# Patient Record
Sex: MALE | Race: WHITE | NOT HISPANIC OR LATINO | Employment: FULL TIME | URBAN - METROPOLITAN AREA
[De-identification: names, ages, dates, MRNs, and addresses within clinical notes are randomized per-mention and may not be internally consistent; named-entity substitution may affect disease eponyms.]

---

## 2017-01-12 ENCOUNTER — OFFICE VISIT (OUTPATIENT)
Dept: PSYCHIATRY | Facility: CLINIC | Age: 47
End: 2017-01-12
Attending: PSYCHIATRY & NEUROLOGY
Payer: MEDICARE

## 2017-01-12 VITALS
SYSTOLIC BLOOD PRESSURE: 143 MMHG | WEIGHT: 184.6 LBS | HEART RATE: 87 BPM | DIASTOLIC BLOOD PRESSURE: 100 MMHG | BODY MASS INDEX: 28.91 KG/M2

## 2017-01-12 DIAGNOSIS — F33.2 MAJOR DEPRESSIVE DISORDER, RECURRENT, SEVERE WITHOUT PSYCHOTIC FEATURES (H): Primary | ICD-10-CM

## 2017-01-12 PROCEDURE — 99212 OFFICE O/P EST SF 10 MIN: CPT | Mod: ZF

## 2017-01-12 RX ORDER — LORAZEPAM 0.5 MG/1
.5-1 TABLET ORAL EVERY 6 HOURS PRN
Qty: 30 TABLET | Refills: 2 | Status: SHIPPED | OUTPATIENT
Start: 2017-01-12 | End: 2018-06-28

## 2017-01-12 RX ORDER — METHYLPHENIDATE HYDROCHLORIDE 10 MG/1
20 TABLET ORAL 3 TIMES DAILY
Qty: 180 TABLET | Refills: 0 | Status: SHIPPED | OUTPATIENT
Start: 2017-01-12 | End: 2017-01-12

## 2017-01-12 RX ORDER — METHYLPHENIDATE HYDROCHLORIDE 10 MG/1
20 TABLET ORAL 3 TIMES DAILY
Qty: 180 TABLET | Refills: 0 | Status: SHIPPED | OUTPATIENT
Start: 2017-01-12 | End: 2017-03-06

## 2017-01-12 NOTE — PATIENT INSTRUCTIONS
Thank you for coming to [facility].    Lab Testing:  **If you had lab testing today and your results are reassuring or normal they will be mailed to you or sent through DrDoctor within 7 days.   **If the lab tests need quick action we will call you with the results.  The phone number we will call with results is # [phone] (home) . If this is not the best number please call our clinic and change the number.    Medication Refills:  If you need any refills please call your pharmacy and they will contact us. Please allow three business for refill processing.   If you need to  your refill at a new pharmacy, please contact the new pharmacy directly. The new pharmacy will help you get your medications transferred.     Scheduling:  If you have any concerns about today's visit or wish to schedule another appointment please call our office during normal business hours [phone] (8-5:00 M-F)    Contact Us:  Please call [phone] during business hours (8-5:00 M-F).  If after clinic hours, or on the weekend, please call  [phone].      MENTAL HEALTH CRISIS NUMBERS:  [facility]:   [facility] - [phone]   Crisis Residence [facility] Residence - [phone]   Walk-In Counseling [facility] [phone]   COPE 24/7 [place] Mobile Team for Adults - [[phone]]; Child - [[phone]]     Crisis Connection - [phone]     [facility] - [phone]   Walk-in counseling [facility] - [phone]   Walk-in counseling [facility] - [phone]   Crisis Residence [facility] Residence - [phone]   Urgent Care Adult Mental Health:   --Drop-in, 24/7 crisis line, and [employer] Mobile Team [[phone]]    CRISIS TEXT LINE: Text [phone] from anywhere, anytime, any crisis 24/7;    OR SEE www.crisistextline.org     Poison Control Center - [phone]    CHILD: [facility] needs assessment  team - [phone]     [facility] - [phone]; or [facility] - [phone]    If you have a medical emergency please call 911or go to the nearest ER.                    _____________________________________________    Again thank you for choosing [facility] and please let us know how we can best partner with you to improve you and your family's health.  You may be receiving a survey in the mail regarding this appointment. We would love to have your feedback, both positive and negative, so please fill out the survey and return it using the provided envolpe. The survey is done by an external company, so your answers are anonymous.

## 2017-01-12 NOTE — MR AVS SNAPSHOT
After Visit Summary   1/12/2017    Sebastien Hoover    MRN: [id number]           Patient Information     Date Of Birth          1970        Visit Information        Provider Department      1/12/2017 1:00 PM Venancio Ochoa MD [facility]        Today's Diagnoses     Major depressive disorder, recurrent, severe without psychotic features (H)    -  1       Care Instructions    Thank you for coming to [facility].    Lab Testing:  **If you had lab testing today and your results are reassuring or normal they will be mailed to you or sent through Smartmarket within 7 days.   **If the lab tests need quick action we will call you with the results.  The phone number we will call with results is # [phone] (home) . If this is not the best number please call our clinic and change the number.    Medication Refills:  If you need any refills please call your pharmacy and they will contact us. Please allow three business for refill processing.   If you need to  your refill at a new pharmacy, please contact the new pharmacy directly. The new pharmacy will help you get your medications transferred.     Scheduling:  If you have any concerns about today's visit or wish to schedule another appointment please call our office during normal business hours [phone] (8-5:00 M-F)    Contact Us:  Please call [phone] during business hours (8-5:00 M-F).  If after clinic hours, or on the weekend, please call  [phone].      MENTAL HEALTH CRISIS NUMBERS:  [facility]:   [facility] - [phone]   Crisis Residence [facility] Residence - [phone]   Walk-In Counseling [facility] - [phone]   COPE 24/7 [place] Mobile Team for Adults - [[phone]]; Child - [[phone]]     Crisis Connection - [phone]     [facility]:   [facility] - [phone]   Walk-in counseling [facility] - [phone]   Walk-in counseling   [facility] [place] [facility] - [phone]   Crisis Residence [facility] Residence - [phone]   Urgent Care Adult Mental Health:   --Drop-in, 24/7 crisis line, and [facility] Mobile Team [[phone]]    CRISIS TEXT LINE: Text [phone] from anywhere, anytime, any crisis 24/7;    OR SEE www.crisistextline.org     Poison Control Center - [phone]    CHILD: [facility] needs assessment team - [phone]     [facility] - [phone]; or [facility] [facility] - [phone]    If you have a medical emergency please call 911or go to the nearest ER.                    _____________________________________________    Again thank you for choosing [facility] and please let us know how we can best partner with you to improve you and your family's health.  You may be receiving a survey in the mail regarding this appointment. We would love to have your feedback, both positive and negative, so please fill out the survey and return it using the provided envolpe. The survey is done by an external company, so your answers are anonymous.           Follow-ups after your visit        Your next 10 appointments already scheduled     Mar 16, 2017  1:00 PM   Adult Med Follow UP with Venancio Ochoa MD   Psychiatry Clinic ([facility])    [place] [id number]  [facility] [address]   [phone]            May 11, 2017  1:00 PM   Adult Med Follow UP with Venancio Ochoa MD   Psychiatry Clinic ([facility])    [address]  [address] [place] [address]   [phone]            Jul 06, 2017  1:00 PM   Adult Med Follow UP with Venancio Ochoa MD   Psychiatry Clinic ([facility])    [address]  [address] [place] [address]   [phone]            Sep 07, 2017  1:30 PM   Adult Med Follow UP with Venancio Ochoa MD   Psychiatry Clinic ([facility])    [address]  [address] [facility] [address]   [phone]            Nov 02, 2017  2:00 PM   Adult Med Follow UP with Venancio Ochoa MD   Psychiatry Clinic ([facility])    [address]  [address] [facility] [address]   [phone]            Jan 04, 2018  1:00 PM   Adult Med Follow UP with Venancio Ochoa MD   Psychiatry Clinic ([facility])    [address]  [address] [facility] [address]   [phone]              Who to contact     Please call your clinic at [phone] to:    Ask questions about your health    Make or cancel appointments    Discuss your medicines    Learn about your test results    Speak to your doctor   If you have compliments or concerns about an experience at your clinic, or if you wish to file a complaint, please contact [facility] Patient Relations at [phone] or email us at [email or web address]         Additional Information About Your Visit        [employer] Information     unbound technologiest gives you secure access to your electronic health record. If you see a primary care provider, you can also send messages to your care team and make appointments. If you have questions, please call your primary care clinic.  If you do not have a primary care provider, please call [phone] and they will assist you.      F[employer] is an electronic gateway that provides easy, online access to your medical records. With [employer], you can request a clinic appointment, read your test results, renew a prescription or communicate with your care team.     To access your existing account, please contact your [facility] Clinic or call [phone] for assistance.        Care EveryWhere ID     This is your Care EveryWhere ID. This could be used by other organizations to access your [facility]  records  [id number]        Your Vitals Were     Pulse                   87            Blood Pressure from Last 3 Encounters:   01/12/17 143/100   12/22/16 119/78   12/16/16 133/87    Weight from Last 3 Encounters:   01/12/17 83.734 kg (184 lb 9.6 oz)   12/12/16 85.548 kg (188 lb 9.6 oz)   12/07/16 85.276 kg (188 lb)              Today, you had the following     No orders found for display         Today's Medication Changes          These changes are accurate as of: 1/12/17  1:21 PM.  If you have any questions, ask your nurse or doctor.               Start taking these medicines.        Dose/Directions    methylphenidate 10 MG tablet   Commonly known as:  RITALIN   Used for:  Major depressive disorder, recurrent, severe without psychotic features (H)   Started by:  Venancio Ochoa MD        Dose:  20 mg   Take 2 tablets (20 mg) by mouth 3 times daily   Quantity:  180 tablet   Refills:  0            Where to get your medicines      Some of these will need a paper prescription and others can be bought over the counter.  Ask your nurse if you have questions.     Bring a paper prescription for each of these medications    - LORazepam 0.5 MG tablet  - methylphenidate 10 MG tablet             Primary Care Provider Office Phone # Fax #    Candy Ridley [phone]       [facility] [address] [facility] [address]        Thank you!     Thank you for choosing [facility]  for your care. Our goal is always to provide you with excellent care. Hearing back from our patients is one way we can continue to improve our services. Please take a few minutes to complete the written survey that you may receive in the mail after your visit with us. Thank you!             Your Updated Medication List - Protect others around you: Learn how to safely use, store and throw away your medicines at www.disposemymeds.org.          This list is accurate as of: 1/12/17  1:21 PM.  Always use  your most recent med list.                   Brand Name Dispense Instructions for use    celecoxib 100 MG capsule    celeBREX    180 capsule    Take 1 capsule (100 mg) by mouth 2 times daily as needed for moderate pain       KETAMINE HCL          lithium 150 MG capsule     90 capsule    Take 1 capsule (150 mg) by mouth daily       LORazepam 0.5 MG tablet    ATIVAN    30 tablet    Take 1-2 tablets (0.5-1 mg) by mouth every 6 hours as needed for anxiety       methylphenidate 10 MG tablet    RITALIN    180 tablet    Take 2 tablets (20 mg) by mouth 3 times daily       sertraline 25 MG tablet    ZOLOFT    45 tablet    Take 0.5 tablets (12.5 mg) by mouth daily

## 2017-01-12 NOTE — NURSING NOTE
Chief Complaint   Patient presents with     Recheck Medication   Major depressive disorder      Reviewed allergies, smoking status, and pharmacy preference  Administered abuse screening questions   Obtained weight, blood pressure and heart rate

## 2017-01-13 NOTE — PROGRESS NOTES
Daniel states that the last 6 weeks were hard.  The past 5-6 days he's been feeling pretty good.  Holidays were stressful and seasonal pattern.  For 2 weeks he had 3 ketamine infusions per week without significant benefit.  Wants to go back to 1 infusion q 3 weeks.  Celebrex didn't help mood.  His mood is currently fair. Sleep is OK.  He has a carbohydrate craving.  Concentration is poor.  Energy and motivation are both down.  He is anhedonic.  No homicidal ideation.  Suicidal ideation without plans.  No flashbacks.  No auditory or visual hallucinations.  No paranoia or ideas of reference.  Anxiety in social situations and from ketamine.  No OCD.  Rare alcohol use and no substance use.      MENTAL STATUS EXAMINATION:  Daniel is cooperative, good grooming.  Affect is neutral.  Movements are normal.  No psychosis or homicidal ideation.  Recent recall intact.  Good eye contact.  Mood is down.  Speech rate is normal.  Thought process is logical.  No current suicidal ideation.  Orientation x 4.      CURRENT MEDICATIONS:   1.  Zoloft 25 mg   2. Ritalin 20 mg TID   3. Lithium 150 mg   4. Lorazepam 0.5 mg prn   5. Ketamine infusions   6. Ketamine nasal spray - 6 sprays in each nostril q 4-5 days      ASSESSMENT:  MDD recurrent moderate to severe intensity refractory to treatment without psychosis      PLAN:  Still looking at having deep TMS.  Looking for psychologist.      This was a 25 minute face-to-face encounter of which >50% of the time was spent on discussion of ketamine frequent and TMS.         FANNY ZELAYA MD             D: 2017 09:54   T: 2017 10:03   MT: JULIUS      Name:     DANIEL HUNT   MRN:      [id number]        Account:      [id number]   :      1970           Service Date: 2017      Document: [id number]

## 2017-02-01 ENCOUNTER — INFUSION THERAPY VISIT (OUTPATIENT)
Dept: INFUSION THERAPY | Facility: CLINIC | Age: 47
End: 2017-02-01
Attending: PSYCHIATRY & NEUROLOGY
Payer: MEDICARE

## 2017-02-01 VITALS
OXYGEN SATURATION: 97 % | DIASTOLIC BLOOD PRESSURE: 80 MMHG | TEMPERATURE: 98.5 F | HEART RATE: 72 BPM | SYSTOLIC BLOOD PRESSURE: 124 MMHG

## 2017-02-01 DIAGNOSIS — F33.2 SEVERE EPISODE OF RECURRENT MAJOR DEPRESSIVE DISORDER, WITHOUT PSYCHOTIC FEATURES (H): Primary | ICD-10-CM

## 2017-02-01 DIAGNOSIS — F33.2 SEVERE RECURRENT MAJOR DEPRESSION WITHOUT PSYCHOTIC FEATURES (H): ICD-10-CM

## 2017-02-01 PROCEDURE — 25000125 ZZHC RX 250: Performed by: PSYCHIATRY & NEUROLOGY

## 2017-02-01 PROCEDURE — 25000128 H RX IP 250 OP 636: Performed by: PSYCHIATRY & NEUROLOGY

## 2017-02-01 PROCEDURE — 96365 THER/PROPH/DIAG IV INF INIT: CPT

## 2017-02-01 RX ADMIN — KETAMINE HYDROCHLORIDE 37.5 MG: 50 INJECTION, SOLUTION INTRAMUSCULAR; INTRAVENOUS at 10:31

## 2017-02-01 NOTE — PROGRESS NOTES
Pt here for ketamine infusion.  Had some extra infusions in Nov and Dec, but states they didn't help as much as he had hoped.  Monitored on cont pulse ox and q 15 min VS during and for 1 hr post infusion.  Tolerated well.  RTC in 3 weeks.

## 2017-02-22 ENCOUNTER — INFUSION THERAPY VISIT (OUTPATIENT)
Dept: INFUSION THERAPY | Facility: CLINIC | Age: 47
End: 2017-02-22
Attending: PSYCHIATRY & NEUROLOGY
Payer: MEDICARE

## 2017-02-22 VITALS
DIASTOLIC BLOOD PRESSURE: 89 MMHG | TEMPERATURE: 98 F | OXYGEN SATURATION: 96 % | RESPIRATION RATE: 16 BRPM | HEART RATE: 72 BPM | SYSTOLIC BLOOD PRESSURE: 126 MMHG

## 2017-02-22 DIAGNOSIS — F33.2 SEVERE RECURRENT MAJOR DEPRESSION WITHOUT PSYCHOTIC FEATURES (H): ICD-10-CM

## 2017-02-22 DIAGNOSIS — F33.2 SEVERE EPISODE OF RECURRENT MAJOR DEPRESSIVE DISORDER, WITHOUT PSYCHOTIC FEATURES (H): Primary | ICD-10-CM

## 2017-02-22 PROCEDURE — 96365 THER/PROPH/DIAG IV INF INIT: CPT

## 2017-02-22 PROCEDURE — 25000125 ZZHC RX 250: Performed by: PSYCHIATRY & NEUROLOGY

## 2017-02-22 PROCEDURE — 25000128 H RX IP 250 OP 636: Performed by: PSYCHIATRY & NEUROLOGY

## 2017-02-22 RX ADMIN — KETAMINE HYDROCHLORIDE 37.5 MG: 50 INJECTION, SOLUTION INTRAMUSCULAR; INTRAVENOUS at 10:39

## 2017-02-22 ASSESSMENT — PAIN SCALES - GENERAL: PAINLEVEL: NO PAIN (0)

## 2017-02-22 NOTE — MR AVS SNAPSHOT
After Visit Summary   2/22/2017    Sebastien Hoover    MRN: [id number]           Patient Information     Date Of Birth          1970        Visit Information        Provider Department      2/22/2017 10:00 AM UR CH 02 [facility], Infusion Services        Today's Diagnoses     Severe episode of recurrent major depressive disorder, without psychotic features (H)    -  1    Severe recurrent major depression without psychotic features (H)           Follow-ups after your visit        Your next 10 appointments already scheduled     Mar 15, 2017 10:00 AM CDT   Level 3 with UR CH 01   [facility], Infusion Services ([facility])    [address]  [address]  [facility] [address]   [phone]            Mar 16, 2017  1:00 PM CDT   Adult Med Follow UP with Venancio Ochoa MD   Psychiatry Clinic ([facility])    [address]  [address] [place] [address]   [phone]            Apr 05, 2017 10:00 AM CDT   Level 3 with UR CH 02   [facility], Infusion Services ([facility])    [address] [place] [address] [address]  [address]  [facility] [address]   [phone]            May 11, 2017  1:00 PM CDT   Adult Med Follow UP with Venancio Ochoa MD   Psychiatry Clinic ([facility])    [address]  [address] [place] [address]   [phone]            Jul 06, 2017  1:00 PM CDT   Adult Med Follow UP with Venancio Ochoa MD   Psychiatry Clinic ([facility])    [address] [place] [address]  [address] [place] [address]   [phone]            Sep 07, 2017  1:30 PM CDT   Adult Med Follow UP with Venancio Ochoa MD   Psychiatry Clinic ([facility])    [address] [place] [address]  [address] [place] [address]   [phone]            Nov 02, 2017  2:00 PM CDT    Adult Med Follow UP with Venancio Ochoa MD   Psychiatry Clinic ([facility])    [address]  [address] [facility] [address] [phone]            Jan 04, 2018  1:00 PM CST   Adult Med Follow UP with Venancio Ochoa MD   Psychiatry Clinic ([facility])    [address] [place] [address]  [address] [facility] [address] [phone]              Who to contact     If you have questions or need follow up information about today's clinic visit or your schedule please contact [facility], INFUSION SERVICES directly at [phone].  Normal or non-critical lab and imaging results will be communicated to you by Digilabhart, letter or phone within 4 business days after the clinic has received the results. If you do not hear from us within 7 days, please contact the clinic through [employer]Sunt or phone. If you have a critical or abnormal lab result, we will notify you by phone as soon as possible.  Submit refill requests through SplashCast or call your pharmacy and they will forward the refill request to us. Please allow 3 business days for your refill to be completed.          Additional Information About Your Visit        [employer] Information     Spl[employer] gives you secure access to your electronic health record. If you see a primary care provider, you can also send messages to your care team and make appointments. If you have questions, please call your primary care clinic.  If you do not have a primary care provider, please call [phone] and they will assist you.        Care EveryWhere ID     This is your Care EveryWhere ID. This could be used by other organizations to access your [facility] medical records  [id number]        Your Vitals Were     Pulse Temperature Respirations Pulse Oximetry          72 98  F (36.7  C) (Oral) 16 96%         Blood Pressure from Last 3 Encounters:   02/22/17 126/89   02/01/17 124/80   12/22/16 119/78    Weight  from Last 3 Encounters:   12/12/16 85.5 kg (188 lb 9.6 oz)   12/07/16 85.3 kg (188 lb)   12/05/16 85.2 kg (187 lb 12.8 oz)              We Performed the Following     Nursing Communication 1     Nursing observation     Pulse oximetry nursing     Vital signs          Today's Medication Changes          These changes are accurate as of: 2/22/17  2:45 PM.  If you have any questions, ask your nurse or doctor.               Stop taking these medicines if you haven't already. Please contact your care team if you have questions.     celecoxib 100 MG capsule   Commonly known as:  celeBREX                    Primary Care Provider Office Phone # Fax #    Candy Sweta Ridley [phone]       [facility] [address] [facility] [address]        Thank you!     Thank you for choosing [facility], INFUSION SERVICES  for your care. Our goal is always to provide you with excellent care. Hearing back from our patients is one way we can continue to improve our services. Please take a few minutes to complete the written survey that you may receive in the mail after your visit with us. Thank you!             Your Updated Medication List - Protect others around you: Learn how to safely use, store and throw away your medicines at www.disposemymeds.org.          This list is accurate as of: 2/22/17  2:45 PM.  Always use your most recent med list.                   Brand Name Dispense Instructions for use    KETAMINE HCL          lithium 150 MG capsule     90 capsule    Take 1 capsule (150 mg) by mouth daily       LORazepam 0.5 MG tablet    ATIVAN    30 tablet    Take 1-2 tablets (0.5-1 mg) by mouth every 6 hours as needed for anxiety       methylphenidate 10 MG tablet    RITALIN    180 tablet    Take 2 tablets (20 mg) by mouth 3 times daily       sertraline 25 MG tablet    ZOLOFT    45 tablet    Take 0.5 tablets (12.5 mg) by mouth daily

## 2017-02-22 NOTE — PROGRESS NOTES
Infusion Nursing Note:  Sebastien Hoover presents today for ketamine.    Patient seen by provider today: No   present during visit today: Not Applicable.    Note: Patient states he is doing ok.    Intravenous Access:  Peripheral IV placed.    Post Infusion Assessment:  Patient tolerated infusion without incident.  Patient observed for 60 minutes post ketamine per protocol.  Blood return noted pre and post infusion.  Site patent and intact, free from redness, edema or discomfort.  No evidence of extravasations.  Access discontinued per protocol.    Discharge Plan:   Patient discharged in stable condition accompanied by: self.  Departure Mode: Ambulatory.    Angela Davis RN

## 2017-03-06 DIAGNOSIS — F33.2 MAJOR DEPRESSIVE DISORDER, RECURRENT, SEVERE WITHOUT PSYCHOTIC FEATURES (H): ICD-10-CM

## 2017-03-06 RX ORDER — METHYLPHENIDATE HYDROCHLORIDE 10 MG/1
20 TABLET ORAL 3 TIMES DAILY
Qty: 180 TABLET | Refills: 0 | Status: SHIPPED | OUTPATIENT
Start: 2017-03-06 | End: 2017-03-16

## 2017-03-08 NOTE — TELEPHONE ENCOUNTER
Writer rec'd hard copy script for Ritalin 20 mg TID.  Script signed by provider.    Per Dr. Ochoa:  Mail Stephans Rx to his home     Script mailed to pharmacy

## 2017-03-15 ENCOUNTER — INFUSION THERAPY VISIT (OUTPATIENT)
Dept: INFUSION THERAPY | Facility: CLINIC | Age: 47
End: 2017-03-15
Attending: PSYCHIATRY & NEUROLOGY
Payer: MEDICARE

## 2017-03-15 VITALS
OXYGEN SATURATION: 96 % | DIASTOLIC BLOOD PRESSURE: 80 MMHG | HEART RATE: 67 BPM | TEMPERATURE: 98 F | RESPIRATION RATE: 16 BRPM | SYSTOLIC BLOOD PRESSURE: 131 MMHG

## 2017-03-15 DIAGNOSIS — F33.2 SEVERE EPISODE OF RECURRENT MAJOR DEPRESSIVE DISORDER, WITHOUT PSYCHOTIC FEATURES (H): Primary | ICD-10-CM

## 2017-03-15 DIAGNOSIS — F33.2 SEVERE RECURRENT MAJOR DEPRESSION WITHOUT PSYCHOTIC FEATURES (H): ICD-10-CM

## 2017-03-15 PROCEDURE — 25000128 H RX IP 250 OP 636: Performed by: PSYCHIATRY & NEUROLOGY

## 2017-03-15 PROCEDURE — 96365 THER/PROPH/DIAG IV INF INIT: CPT

## 2017-03-15 PROCEDURE — 25000125 ZZHC RX 250: Performed by: PSYCHIATRY & NEUROLOGY

## 2017-03-15 RX ADMIN — KETAMINE HYDROCHLORIDE 37.5 MG: 50 INJECTION, SOLUTION INTRAMUSCULAR; INTRAVENOUS at 10:20

## 2017-03-15 ASSESSMENT — PAIN SCALES - GENERAL: PAINLEVEL: NO PAIN (0)

## 2017-03-15 NOTE — MR AVS SNAPSHOT
After Visit Summary   3/15/2017    Sebastien Hoover    MRN: [id number]           Patient Information     Date Of Birth          1970        Visit Information        Provider Department      3/15/2017 10:00 AM UR CH 01 [facility]        Today's Diagnoses     Severe episode of recurrent major depressive disorder, without psychotic features (H)    -  1    Severe recurrent major depression without psychotic features (H)           Follow-ups after your visit        Your next 10 appointments already scheduled     Mar 16, 2017  1:00 PM CDT   Adult Med Follow UP with Venancio Ochoa MD   [facility]    [address]  [address] [place] [address]   [phone]            Apr 05, 2017 10:00 AM CDT   Level 3 with UR CH 02   [facility] ([facility])    [address] [place] [address]   [phone]            May 11, 2017  1:00 PM CDT   Adult Med Follow UP with Venancio Ochoa MD   [facility]    [address]  [address] [place] [address]   [phone]            Jul 06, 2017  1:00 PM CDT   Adult Med Follow UP with Venancio Ochoa MD   [facility]    [address]  [address] [place] [address]   [phone]            Sep 07, 2017  1:30 PM CDT   Adult Med Follow UP with Venancio Ochoa MD   [facility]    [address]  [address] [place] [address]   [phone]            Nov 02, 2017  2:00 PM CDT   Adult Med Follow UP with Venancio Ochoa MD   [facility]    [address]  [address] [place] [address]   [phone]            Jan 04, 2018  1:00 PM  CST   Adult Med Follow UP with Venancio Ochoa MD   Psychiatry Clinic ([facility])    [address]  [address] [facility] [address] [phone]              Who to contact     If you have questions or need follow up information about today's clinic visit or your schedule please contact [facility] [place], INFUSION SERVICES directly at [phone].  Normal or non-critical lab and imaging results will be communicated to you by Method CRMhart, letter or phone within 4 business days after the clinic has received the results. If you do not hear from us within 7 days, please contact the clinic through Wintegrat or phone. If you have a critical or abnormal lab result, we will notify you by phone as soon as possible.  Submit refill requests through [employer] or call your pharmacy and they will forward the refill request to us. Please allow 3 business days for your refill to be completed.          Additional Information About Your Visit        [employer] Information     [employer] gives you secure access to your electronic health record. If you see a primary care provider, you can also send messages to your care team and make appointments. If you have questions, please call your primary care clinic.  If you do not have a primary care provider, please call [phone] and they will assist you.        Care EveryWhere ID     This is your Care EveryWhere ID. This could be used by other organizations to access your [place] medical records  [id number]        Your Vitals Were     Pulse Temperature Respirations Pulse Oximetry          67 98  F (36.7  C) 16 96%         Blood Pressure from Last 3 Encounters:   03/15/17 131/80   02/22/17 126/89   02/01/17 124/80    Weight from Last 3 Encounters:   12/12/16 85.5 kg (188 lb 9.6 oz)   12/07/16 85.3 kg (188 lb)   12/05/16 85.2 kg (187 lb 12.8 oz)              We Performed the Following     Pulse oximetry nursing     Vital signs        Primary Care  Provider Office Phone # Fax #    Candy Ridley [phone]       [facility] [address]  [facility] [address]        Thank you!     Thank you for choosing [facility] SERVICES  for your care. Our goal is always to provide you with excellent care. Hearing back from our patients is one way we can continue to improve our services. Please take a few minutes to complete the written survey that you may receive in the mail after your visit with us. Thank you!             Your Updated Medication List - Protect others around you: Learn how to safely use, store and throw away your medicines at www.disposemymeds.org.          This list is accurate as of: 3/15/17  1:03 PM.  Always use your most recent med list.                   Brand Name Dispense Instructions for use    KETAMINE HCL          lithium 150 MG capsule     90 capsule    Take 1 capsule (150 mg) by mouth daily       LORazepam 0.5 MG tablet    ATIVAN    30 tablet    Take 1-2 tablets (0.5-1 mg) by mouth every 6 hours as needed for anxiety       methylphenidate 10 MG tablet    RITALIN    180 tablet    Take 2 tablets (20 mg) by mouth 3 times daily       sertraline 25 MG tablet    ZOLOFT    45 tablet    Take 0.5 tablets (12.5 mg) by mouth daily

## 2017-03-15 NOTE — PROGRESS NOTES
Infusion Nursing Note:  Sebastien Hoover presents today for ketamine.    Patient seen by provider today: No   present during visit today: Not Applicable.    Note: patient states that depression is stable.    Intravenous Access:  Peripheral IV placed.    Post Infusion Assessment:  Patient tolerated infusion without incident.  Patient observed for 60 minutes post ketamine per protocol.  Blood return noted pre and post infusion.  Site patent and intact, free from redness, edema or discomfort.  No evidence of extravasations.  Access discontinued per protocol.    Discharge Plan:   Patient discharged in stable condition accompanied by: self, has ride.  Departure Mode: Ambulatory.  Will return to clinic in 3 weeks.  Angela Davis RN

## 2017-03-16 ENCOUNTER — OFFICE VISIT (OUTPATIENT)
Dept: PSYCHIATRY | Facility: CLINIC | Age: 47
End: 2017-03-16
Attending: PSYCHIATRY & NEUROLOGY
Payer: MEDICARE

## 2017-03-16 VITALS
SYSTOLIC BLOOD PRESSURE: 139 MMHG | DIASTOLIC BLOOD PRESSURE: 98 MMHG | BODY MASS INDEX: 28.91 KG/M2 | WEIGHT: 184.6 LBS | HEART RATE: 81 BPM

## 2017-03-16 DIAGNOSIS — F33.2 MAJOR DEPRESSIVE DISORDER, RECURRENT, SEVERE WITHOUT PSYCHOTIC FEATURES (H): ICD-10-CM

## 2017-03-16 LAB
FOLATE SERPL-MCNC: 9.1 NG/ML
TSH SERPL DL<=0.005 MIU/L-ACNC: 1.14 MU/L (ref 0.4–4)
VIT B12 SERPL-MCNC: 272 PG/ML (ref 193–986)

## 2017-03-16 PROCEDURE — 84443 ASSAY THYROID STIM HORMONE: CPT | Performed by: PSYCHIATRY & NEUROLOGY

## 2017-03-16 PROCEDURE — 36415 COLL VENOUS BLD VENIPUNCTURE: CPT | Performed by: PSYCHIATRY & NEUROLOGY

## 2017-03-16 PROCEDURE — 82746 ASSAY OF FOLIC ACID SERUM: CPT | Performed by: PSYCHIATRY & NEUROLOGY

## 2017-03-16 PROCEDURE — 99212 OFFICE O/P EST SF 10 MIN: CPT | Mod: ZF

## 2017-03-16 PROCEDURE — 82607 VITAMIN B-12: CPT | Performed by: PSYCHIATRY & NEUROLOGY

## 2017-03-16 RX ORDER — METHYLPHENIDATE HYDROCHLORIDE 10 MG/1
20 TABLET ORAL 3 TIMES DAILY
Qty: 180 TABLET | Refills: 0 | Status: SHIPPED | OUTPATIENT
Start: 2017-03-16 | End: 2017-03-24

## 2017-03-16 RX ORDER — METHYLPHENIDATE HYDROCHLORIDE 10 MG/1
20 TABLET ORAL 3 TIMES DAILY
Qty: 180 TABLET | Refills: 0 | Status: SHIPPED | OUTPATIENT
Start: 2017-03-16 | End: 2017-03-16

## 2017-03-16 NOTE — NURSING NOTE
Chief Complaint   Patient presents with     RECHECK     Severe episode of recurrent major depressive disorder, without psychotic features      Reviewed allergies, smoking status, and pharmacy preference  Administered abuse screening questions   Obtained weight, blood pressure and heart rate   Vilma Miller LPN

## 2017-03-16 NOTE — MR AVS SNAPSHOT
After Visit Summary   3/16/2017    Sebastien Hoover    MRN: [id number]           Patient Information     Date Of Birth          1970        Visit Information        Provider Department      3/16/2017 1:00 PM Venancio Ochoa MD Psychiatry Clinic        Today's Diagnoses     Major depressive disorder, recurrent, severe without psychotic features (H)           Follow-ups after your visit        Your next 10 appointments already scheduled     Apr 05, 2017 10:00 AM CDT   Level 3 with UR CH 02   [facility] ([facility])    [address] [place] [address] [address] [address]   [phone]            May 11, 2017  1:00 PM CDT   Adult Med Follow UP with Venancio Ochoa MD   Psychiatry Clinic ([facility])    [address] [place] [address]  [address] [place] [address]   [phone]            Jul 06, 2017  1:00 PM CDT   Adult Med Follow UP with Venancio Ochoa MD   Psychiatry Clinic ([facility])    [address]  [address] [place] [address]   [phone]            Sep 07, 2017  1:30 PM CDT   Adult Med Follow UP with Venancio Ochoa MD   Psychiatry Clinic ([facility])    [address]  [address] [place] [address]   [phone]            Nov 02, 2017  2:00 PM CDT   Adult Med Follow UP with Venancio Ochoa MD   Psychiatry Clinic ([facility])    [address]  [address] [place] [address]   [phone]            Jan 04, 2018  1:00 PM CST   Adult Med Follow UP with Venancio Ochoa MD   Psychiatry Clinic ([facility])    [address]  [address] [place] [address] [phone]              Who to contact     Please call your clinic at [phone] to:    Ask questions about your health    Make or  cancel appointments    Discuss your medicines    Learn about your test results    Speak to your doctor   If you have compliments or concerns about an experience at your clinic, or if you wish to file a complaint, please contact [facility] Patient Relations at [phone] or email us at [email or web address]         Additional Information About Your Visit        C[employer] Coffee[employer] Information     3LMt gives you secure access to your electronic health record. If you see a primary care provider, you can also send messages to your care team and make appointments. If you have questions, please call your primary care clinic.  If you do not have a primary care provider, please call [phone] and they will assist you.      En[employer] HQ is an electronic gateway that provides easy, online access to your medical records. With [employer] HQ, you can request a clinic appointment, read your test results, renew a prescription or communicate with your care team.     To access your existing account, please contact your [facility] Clinic or call [phone] for assistance.        Care EveryWhere ID     This is your Care EveryWhere ID. This could be used by other organizations to access your [place] medical records  [id number]        Your Vitals Were     Pulse BMI (Body Mass Index)                81 28.91 kg/m2           Blood Pressure from Last 3 Encounters:   03/16/17 (!) 139/98   03/15/17 131/80   02/22/17 126/89    Weight from Last 3 Encounters:   03/16/17 83.7 kg (184 lb 9.6 oz)   01/12/17 83.7 kg (184 lb 9.6 oz)   12/12/16 85.5 kg (188 lb 9.6 oz)              We Performed the Following     Folate     TSH with free T4 reflex     Vitamin B12          Today's Medication Changes          These changes are accurate as of: 3/16/17 11:59 PM.  If you have any questions, ask your nurse or doctor.               Start taking these medicines.        Dose/Directions    methylphenidate 10 MG  tablet   Commonly known as:  RITALIN   Used for:  Major depressive disorder, recurrent, severe without psychotic features (H)   Started by:  Venancio Ochoa MD        Dose:  20 mg   Take 2 tablets (20 mg) by mouth 3 times daily   Quantity:  180 tablet   Refills:  0            Where to get your medicines      Some of these will need a paper prescription and others can be bought over the counter.  Ask your nurse if you have questions.     Bring a paper prescription for each of these medications     methylphenidate 10 MG tablet                Primary Care Provider Office Phone # Fax #    Candy Ridley [phone]       [facility] [address]  [facility] [address]        Thank you!     Thank you for choosing PSYCHIATRY CLINIC  for your care. Our goal is always to provide you with excellent care. Hearing back from our patients is one way we can continue to improve our services. Please take a few minutes to complete the written survey that you may receive in the mail after your visit with us. Thank you!             Your Updated Medication List - Protect others around you: Learn how to safely use, store and throw away your medicines at www.disposemymeds.org.          This list is accurate as of: 3/16/17 11:59 PM.  Always use your most recent med list.                   Brand Name Dispense Instructions for use    KETAMINE HCL          lithium 150 MG capsule     90 capsule    Take 1 capsule (150 mg) by mouth daily       LORazepam 0.5 MG tablet    ATIVAN    30 tablet    Take 1-2 tablets (0.5-1 mg) by mouth every 6 hours as needed for anxiety       methylphenidate 10 MG tablet    RITALIN    180 tablet    Take 2 tablets (20 mg) by mouth 3 times daily       sertraline 25 MG tablet    ZOLOFT    45 tablet    Take 0.5 tablets (12.5 mg) by mouth daily

## 2017-03-17 ENCOUNTER — TELEPHONE (OUTPATIENT)
Dept: PSYCHIATRY | Facility: CLINIC | Age: 47
End: 2017-03-17

## 2017-03-17 NOTE — TELEPHONE ENCOUNTER
Two hard copies for Methylphenidate place in Dr. Ochoa's folder for signature.     Message routed to him.

## 2017-03-20 NOTE — PROGRESS NOTES
Daniel is having seasonal worsening.  Still waiting to start TMS.  Mood is not as good.  Frustrated that he doesn't have the motivation to exercise.  Physical health is good.  Mood is pretty down.  He has early a.m. awakening.  He has a carbohydrate craving.  Concentration is poor.  Energy is down.  Motivation is poor.  No ability to experience pleasure.  No homicidal ideation.  Some passive suicidal ideation.  No auditory or visual hallucinations.  No paranoia or ideas of reference.  Anxiety only during ketamine use.  Panic attacks sometimes during ketamine infusion.  Some checking behaviors.  No alcohol or substance use.      MENTAL STATUS EXAMINATION:  Daniel is cooperative and has good grooming.  Affect is congruent.  Movements are normal.  No psychosis.  No homicidal ideation.  Good recent recall.  Good eye contact.  Mood is sad.  Speech rate is normal.  Thought process is logical.  Passive suicidal ideation.  Orientation x 4.      CURRENT MEDICATIONS:   1.  Lithium 150 mg   2. Ativan 0.5 mg prn   3. Zoloft 25 mg   4. Ritalin 20 mg TID\   5. Ketamine IV q 3 wk & intranasal      ASSESSMENT:  MDD, severe and recurrent without psychosis refractory to treatment.      PLAN:  No med changes.  Discussed his concerns about vitamin levels - will check B12 and folate.      This was a 30 minute face-to-face encounter of which >50% of the time was spent on review of upcoming treatments for depression.         FANNY ZELAYA MD             D: 2017 09:48   T: 2017 09:57   MT: JULIUS      Name:     DANIEL HUNT   MRN:      [id number]        Account:      [id number]   :      1970           Service Date: 2017      Document: [id number]

## 2017-03-21 ENCOUNTER — CARE COORDINATION (OUTPATIENT)
Dept: PSYCHIATRY | Facility: CLINIC | Age: 47
End: 2017-03-21

## 2017-03-24 DIAGNOSIS — F33.2 MAJOR DEPRESSIVE DISORDER, RECURRENT, SEVERE WITHOUT PSYCHOTIC FEATURES (H): ICD-10-CM

## 2017-03-24 RX ORDER — METHYLPHENIDATE HYDROCHLORIDE 10 MG/1
20 TABLET ORAL 3 TIMES DAILY
Qty: 180 TABLET | Refills: 0 | Status: SHIPPED | OUTPATIENT
Start: 2017-03-24 | End: 2017-05-11

## 2017-03-24 RX ORDER — METHYLPHENIDATE HYDROCHLORIDE 10 MG/1
20 TABLET ORAL 3 TIMES DAILY
Qty: 180 TABLET | Refills: 0 | Status: SHIPPED | OUTPATIENT
Start: 2017-03-24 | End: 2017-03-24

## 2017-03-24 NOTE — TELEPHONE ENCOUNTER
Scripts signed by Dr. Ochoa.    Mailed to pt's address at:  [address] [address]  [address] [place] [phone]

## 2017-04-05 ENCOUNTER — INFUSION THERAPY VISIT (OUTPATIENT)
Dept: INFUSION THERAPY | Facility: CLINIC | Age: 47
End: 2017-04-05
Attending: PSYCHIATRY & NEUROLOGY
Payer: MEDICARE

## 2017-04-05 VITALS
DIASTOLIC BLOOD PRESSURE: 88 MMHG | RESPIRATION RATE: 16 BRPM | OXYGEN SATURATION: 93 % | TEMPERATURE: 98.2 F | SYSTOLIC BLOOD PRESSURE: 126 MMHG | HEART RATE: 71 BPM

## 2017-04-05 DIAGNOSIS — F33.2 SEVERE EPISODE OF RECURRENT MAJOR DEPRESSIVE DISORDER, WITHOUT PSYCHOTIC FEATURES (H): Primary | ICD-10-CM

## 2017-04-05 DIAGNOSIS — F33.2 SEVERE RECURRENT MAJOR DEPRESSION WITHOUT PSYCHOTIC FEATURES (H): ICD-10-CM

## 2017-04-05 PROCEDURE — 25000125 ZZHC RX 250: Performed by: PSYCHIATRY & NEUROLOGY

## 2017-04-05 PROCEDURE — 96365 THER/PROPH/DIAG IV INF INIT: CPT

## 2017-04-05 PROCEDURE — 25000128 H RX IP 250 OP 636: Performed by: PSYCHIATRY & NEUROLOGY

## 2017-04-05 RX ADMIN — KETAMINE HYDROCHLORIDE 37.5 MG: 50 INJECTION, SOLUTION INTRAMUSCULAR; INTRAVENOUS at 10:34

## 2017-04-05 ASSESSMENT — PAIN SCALES - GENERAL: PAINLEVEL: NO PAIN (0)

## 2017-04-05 NOTE — PROGRESS NOTES
Infusion Nursing Note:  Sebastien Hoover presents today for ketamine.    Patient seen by provider today: No   present during visit today: Not Applicable.    Note: Patient states he has started a deep TMS, (cranial stimulation).  He also feels that his depression is somewhat worse, and he is going to start coming every 2 weeks.    Intravenous Access:  Peripheral IV placed.    Post Infusion Assessment:  Patient tolerated infusion without incident.  Patient observed for 60 minutes post ketamine per protocol.  Blood return noted pre and post infusion.  Site patent and intact, free from redness, edema or discomfort.  No evidence of extravasations.  Access discontinued per protocol.    Discharge Plan:   Patient discharged in stable condition accompanied by: self.  Departure Mode: Ambulatory.  Will return to clinic in 2 weeks.  Anegla Davis RN

## 2017-04-05 NOTE — MR AVS SNAPSHOT
After Visit Summary   4/5/2017    Sebastien Hoover    MRN: [id number]           Patient Information     Date Of Birth          1970        Visit Information        Provider Department      4/5/2017 10:00 AM UR CH 02 [facility], Infusion Services        Today's Diagnoses     Severe episode of recurrent major depressive disorder, without psychotic features (H)    -  1    Severe recurrent major depression without psychotic features (H)           Follow-ups after your visit        Your next 10 appointments already scheduled     Apr 19, 2017  9:30 AM CDT   Level 3 with UR BD 01   [facility], Infusion Services ([facility])    [address] [place] [address] [address]  [address]  [facility] [address]   [phone]            May 03, 2017 10:00 AM CDT   Level 3 with UR CH 05   [facility], Infusion Services ([facility])    [address] [place] [address] [address]  [address]  [facility] [address]   [phone]            May 11, 2017  1:00 PM CDT   Adult Med Follow UP with Venancio Ochoa MD   Psychiatry Clinic ([facility])    [address]  [address] [place] [address]   [phone]            Jul 06, 2017  1:00 PM CDT   Adult Med Follow UP with Venancio Ochoa MD   Psychiatry Clinic ([facility])    [address]  [address] [place] [address]   [phone]            Sep 07, 2017  1:30 PM CDT   Adult Med Follow UP with Venancio Ochoa MD   Psychiatry Clinic ([facility])    [address]  [address] [place] [address]   [phone]            Nov 02, 2017  2:00 PM CDT   Adult Med Follow UP with Venancio Ochoa MD   Psychiatry Clinic ([facility])    [address]  [address] [place] [address]   [phone]            Jan 04, 2018  1:00 PM CST    Adult Med Follow UP with Venancio Ochoa MD   Psychiatry Clinic ([facility])    [address]  [address] [facility] [address] [phone]              Who to contact     If you have questions or need follow up information about today's clinic visit or your schedule please contact [facility] [place] INFUSION SERVICES directly at [phone].  Normal or non-critical lab and imaging results will be communicated to you by Tablohart, letter or phone within 4 business days after the clinic has received the results. If you do not hear from us within 7 days, please contact the clinic through Frockadvisort or phone. If you have a critical or abnormal lab result, we will notify you by phone as soon as possible.  Submit refill requests through icomply or call your pharmacy and they will forward the refill request to us. Please allow 3 business days for your refill to be completed.          Additional Information About Your Visit        icomply Information     icomply gives you secure access to your electronic health record. If you see a primary care provider, you can also send messages to your care team and make appointments. If you have questions, please call your primary care clinic.  If you do not have a primary care provider, please call [phone] and they will assist you.        Care EveryWhere ID     This is your Care EveryWhere ID. This could be used by other organizations to access your [place] medical records  [id number]        Your Vitals Were     Pulse Temperature Respirations Pulse Oximetry          71 98.2  F (36.8  C) (Oral) 16 93%         Blood Pressure from Last 3 Encounters:   04/05/17 126/88   03/15/17 131/80   02/22/17 126/89    Weight from Last 3 Encounters:   12/12/16 85.5 kg (188 lb 9.6 oz)   12/07/16 85.3 kg (188 lb)   12/05/16 85.2 kg (187 lb 12.8 oz)              We Performed the Following     Pulse oximetry nursing     Vital signs        Primary Care  Provider Office Phone # Fax #    Candy Ridley [phone]       [facility] [address]  [facility] [address]        Thank you!     Thank you for choosing [facility] SERVICES  for your care. Our goal is always to provide you with excellent care. Hearing back from our patients is one way we can continue to improve our services. Please take a few minutes to complete the written survey that you may receive in the mail after your visit with us. Thank you!             Your Updated Medication List - Protect others around you: Learn how to safely use, store and throw away your medicines at www.disposemymeds.org.          This list is accurate as of: 4/5/17  2:58 PM.  Always use your most recent med list.                   Brand Name Dispense Instructions for use    KETAMINE HCL          lithium 150 MG capsule     90 capsule    Take 1 capsule (150 mg) by mouth daily       LORazepam 0.5 MG tablet    ATIVAN    30 tablet    Take 1-2 tablets (0.5-1 mg) by mouth every 6 hours as needed for anxiety       methylphenidate 10 MG tablet    RITALIN    180 tablet    Take 2 tablets (20 mg) by mouth 3 times daily       sertraline 25 MG tablet    ZOLOFT    45 tablet    Take 0.5 tablets (12.5 mg) by mouth daily

## 2017-04-18 DIAGNOSIS — F33.2 MAJOR DEPRESSIVE DISORDER, RECURRENT, SEVERE WITHOUT PSYCHOTIC FEATURES (H): ICD-10-CM

## 2017-04-18 RX ORDER — SERTRALINE HYDROCHLORIDE 25 MG/1
25 TABLET, FILM COATED ORAL DAILY
Qty: 90 TABLET | Refills: 0 | Status: SHIPPED | OUTPATIENT
Start: 2017-04-18 | End: 2017-07-06

## 2017-04-19 ENCOUNTER — INFUSION THERAPY VISIT (OUTPATIENT)
Dept: INFUSION THERAPY | Facility: CLINIC | Age: 47
End: 2017-04-19
Attending: PSYCHIATRY & NEUROLOGY
Payer: MEDICARE

## 2017-04-19 VITALS
TEMPERATURE: 98.3 F | HEART RATE: 71 BPM | DIASTOLIC BLOOD PRESSURE: 79 MMHG | OXYGEN SATURATION: 97 % | SYSTOLIC BLOOD PRESSURE: 128 MMHG

## 2017-04-19 DIAGNOSIS — F33.2 SEVERE RECURRENT MAJOR DEPRESSION WITHOUT PSYCHOTIC FEATURES (H): ICD-10-CM

## 2017-04-19 DIAGNOSIS — F33.2 SEVERE EPISODE OF RECURRENT MAJOR DEPRESSIVE DISORDER, WITHOUT PSYCHOTIC FEATURES (H): Primary | ICD-10-CM

## 2017-04-19 PROCEDURE — 25000125 ZZHC RX 250: Performed by: PSYCHIATRY & NEUROLOGY

## 2017-04-19 PROCEDURE — 25000128 H RX IP 250 OP 636: Performed by: PSYCHIATRY & NEUROLOGY

## 2017-04-19 PROCEDURE — 96365 THER/PROPH/DIAG IV INF INIT: CPT

## 2017-04-19 RX ADMIN — KETAMINE HYDROCHLORIDE 37.5 MG: 50 INJECTION, SOLUTION INTRAMUSCULAR; INTRAVENOUS at 10:00

## 2017-04-19 NOTE — PROGRESS NOTES
Pt here for ketamine.  Has increased to once every 2 weeks in addition to intranasal.  States he is also receiving transcranial magnetic stimulation for depression and has noticed slight improvement.  Monitored on cont pulse ox and q 15 min VS during and for 1 hr post infusion.  Stayed for an additional 20 min at his choice.  RTC in 2 weeks.

## 2017-04-19 NOTE — MR AVS SNAPSHOT
After Visit Summary   4/19/2017    Sebastien Hoover    MRN: [id number]           Patient Information     Date Of Birth          1970        Visit Information        Provider Department      4/19/2017 9:30 AM UR BD 01 [facility], Infusion Services        Today's Diagnoses     Severe episode of recurrent major depressive disorder, without psychotic features (H)    -  1    Severe recurrent major depression without psychotic features (H)           Follow-ups after your visit        Your next 10 appointments already scheduled     May 03, 2017 10:00 AM CDT   Level 3 with UR CH 05   [facility], Infusion Services ([facility])    [address] [place] [address] [address]  [address] [place] [address]   [phone]            May 11, 2017  1:00 PM CDT   Adult Med Follow UP with Venancio Ochoa MD   Psychiatry Clinic ([facility])    [address]  [address] [place] [address]   [phone]            May 17, 2017 10:00 AM CDT   Level 3 with UR CH 03   [facility], Infusion Services ([facility])    [address] [place] [address] [address]  [address] [place] [address]   [phone]            May 31, 2017 10:00 AM CDT   Level 3 with UR CH 03   [facility], Infusion Services ([facility])    [address] [place] [address] [address].  [address]  [facility] [address]   [phone]            Jul 06, 2017  1:00 PM CDT   Adult Med Follow UP with Venancio Ochoa MD   Psychiatry Clinic ([facility])    [facility]  [address]nd [address]  [address] [place] [address]   [phone]            Sep 07, 2017  1:30 PM CDT   Adult Med Follow UP with Venancio Ochoa MD   Psychiatry Clinic ([facility])    [facility]  [address]  [address] [place] [address]   [phone]            Nov 02, 2017  2:00 PM CDT    Adult Med Follow UP with Venancio Ochoa MD   Psychiatry Clinic ([facility])    [address]  [address] [place] [address] [phone]            Jan 04, 2018  1:00 PM CST   Adult Med Follow UP with Venancio Ochoa MD   Psychiatry Clinic ([facility])    [address]  [address] [place] [id number] [phone]              Who to contact     If you have questions or need follow up information about today's clinic visit or your schedule please contact [facility], [place], INFUSION SERVICES directly at [phone].  Normal or non-critical lab and imaging results will be communicated to you by Accelitechart, letter or phone within 4 business days after the clinic has received the results. If you do not hear from us within 7 days, please contact the clinic through Portable Internett or phone. If you have a critical or abnormal lab result, we will notify you by phone as soon as possible.  Submit refill requests through Docphin or call your pharmacy and they will forward the refill request to us. Please allow 3 business days for your refill to be completed.          Additional Information About Your Visit        AccelitecharmParticle Information     Docphin gives you secure access to your electronic health record. If you see a primary care provider, you can also send messages to your care team and make appointments. If you have questions, please call your primary care clinic.  If you do not have a primary care provider, please call [phone] and they will assist you.        Care EveryWhere ID     This is your Care EveryWhere ID. This could be used by other organizations to access your [place] medical records  [id number]        Your Vitals Were     Pulse Temperature Pulse Oximetry             71 98.3  F (36.8  C) (Oral) 97%          Blood Pressure from Last 3 Encounters:   04/19/17 128/79   04/05/17 126/88   03/15/17 131/80    Weight from Last  3 Encounters:   12/12/16 85.5 kg (188 lb 9.6 oz)   12/07/16 85.3 kg (188 lb)   12/05/16 85.2 kg (187 lb 12.8 oz)              We Performed the Following     Pulse oximetry nursing     Vital signs        Primary Care Provider Office Phone # Fax #    Candy Sweta Ridley [phone]       [facility] [address]  [facility] [address]        Thank you!     Thank you for choosing [facility], INFUSION SERVICES  for your care. Our goal is always to provide you with excellent care. Hearing back from our patients is one way we can continue to improve our services. Please take a few minutes to complete the written survey that you may receive in the mail after your visit with us. Thank you!             Your Updated Medication List - Protect others around you: Learn how to safely use, store and throw away your medicines at www.disposemymeds.org.          This list is accurate as of: 4/19/17 12:44 PM.  Always use your most recent med list.                   Brand Name Dispense Instructions for use    KETAMINE HCL          lithium 150 MG capsule     90 capsule    Take 1 capsule (150 mg) by mouth daily       LORazepam 0.5 MG tablet    ATIVAN    30 tablet    Take 1-2 tablets (0.5-1 mg) by mouth every 6 hours as needed for anxiety       methylphenidate 10 MG tablet    RITALIN    180 tablet    Take 2 tablets (20 mg) by mouth 3 times daily       sertraline 25 MG tablet    ZOLOFT    90 tablet    Take 1 tablet (25 mg) by mouth daily

## 2017-05-03 ENCOUNTER — INFUSION THERAPY VISIT (OUTPATIENT)
Dept: INFUSION THERAPY | Facility: CLINIC | Age: 47
End: 2017-05-03
Attending: PSYCHIATRY & NEUROLOGY
Payer: MEDICARE

## 2017-05-03 VITALS
HEART RATE: 85 BPM | SYSTOLIC BLOOD PRESSURE: 133 MMHG | RESPIRATION RATE: 16 BRPM | DIASTOLIC BLOOD PRESSURE: 86 MMHG | OXYGEN SATURATION: 95 %

## 2017-05-03 DIAGNOSIS — F33.2 SEVERE RECURRENT MAJOR DEPRESSION WITHOUT PSYCHOTIC FEATURES (H): ICD-10-CM

## 2017-05-03 DIAGNOSIS — F33.2 SEVERE EPISODE OF RECURRENT MAJOR DEPRESSIVE DISORDER, WITHOUT PSYCHOTIC FEATURES (H): Primary | ICD-10-CM

## 2017-05-03 PROCEDURE — 25000128 H RX IP 250 OP 636: Performed by: PSYCHIATRY & NEUROLOGY

## 2017-05-03 PROCEDURE — 25000125 ZZHC RX 250: Performed by: PSYCHIATRY & NEUROLOGY

## 2017-05-03 PROCEDURE — 96365 THER/PROPH/DIAG IV INF INIT: CPT

## 2017-05-03 RX ADMIN — KETAMINE HYDROCHLORIDE 37.5 MG: 50 INJECTION, SOLUTION INTRAMUSCULAR; INTRAVENOUS at 10:36

## 2017-05-03 NOTE — PROGRESS NOTES
Pt here for ketamine.  Monitored on cont pulse ox and q 15 min VS during and for 1 hr post infusion.  Tolerated well.  RTC next week.

## 2017-05-03 NOTE — MR AVS SNAPSHOT
After Visit Summary   5/3/2017    Sebastien Hoover    MRN: [id number]           Patient Information     Date Of Birth          1970        Visit Information        Provider Department      5/3/2017 10:00 AM UR CH 05 [facility], Infusion Services        Today's Diagnoses     Severe episode of recurrent major depressive disorder, without psychotic features (H)    -  1    Severe recurrent major depression without psychotic features (H)           Follow-ups after your visit        Your next 10 appointments already scheduled     May 11, 2017  1:00 PM CDT   Adult Med Follow UP with Venancio Ochoa MD   Psychiatry Clinic ([facility])    [address]  [address] [place] [address]   [phone]            May 17, 2017 10:00 AM CDT   Level 3 with UR CH 03   [facility] ([facility])    [address] [place] [address] [address].  [address] [place] [address]   [phone]            May 31, 2017 10:00 AM CDT   Level 3 with UR CH 03   [facility] ([facility])    [address] [place] [address] [address].  [address] [place] [address]   [phone]            Jul 06, 2017  1:00 PM CDT   Adult Med Follow UP with Venancio Ochoa MD   Psychiatry Clinic ([facility])    [address]  [facility] [address]   [phone]            Sep 07, 2017  1:30 PM CDT   Adult Med Follow UP with Venancio Ochoa MD   Psychiatry Clinic ([facility])    [address]  [address] [place] [address]   [phone]            Nov 02, 2017  2:00 PM CDT   Adult Med Follow UP with Venancio Ochoa MD   Psychiatry Clinic ([facility])    [address]  [address] [place] [address]   [phone]            Jan 04, 2018  1:00 PM CST    Adult Med Follow UP with Venancio Ochoa MD   Psychiatry Clinic ([facility])    [address]  [address] [facility] [address] [phone]              Who to contact     If you have questions or need follow up information about today's clinic visit or your schedule please contact [facility], [place], INFUSION SERVICES directly at [phone].  Normal or non-critical lab and imaging results will be communicated to you by VPHealthhart, letter or phone within 4 business days after the clinic has received the results. If you do not hear from us within 7 days, please contact the clinic through CSIDt or phone. If you have a critical or abnormal lab result, we will notify you by phone as soon as possible.  Submit refill requests through Huixiaoer or call your pharmacy and they will forward the refill request to us. Please allow 3 business days for your refill to be completed.          Additional Information About Your Visit        Huixiaoer Information     Huixiaoer gives you secure access to your electronic health record. If you see a primary care provider, you can also send messages to your care team and make appointments. If you have questions, please call your primary care clinic.  If you do not have a primary care provider, please call [phone] and they will assist you.        Care EveryWhere ID     This is your Care EveryWhere ID. This could be used by other organizations to access your [place] medical records  [id number]        Your Vitals Were     Pulse Respirations Pulse Oximetry             85 16 95%          Blood Pressure from Last 3 Encounters:   05/03/17 133/86   04/19/17 128/79   04/05/17 126/88    Weight from Last 3 Encounters:   12/12/16 85.5 kg (188 lb 9.6 oz)   12/07/16 85.3 kg (188 lb)   12/05/16 85.2 kg (187 lb 12.8 oz)              We Performed the Following     Nursing Communication 1     Nursing observation     Pulse oximetry nursing     Vital signs         Primary Care Provider Office Phone # Fax #    Candy Ridley [phone]       [facility] [address]  [facility] [address]        Thank you!     Thank you for choosing [facility], [place], [facility] SERVICES  for your care. Our goal is always to provide you with excellent care. Hearing back from our patients is one way we can continue to improve our services. Please take a few minutes to complete the written survey that you may receive in the mail after your visit with us. Thank you!             Your Updated Medication List - Protect others around you: Learn how to safely use, store and throw away your medicines at www.disposemymeds.org.          This list is accurate as of: 5/3/17  2:54 PM.  Always use your most recent med list.                   Brand Name Dispense Instructions for use    KETAMINE HCL          lithium 150 MG capsule     90 capsule    Take 1 capsule (150 mg) by mouth daily       LORazepam 0.5 MG tablet    ATIVAN    30 tablet    Take 1-2 tablets (0.5-1 mg) by mouth every 6 hours as needed for anxiety       methylphenidate 10 MG tablet    RITALIN    180 tablet    Take 2 tablets (20 mg) by mouth 3 times daily       sertraline 25 MG tablet    ZOLOFT    90 tablet    Take 1 tablet (25 mg) by mouth daily

## 2017-05-08 ENCOUNTER — TRANSFERRED RECORDS (OUTPATIENT)
Dept: HEALTH INFORMATION MANAGEMENT | Facility: CLINIC | Age: 47
End: 2017-05-08

## 2017-05-11 ENCOUNTER — OFFICE VISIT (OUTPATIENT)
Dept: PSYCHIATRY | Facility: CLINIC | Age: 47
End: 2017-05-11
Attending: PSYCHIATRY & NEUROLOGY
Payer: MEDICARE

## 2017-05-11 VITALS
SYSTOLIC BLOOD PRESSURE: 142 MMHG | DIASTOLIC BLOOD PRESSURE: 97 MMHG | WEIGHT: 189 LBS | HEART RATE: 101 BPM | BODY MASS INDEX: 29.59 KG/M2

## 2017-05-11 DIAGNOSIS — F33.2 MAJOR DEPRESSIVE DISORDER, RECURRENT, SEVERE WITHOUT PSYCHOTIC FEATURES (H): ICD-10-CM

## 2017-05-11 DIAGNOSIS — F33.2 SEVERE EPISODE OF RECURRENT MAJOR DEPRESSIVE DISORDER, WITHOUT PSYCHOTIC FEATURES (H): Primary | ICD-10-CM

## 2017-05-11 PROCEDURE — 99212 OFFICE O/P EST SF 10 MIN: CPT | Mod: ZF

## 2017-05-11 RX ORDER — METHYLPHENIDATE HYDROCHLORIDE 10 MG/1
20 TABLET ORAL 3 TIMES DAILY
Qty: 180 TABLET | Refills: 0 | Status: SHIPPED | OUTPATIENT
Start: 2017-05-11 | End: 2017-05-11

## 2017-05-11 RX ORDER — CLONIDINE HYDROCHLORIDE 0.1 MG/1
TABLET ORAL
Qty: 60 TABLET | Refills: 1 | Status: SHIPPED | OUTPATIENT
Start: 2017-05-11 | End: 2017-05-23

## 2017-05-11 RX ORDER — METHYLPHENIDATE HYDROCHLORIDE 10 MG/1
20 TABLET ORAL 3 TIMES DAILY
Qty: 180 TABLET | Refills: 0 | Status: SHIPPED | OUTPATIENT
Start: 2017-05-11 | End: 2017-07-06

## 2017-05-11 NOTE — NURSING NOTE
No chief complaint on file.    Reviewed allergies, smoking status, and pharmacy preference  Administered abuse screening questions   Obtained weight, blood pressure and heart rate

## 2017-05-11 NOTE — MR AVS SNAPSHOT
After Visit Summary   5/11/2017    Sebastien Hooevr    MRN: [id number]           Patient Information     Date Of Birth          1970        Visit Information        Provider Department      5/11/2017 1:00 PM Venancio Ochoa MD Psychiatry Clinic        Today's Diagnoses     Severe episode of recurrent major depressive disorder, without psychotic features (H)    -  1    Major depressive disorder, recurrent, severe without psychotic features (H)           Follow-ups after your visit        Your next 10 appointments already scheduled     May 17, 2017 10:00 AM CDT   Level 3 with UR CH 03   [facility], Infusion Services ([facility])    [address] [place] [address] [address]  [address]  [facility] [address]   [phone]            May 31, 2017 10:00 AM CDT   Level 3 with UR CH 03   [facility], Infusion Services ([facility])    [address] [place] [address] [address].  [address]  [facility] [address]   [phone]            Jul 06, 2017  1:00 PM CDT   Adult Med Follow UP with Venancio Ochoa MD   Psychiatry Clinic ([facility])    [address]  [address] [place] [address]   [phone]            Sep 07, 2017  1:30 PM CDT   Adult Med Follow UP with Venancio Ochoa MD   Psychiatry Clinic ([facility])    [address]  [address] [place] [address]   [phone]            Nov 02, 2017  2:00 PM CDT   Adult Med Follow UP with Venancio Ochoa MD   Psychiatry Clinic ([facility])    [address]  [address] [place] [address]   [phone]            Jan 04, 2018  1:00 PM CST   Adult Med Follow UP with Venancio Ochoa MD   Psychiatry Clinic ([facility])    [address]  [address] [place] [address]   [phone]            Mar 01, 2018  1:00  PM CST   Adult Med Follow UP with Venancio Ochoa MD   Psychiatry Clinic ([facility])    [address] [facility] [address] [phone]            May 03, 2018  1:00 PM CDT   Adult Med Follow UP with Venancio Ochoa MD   Psychiatry Clinic ([facility])    [address] [facility] [address] [phone]              Who to contact     Please call your clinic at [phone] to:    Ask questions about your health    Make or cancel appointments    Discuss your medicines    Learn about your test results    Speak to your doctor   If you have compliments or concerns about an experience at your clinic, or if you wish to file a complaint, please contact [facility] Phys[facility] Patient Relations at [phone] or email us at [email or web address]         Additional Information About Your Visit        [email or web address] Information     WorkHands gives you secure access to your electronic health record. If you see a primary care provider, you can also send messages to your care team and make appointments. If you have questions, please call your primary care clinic.  If you do not have a primary care provider, please call [phone] and they will assist you.      WorkHands is an electronic gateway that provides easy, online access to your medical records. With WorkHands, you can request a clinic appointment, read your test results, renew a prescription or communicate with your care team.     To access your existing account, please contact your [facility] Clinic or call [phone] for assistance.        Care EveryWhere ID     This is your Care EveryWhere ID. This could be used by other organizations to access your [place] medical records  [id number]        Your Vitals Were     Pulse BMI (Body Mass Index)                101 29.59 kg/m2           Blood Pressure from Last  3 Encounters:   05/11/17 (!) 142/97   05/03/17 133/86   04/19/17 128/79    Weight from Last 3 Encounters:   05/11/17 85.7 kg (189 lb)   03/16/17 83.7 kg (184 lb 9.6 oz)   01/12/17 83.7 kg (184 lb 9.6 oz)              Today, you had the following     No orders found for display         Today's Medication Changes          These changes are accurate as of: 5/11/17  1:26 PM.  If you have any questions, ask your nurse or doctor.               Start taking these medicines.        Dose/Directions    cloNIDine 0.1 MG tablet   Commonly known as:  CATAPRES   Used for:  Severe episode of recurrent major depressive disorder, without psychotic features (H)   Started by:  Venancio Ochoa MD        1 tablet 1 hour prior to ketamine dose   Quantity:  60 tablet   Refills:  1       methylphenidate 10 MG tablet   Commonly known as:  RITALIN   Used for:  Major depressive disorder, recurrent, severe without psychotic features (H)   Started by:  Venancio Ochoa MD        Dose:  20 mg   Take 2 tablets (20 mg) by mouth 3 times daily   Quantity:  180 tablet   Refills:  0            Where to get your medicines      These medications were sent to [facility] [id number] [place] [address] [address] [facility] [address]  [address] [facility] [address]    Hours:  24-hours Phone:  [phone]     cloNIDine 0.1 MG tablet         Some of these will need a paper prescription and others can be bought over the counter.  Ask your nurse if you have questions.     Bring a paper prescription for each of these medications     methylphenidate 10 MG tablet                Primary Care Provider Office Phone # Fax #    Candy Ridley [phone]       [facility] [address]  [facility] [address]        Thank you!     Thank you for choosing PSYCHIATRY CLINIC  for your care. Our goal is always to provide you with excellent care. Hearing back from our patients is one way we can  continue to improve our services. Please take a few minutes to complete the written survey that you may receive in the mail after your visit with us. Thank you!             Your Updated Medication List - Protect others around you: Learn how to safely use, store and throw away your medicines at www.disposemymeds.org.          This list is accurate as of: 5/11/17  1:26 PM.  Always use your most recent med list.                   Brand Name Dispense Instructions for use    cloNIDine 0.1 MG tablet    CATAPRES    60 tablet    1 tablet 1 hour prior to ketamine dose       KETAMINE HCL          lithium 150 MG capsule     90 capsule    Take 1 capsule (150 mg) by mouth daily       LORazepam 0.5 MG tablet    ATIVAN    30 tablet    Take 1-2 tablets (0.5-1 mg) by mouth every 6 hours as needed for anxiety       methylphenidate 10 MG tablet    RITALIN    180 tablet    Take 2 tablets (20 mg) by mouth 3 times daily       sertraline 25 MG tablet    ZOLOFT    90 tablet    Take 1 tablet (25 mg) by mouth daily

## 2017-05-12 NOTE — PROGRESS NOTES
Daniel had a series of TMS which didn't have benefit.  Feels disappointed.  Due to the time commitment, has not been exercising regularly.  Has paper showing clonidine helps ameliorate side effects of ketamine.  Hope is that he could take higher dose ketamine.  Mood is down all the time.  He has early a.m. awakening.  Has a carbohydrate craving.  Concentration is fair.  Low energy and motivation.  No ability to experience pleasure.  No homicidal ideation.  Passive suicidal ideation.  No flashbacks.  No auditory or visual hallucinations.  No paranoia or ideas of reference.  No OCD.  No alcohol or substance use.      MENTAL STATUS EXAMINATION:  Daniel is cooperative.  He has good grooming.  Affect is serious.  Movements are normal.  No psychosis.  No homicidal ideation.  Recent recall is OK.  Good eye contact.  Mood is down. Speech rate is normal.  Thought process is logical.  Passive suicidal ideation.  Orientation x 4.      CURRENT MEDICATIONS:   1.  Zoloft 25 mg   2. Ritalin 20 mg tid   3. Lithium 150 mg   4. Lorazepam 0.5 mg prn   5. Ketamine (infusion q 2 wk & intranasal 4x/wk)      ASSESSMENT:  MDD, recurrent and severe without psychosis.  Non-response to TMS.  Partial response to ketamine - side effects, limit dose.      PLAN:  Clonidine 0.1 mg prior to ketamine infusion or intranasal to see if blocks side effects.      This was a 30 minute face-to-face encounter of which >50% of the time was spent on discussion of ketamine side effects and potential benefit from clonidine.         FANNY ZELAYA MD             D: 2017 14:05   T: 2017 14:30   MT: JULIUS      Name:     DANIEL HUNT   MRN:      [id number]        Account:      [id number]   :      1970           Service Date: 2017      Document: [id number]

## 2017-05-17 ENCOUNTER — INFUSION THERAPY VISIT (OUTPATIENT)
Dept: INFUSION THERAPY | Facility: CLINIC | Age: 47
End: 2017-05-17
Attending: PSYCHIATRY & NEUROLOGY
Payer: MEDICARE

## 2017-05-17 VITALS
OXYGEN SATURATION: 95 % | DIASTOLIC BLOOD PRESSURE: 83 MMHG | TEMPERATURE: 98.1 F | SYSTOLIC BLOOD PRESSURE: 124 MMHG | RESPIRATION RATE: 16 BRPM | HEART RATE: 82 BPM

## 2017-05-17 DIAGNOSIS — F33.2 SEVERE EPISODE OF RECURRENT MAJOR DEPRESSIVE DISORDER, WITHOUT PSYCHOTIC FEATURES (H): Primary | ICD-10-CM

## 2017-05-17 DIAGNOSIS — F33.2 SEVERE RECURRENT MAJOR DEPRESSION WITHOUT PSYCHOTIC FEATURES (H): ICD-10-CM

## 2017-05-17 PROCEDURE — 25000125 ZZHC RX 250: Performed by: PSYCHIATRY & NEUROLOGY

## 2017-05-17 PROCEDURE — 25000128 H RX IP 250 OP 636: Performed by: PSYCHIATRY & NEUROLOGY

## 2017-05-17 PROCEDURE — 96365 THER/PROPH/DIAG IV INF INIT: CPT

## 2017-05-17 RX ADMIN — KETAMINE HYDROCHLORIDE 37.5 MG: 50 INJECTION, SOLUTION INTRAMUSCULAR; INTRAVENOUS at 10:39

## 2017-05-17 ASSESSMENT — PAIN SCALES - GENERAL: PAINLEVEL: NO PAIN (0)

## 2017-05-17 NOTE — PROGRESS NOTES
Infusion Nursing Note:  Sebastien Hoover presents today for ketamine.    Patient seen by provider today: No   present during visit today: Not Applicable.    Note: Patient states he has been doing some research and finding that a longer infusion seems to be more beneficial.  He is going to talk to Dr. Ochoa regarding this.    Intravenous Access:  Peripheral IV placed.    Post Infusion Assessment:  Patient tolerated infusion without incident.  VS monitored per protocol  Patient observed for 60 minutes post ketamine per protocol.  Blood return noted pre and post infusion.  Site patent and intact, free from redness, edema or discomfort.  No evidence of extravasations.  Access discontinued per protocol.    Discharge Plan:   Patient discharged in stable condition accompanied by: self, has ride home.  Departure Mode: Ambulatory.  Will return to clinic next week.  Angela Davis RN

## 2017-05-17 NOTE — MR AVS SNAPSHOT
After Visit Summary   5/17/2017    Sebastien Hoover    MRN: [id number]           Patient Information     Date Of Birth          1970        Visit Information        Provider Department      5/17/2017 10:00 AM [facility]        Today's Diagnoses     Severe episode of recurrent major depressive disorder, without psychotic features (H)    -  1    Severe recurrent major depression without psychotic features (H)           Follow-ups after your visit        Your next 10 appointments already scheduled     May 24, 2017 12:00 PM CDT   Level 3 with UR BD 01   [facility] ([facility])    [address]  [address]  [facility] [address]   [phone]            May 26, 2017  9:00 AM CDT   Level 3 with UR CH 04   [facility] ([facility])    [address]  [address]  [facility] [address]   [phone]            May 31, 2017 10:00 AM CDT   Level 3 with UR CH 03   [facility] ([facility])    [address]  [address]  [facility] [address]   [phone]            Jun 02, 2017 12:00 PM CDT   Level 3 with UR CH 06   [facility] ([facility])    [address]  [address]  [facility] [address]   [phone]            Jun 07, 2017 12:00 PM CDT   Level 3 with UR CH 05   [facility] ([facility])    [address]  [address]  [facility] [address]   [phone]            Jun 14, 2017 12:00 PM CDT   Level 3 with UR CH 04   [facility] ([facility])    [address]  [address]  [facility] [address]   [phone]            Jun 16, 2017 10:00 AM CDT   Level 3  with UR CH 04   [facility]    [address]  [address]  [facility] [address]   [phone]            Jun 21, 2017 12:00 PM CDT   Level 3 with UR CH 03   [facility])    [address]  [address]  [facility] [address]   [phone]            Jun 28, 2017 12:00 PM CDT   Level 3 with UR CH 06   [facility], Infusion Services ([facility])    [address]  [address]  [facility] [address]   [phone]            Jun 30, 2017 10:00 AM CDT   Level 3 with UR CH 04   [facility], Infusion Services ([facility])    [address]  [address]  [facility] [address]   [phone]              Who to contact     If you have questions or need follow up information about today's clinic visit or your schedule please contact [facility] directly at [phone].  Normal or non-critical lab and imaging results will be communicated to you by [email or web address], letter or phone within 4 business days after the clinic has received the results. If you do not hear from us within 7 days, please contact the clinic through [email or web address] or phone. If you have a critical or abnormal lab result, we will notify you by phone as soon as possible.  Submit refill requests through hoccer or call your pharmacy and they will forward the refill request to us. Please allow 3 business days for your refill to be completed.          Additional Information About Your Visit        [email or web address] Information     hoccer gives you secure access to your electronic health record. If you see a primary care provider, you can also send messages to your care team and make appointments. If you have questions, please call your primary care clinic.  If you do not have a primary care provider,  please call [phone] and they will assist you.        Care EveryWhere ID     This is your Care EveryWhere ID. This could be used by other organizations to access your [place] medical records  [id number]        Your Vitals Were     Pulse Temperature Respirations Pulse Oximetry          82 98.1  F (36.7  C) (Oral) 16 95%         Blood Pressure from Last 3 Encounters:   05/17/17 124/83   05/03/17 133/86   04/19/17 128/79    Weight from Last 3 Encounters:   12/12/16 85.5 kg (188 lb 9.6 oz)   12/07/16 85.3 kg (188 lb)   12/05/16 85.2 kg (187 lb 12.8 oz)              We Performed the Following     Nursing Communication 1     Nursing observation     Pulse oximetry nursing     Vital signs        Primary Care Provider Office Phone # Fax #    Candy Ridley [phone]       [facility] [address]  [facility] [address]        Thank you!     Thank you for choosing [facility], INFUSION SERVICES  for your care. Our goal is always to provide you with excellent care. Hearing back from our patients is one way we can continue to improve our services. Please take a few minutes to complete the written survey that you may receive in the mail after your visit with us. Thank you!             Your Updated Medication List - Protect others around you: Learn how to safely use, store and throw away your medicines at www.disposemymeds.org.          This list is accurate as of: 5/17/17  1:06 PM.  Always use your most recent med list.                   Brand Name Dispense Instructions for use    cloNIDine 0.1 MG tablet    CATAPRES    60 tablet    1 tablet 1 hour prior to ketamine dose       KETAMINE HCL          lithium 150 MG capsule     90 capsule    Take 1 capsule (150 mg) by mouth daily       LORazepam 0.5 MG tablet    ATIVAN    30 tablet    Take 1-2 tablets (0.5-1 mg) by mouth every 6 hours as needed for anxiety       methylphenidate 10 MG tablet    RITALIN    180 tablet    Take 2  tablets (20 mg) by mouth 3 times daily       sertraline 25 MG tablet    ZOLOFT    90 tablet    Take 1 tablet (25 mg) by mouth daily

## 2017-05-22 ENCOUNTER — TELEPHONE (OUTPATIENT)
Dept: PSYCHIATRY | Facility: CLINIC | Age: 47
End: 2017-05-22

## 2017-05-22 DIAGNOSIS — F33.2 SEVERE EPISODE OF RECURRENT MAJOR DEPRESSIVE DISORDER, WITHOUT PSYCHOTIC FEATURES (H): ICD-10-CM

## 2017-05-22 NOTE — TELEPHONE ENCOUNTER
Rec'd email below forwarded by Dr. Ochoa:    From: Sebastien Hoover <[email or web address]>  Date: Mon, May 22, 2017 at 1:31 PM  Subject: clonidine prescription  To: Venancio Ochoa [email or web address]    Rad Ochoa,    I received a voicemail from my [facility] in [place] early last week stating that they needed more information from you in order to fill my prescription for clonidine, and I haven't yet heard from them that it's ready to be picked up, so I was wondering if you could give me any further information about the current status of this prescription.    Thanks,  Sebastien    Per review of EMR, Clonidine 0.1 mg QHS submitted to Jessica on 5/11/17.      Routed to Michelle-LPN to assist with calling pharmacy to see if Rx was rec'd and if there is a possible need for a PA.

## 2017-05-23 RX ORDER — CLONIDINE HYDROCHLORIDE 0.1 MG/1
TABLET ORAL
Qty: 30 TABLET | Refills: 1 | COMMUNITY
Start: 2017-05-23 | End: 2017-07-06

## 2017-05-23 NOTE — TELEPHONE ENCOUNTER
Venancio Ochoa MD Bove, Michelle, RN        Caller: Unspecified (Yesterday,  3:19 PM)                     30 day       Writer called Jessica and was informed that question was in regard to dosing parameters as directions state to take one tab prior to ketamine. Pharmacy uncertain how often ketamine is given.  Rx updated to reflect a quantity of #30 tabs/ 30 days.

## 2017-05-23 NOTE — TELEPHONE ENCOUNTER
Spoke with pharmacy and they stated that they received script for Clonidine and it does not need a PA. The only question they had was whether or not we wanted to send new script for 90-day supply and stated that is what pt prefers. I stated that I would need to check with provider before doing so and pharmacist stated they will just fill the one-month supply for now. Michelle Braden, CLARISSAN

## 2017-05-24 ENCOUNTER — INFUSION THERAPY VISIT (OUTPATIENT)
Dept: INFUSION THERAPY | Facility: CLINIC | Age: 47
End: 2017-05-24
Attending: PSYCHIATRY & NEUROLOGY
Payer: MEDICARE

## 2017-05-24 VITALS
DIASTOLIC BLOOD PRESSURE: 71 MMHG | TEMPERATURE: 98.5 F | HEART RATE: 67 BPM | OXYGEN SATURATION: 98 % | RESPIRATION RATE: 16 BRPM | SYSTOLIC BLOOD PRESSURE: 113 MMHG

## 2017-05-24 DIAGNOSIS — F33.2 SEVERE EPISODE OF RECURRENT MAJOR DEPRESSIVE DISORDER, WITHOUT PSYCHOTIC FEATURES (H): Primary | ICD-10-CM

## 2017-05-24 DIAGNOSIS — F33.2 SEVERE RECURRENT MAJOR DEPRESSION WITHOUT PSYCHOTIC FEATURES (H): ICD-10-CM

## 2017-05-24 PROCEDURE — 96365 THER/PROPH/DIAG IV INF INIT: CPT

## 2017-05-24 PROCEDURE — 25000125 ZZHC RX 250: Performed by: PSYCHIATRY & NEUROLOGY

## 2017-05-24 PROCEDURE — 25000128 H RX IP 250 OP 636: Performed by: PSYCHIATRY & NEUROLOGY

## 2017-05-24 RX ADMIN — KETAMINE HYDROCHLORIDE 37.5 MG: 50 INJECTION, SOLUTION INTRAMUSCULAR; INTRAVENOUS at 12:31

## 2017-05-24 NOTE — PROGRESS NOTES
Pt here for ketamine infusion.  Monitored on cont pulse ox and q 15 min VS during and for 1 hr post infusion.  Tolerated well.  RTC Friday.

## 2017-05-24 NOTE — MR AVS SNAPSHOT
After Visit Summary   5/24/2017    Sebastien Hoover    MRN: [id number]           Patient Information     Date Of Birth          1970        Visit Information        Provider Department      5/24/2017 12:00 PM UR BD [facility]        Today's Diagnoses     Severe episode of recurrent major depressive disorder, without psychotic features (H)    -  1    Severe recurrent major depression without psychotic features (H)           Follow-ups after your visit        Your next 10 appointments already scheduled     May 26, 2017  9:00 AM CDT   Level 3 with UR CH 04   [facility] ([facility])    [address]  [address]  [facility] [address]   [phone]            May 31, 2017 10:00 AM CDT   Level 3 with UR CH 03   [facility] ([facility])    [address]  [address]  [facility] [address]   [phone]            Jun 02, 2017 12:00 PM CDT   Level 3 with UR CH 06   [facility] ([facility])    [address]  [address]  [facility] [address]   [phone]            Jun 07, 2017 12:00 PM CDT   Level 3 with UR CH 05   [facility] ([facility])    [address]  [address]  [facility] [address]   [phone]            Jun 14, 2017 12:00 PM CDT   Level 3 with UR CH 04   [facility] ([facility])    [address]  [address]  [facility] [address]   [phone]            Jun 16, 2017 10:00 AM CDT   Level 3 with UR CH 04   [facility] ([facility])    [address]  [facility] [address]   [phone]            Jun 21, 2017 12:00 PM CDT   Level 3  with UR CH 03   [facility] ([facility])    [address]  [facility] [address]   [phone]            Jun 28, 2017 12:00 PM CDT   Level 3 with UR CH 06   [facility] ([facility])    [address]  [facility] [address]   [phone]            Jun 30, 2017 10:00 AM CDT   Level 3 with UR CH 04   [facility] [place], Infusion Services ([facility])    [address]  [facility] [address]   [phone]            Jul 05, 2017 10:30 AM CDT   Level 3 with UR CH 02   [facility], Infusion Services ([facility])    [address] [facility]  [facility] [address]   [phone]              Who to contact     If you have questions or need follow up information about today's clinic visit or your schedule please contact [facility] directly at [phone].  Normal or non-critical lab and imaging results will be communicated to you by Kofaxhart, letter or phone within 4 business days after the clinic has received the results. If you do not hear from us within 7 days, please contact the clinic through Kofaxhart or phone. If you have a critical or abnormal lab result, we will notify you by phone as soon as possible.  Submit refill requests through Geofusion or call your pharmacy and they will forward the refill request to us. Please allow 3 business days for your refill to be completed.          Additional Information About Your Visit        Kofaxhart Information     Geofusion gives you secure access to your electronic health record. If you see a primary care provider, you can also send messages to your care team and make appointments. If you have questions, please call your primary care clinic.  If you do not have a primary care provider,  please call [phone] and they will assist you.        Care EveryWhere ID     This is your Care EveryWhere ID. This could be used by other organizations to access your [place] medical records  [id number]        Your Vitals Were     Pulse Temperature Respirations Pulse Oximetry          67 98.5  F (36.9  C) (Oral) 16 98%         Blood Pressure from Last 3 Encounters:   05/24/17 113/71   05/17/17 124/83   05/03/17 133/86    Weight from Last 3 Encounters:   12/12/16 85.5 kg (188 lb 9.6 oz)   12/07/16 85.3 kg (188 lb)   12/05/16 85.2 kg (187 lb 12.8 oz)              Today, you had the following     No orders found for display       Primary Care Provider Office Phone # Fax #    Candy Ridley [phone]       [facility] [address]  [facility] [address]        Thank you!     Thank you for choosing [facility], INFUSION SERVICES  for your care. Our goal is always to provide you with excellent care. Hearing back from our patients is one way we can continue to improve our services. Please take a few minutes to complete the written survey that you may receive in the mail after your visit with us. Thank you!             Your Updated Medication List - Protect others around you: Learn how to safely use, store and throw away your medicines at www.disposemymeds.org.          This list is accurate as of: 5/24/17  2:35 PM.  Always use your most recent med list.                   Brand Name Dispense Instructions for use    cloNIDine 0.1 MG tablet    CATAPRES    30 tablet    Take 1 tablet 1 hour prior to scheduled ketamine dose       KETAMINE HCL          lithium 150 MG capsule     90 capsule    Take 1 capsule (150 mg) by mouth daily       LORazepam 0.5 MG tablet    ATIVAN    30 tablet    Take 1-2 tablets (0.5-1 mg) by mouth every 6 hours as needed for anxiety       methylphenidate 10 MG tablet    RITALIN    180 tablet    Take 2 tablets (20 mg) by mouth 3 times daily        sertraline 25 MG tablet    ZOLOFT    90 tablet    Take 1 tablet (25 mg) by mouth daily

## 2017-05-26 ENCOUNTER — INFUSION THERAPY VISIT (OUTPATIENT)
Dept: INFUSION THERAPY | Facility: CLINIC | Age: 47
End: 2017-05-26
Attending: PSYCHIATRY & NEUROLOGY
Payer: MEDICARE

## 2017-05-26 VITALS
TEMPERATURE: 97.8 F | RESPIRATION RATE: 16 BRPM | OXYGEN SATURATION: 94 % | SYSTOLIC BLOOD PRESSURE: 121 MMHG | WEIGHT: 190.26 LBS | BODY MASS INDEX: 29.79 KG/M2 | HEART RATE: 65 BPM | DIASTOLIC BLOOD PRESSURE: 74 MMHG

## 2017-05-26 DIAGNOSIS — F33.2 SEVERE EPISODE OF RECURRENT MAJOR DEPRESSIVE DISORDER, WITHOUT PSYCHOTIC FEATURES (H): Primary | ICD-10-CM

## 2017-05-26 DIAGNOSIS — F33.2 SEVERE RECURRENT MAJOR DEPRESSION WITHOUT PSYCHOTIC FEATURES (H): ICD-10-CM

## 2017-05-26 PROCEDURE — 25000128 H RX IP 250 OP 636: Performed by: PSYCHIATRY & NEUROLOGY

## 2017-05-26 PROCEDURE — 96365 THER/PROPH/DIAG IV INF INIT: CPT

## 2017-05-26 PROCEDURE — 25000125 ZZHC RX 250: Performed by: PSYCHIATRY & NEUROLOGY

## 2017-05-26 RX ADMIN — KETAMINE HYDROCHLORIDE 37.5 MG: 50 INJECTION, SOLUTION INTRAMUSCULAR; INTRAVENOUS at 09:14

## 2017-05-26 ASSESSMENT — PAIN SCALES - GENERAL: PAINLEVEL: NO PAIN (0)

## 2017-05-26 NOTE — PROGRESS NOTES
Infusion Nursing Note:  Sebastien Hoover presents today for ketamine infusion.    Patient seen by provider today: No   present during visit today: Not Applicable.    Note: N/A.    Intravenous Access:  Peripheral IV placed.    Treatment Conditions:  Not Applicable.      Post Infusion Assessment:  Patient tolerated infusion without incident.  Patient observed for 60 minutes post ketamine infusin, per protocol.  Blood return noted pre and post infusion.  Site patent and intact, free from redness, edema or discomfort.  No evidence of extravasations.  Access discontinued per protocol.    Discharge Plan:   Patient discharged in stable condition accompanied by: self.  Departure Mode: Ambulatory.    Angela Gann RN

## 2017-05-26 NOTE — MR AVS SNAPSHOT
After Visit Summary   5/26/2017    Sebastien Hoover    MRN: [id number]           Patient Information     Date Of Birth          1970        Visit Information        Provider Department      5/26/2017 9:00 AM UR CH 04 [facility]        Today's Diagnoses     Severe episode of recurrent major depressive disorder, without psychotic features (H)    -  1    Severe recurrent major depression without psychotic features (H)           Follow-ups after your visit        Your next 10 appointments already scheduled     May 31, 2017 10:00 AM CDT   Level 3 with UR CH 03   [facility] ([facility])    [address]  [address]  [facility] [address]   [phone]            Jun 02, 2017 12:00 PM CDT   Level 3 with UR CH 06   [facility] ([facility])    [address]  [facility] [address]   [phone]            Jun 07, 2017 12:00 PM CDT   Level 3 with UR CH 05   [facility] ([facility])    [address]  [facility] [address]   [phone]            Jun 14, 2017 12:00 PM CDT   Level 3 with UR CH 04   [facility] ([facility])    [address]  [facility] [address]   [phone]            Jun 16, 2017 10:00 AM CDT   Level 3 with UR CH 04   [facility] ([facility])    [address]  [address]  [facility] [address]   [phone]            Jun 21, 2017 12:00 PM CDT   Level 3 with UR CH 03   [facility] ([facility])    [address]  [facility] [address]   [phone]            Jun 28, 2017 12:00 PM CDT   Level 3 with  UR CH 06   [facility], Infusion Services ([facility])    [address]  [address]  [facility] [address]   [phone]            Jun 30, 2017 10:00 AM CDT   Level 3 with UR CH 04   [facility], Infusion Services ([facility])    [address]  [address]  [facility] [address]   [phone]            Jul 05, 2017 10:30 AM CDT   Level 3 with UR CH 02   [facility], Infusion Services ([facility])    [address]  [address]  [facility] [address]   [phone]            Jul 06, 2017  1:00 PM CDT   Adult Med Follow UP with Venancio Ochoa MD   Psychiatry Clinic ([facility])    [place] [id number] [address] [facility] [id number] [phone]              Who to contact     If you have questions or need follow up information about today's clinic visit or your schedule please contact [facility], INFUSION SERVICES directly at [phone].  Normal or non-critical lab and imaging results will be communicated to you by Codemastershart, letter or phone within 4 business days after the clinic has received the results. If you do not hear from us within 7 days, please contact the clinic through Codemastershart or phone. If you have a critical or abnormal lab result, we will notify you by phone as soon as possible.  Submit refill requests through Loginza or call your pharmacy and they will forward the refill request to us. Please allow 3 business days for your refill to be completed.          Additional Information About Your Visit        Codemastershart Information     Loginza gives you secure access to your electronic health record. If you see a primary care provider, you can also send messages to your care team and make appointments. If you have questions, please call your primary care clinic.  If you do not have a primary care provider,  please call [phone] and they will assist you.        Care EveryWhere ID     This is your Care EveryWhere ID. This could be used by other organizations to access your [place] medical records  [id number]        Your Vitals Were     Pulse Temperature Respirations Pulse Oximetry BMI (Body Mass Index)       65 97.8  F (36.6  C) 16 94% 29.79 kg/m2        Blood Pressure from Last 3 Encounters:   05/26/17 121/74   05/24/17 113/71   05/17/17 124/83    Weight from Last 3 Encounters:   05/26/17 86.3 kg (190 lb 4.1 oz)   12/12/16 85.5 kg (188 lb 9.6 oz)   12/07/16 85.3 kg (188 lb)              Today, you had the following     No orders found for display       Primary Care Provider Office Phone # Fax #    Candy Ridley [phone]       [facility] [address]  [facility] [address]        Thank you!     Thank you for choosing [facility], INFUSION SERVICES  for your care. Our goal is always to provide you with excellent care. Hearing back from our patients is one way we can continue to improve our services. Please take a few minutes to complete the written survey that you may receive in the mail after your visit with us. Thank you!             Your Updated Medication List - Protect others around you: Learn how to safely use, store and throw away your medicines at www.disposemymeds.org.          This list is accurate as of: 5/26/17 11:39 AM.  Always use your most recent med list.                   Brand Name Dispense Instructions for use    cloNIDine 0.1 MG tablet    CATAPRES    30 tablet    Take 1 tablet 1 hour prior to scheduled ketamine dose       KETAMINE HCL          lithium 150 MG capsule     90 capsule    Take 1 capsule (150 mg) by mouth daily       LORazepam 0.5 MG tablet    ATIVAN    30 tablet    Take 1-2 tablets (0.5-1 mg) by mouth every 6 hours as needed for anxiety       methylphenidate 10 MG tablet    RITALIN    180 tablet    Take 2 tablets (20 mg) by mouth 3  times daily       sertraline 25 MG tablet    ZOLOFT    90 tablet    Take 1 tablet (25 mg) by mouth daily

## 2017-05-31 ENCOUNTER — INFUSION THERAPY VISIT (OUTPATIENT)
Dept: INFUSION THERAPY | Facility: CLINIC | Age: 47
End: 2017-05-31
Attending: PSYCHIATRY & NEUROLOGY
Payer: MEDICARE

## 2017-05-31 VITALS — HEART RATE: 70 BPM | SYSTOLIC BLOOD PRESSURE: 114 MMHG | OXYGEN SATURATION: 98 % | DIASTOLIC BLOOD PRESSURE: 72 MMHG

## 2017-05-31 DIAGNOSIS — F33.2 SEVERE EPISODE OF RECURRENT MAJOR DEPRESSIVE DISORDER, WITHOUT PSYCHOTIC FEATURES (H): Primary | ICD-10-CM

## 2017-05-31 DIAGNOSIS — F33.2 SEVERE RECURRENT MAJOR DEPRESSION WITHOUT PSYCHOTIC FEATURES (H): ICD-10-CM

## 2017-05-31 PROCEDURE — 96365 THER/PROPH/DIAG IV INF INIT: CPT

## 2017-05-31 PROCEDURE — 25000128 H RX IP 250 OP 636: Performed by: PSYCHIATRY & NEUROLOGY

## 2017-05-31 PROCEDURE — 25000125 ZZHC RX 250: Performed by: PSYCHIATRY & NEUROLOGY

## 2017-05-31 RX ADMIN — KETAMINE HYDROCHLORIDE 37.5 MG: 50 INJECTION, SOLUTION INTRAMUSCULAR; INTRAVENOUS at 10:13

## 2017-05-31 NOTE — MR AVS SNAPSHOT
After Visit Summary   5/31/2017    Sebastien Hoover    MRN: [id number]           Patient Information     Date Of Birth          1970        Visit Information        Provider Department      5/31/2017 10:00 AM [facility]        Today's Diagnoses     Severe episode of recurrent major depressive disorder, without psychotic features (H)    -  1    Severe recurrent major depression without psychotic features (H)           Follow-ups after your visit        Your next 10 appointments already scheduled     Jun 02, 2017 12:00 PM CDT   Level 3 with UR CH 06   [facility] ([facility]    [address]  [facility] [address]   [phone]            Jun 07, 2017 12:00 PM CDT   Level 3 with UR CH 05   [facility]    [address]  [address]  [facility] [address]   [phone]            Jun 14, 2017 12:00 PM CDT   Level 3 with UR CH 04   [facility]    [address]  [facility] [address]   [phone]            Jun 16, 2017 10:00 AM CDT   Level 3 with UR CH 04   [facility]    [address]  [facility] [address]   [phone]            Jun 21, 2017 12:00 PM CDT   Level 3 with UR CH 03   [facility]    [address]  [facility] [address]   [phone]            Jun 28, 2017 12:00 PM CDT   Level 3 with UR CH 06   [facility]    [address]  [facility] [address]   [phone]            Jun 30, 2017 10:00 AM CDT   Level 3  with UR CH 04   [facility] ([facility])    [address]  [facility] [address]   [phone]            Jul 05, 2017 10:30 AM CDT   Level 3 with UR CH 02   [facility] ([facility])    [address]  [facility] [address]   [phone]            Jul 06, 2017  1:00 PM CDT   Adult Med Follow UP with Venancio Ochoa MD   Psychiatry Clinic ([facility])    [address]  [address] [facility] [address]   [phone]            Jul 07, 2017 10:00 AM CDT   Level 3 with UR CH 02   [facility] ([facility])    [address] [place] [address] [address]  [address]  [facility] [address]   [phone]              Who to contact     If you have questions or need follow up information about today's clinic visit or your schedule please contact [facility] directly at [phone].  Normal or non-critical lab and imaging results will be communicated to you by Rootdownhart, letter or phone within 4 business days after the clinic has received the results. If you do not hear from us within 7 days, please contact the clinic through Rootdownhart or phone. If you have a critical or abnormal lab result, we will notify you by phone as soon as possible.  Submit refill requests through Net Orange or call your pharmacy and they will forward the refill request to us. Please allow 3 business days for your refill to be completed.          Additional Information About Your Visit        Rootdownhart Information     Net Orange gives you secure access to your electronic health record. If you see a primary care provider, you can also send messages to your care team and make appointments. If you have questions, please call your primary care clinic.  If you do not have a primary care  provider, please call [phone] and they will assist you.        Care EveryWhere ID     This is your Care EveryWhere ID. This could be used by other organizations to access your [place] medical records  [id number]        Your Vitals Were     Pulse Pulse Oximetry                70 98%           Blood Pressure from Last 3 Encounters:   05/31/17 114/72   05/26/17 121/74   05/24/17 113/71    Weight from Last 3 Encounters:   05/26/17 86.3 kg (190 lb 4.1 oz)   12/12/16 85.5 kg (188 lb 9.6 oz)   12/07/16 85.3 kg (188 lb)              Today, you had the following     No orders found for display       Primary Care Provider Office Phone # Fax #    Candy Ridley [phone]       [facility] [address]  [facility] [address]        Thank you!     Thank you for choosing [facility], [facility] SERVICES  for your care. Our goal is always to provide you with excellent care. Hearing back from our patients is one way we can continue to improve our services. Please take a few minutes to complete the written survey that you may receive in the mail after your visit with us. Thank you!             Your Updated Medication List - Protect others around you: Learn how to safely use, store and throw away your medicines at www.disposemymeds.org.          This list is accurate as of: 5/31/17 12:18 PM.  Always use your most recent med list.                   Brand Name Dispense Instructions for use    cloNIDine 0.1 MG tablet    CATAPRES    30 tablet    Take 1 tablet 1 hour prior to scheduled ketamine dose       KETAMINE HCL          lithium 150 MG capsule     90 capsule    Take 1 capsule (150 mg) by mouth daily       LORazepam 0.5 MG tablet    ATIVAN    30 tablet    Take 1-2 tablets (0.5-1 mg) by mouth every 6 hours as needed for anxiety       methylphenidate 10 MG tablet    RITALIN    180 tablet    Take 2 tablets (20 mg) by mouth 3 times daily       sertraline 25 MG tablet    ZOLOFT    90  tablet    Take 1 tablet (25 mg) by mouth daily

## 2017-06-02 ENCOUNTER — INFUSION THERAPY VISIT (OUTPATIENT)
Dept: INFUSION THERAPY | Facility: CLINIC | Age: 47
End: 2017-06-02
Attending: PSYCHIATRY & NEUROLOGY
Payer: MEDICARE

## 2017-06-02 VITALS
TEMPERATURE: 98.3 F | HEART RATE: 75 BPM | RESPIRATION RATE: 18 BRPM | DIASTOLIC BLOOD PRESSURE: 86 MMHG | OXYGEN SATURATION: 98 % | SYSTOLIC BLOOD PRESSURE: 129 MMHG

## 2017-06-02 DIAGNOSIS — F33.2 SEVERE EPISODE OF RECURRENT MAJOR DEPRESSIVE DISORDER, WITHOUT PSYCHOTIC FEATURES (H): Primary | ICD-10-CM

## 2017-06-02 DIAGNOSIS — F33.2 SEVERE RECURRENT MAJOR DEPRESSION WITHOUT PSYCHOTIC FEATURES (H): ICD-10-CM

## 2017-06-02 PROCEDURE — 96365 THER/PROPH/DIAG IV INF INIT: CPT

## 2017-06-02 PROCEDURE — 96361 HYDRATE IV INFUSION ADD-ON: CPT

## 2017-06-02 PROCEDURE — 25000125 ZZHC RX 250: Performed by: PSYCHIATRY & NEUROLOGY

## 2017-06-02 PROCEDURE — 25000128 H RX IP 250 OP 636: Performed by: PSYCHIATRY & NEUROLOGY

## 2017-06-02 RX ADMIN — KETAMINE HYDROCHLORIDE 37.5 MG: 50 INJECTION, SOLUTION INTRAMUSCULAR; INTRAVENOUS at 12:39

## 2017-06-02 RX ADMIN — SODIUM CHLORIDE 250 ML: 9 INJECTION, SOLUTION INTRAVENOUS at 12:39

## 2017-06-02 NOTE — PROGRESS NOTES
Here for ketamine infusion. Depression is stable. No new health issues. Tolerated infusion. Vitals monitored every 15 minutes. Stayed for 1 hour post infusion. Left when discharged. Gait steady. Has ride home. To return at next appointment.

## 2017-06-02 NOTE — MR AVS SNAPSHOT
After Visit Summary   6/2/2017    Sebastien Hoover    MRN: [id number]           Patient Information     Date Of Birth          1970        Visit Information        Provider Department      6/2/2017 12:00 PM UR CH 06 [facility]        Today's Diagnoses     Severe episode of recurrent major depressive disorder, without psychotic features (H)    -  1    Severe recurrent major depression without psychotic features (H)           Follow-ups after your visit        Your next 10 appointments already scheduled     Jun 07, 2017 12:00 PM CDT   Level 3 with UR CH 05   [facility] ([facility])    [address]  [facility] [address]   [phone]            Jun 14, 2017 12:00 PM CDT   Level 3 with UR CH 04   [facility])    [address]  [facility] [address]   [phone]            Jun 16, 2017 10:00 AM CDT   Level 3 with UR CH 04   [facility])    [address]  [facility] [address]   [phone]            Jun 21, 2017 12:00 PM CDT   Level 3 with UR CH 03   [facility])    [address]  [facility] [address]   [phone]            Jun 28, 2017 12:00 PM CDT   Level 3 with UR CH 06   [facility] ([facility])    [address]  [facility] [address]   [phone]            Jun 30, 2017 10:00 AM CDT   Level 3 with UR CH 04   [facility])    [address]  [facility] [address]   [phone]            Jul 05, 2017 10:30 AM CDT   Level 3 with  UR CH 02   [facility] ([facility])    [address]  [facility] [address]   [phone]            Jul 06, 2017  1:00 PM CDT   Adult Med Follow UP with Venancio Ochoa MD   Psychiatry Clinic ([facility])    [place] [id number]  [address] [facility] [address]   [phone]            Jul 07, 2017 10:00 AM CDT   Level 3 with UR CH 02   [facility], [facility]fusion Services ([facility])    [address] [place] [address] [address]  [address]  [facility] [address]   [phone]            Sep 07, 2017  1:30 PM CDT   Adult Med Follow UP with Venancio Ochoa MD   Psychiatry Clinic ([facility])    [place] [id number]  [address] [facility] [address] [phone]              Who to contact     If you have questions or need follow up information about today's clinic visit or your schedule please contact [facility], INFUSION SERVICES directly at [phone].  Normal or non-critical lab and imaging results will be communicated to you by MyChart, letter or phone within 4 business days after the clinic has received the results. If you do not hear from us within 7 days, please contact the clinic through Emirates Biodieselhart or phone. If you have a critical or abnormal lab result, we will notify you by phone as soon as possible.  Submit refill requests through Recovr or call your pharmacy and they will forward the refill request to us. Please allow 3 business days for your refill to be completed.          Additional Information About Your Visit        [employer] Information     Recovr gives you secure access to your electronic health record. If you see a primary care provider, you can also send messages to your care team and make appointments. If you have questions, please call your primary care clinic.  If you do not have a primary care provider,  please call [phone] and they will assist you.        Care EveryWhere ID     This is your Care EveryWhere ID. This could be used by other organizations to access your [place] medical records  [id number]        Your Vitals Were     Pulse Temperature Respirations Pulse Oximetry          75 98.3  F (36.8  C) (Oral) 18 98%         Blood Pressure from Last 3 Encounters:   06/02/17 129/86   05/31/17 114/72   05/26/17 121/74    Weight from Last 3 Encounters:   05/26/17 86.3 kg (190 lb 4.1 oz)   12/12/16 85.5 kg (188 lb 9.6 oz)   12/07/16 85.3 kg (188 lb)              Today, you had the following     No orders found for display       Primary Care Provider Office Phone # Fax #    Candy Ridley [phone]       [facility] [address]  [facility] [address]        Thank you!     Thank you for choosing [facility], INFUSION SERVICES  for your care. Our goal is always to provide you with excellent care. Hearing back from our patients is one way we can continue to improve our services. Please take a few minutes to complete the written survey that you may receive in the mail after your visit with us. Thank you!             Your Updated Medication List - Protect others around you: Learn how to safely use, store and throw away your medicines at www.disposemymeds.org.          This list is accurate as of: 6/2/17  3:15 PM.  Always use your most recent med list.                   Brand Name Dispense Instructions for use    cloNIDine 0.1 MG tablet    CATAPRES    30 tablet    Take 1 tablet 1 hour prior to scheduled ketamine dose       KETAMINE HCL          lithium 150 MG capsule     90 capsule    Take 1 capsule (150 mg) by mouth daily       LORazepam 0.5 MG tablet    ATIVAN    30 tablet    Take 1-2 tablets (0.5-1 mg) by mouth every 6 hours as needed for anxiety       methylphenidate 10 MG tablet    RITALIN    180 tablet    Take 2 tablets (20 mg) by mouth 3 times daily       sertraline  25 MG tablet    ZOLOFT    90 tablet    Take 1 tablet (25 mg) by mouth daily

## 2017-06-07 ENCOUNTER — INFUSION THERAPY VISIT (OUTPATIENT)
Dept: INFUSION THERAPY | Facility: CLINIC | Age: 47
End: 2017-06-07
Attending: PSYCHIATRY & NEUROLOGY
Payer: MEDICARE

## 2017-06-07 VITALS
TEMPERATURE: 97.9 F | OXYGEN SATURATION: 96 % | RESPIRATION RATE: 16 BRPM | DIASTOLIC BLOOD PRESSURE: 79 MMHG | SYSTOLIC BLOOD PRESSURE: 118 MMHG | HEART RATE: 69 BPM

## 2017-06-07 DIAGNOSIS — F33.2 SEVERE RECURRENT MAJOR DEPRESSION WITHOUT PSYCHOTIC FEATURES (H): ICD-10-CM

## 2017-06-07 DIAGNOSIS — F33.2 SEVERE EPISODE OF RECURRENT MAJOR DEPRESSIVE DISORDER, WITHOUT PSYCHOTIC FEATURES (H): Primary | ICD-10-CM

## 2017-06-07 PROCEDURE — 25000128 H RX IP 250 OP 636: Performed by: PSYCHIATRY & NEUROLOGY

## 2017-06-07 PROCEDURE — 96365 THER/PROPH/DIAG IV INF INIT: CPT

## 2017-06-07 PROCEDURE — 25000125 ZZHC RX 250: Performed by: PSYCHIATRY & NEUROLOGY

## 2017-06-07 PROCEDURE — 96361 HYDRATE IV INFUSION ADD-ON: CPT

## 2017-06-07 RX ADMIN — SODIUM CHLORIDE 250 ML: 9 INJECTION, SOLUTION INTRAVENOUS at 12:19

## 2017-06-07 RX ADMIN — KETAMINE HYDROCHLORIDE 37.5 MG: 50 INJECTION, SOLUTION INTRAMUSCULAR; INTRAVENOUS at 12:20

## 2017-06-07 ASSESSMENT — PAIN SCALES - GENERAL: PAINLEVEL: NO PAIN (0)

## 2017-06-07 NOTE — PROGRESS NOTES
Infusion Nursing Note:  Sebastien BERNABE Kiko presents today for ketamine.    Patient seen by provider today: No   present during visit today: Not Applicable.    Note: No new concerns at this time.  States he is feeling better, but it's not happening as fast as he thought it would.    Intravenous Access:  Peripheral IV placed.    Post Infusion Assessment:  Patient tolerated infusion without incident.  VS monitored per protocol.  Patient observed for 60 minutes post ketamine per protocol.  Blood return noted pre and post infusion.  Site patent and intact, free from redness, edema or discomfort.  No evidence of extravasations.  Access discontinued per protocol.    Discharge Plan:   Patient discharged in stable condition accompanied by: self, has transportation.  Departure Mode: Ambulatory.  Will return to clinic next week.  Angela Davis RN

## 2017-06-07 NOTE — MR AVS SNAPSHOT
After Visit Summary   6/7/2017    Sebastien Hoover    MRN: [id number]           Patient Information     Date Of Birth          1970        Visit Information        Provider Department      6/7/2017 12:00 PM [facility]        Today's Diagnoses     Severe episode of recurrent major depressive disorder, without psychotic features (H)    -  1    Severe recurrent major depression without psychotic features (H)           Follow-ups after your visit        Your next 10 appointments already scheduled     Jun 14, 2017 12:00 PM CDT   Level 3 with UR CH 04   [facility] ([facility])    [address]  [facility] [address]   [phone]            Jun 16, 2017 10:00 AM CDT   Level 3 with UR CH 04   [facility])    [address]  [facility] [address]   [phone]            Jun 21, 2017 12:00 PM CDT   Level 3 with UR CH 03   [facility]    [address]  [facility] [address]   [phone]            Jun 28, 2017 12:00 PM CDT   Level 3 with UR CH 06   [facility])    [address]  [facility] [address]   [phone]            Jun 30, 2017 10:00 AM CDT   Level 3 with UR CH 04   [facility])    [address]  [facility] [address]   [phone]            Jul 05, 2017 10:30 AM CDT   Level 3 with UR CH 02   [facility])    [address]  [facility] [address]   [phone]            Jul 06, 2017  1:00 PM CDT   Adult Med  Follow UP with Venancio Ochoa MD   Psychiatry Clinic ([facility])    [address]  [address] [facility] [address]   [phone]            Jul 07, 2017 10:00 AM CDT   Level 3 with UR CH 02   [facility], Infusion Services ([facility])    [address] [place] [address] [address]  [address]  [facility] [address]   [phone]            Sep 07, 2017  1:30 PM CDT   Adult Med Follow UP with Venancio Ochoa MD   Psychiatry Clinic ([facility])    [address]  [address] [facility] [address]   [phone]            Nov 02, 2017  2:00 PM CDT   Adult Med Follow UP with Venancio Ochoa MD   Psychiatry Clinic ([facility])    [place] [id number]  [address] [facility] [address] [phone]              Who to contact     If you have questions or need follow up information about today's clinic visit or your schedule please contact [facility] directly at [phone].  Normal or non-critical lab and imaging results will be communicated to you by Enkiahart, letter or phone within 4 business days after the clinic has received the results. If you do not hear from us within 7 days, please contact the clinic through Enkiahart or phone. If you have a critical or abnormal lab result, we will notify you by phone as soon as possible.  Submit refill requests through Community Infopoint or call your pharmacy and they will forward the refill request to us. Please allow 3 business days for your refill to be completed.          Additional Information About Your Visit        Community Infopoint Information     Community Infopoint gives you secure access to your electronic health record. If you see a primary care provider, you can also send messages to your care team and make appointments. If you have questions, please call your primary care clinic.  If you do not have a primary care  provider, please call [phone] and they will assist you.        Care EveryWhere ID     This is your Care EveryWhere ID. This could be used by other organizations to access your [place] medical records  [id number]        Your Vitals Were     Pulse Temperature Respirations Pulse Oximetry          69 97.9  F (36.6  C) (Oral) 16 96%         Blood Pressure from Last 3 Encounters:   06/07/17 118/79   06/02/17 129/86   05/31/17 114/72    Weight from Last 3 Encounters:   05/26/17 86.3 kg (190 lb 4.1 oz)   12/12/16 85.5 kg (188 lb 9.6 oz)   12/07/16 85.3 kg (188 lb)              Today, you had the following     No orders found for display       Primary Care Provider Office Phone # Fax #    Candy Ridley [phone]       [facility] [address]  [facility] [address]        Thank you!     Thank you for choosing [facility], INFUSION SERVICES  for your care. Our goal is always to provide you with excellent care. Hearing back from our patients is one way we can continue to improve our services. Please take a few minutes to complete the written survey that you may receive in the mail after your visit with us. Thank you!             Your Updated Medication List - Protect others around you: Learn how to safely use, store and throw away your medicines at www.disposemymeds.org.          This list is accurate as of: 6/7/17  4:10 PM.  Always use your most recent med list.                   Brand Name Dispense Instructions for use    cloNIDine 0.1 MG tablet    CATAPRES    30 tablet    Take 1 tablet 1 hour prior to scheduled ketamine dose       KETAMINE HCL          lithium 150 MG capsule     90 capsule    Take 1 capsule (150 mg) by mouth daily       LORazepam 0.5 MG tablet    ATIVAN    30 tablet    Take 1-2 tablets (0.5-1 mg) by mouth every 6 hours as needed for anxiety       methylphenidate 10 MG tablet    RITALIN    180 tablet    Take 2 tablets (20 mg) by mouth 3 times daily        sertraline 25 MG tablet    ZOLOFT    90 tablet    Take 1 tablet (25 mg) by mouth daily

## 2017-06-14 ENCOUNTER — INFUSION THERAPY VISIT (OUTPATIENT)
Dept: INFUSION THERAPY | Facility: CLINIC | Age: 47
End: 2017-06-14
Attending: PSYCHIATRY & NEUROLOGY
Payer: MEDICARE

## 2017-06-14 VITALS
TEMPERATURE: 98 F | DIASTOLIC BLOOD PRESSURE: 71 MMHG | RESPIRATION RATE: 16 BRPM | SYSTOLIC BLOOD PRESSURE: 121 MMHG | OXYGEN SATURATION: 96 % | HEART RATE: 79 BPM

## 2017-06-14 DIAGNOSIS — F33.2 SEVERE EPISODE OF RECURRENT MAJOR DEPRESSIVE DISORDER, WITHOUT PSYCHOTIC FEATURES (H): Primary | ICD-10-CM

## 2017-06-14 DIAGNOSIS — F33.2 SEVERE RECURRENT MAJOR DEPRESSION WITHOUT PSYCHOTIC FEATURES (H): ICD-10-CM

## 2017-06-14 PROCEDURE — 96361 HYDRATE IV INFUSION ADD-ON: CPT

## 2017-06-14 PROCEDURE — 25000128 H RX IP 250 OP 636: Performed by: PSYCHIATRY & NEUROLOGY

## 2017-06-14 PROCEDURE — 25000125 ZZHC RX 250: Performed by: PSYCHIATRY & NEUROLOGY

## 2017-06-14 PROCEDURE — 96365 THER/PROPH/DIAG IV INF INIT: CPT

## 2017-06-14 RX ADMIN — KETAMINE HYDROCHLORIDE 37.5 MG: 50 INJECTION, SOLUTION INTRAMUSCULAR; INTRAVENOUS at 12:33

## 2017-06-14 RX ADMIN — SODIUM CHLORIDE 250 ML: 9 INJECTION, SOLUTION INTRAVENOUS at 12:33

## 2017-06-14 NOTE — MR AVS SNAPSHOT
After Visit Summary   6/14/2017    Sebastien Hoover    MRN: [id number]           Patient Information     Date Of Birth          1970        Visit Information        Provider Department      6/14/2017 12:00 PM UR CH 04 [facility], Infusion Services        Today's Diagnoses     Severe episode of recurrent major depressive disorder, without psychotic features (H)    -  1    Severe recurrent major depression without psychotic features (H)           Follow-ups after your visit        Your next 10 appointments already scheduled     Jun 16, 2017 10:00 AM CDT   Level 3 with UR CH 04   [facility] ([facility]    [address] [place] [address] [address]  [address]  [facility] [address]   [phone]            Jun 21, 2017 12:00 PM CDT   Level 3 with UR CH 03   [facility], Infusion Services ([facility])    [address] [place] [address] [address]  [address]  [facility] [address]   [phone]            Jun 28, 2017 12:00 PM CDT   Level 3 with UR CH 06   [facility], Infusion Services ([facility])    [address] [place] [address] [address]  [address]  [facility] [address]   [phone]            Jun 30, 2017 10:00 AM CDT   Level 3 with UR CH 04   [facility])    [address] [address]  [address]  [facility] [address]   [phone]            Jul 05, 2017 10:30 AM CDT   Level 3 with UR CH 02   [facility])    [address] [place] [address] [address]  [address]  [facility] [address]   [phone]            Jul 06, 2017  1:00 PM CDT   Adult Med Follow UP with Venancio Ochoa MD   Psychiatry Clinic ([facility])    [address]  [address] [facility] [address]   [phone]            Jul 07, 2017 10:00 AM CDT   Level 3  with UR CH 02   [facility], Infusion Services ([facility])    [address] [place] [address] [address]  [address]  [facility] [address]   [phone]            Sep 07, 2017  1:30 PM CDT   Adult Med Follow UP with Venancio Ochoa MD   Psychiatry Clinic ([facility])    [address]  [address] [facility] [address]   [phone]            Nov 02, 2017  2:00 PM CDT   Adult Med Follow UP with Venancio Ochoa MD   Psychiatry Clinic ([facility])    [address]  [address] [facility] [address]   [phone]            Jan 04, 2018  1:00 PM CST   Adult Med Follow UP with Venacnio Ochoa MD   Psychiatry Clinic ([facility])    [address]  [address] [facility] [address] [phone]              Who to contact     If you have questions or need follow up information about today's clinic visit or your schedule please contact [facility], INFUSION SERVICES directly at [phone].  Normal or non-critical lab and imaging results will be communicated to you by Gearworkshart, letter or phone within 4 business days after the clinic has received the results. If you do not hear from us within 7 days, please contact the clinic through Gearworkshart or phone. If you have a critical or abnormal lab result, we will notify you by phone as soon as possible.  Submit refill requests through [employer] or call your pharmacy and they will forward the refill request to us. Please allow 3 business days for your refill to be completed.          Additional Information About Your Visit        [employer] Computing Information     [employer] gives you secure access to your electronic health record. If you see a primary care provider, you can also send messages to your care team and make appointments. If you have questions, please call your primary care clinic.  If you do not have a primary care  provider, please call [phone] and they will assist you.        Care EveryWhere ID     This is your Care EveryWhere ID. This could be used by other organizations to access your [place] medical records  [id number]        Your Vitals Were     Pulse Temperature Respirations Pulse Oximetry          79 98  F (36.7  C) (Oral) 16 96%         Blood Pressure from Last 3 Encounters:   06/14/17 121/71   06/07/17 118/79   06/02/17 129/86    Weight from Last 3 Encounters:   05/26/17 86.3 kg (190 lb 4.1 oz)   12/12/16 85.5 kg (188 lb 9.6 oz)   12/07/16 85.3 kg (188 lb)              Today, you had the following     No orders found for display       Primary Care Provider Office Phone # Fax #    Candy Ridley [phone]       [facility] [address]  [facility] [address]        Thank you!     Thank you for choosing [facility], INFUSION SERVICES  for your care. Our goal is always to provide you with excellent care. Hearing back from our patients is one way we can continue to improve our services. Please take a few minutes to complete the written survey that you may receive in the mail after your visit with us. Thank you!             Your Updated Medication List - Protect others around you: Learn how to safely use, store and throw away your medicines at www.disposemymeds.org.          This list is accurate as of: 6/14/17  2:45 PM.  Always use your most recent med list.                   Brand Name Dispense Instructions for use    cloNIDine 0.1 MG tablet    CATAPRES    30 tablet    Take 1 tablet 1 hour prior to scheduled ketamine dose       KETAMINE HCL          lithium 150 MG capsule     90 capsule    Take 1 capsule (150 mg) by mouth daily       LORazepam 0.5 MG tablet    ATIVAN    30 tablet    Take 1-2 tablets (0.5-1 mg) by mouth every 6 hours as needed for anxiety       methylphenidate 10 MG tablet    RITALIN    180 tablet    Take 2 tablets (20 mg) by mouth 3 times daily        sertraline 25 MG tablet    ZOLOFT    90 tablet    Take 1 tablet (25 mg) by mouth daily

## 2017-06-16 ENCOUNTER — INFUSION THERAPY VISIT (OUTPATIENT)
Dept: INFUSION THERAPY | Facility: CLINIC | Age: 47
End: 2017-06-16
Attending: PSYCHIATRY & NEUROLOGY
Payer: MEDICARE

## 2017-06-16 VITALS
HEART RATE: 70 BPM | DIASTOLIC BLOOD PRESSURE: 84 MMHG | SYSTOLIC BLOOD PRESSURE: 128 MMHG | TEMPERATURE: 97.5 F | OXYGEN SATURATION: 99 % | RESPIRATION RATE: 16 BRPM

## 2017-06-16 DIAGNOSIS — F33.2 SEVERE EPISODE OF RECURRENT MAJOR DEPRESSIVE DISORDER, WITHOUT PSYCHOTIC FEATURES (H): Primary | ICD-10-CM

## 2017-06-16 DIAGNOSIS — F33.2 SEVERE RECURRENT MAJOR DEPRESSION WITHOUT PSYCHOTIC FEATURES (H): ICD-10-CM

## 2017-06-16 PROCEDURE — 25000125 ZZHC RX 250: Performed by: PSYCHIATRY & NEUROLOGY

## 2017-06-16 PROCEDURE — 96365 THER/PROPH/DIAG IV INF INIT: CPT

## 2017-06-16 PROCEDURE — 25000128 H RX IP 250 OP 636: Performed by: PSYCHIATRY & NEUROLOGY

## 2017-06-16 PROCEDURE — 96361 HYDRATE IV INFUSION ADD-ON: CPT

## 2017-06-16 RX ADMIN — KETAMINE HYDROCHLORIDE 37.5 MG: 50 INJECTION, SOLUTION INTRAMUSCULAR; INTRAVENOUS at 10:18

## 2017-06-16 RX ADMIN — SODIUM CHLORIDE 250 ML: 9 INJECTION, SOLUTION INTRAVENOUS at 10:18

## 2017-06-16 ASSESSMENT — PAIN SCALES - GENERAL: PAINLEVEL: NO PAIN (0)

## 2017-06-16 NOTE — PROGRESS NOTES
Infusion Nursing Note:  Sebastien Hoover presents today for ketamine.    Patient seen by provider today: No   present during visit today: Not Applicable.    Note: Patient states he is not feeling as good as he was hoping to.  He also has some family stressors, (nieces in town).    Intravenous Access:  Peripheral IV placed.    Post Infusion Assessment:  Patient tolerated infusion without incident.VS monitored per protocol. Patient observed for 60 minutes post ketamine per protocol.  Blood return noted pre and post infusion.  Site patent and intact, free from redness, edema or discomfort.  No evidence of extravasations.  Access discontinued per protocol.    Discharge Plan:   Patient discharged in stable condition accompanied by: self, has transportation  Departure Mode: Ambulatory.  Will return to clinic next week.  Angela Davis RN

## 2017-06-16 NOTE — MR AVS SNAPSHOT
After Visit Summary   6/16/2017    Sebastien Hoover    MRN: [id number]           Patient Information     Date Of Birth          1970        Visit Information        Provider Department      6/16/2017 10:00 AM UR CH 04 [facility]        Today's Diagnoses     Severe episode of recurrent major depressive disorder, without psychotic features (H)    -  1    Severe recurrent major depression without psychotic features (H)           Follow-ups after your visit        Your next 10 appointments already scheduled     Jun 21, 2017 12:00 PM CDT   Level 3 with UR CH 03   [facility] ([facility])    [address]  [address]  [facility] [address]   [phone]            Jun 28, 2017 12:00 PM CDT   Level 3 with UR CH 06   [facility] ([facility])    [address] [place] [address] [address]  [address]  [facility] [address]   [phone]            Jun 30, 2017 10:00 AM CDT   Level 3 with UR CH 04   [facility] ([facility])    [address] [address]  [address]  [facility] [address]   [phone]            Jul 05, 2017 10:30 AM CDT   Level 3 with UR CH 02   [facility] ([facility])    [address]  [address]  [facility] [address]   [phone]            Jul 06, 2017  1:00 PM CDT   Adult Med Follow UP with Venancio Ochoa MD   Psychiatry Clinic ([facility])    [address]  [address] [facility] [address]   [phone]            Jul 07, 2017 10:00 AM CDT   Level 3 with UR CH 02   [facility] ([facility])    [address] [place] [address] [address]  [address]  [facility] [address]   [phone]            Sep 07, 2017  1:30 PM CDT   Adult  Med Follow UP with Venancio Ochoa MD   Psychiatry Clinic ([facility])    [address]  [address] [facility] [address]   [phone]            Nov 02, 2017  2:00 PM CDT   Adult Med Follow UP with Venancio Ochoa MD   Psychiatry Clinic ([facility])    [address]  [address] [facility] [address]   [phone]            Jan 04, 2018  1:00 PM CST   Adult Med Follow UP with Venancio Ochoa MD   Psychiatry Clinic ([facility])    [address]  [address] [facility] [address]   [phone]            Mar 01, 2018  1:00 PM CST   Adult Med Follow UP with Venancio Ochoa MD   Psychiatry Clinic ([facility])    [address]  [address] [facility] [address]   [phone]              Who to contact     If you have questions or need follow up information about today's clinic visit or your schedule please contact [facility], [facility] SERVICES directly at [phone].  Normal or non-critical lab and imaging results will be communicated to you by Fashiolistahart, letter or phone within 4 business days after the clinic has received the results. If you do not hear from us within 7 days, please contact the clinic through Fashiolistahart or phone. If you have a critical or abnormal lab result, we will notify you by phone as soon as possible.  Submit refill requests through [employer]Barrister or call your pharmacy and they will forward the refill request to us. Please allow 3 business days for your refill to be completed.          Additional Information About Your Visit        FashiolistaharAppnique Information     [employer]arrister gives you secure access to your electronic health record. If you see a primary care provider, you can also send messages to your care team and make appointments. If you have questions, please call your primary care clinic.  If you do not have a primary  care provider, please call [phone] and they will assist you.        Care EveryWhere ID     This is your Care EveryWhere ID. This could be used by other organizations to access your [place] medical records  [id number]        Your Vitals Were     Pulse Temperature Respirations Pulse Oximetry          70 97.5  F (36.4  C) (Oral) 16 99%         Blood Pressure from Last 3 Encounters:   06/16/17 128/84   06/14/17 121/71   06/07/17 118/79    Weight from Last 3 Encounters:   05/26/17 86.3 kg (190 lb 4.1 oz)   12/12/16 85.5 kg (188 lb 9.6 oz)   12/07/16 85.3 kg (188 lb)              Today, you had the following     No orders found for display       Primary Care Provider Office Phone # Fax #    Candy Ridley [phone]       [facility] [address]  [facility] [address]        Thank you!     Thank you for choosing [facility], INFUSION SERVICES  for your care. Our goal is always to provide you with excellent care. Hearing back from our patients is one way we can continue to improve our services. Please take a few minutes to complete the written survey that you may receive in the mail after your visit with us. Thank you!             Your Updated Medication List - Protect others around you: Learn how to safely use, store and throw away your medicines at www.disposemymeds.org.          This list is accurate as of: 6/16/17 12:37 PM.  Always use your most recent med list.                   Brand Name Dispense Instructions for use    cloNIDine 0.1 MG tablet    CATAPRES    30 tablet    Take 1 tablet 1 hour prior to scheduled ketamine dose       KETAMINE HCL          lithium 150 MG capsule     90 capsule    Take 1 capsule (150 mg) by mouth daily       LORazepam 0.5 MG tablet    ATIVAN    30 tablet    Take 1-2 tablets (0.5-1 mg) by mouth every 6 hours as needed for anxiety       methylphenidate 10 MG tablet    RITALIN    180 tablet    Take 2 tablets (20 mg) by mouth 3 times daily        sertraline 25 MG tablet    ZOLOFT    90 tablet    Take 1 tablet (25 mg) by mouth daily

## 2017-06-21 ENCOUNTER — INFUSION THERAPY VISIT (OUTPATIENT)
Dept: INFUSION THERAPY | Facility: CLINIC | Age: 47
End: 2017-06-21
Attending: PSYCHIATRY & NEUROLOGY
Payer: MEDICARE

## 2017-06-21 VITALS
DIASTOLIC BLOOD PRESSURE: 82 MMHG | OXYGEN SATURATION: 97 % | RESPIRATION RATE: 16 BRPM | HEART RATE: 79 BPM | SYSTOLIC BLOOD PRESSURE: 129 MMHG

## 2017-06-21 DIAGNOSIS — F33.2 SEVERE EPISODE OF RECURRENT MAJOR DEPRESSIVE DISORDER, WITHOUT PSYCHOTIC FEATURES (H): Primary | ICD-10-CM

## 2017-06-21 DIAGNOSIS — F33.2 SEVERE RECURRENT MAJOR DEPRESSION WITHOUT PSYCHOTIC FEATURES (H): ICD-10-CM

## 2017-06-21 PROCEDURE — 96365 THER/PROPH/DIAG IV INF INIT: CPT

## 2017-06-21 PROCEDURE — 96361 HYDRATE IV INFUSION ADD-ON: CPT

## 2017-06-21 PROCEDURE — 25000125 ZZHC RX 250: Performed by: PSYCHIATRY & NEUROLOGY

## 2017-06-21 PROCEDURE — 25000128 H RX IP 250 OP 636: Performed by: PSYCHIATRY & NEUROLOGY

## 2017-06-21 RX ADMIN — SODIUM CHLORIDE 250 ML: 9 INJECTION, SOLUTION INTRAVENOUS at 12:20

## 2017-06-21 RX ADMIN — KETAMINE HYDROCHLORIDE 37.5 MG: 50 INJECTION, SOLUTION INTRAMUSCULAR; INTRAVENOUS at 12:20

## 2017-06-21 NOTE — MR AVS SNAPSHOT
After Visit Summary   6/21/2017    Sebastien Hoover    MRN: [id number]           Patient Information     Date Of Birth          1970        Visit Information        Provider Department      6/21/2017 12:00 PM UR CH 03 [facility], Infusion Services        Today's Diagnoses     Severe episode of recurrent major depressive disorder, without psychotic features (H)    -  1    Severe recurrent major depression without psychotic features (H)           Follow-ups after your visit        Your next 10 appointments already scheduled     Jun 28, 2017 12:00 PM CDT   Level 3 with UR CH 06   [facility], Infusion Services ([facility])    [address] [address]  [address]  [facility] [address]   [phone]            Jun 30, 2017 10:00 AM CDT   Level 3 with UR CH 04   [facility], Infusion Services ([facility])    [address] [place] [address] [address]  [address]  [facility] [address]   [phone]            Jul 05, 2017 10:30 AM CDT   Level 3 with UR CH 02   [facility], Infusion Services ([facility])    [address] [place] [address] [address]  [address]  [facility] [address]   [phone]            Jul 06, 2017  1:00 PM CDT   Adult Med Follow UP with Venancio Ochoa MD   Psychiatry Clinic ([facility])    [address]  [address] [place] [address]   [phone]            Jul 07, 2017 10:00 AM CDT   Level 3 with UR CH 02   [facility], Infusion Services ([facility])    [address] [place] [address] [address]  [address]  [facility] [address]   [phone]            Sep 07, 2017  1:30 PM CDT   Adult Med Follow UP with Venancio Ochoa MD   Psychiatry Clinic ([facility])    [address]  [address] [place] [address]   [phone]            Nov 02, 2017  2:00 PM CDT   Adult  Med Follow UP with Venancio Ochoa MD   Psychiatry Clinic ([facility])    [address]  [address] [facility] [address]   [phone]            Jan 04, 2018  1:00 PM CST   Adult Med Follow UP with Venancio Ochoa MD   Psychiatry Clinic ([facility])    [address]  [address] [facility] [address]   [phone]            Mar 01, 2018  1:00 PM CST   Adult Med Follow UP with Venancio Ochoa MD   Psychiatry Clinic ([facility])    [address]  [address] [facility] [address]   [phone]            May 03, 2018  1:00 PM CDT   Adult Med Follow UP with Venancio Ochoa MD   Psychiatry Clinic ([facility])    [place] [id number]  [address] [facility] [address]   [phone]              Who to contact     If you have questions or need follow up information about today's clinic visit or your schedule please contact [facility] [place], [facility] SERVICES directly at [phone].  Normal or non-critical lab and imaging results will be communicated to you by OpenDrivehart, letter or phone within 4 business days after the clinic has received the results. If you do not hear from us within 7 days, please contact the clinic through OpenDrivehart or phone. If you have a critical or abnormal lab result, we will notify you by phone as soon as possible.  Submit refill requests through [employer] or call your pharmacy and they will forward the refill request to us. Please allow 3 business days for your refill to be completed.          Additional Information About Your Visit        OpenDriveharYour Body by Design Information     [employer] gives you secure access to your electronic health record. If you see a primary care provider, you can also send messages to your care team and make appointments. If you have questions, please call your primary care clinic.  If you do not have a primary  care provider, please call [phone] and they will assist you.        Care EveryWhere ID     This is your Care EveryWhere ID. This could be used by other organizations to access your [place] medical records  [id number]        Your Vitals Were     Pulse Respirations Pulse Oximetry             79 16 97%          Blood Pressure from Last 3 Encounters:   06/21/17 129/82   06/16/17 128/84   06/14/17 121/71    Weight from Last 3 Encounters:   05/26/17 86.3 kg (190 lb 4.1 oz)   12/12/16 85.5 kg (188 lb 9.6 oz)   12/07/16 85.3 kg (188 lb)              Today, you had the following     No orders found for display       Primary Care Provider Office Phone # Fax #    Candy Sweta Ridley [phone]       [facility] [address]  [facility] [address]        Equal Access to Services     TONY ARTHUR : Hadii aad ku hadasho Soomaali, waaxda luqadaha, qaybta kaalmada adeegyada, waxay idiin hayaan adeferny castillo . So [facility] [phone].    ATENCIÓN: Si habla español, tiene a madrigal disposición servicios gratuitos de asistencia lingüística. Parish al [phone].    We comply with applicable federal civil rights laws and [place] laws. We do not discriminate on the basis of race, color, national origin, age, disability sex, sexual orientation or gender identity.            Thank you!     Thank you for choosing [facility], [facility] SERVICES  for your care. Our goal is always to provide you with excellent care. Hearing back from our patients is one way we can continue to improve our services. Please take a few minutes to complete the written survey that you may receive in the mail after your visit with us. Thank you!             Your Updated Medication List - Protect others around you: Learn how to safely use, store and throw away your medicines at www.disposemymeds.org.          This list is accurate as of: 6/21/17  3:01 PM.  Always use your most recent med list.                   Brand  Name Dispense Instructions for use Diagnosis    cloNIDine 0.1 MG tablet    CATAPRES    30 tablet    Take 1 tablet 1 hour prior to scheduled ketamine dose    Severe episode of recurrent major depressive disorder, without psychotic features (H)       KETAMINE HCL           lithium 150 MG capsule     90 capsule    Take 1 capsule (150 mg) by mouth daily    Major depressive disorder, recurrent, severe without psychotic features (H)       LORazepam 0.5 MG tablet    ATIVAN    30 tablet    Take 1-2 tablets (0.5-1 mg) by mouth every 6 hours as needed for anxiety    Major depressive disorder, recurrent, severe without psychotic features (H)       methylphenidate 10 MG tablet    RITALIN    180 tablet    Take 2 tablets (20 mg) by mouth 3 times daily    Major depressive disorder, recurrent, severe without psychotic features (H)       sertraline 25 MG tablet    ZOLOFT    90 tablet    Take 1 tablet (25 mg) by mouth daily    Major depressive disorder, recurrent, severe without psychotic features (H)

## 2017-06-28 ENCOUNTER — INFUSION THERAPY VISIT (OUTPATIENT)
Dept: INFUSION THERAPY | Facility: CLINIC | Age: 47
End: 2017-06-28
Attending: PSYCHIATRY & NEUROLOGY
Payer: MEDICARE

## 2017-06-28 VITALS
TEMPERATURE: 98.2 F | HEART RATE: 75 BPM | OXYGEN SATURATION: 99 % | SYSTOLIC BLOOD PRESSURE: 121 MMHG | RESPIRATION RATE: 18 BRPM | DIASTOLIC BLOOD PRESSURE: 86 MMHG

## 2017-06-28 DIAGNOSIS — F33.2 SEVERE EPISODE OF RECURRENT MAJOR DEPRESSIVE DISORDER, WITHOUT PSYCHOTIC FEATURES (H): Primary | ICD-10-CM

## 2017-06-28 DIAGNOSIS — F33.2 SEVERE RECURRENT MAJOR DEPRESSION WITHOUT PSYCHOTIC FEATURES (H): ICD-10-CM

## 2017-06-28 PROCEDURE — 96365 THER/PROPH/DIAG IV INF INIT: CPT

## 2017-06-28 PROCEDURE — 25000128 H RX IP 250 OP 636: Performed by: PSYCHIATRY & NEUROLOGY

## 2017-06-28 PROCEDURE — 96361 HYDRATE IV INFUSION ADD-ON: CPT

## 2017-06-28 PROCEDURE — 25000125 ZZHC RX 250: Performed by: PSYCHIATRY & NEUROLOGY

## 2017-06-28 RX ADMIN — KETAMINE HYDROCHLORIDE 37.5 MG: 50 INJECTION, SOLUTION INTRAMUSCULAR; INTRAVENOUS at 12:23

## 2017-06-28 RX ADMIN — SODIUM CHLORIDE 250 ML: 9 INJECTION, SOLUTION INTRAVENOUS at 12:23

## 2017-06-28 NOTE — MR AVS SNAPSHOT
After Visit Summary   6/28/2017    Sebastien Hoover    MRN: [id number]           Patient Information     Date Of Birth          1970        Visit Information        Provider Department      6/28/2017 12:00 PM [facility]        Today's Diagnoses     Severe episode of recurrent major depressive disorder, without psychotic features (H)    -  1    Severe recurrent major depression without psychotic features (H)           Follow-ups after your visit        Your next 10 appointments already scheduled     Jun 30, 2017 10:00 AM CDT   Level 3 with UR CH 04   [facility] ([facility])    [address] [place] [address] [address]  [address]  [facility] [address]   [phone]            Jul 05, 2017 10:30 AM CDT   Level 3 with UR CH 02   [facility])    [address] [place] [address] [address]  [address]  [facility] [address]   [phone]            Jul 06, 2017  1:00 PM CDT   Adult Med Follow UP with Venancio Ochoa MD   Psychiatry Clinic ([facility])    [address]  [address] [facility] [address]   [phone]            Jul 07, 2017 10:00 AM CDT   Level 3 with UR CH 02   [facility])    [address] [place] [address] [address]  [address]  [facility] [address]   [phone]            Jul 12, 2017 12:00 PM CDT   Level 3 with UR CH 03   [facility])    [address] [place] [address] [address]  [address]  [facility] [address]   [phone]            Jul 19, 2017 10:00 AM CDT   Level 3 with UR CH 02   [facility])    [address] [place] [address] [address]  [address] [place] [address]   [phone]            Jul 26, 2017 10:00 AM CDT   Level 3  with UR CH 02   [facility], [facility] ([facility])    [address]  [address]  [facility] [address]   [phone]            Aug 02, 2017 10:00 AM CDT   Level 3 with UR CH 01   [facility], Infusion Services ([facility])    [address]  [address]  [facility] [address]   [phone]            Sep 07, 2017  1:30 PM CDT   Adult Med Follow UP with Venancio Ochoa MD   Psychiatry Clinic ([facility])    [address] [place] [address] [facility] [address] [phone]            Nov 02, 2017  2:00 PM CDT   Adult Med Follow UP with Venancio Ochoa MD   Psychiatry Clinic ([facility])    [address] [address] [facility] [id number] [phone]              Who to contact     If you have questions or need follow up information about today's clinic visit or your schedule please contact [facility], INFUSION SERVICES directly at [phone].  Normal or non-critical lab and imaging results will be communicated to you by MyChart, letter or phone within 4 business days after the clinic has received the results. If you do not hear from us within 7 days, please contact the clinic through [employer] or phone. If you have a critical or abnormal lab result, we will notify you by phone as soon as possible.  Submit refill requests through [employer] or call your pharmacy and they will forward the refill request to us. Please allow 3 business days for your refill to be completed.          Additional Information About Your Visit        [employer] Information     [employer] gives you secure access to your electronic health record. If you see a primary care provider, you can also send messages to your care team and make appointments. If you have questions, please call your primary care clinic.  If you do not have a primary care  provider, please call [phone] and they will assist you.        Care EveryWhere ID     This is your Care EveryWhere ID. This could be used by other organizations to access your [place] medical records  [id number]        Your Vitals Were     Pulse Temperature Respirations Pulse Oximetry          75 98.2  F (36.8  C) (Oral) 18 99%         Blood Pressure from Last 3 Encounters:   06/28/17 121/86   06/21/17 129/82   06/16/17 128/84    Weight from Last 3 Encounters:   05/26/17 86.3 kg (190 lb 4.1 oz)   12/12/16 85.5 kg (188 lb 9.6 oz)   12/07/16 85.3 kg (188 lb)              Today, you had the following     No orders found for display       Primary Care Provider Office Phone # Fax #    Candy Sweta Ridley [phone]       [facility] [address]  [facility] [address]        Equal Access to Services     TONY ARTHUR : Hadii aad ku hadasho Soomaali, waaxda luqadaha, qaybta kaalmada adeegyada, waxay idiin hayaan shaggy castillo . So [facility] [phone].    ATENCIÓN: Si habla español, tiene a madrigal disposición servicios gratuitos de asistencia lingüística. Llame al [phone].    We comply with applicable federal civil rights laws and [place] laws. We do not discriminate on the basis of race, color, national origin, age, disability sex, sexual orientation or gender identity.            Thank you!     Thank you for choosing [facility]  for your care. Our goal is always to provide you with excellent care. Hearing back from our patients is one way we can continue to improve our services. Please take a few minutes to complete the written survey that you may receive in the mail after your visit with us. Thank you!             Your Updated Medication List - Protect others around you: Learn how to safely use, store and throw away your medicines at www.disposemymeds.org.          This list is accurate as of: 6/28/17  3:39 PM.  Always use your most recent med  list.                   Brand Name Dispense Instructions for use Diagnosis    cloNIDine 0.1 MG tablet    CATAPRES    30 tablet    Take 1 tablet 1 hour prior to scheduled ketamine dose    Severe episode of recurrent major depressive disorder, without psychotic features (H)       KETAMINE HCL           lithium 150 MG capsule     90 capsule    Take 1 capsule (150 mg) by mouth daily    Major depressive disorder, recurrent, severe without psychotic features (H)       LORazepam 0.5 MG tablet    ATIVAN    30 tablet    Take 1-2 tablets (0.5-1 mg) by mouth every 6 hours as needed for anxiety    Major depressive disorder, recurrent, severe without psychotic features (H)       methylphenidate 10 MG tablet    RITALIN    180 tablet    Take 2 tablets (20 mg) by mouth 3 times daily    Major depressive disorder, recurrent, severe without psychotic features (H)       sertraline 25 MG tablet    ZOLOFT    90 tablet    Take 1 tablet (25 mg) by mouth daily    Major depressive disorder, recurrent, severe without psychotic features (H)

## 2017-06-30 ENCOUNTER — INFUSION THERAPY VISIT (OUTPATIENT)
Dept: INFUSION THERAPY | Facility: CLINIC | Age: 47
End: 2017-06-30
Attending: PSYCHIATRY & NEUROLOGY
Payer: MEDICARE

## 2017-06-30 VITALS
DIASTOLIC BLOOD PRESSURE: 87 MMHG | OXYGEN SATURATION: 98 % | RESPIRATION RATE: 18 BRPM | TEMPERATURE: 98.1 F | HEART RATE: 71 BPM | SYSTOLIC BLOOD PRESSURE: 126 MMHG

## 2017-06-30 DIAGNOSIS — F33.2 SEVERE EPISODE OF RECURRENT MAJOR DEPRESSIVE DISORDER, WITHOUT PSYCHOTIC FEATURES (H): Primary | ICD-10-CM

## 2017-06-30 DIAGNOSIS — F33.2 SEVERE RECURRENT MAJOR DEPRESSION WITHOUT PSYCHOTIC FEATURES (H): ICD-10-CM

## 2017-06-30 PROCEDURE — 25000128 H RX IP 250 OP 636: Performed by: PSYCHIATRY & NEUROLOGY

## 2017-06-30 PROCEDURE — 25000125 ZZHC RX 250: Performed by: PSYCHIATRY & NEUROLOGY

## 2017-06-30 PROCEDURE — 96365 THER/PROPH/DIAG IV INF INIT: CPT

## 2017-06-30 PROCEDURE — 96361 HYDRATE IV INFUSION ADD-ON: CPT

## 2017-06-30 RX ADMIN — SODIUM CHLORIDE 250 ML: 9 INJECTION, SOLUTION INTRAVENOUS at 10:34

## 2017-06-30 RX ADMIN — KETAMINE HYDROCHLORIDE 37.5 MG: 50 INJECTION, SOLUTION INTRAMUSCULAR; INTRAVENOUS at 10:34

## 2017-06-30 NOTE — PROGRESS NOTES
Here for ketamine infusion. Depression is stable. No new health issues. Tolerated infusion. Vitals monitored every 15 minutes. Stayed for 1 hour post infusion. Left when discharged. Gait steady. Has ride home with family.

## 2017-07-05 ENCOUNTER — INFUSION THERAPY VISIT (OUTPATIENT)
Dept: INFUSION THERAPY | Facility: CLINIC | Age: 47
End: 2017-07-05
Attending: PSYCHIATRY & NEUROLOGY
Payer: MEDICARE

## 2017-07-05 VITALS
OXYGEN SATURATION: 95 % | HEART RATE: 72 BPM | TEMPERATURE: 97.9 F | RESPIRATION RATE: 16 BRPM | SYSTOLIC BLOOD PRESSURE: 113 MMHG | DIASTOLIC BLOOD PRESSURE: 61 MMHG

## 2017-07-05 DIAGNOSIS — F33.2 SEVERE EPISODE OF RECURRENT MAJOR DEPRESSIVE DISORDER, WITHOUT PSYCHOTIC FEATURES (H): Primary | ICD-10-CM

## 2017-07-05 DIAGNOSIS — F33.2 SEVERE RECURRENT MAJOR DEPRESSION WITHOUT PSYCHOTIC FEATURES (H): ICD-10-CM

## 2017-07-05 PROCEDURE — 25000125 ZZHC RX 250: Performed by: PSYCHIATRY & NEUROLOGY

## 2017-07-05 PROCEDURE — 96365 THER/PROPH/DIAG IV INF INIT: CPT

## 2017-07-05 PROCEDURE — 96361 HYDRATE IV INFUSION ADD-ON: CPT

## 2017-07-05 PROCEDURE — 25000128 H RX IP 250 OP 636: Performed by: PSYCHIATRY & NEUROLOGY

## 2017-07-05 RX ADMIN — SODIUM CHLORIDE 250 ML: 9 INJECTION, SOLUTION INTRAVENOUS at 10:57

## 2017-07-05 RX ADMIN — KETAMINE HYDROCHLORIDE 37.5 MG: 50 INJECTION, SOLUTION INTRAMUSCULAR; INTRAVENOUS at 10:57

## 2017-07-05 NOTE — PROGRESS NOTES
Infusion Nursing Note:  Sebastien Hoover presents today for ketamine infusion.    Patient seen by provider today: No   present during visit today: Not Applicable.    Note: N/A.    Intravenous Access:  Peripheral IV placed.    Treatment Conditions:  Not Applicable.      Post Infusion Assessment:  Patient tolerated infusion without incident.  Patient observed for 60  minutes post ketamine infusion, per protocol.  Blood return noted pre and post infusion.  Site patent and intact, free from redness, edema or discomfort.  No evidence of extravasations.  Access discontinued per protocol.    Discharge Plan:   Patient discharged in stable condition accompanied by: self.  Departure Mode: Ambulatory.    Angela Gann RN

## 2017-07-05 NOTE — MR AVS SNAPSHOT
After Visit Summary   7/5/2017    Sebastien Hoover    MRN: [id number]           Patient Information     Date Of Birth          1970        Visit Information        Provider Department      7/5/2017 10:30 AM [facility] 02 [facility]        Today's Diagnoses     Severe episode of recurrent major depressive disorder, without psychotic features (H)    -  1    Severe recurrent major depression without psychotic features (H)           Follow-ups after your visit        Your next 10 appointments already scheduled     Jul 06, 2017  1:00 PM CDT   Adult Med Follow UP with Venancio Ochoa MD   Psychiatry Clinic ([facility])    [address]  [address] [facility] [address]   [phone]            Jul 07, 2017 10:00 AM CDT   Level 3 with UR CH 02   [facility] ([facility])    [address] [place] [address] [address]  [address] [place] [address]   [phone]            Jul 12, 2017 12:00 PM CDT   Level 3 with UR BD 01   [facility] ([facility])    [address] [place] [address] [address]  [address] [place] [address]   [phone]            Jul 19, 2017 10:00 AM CDT   Level 3 with UR CH 02   [facility] ([facility])    [address] [place] [address] [address]  [address] [place] [address]   [phone]            Jul 26, 2017 10:00 AM CDT   Level 3 with UR CH 02   [facility] ([facility])    [address] [place] [address] [address]  [address] [place] [address]   [phone]            Aug 02, 2017 10:00 AM CDT   Level 3 with UR CH 01   [facility] ([facility])    [address] [place] [address] [address]  [address] [place] [address]   [phone]            Aug 09, 2017  9:30 AM CDT   Level 3  with UR CH 02   [facility])    [address]  [facility] [address]   [phone]            Aug 16, 2017 10:00 AM CDT   Level 3 with UR CH 02   [facility])    [address]  [address]  [facility] [address]   [phone]            Aug 23, 2017 10:00 AM CDT   Level 3 with UR CH 03   [facility] ([facility])    [address]  [address]  [facility] [address]   [phone]            Aug 30, 2017 10:00 AM CDT   Level 3 with UR CH 02   [facility] ([facility])    [address]  [address]  [facility] [address]   [phone]              Who to contact     If you have questions or need follow up information about today's clinic visit or your schedule please contact [facility] directly at [phone].  Normal or non-critical lab and imaging results will be communicated to you by [employer], letter or phone within 4 business days after the clinic has received the results. If you do not hear from us within 7 days, please contact the clinic through [employer] or phone. If you have a critical or abnormal lab result, we will notify you by phone as soon as possible.  Submit refill requests through [employer] or call your pharmacy and they will forward the refill request to us. Please allow 3 business days for your refill to be completed.          Additional Information About Your Visit        [employer] Information     [employer] gives you secure access to your electronic health record. If you see a primary care provider, you can also send messages to your care team and make appointments. If you have questions, please call your primary care clinic.  If you do not have a primary care provider,  please call [phone] and they will assist you.        Care EveryWhere ID     This is your Care EveryWhere ID. This could be used by other organizations to access your [place] medical records  [id number]        Your Vitals Were     Pulse Temperature Respirations Pulse Oximetry          72 97.9  F (36.6  C) 16 95%         Blood Pressure from Last 3 Encounters:   07/05/17 113/61   06/30/17 126/87   06/28/17 121/86    Weight from Last 3 Encounters:   05/26/17 86.3 kg (190 lb 4.1 oz)   12/12/16 85.5 kg (188 lb 9.6 oz)   12/07/16 85.3 kg (188 lb)              Today, you had the following     No orders found for display       Primary Care Provider Office Phone # Fax #    Candy Sweta Ridley [phone]       [facility] [address]  [facility] [address]        Equal Access to Services     STEVEN ARTHUR : Hadii aad ku hadasho Soomaali, waaxda luqadaha, qaybta kaalmada adeegyada, waxay domingain hayjuden shaggy castillo . So [facility] [phone].    ATENCIÓN: Si habla español, tiene a madrigal disposición servicios gratuitos de asistencia lingüística. Llame al [phone].    We comply with applicable federal civil rights laws and [place] laws. We do not discriminate on the basis of race, color, national origin, age, disability sex, sexual orientation or gender identity.            Thank you!     Thank you for choosing [facility]  for your care. Our goal is always to provide you with excellent care. Hearing back from our patients is one way we can continue to improve our services. Please take a few minutes to complete the written survey that you may receive in the mail after your visit with us. Thank you!             Your Updated Medication List - Protect others around you: Learn how to safely use, store and throw away your medicines at www.disposemymeds.org.          This list is accurate as of: 7/5/17  1:09 PM.  Always use your most recent med list.                    Brand Name Dispense Instructions for use Diagnosis    cloNIDine 0.1 MG tablet    CATAPRES    30 tablet    Take 1 tablet 1 hour prior to scheduled ketamine dose    Severe episode of recurrent major depressive disorder, without psychotic features (H)       KETAMINE HCL           lithium 150 MG capsule     90 capsule    Take 1 capsule (150 mg) by mouth daily    Major depressive disorder, recurrent, severe without psychotic features (H)       LORazepam 0.5 MG tablet    ATIVAN    30 tablet    Take 1-2 tablets (0.5-1 mg) by mouth every 6 hours as needed for anxiety    Major depressive disorder, recurrent, severe without psychotic features (H)       methylphenidate 10 MG tablet    RITALIN    180 tablet    Take 2 tablets (20 mg) by mouth 3 times daily    Major depressive disorder, recurrent, severe without psychotic features (H)       sertraline 25 MG tablet    ZOLOFT    90 tablet    Take 1 tablet (25 mg) by mouth daily    Major depressive disorder, recurrent, severe without psychotic features (H)

## 2017-07-06 ENCOUNTER — OFFICE VISIT (OUTPATIENT)
Dept: PSYCHIATRY | Facility: CLINIC | Age: 47
End: 2017-07-06
Attending: PSYCHIATRY & NEUROLOGY
Payer: MEDICARE

## 2017-07-06 VITALS
SYSTOLIC BLOOD PRESSURE: 131 MMHG | DIASTOLIC BLOOD PRESSURE: 90 MMHG | WEIGHT: 186.6 LBS | BODY MASS INDEX: 29.22 KG/M2 | HEART RATE: 101 BPM

## 2017-07-06 DIAGNOSIS — F33.2 SEVERE EPISODE OF RECURRENT MAJOR DEPRESSIVE DISORDER, WITHOUT PSYCHOTIC FEATURES (H): ICD-10-CM

## 2017-07-06 DIAGNOSIS — F33.2 MAJOR DEPRESSIVE DISORDER, RECURRENT, SEVERE WITHOUT PSYCHOTIC FEATURES (H): ICD-10-CM

## 2017-07-06 PROCEDURE — 99212 OFFICE O/P EST SF 10 MIN: CPT | Mod: ZF

## 2017-07-06 RX ORDER — KETAMINE HCL 50MG/ML(1)
150 SYRINGE (ML) INTRAVENOUS
Qty: 15 ML | Refills: 3 | Status: SHIPPED | OUTPATIENT
Start: 2017-07-06 | End: 2018-01-04

## 2017-07-06 RX ORDER — LITHIUM CARBONATE 150 MG/1
150 CAPSULE ORAL DAILY
Qty: 90 CAPSULE | Refills: 3 | Status: SHIPPED | OUTPATIENT
Start: 2017-07-06 | End: 2018-06-28

## 2017-07-06 RX ORDER — METHYLPHENIDATE HYDROCHLORIDE 10 MG/1
20 TABLET ORAL 3 TIMES DAILY
Qty: 180 TABLET | Refills: 0 | Status: SHIPPED | OUTPATIENT
Start: 2017-07-06 | End: 2017-09-07

## 2017-07-06 RX ORDER — CLONIDINE HYDROCHLORIDE 0.1 MG/1
0.15 TABLET ORAL PRN
Qty: 45 TABLET | Refills: 2 | Status: SHIPPED | OUTPATIENT
Start: 2017-07-06 | End: 2017-07-10

## 2017-07-06 RX ORDER — SERTRALINE HYDROCHLORIDE 25 MG/1
25 TABLET, FILM COATED ORAL DAILY
Qty: 90 TABLET | Refills: 0 | Status: SHIPPED | OUTPATIENT
Start: 2017-07-06 | End: 2017-09-07

## 2017-07-06 RX ORDER — METHYLPHENIDATE HYDROCHLORIDE 10 MG/1
20 TABLET ORAL 3 TIMES DAILY
Qty: 180 TABLET | Refills: 0 | Status: SHIPPED | OUTPATIENT
Start: 2017-07-06 | End: 2017-07-06

## 2017-07-06 NOTE — NURSING NOTE
Chief Complaint   Patient presents with     Recheck Medication     MDD     Reviewed allergies, smoking status, and pharmacy preference  Administered abuse screening questions   Obtained weight, blood pressure and heart rate

## 2017-07-06 NOTE — MR AVS SNAPSHOT
After Visit Summary   7/6/2017    Sebastien Hoover    MRN: [id number]           Patient Information     Date Of Birth          1970        Visit Information        Provider Department      7/6/2017 1:00 PM Venancio Ochoa MD [facility]        Today's Diagnoses     Severe episode of recurrent major depressive disorder, without psychotic features (H)        Major depressive disorder, recurrent, severe without psychotic features (H)           Follow-ups after your visit        Your next 10 appointments already scheduled     Nov 16, 2017 10:30 AM CST   Level 3 with UR BD 01   [facility], Infusion Services ([facility])    [address]  [address]  [facility] [address]   [phone]            Nov 21, 2017 10:00 AM CST   Level 3 with UR CH 04   [facility], Infusion Services ([facility])    [address] [place] [address] [address]  [address]  [facility] [address]   [phone]            Nov 30, 2017 10:30 AM CST   Level 3 with UR CH 06   [facility], Infusion Services ([facility])    [address] [place] [address] [address]  [address]  [facility] [address]   [phone]            Dec 07, 2017 10:30 AM CST   Level 3 with UR CH 06   [facility], Infusion Services ([facility])    [address] [place] [address] [address]  [address]  [facility] [address]   [phone]            Jan 04, 2018  1:00 PM CST   Adult Med Follow UP with Venancio Ochoa MD   [facility]iatry Clinic ([facility])    [address]  [address] [facility] [address]   [phone]            Mar 01, 2018  1:00 PM CST   Adult Med Follow UP with Venancio Ochoa MD   Psychiatry Clinic ([facility])    [address]  [address] [facility] [address]   [phone]            May 03, 2018  1:00 PM CDT    Adult Med Follow UP with Venancio Ochoa MD   Psychiatry Clinic ([facility])    [address]  [address] [facility] [address]   [phone]            Jun 28, 2018  2:00 PM CDT   Adult Med Follow UP with Venancio Ochoa MD   Psychiatry Clinic ([facility])    [address]  [address] [facility] [address] [phone]            Sep 20, 2018  1:00 PM CDT   Adult Med Follow UP with Venancio Ochoa MD   Psych[facility]ry Clinic ([facility])    [address]  [address] [facility] [id number] [phone]              Who to contact     Please call your clinic at [phone] to:    Ask questions about your health    Make or cancel appointments    Discuss your medicines    Learn about your test results    Speak to your doctor   If you have compliments or concerns about an experience at your clinic, or if you wish to file a complaint, please contact [facility] Patient Relations at [phone] or email us at [email or web address]         Additional Information About Your Visit        [employer] Information     [employer] gives you secure access to your electronic health record. If you see a primary care provider, you can also send messages to your care team and make appointments. If you have questions, please call your primary care clinic.  If you do not have a primary care provider, please call [phone] and they will assist you.      [employer] is an electronic gateway that provides easy, online access to your medical records. With [employer], you can request a clinic appointment, read your test results, renew a prescription or communicate with your care team.     To access your existing account, please contact your [facility] or call [phone] for assistance.        Care EveryWhere ID     This is your Care EveryWhere  ID. This could be used by other organizations to access your [place] medical records  [id number]        Your Vitals Were     Pulse BMI (Body Mass Index)                101 29.22 kg/m2           Blood Pressure from Last 3 Encounters:   11/09/17 125/67   11/02/17 (!) 145/98   10/31/17 126/77    Weight from Last 3 Encounters:   11/02/17 83.5 kg (184 lb)   10/31/17 83.1 kg (183 lb 3.2 oz)   10/26/17 83.3 kg (183 lb 10.3 oz)              Today, you had the following     No orders found for display         Today's Medication Changes          These changes are accurate as of: 7/6/17 11:59 PM.  If you have any questions, ask your nurse or doctor.               Start taking these medicines.        Dose/Directions    Ketamine HCl 50 MG/ML Sosy   Used for:  Major depressive disorder, recurrent, severe without psychotic features (H)   Started by:  Venancio Ochoa MD        Dose:  150 mg   Apply 150 mg into one nostril as directed every 7 days 3 sprays each nostril   Quantity:  15 mL   Refills:  3       methylphenidate 10 MG tablet   Commonly known as:  RITALIN   Used for:  Major depressive disorder, recurrent, severe without psychotic features (H)   Started by:  Venancio Ochoa MD        Dose:  20 mg   Take 2 tablets (20 mg) by mouth 3 times daily   Quantity:  180 tablet   Refills:  0         These medicines have changed or have updated prescriptions.        Dose/Directions    cloNIDine 0.1 MG tablet   Commonly known as:  CATAPRES   This may have changed:    - how much to take  - how to take this  - when to take this  - reasons to take this   Used for:  Severe episode of recurrent major depressive disorder, without psychotic features (H)   Changed by:  Venancio Ochoa MD        Dose:  0.15 mg   Take 1.5 tablets (0.15 mg) by mouth as needed Take 1 tablet 1 hour prior to scheduled ketamine dose   Quantity:  45 tablet   Refills:  2            Where to get your medicines      These medications were sent to [facility] - [place], [place] - [address]  [address] [facility] [address]    Hours:  24-hours Phone:  [phone]     cloNIDine 0.1 MG tablet    lithium 150 MG capsule    sertraline 25 MG tablet         Some of these will need a paper prescription and others can be bought over the counter.  Ask your nurse if you have questions.     Bring a paper prescription for each of these medications     Ketamine HCl 50 MG/ML Sosy    methylphenidate 10 MG tablet                Primary Care Provider Office Phone # Fax #    Candy Sweta Ridley [phone]       [facility] [address]  [facility] [address]        Equal Access to Services     STEVEN ARTHUR : Reynaldo avendanoo Sodaniella, waaxda luqadaha, qaybta kaalmada adeegyada, guillermo mccain. So [facility] [phone].    ATENCIÓN: Si habla español, tiene a madrigal disposición servicios gratuitos de asistencia lingüística. [facility] [phone].    We comply with applicable federal civil rights laws and [place] laws. We do not discriminate on the basis of race, color, national origin, age, disability, sex, sexual orientation, or gender identity.            Thank you!     Thank you for choosing [facility]  for your care. Our goal is always to provide you with excellent care. Hearing back from our patients is one way we can continue to improve our services. Please take a few minutes to complete the written survey that you may receive in the mail after your visit with us. Thank you!             Your Updated Medication List - Protect others around you: Learn how to safely use, store and throw away your medicines at www.disposemymeds.org.          This list is accurate as of: 7/6/17 11:59 PM.  Always use your most recent med list.                   Brand Name Dispense Instructions for use Diagnosis    cloNIDine 0.1 MG tablet    CATAPRES    45 tablet    Take 1.5  tablets (0.15 mg) by mouth as needed Take 1 tablet 1 hour prior to scheduled ketamine dose    Severe episode of recurrent major depressive disorder, without psychotic features (H)       KETAMINE HCL           Ketamine HCl 50 MG/ML Sosy     15 mL    Apply 150 mg into one nostril as directed every 7 days 3 sprays each nostril    Major depressive disorder, recurrent, severe without psychotic features (H)       lithium 150 MG capsule     90 capsule    Take 1 capsule (150 mg) by mouth daily    Major depressive disorder, recurrent, severe without psychotic features (H)       LORazepam 0.5 MG tablet    ATIVAN    30 tablet    Take 1-2 tablets (0.5-1 mg) by mouth every 6 hours as needed for anxiety    Major depressive disorder, recurrent, severe without psychotic features (H)       methylphenidate 10 MG tablet    RITALIN    180 tablet    Take 2 tablets (20 mg) by mouth 3 times daily    Major depressive disorder, recurrent, severe without psychotic features (H)       sertraline 25 MG tablet    ZOLOFT    90 tablet    Take 1 tablet (25 mg) by mouth daily    Major depressive disorder, recurrent, severe without psychotic features (H)

## 2017-07-07 ENCOUNTER — INFUSION THERAPY VISIT (OUTPATIENT)
Dept: INFUSION THERAPY | Facility: CLINIC | Age: 47
End: 2017-07-07
Attending: PSYCHIATRY & NEUROLOGY
Payer: MEDICARE

## 2017-07-07 VITALS
SYSTOLIC BLOOD PRESSURE: 112 MMHG | OXYGEN SATURATION: 96 % | HEART RATE: 62 BPM | RESPIRATION RATE: 16 BRPM | TEMPERATURE: 98.1 F | DIASTOLIC BLOOD PRESSURE: 76 MMHG

## 2017-07-07 DIAGNOSIS — F33.2 SEVERE EPISODE OF RECURRENT MAJOR DEPRESSIVE DISORDER, WITHOUT PSYCHOTIC FEATURES (H): Primary | ICD-10-CM

## 2017-07-07 DIAGNOSIS — F33.2 SEVERE RECURRENT MAJOR DEPRESSION WITHOUT PSYCHOTIC FEATURES (H): ICD-10-CM

## 2017-07-07 PROCEDURE — 96365 THER/PROPH/DIAG IV INF INIT: CPT

## 2017-07-07 PROCEDURE — 25000125 ZZHC RX 250: Performed by: PSYCHIATRY & NEUROLOGY

## 2017-07-07 PROCEDURE — 25000128 H RX IP 250 OP 636: Performed by: PSYCHIATRY & NEUROLOGY

## 2017-07-07 PROCEDURE — 96361 HYDRATE IV INFUSION ADD-ON: CPT

## 2017-07-07 RX ADMIN — SODIUM CHLORIDE 250 ML: 9 INJECTION, SOLUTION INTRAVENOUS at 10:11

## 2017-07-07 RX ADMIN — KETAMINE HYDROCHLORIDE 37.5 MG: 50 INJECTION, SOLUTION INTRAMUSCULAR; INTRAVENOUS at 10:15

## 2017-07-07 ASSESSMENT — PAIN SCALES - GENERAL: PAINLEVEL: NO PAIN (0)

## 2017-07-07 NOTE — MR AVS SNAPSHOT
After Visit Summary   7/7/2017    Sebastien Hoover    MRN: [id number]           Patient Information     Date Of Birth          1970        Visit Information        Provider Department      7/7/2017 10:00 AM [facility]        Today's Diagnoses     Severe episode of recurrent major depressive disorder, without psychotic features (H)    -  1    Severe recurrent major depression without psychotic features (H)           Follow-ups after your visit        Your next 10 appointments already scheduled     Jul 12, 2017 12:00 PM CDT   Level 3 with UR BD 01   [facility]    [address]  [address]  [facility] [address]   [phone]            Jul 19, 2017 10:00 AM CDT   Level 3 with UR CH 02   [facility])    [address]  [address]  [facility] [address]   [phone]            Jul 26, 2017 10:00 AM CDT   Level 3 with UR CH 02   [facility])    [address]  [address]  [facility] [address]   [phone]            Aug 02, 2017 10:00 AM CDT   Level 3 with UR CH 01   [facility])    [address]  [address]  [facility] [address]   [phone]            Aug 09, 2017  9:30 AM CDT   Level 3 with UR CH 02   [facility])    [address]  [address]  [facility] [address]   [phone]            Aug 16, 2017 10:00 AM CDT   Level 3 with UR CH 02   [facility])    [address]  [address]  [facility] [address]   [phone]            Aug 23, 2017 10:00 AM CDT   Level 3 with  UR CH 03   [facility], Infusion Services ([facility])    [address]  [address]  [facility] [address]   [phone]            Aug 30, 2017 10:00 AM CDT   Level 3 with UR CH 02   [facility], Infusion Services ([facility])    [address]  [address]  [facility] [address]   [phone]            Sep 06, 2017 10:30 AM CDT   Level 3 with UR CH 01   [facility], Infusion Services ([facility])    [address]  [address]  [facility] [address]   [phone]            Sep 07, 2017  1:30 PM CDT   Adult Med Follow UP with Venancio Ochoa MD   Psychiatry Clinic ([facility])    [place] [id number] [facility] [id number] [phone]              Who to contact     If you have questions or need follow up information about today's clinic visit or your schedule please contact [facility], INFUSION SERVICES directly at [phone].  Normal or non-critical lab and imaging results will be communicated to you by [employer], letter or phone within 4 business days after the clinic has received the results. If you do not hear from us within 7 days, please contact the clinic through [employer] or phone. If you have a critical or abnormal lab result, we will notify you by phone as soon as possible.  Submit refill requests through [employer] or call your pharmacy and they will forward the refill request to us. Please allow 3 business days for your refill to be completed.          Additional Information About Your Visit        [employer] Information     [employer] gives you secure access to your electronic health record. If you see a primary care provider, you can also send messages to your care team and make appointments. If you have questions, please call your primary care clinic.  If you do not have a primary care provider,  please call [phone] and they will assist you.        Care EveryWhere ID     This is your Care EveryWhere ID. This could be used by other organizations to access your [place] medical records  [id number]        Your Vitals Were     Pulse Temperature Respirations Pulse Oximetry          62 98.1  F (36.7  C) (Oral) 16 96%         Blood Pressure from Last 3 Encounters:   07/07/17 112/76   07/05/17 113/61   06/30/17 126/87    Weight from Last 3 Encounters:   05/26/17 86.3 kg (190 lb 4.1 oz)   12/12/16 85.5 kg (188 lb 9.6 oz)   12/07/16 85.3 kg (188 lb)              Today, you had the following     No orders found for display       Primary Care Provider Office Phone # Fax #    Candy Sweta Ridley [phone]       [facility] [address]  [facility] [address]        Equal Access to Services     STEVEN ARTHUR : Hadii aad ku hadasho Soomaali, waaxda luqadaha, qaybta kaalmada adeegyada, waxay idiin hayjuden shaggy castillo . So [facility] [phone].    ATENCIÓN: Si habla español, tiene a madrigal disposición servicios gratuitos de asistencia lingüística. Llame al [phone].    We comply with applicable federal civil rights laws and [place] laws. We do not discriminate on the basis of race, color, national origin, age, disability sex, sexual orientation or gender identity.            Thank you!     Thank you for choosing [facility]  for your care. Our goal is always to provide you with excellent care. Hearing back from our patients is one way we can continue to improve our services. Please take a few minutes to complete the written survey that you may receive in the mail after your visit with us. Thank you!             Your Updated Medication List - Protect others around you: Learn how to safely use, store and throw away your medicines at www.disposemymeds.org.          This list is accurate as of: 7/7/17  1:38 PM.  Always use your most recent med list.                    Brand Name Dispense Instructions for use Diagnosis    cloNIDine 0.1 MG tablet    CATAPRES    45 tablet    Take 1.5 tablets (0.15 mg) by mouth as needed Take 1 tablet 1 hour prior to scheduled ketamine dose    Severe episode of recurrent major depressive disorder, without psychotic features (H)       KETAMINE HCL           Ketamine HCl 50 MG/ML Sosy     15 mL    Apply 150 mg into one nostril as directed every 7 days 3 sprays each nostril    Major depressive disorder, recurrent, severe without psychotic features (H)       lithium 150 MG capsule     90 capsule    Take 1 capsule (150 mg) by mouth daily    Major depressive disorder, recurrent, severe without psychotic features (H)       LORazepam 0.5 MG tablet    ATIVAN    30 tablet    Take 1-2 tablets (0.5-1 mg) by mouth every 6 hours as needed for anxiety    Major depressive disorder, recurrent, severe without psychotic features (H)       methylphenidate 10 MG tablet    RITALIN    180 tablet    Take 2 tablets (20 mg) by mouth 3 times daily    Major depressive disorder, recurrent, severe without psychotic features (H)       sertraline 25 MG tablet    ZOLOFT    90 tablet    Take 1 tablet (25 mg) by mouth daily    Major depressive disorder, recurrent, severe without psychotic features (H)

## 2017-07-07 NOTE — PROGRESS NOTES
Infusion Nursing Note:  Sebastien Hoover presents today for ketamine.    Patient seen by provider today: No   present during visit today: Not Applicable.    Note: Patient doing ok, he saw Dr. Ochoa earlier this week.  He is increasing his clonidine to 1.5 tabs.    Intravenous Access:  Peripheral IV placed.    Post Infusion Assessment:  Patient tolerated infusion without incident.  VS monitored per protocol  Patient observed for 60 minutes post ketamine per protocol.  Blood return noted pre and post infusion.  Site patent and intact, free from redness, edema or discomfort.  No evidence of extravasations.  Access discontinued per protocol.    Discharge Plan:   Patient discharged in stable condition accompanied by: self, has transportation.  Departure Mode: Ambulatory.  Will return to clinic next week.  Angela Davis RN

## 2017-07-10 ENCOUNTER — TELEPHONE (OUTPATIENT)
Dept: PSYCHIATRY | Facility: CLINIC | Age: 47
End: 2017-07-10

## 2017-07-10 DIAGNOSIS — F33.2 SEVERE EPISODE OF RECURRENT MAJOR DEPRESSIVE DISORDER, WITHOUT PSYCHOTIC FEATURES (H): ICD-10-CM

## 2017-07-10 RX ORDER — CLONIDINE HYDROCHLORIDE 0.1 MG/1
TABLET ORAL
Qty: 45 TABLET | Refills: 2 | Status: SHIPPED | OUTPATIENT
Start: 2017-07-10 | End: 2018-07-18

## 2017-07-10 NOTE — TELEPHONE ENCOUNTER
clarification of Clonidine order for pharmacy needed.    Order reads:Take 1.5 tablets (0.15 mg) by mouth as needed Take 1 tablet 1 hour prior to scheduled ketamine dose    Pharmacy is unsure of these instructions - will route to provider to confirm is what is meant is:    Take 1.5 tablets (0.15 mg) by mouth as needed.  IN ADDITION take 1 tablet 1 hour prior to scheduled ketamine dose.    Kathleen M Doege RN

## 2017-07-10 NOTE — TELEPHONE ENCOUNTER
Order is now entered at 1.5 tab 1 hour prior to ketamine infusion as indicated by Dr Ochoa.    Kathleen M Doege RN

## 2017-07-10 NOTE — TELEPHONE ENCOUNTER
1 hr prior to ketamine infusion (Routing comment)                        You   Venancio Ochoa MD; Doris Abarca, RN 2 hours ago (1:00 PM)                   When and how often is he to be taking 1.5 mg tablets? The order is pended and can be adjusted and sent to pharmacy. (Routing comment)                        Venancio Ochoa MD   You 2 hours ago (12:53 PM)                   Remove the prn and just leave the 1 hr prior to ketamine infusion (Routing comment)

## 2017-07-12 ENCOUNTER — INFUSION THERAPY VISIT (OUTPATIENT)
Dept: INFUSION THERAPY | Facility: CLINIC | Age: 47
End: 2017-07-12
Attending: PSYCHIATRY & NEUROLOGY
Payer: MEDICARE

## 2017-07-12 VITALS
TEMPERATURE: 98 F | DIASTOLIC BLOOD PRESSURE: 63 MMHG | RESPIRATION RATE: 16 BRPM | OXYGEN SATURATION: 96 % | HEART RATE: 74 BPM | SYSTOLIC BLOOD PRESSURE: 104 MMHG

## 2017-07-12 DIAGNOSIS — F33.2 SEVERE EPISODE OF RECURRENT MAJOR DEPRESSIVE DISORDER, WITHOUT PSYCHOTIC FEATURES (H): Primary | ICD-10-CM

## 2017-07-12 DIAGNOSIS — F33.2 SEVERE RECURRENT MAJOR DEPRESSION WITHOUT PSYCHOTIC FEATURES (H): ICD-10-CM

## 2017-07-12 PROCEDURE — 96365 THER/PROPH/DIAG IV INF INIT: CPT

## 2017-07-12 PROCEDURE — 96361 HYDRATE IV INFUSION ADD-ON: CPT

## 2017-07-12 PROCEDURE — 25000125 ZZHC RX 250: Performed by: PSYCHIATRY & NEUROLOGY

## 2017-07-12 PROCEDURE — 25000128 H RX IP 250 OP 636: Performed by: PSYCHIATRY & NEUROLOGY

## 2017-07-12 RX ADMIN — SODIUM CHLORIDE 100 ML: 9 INJECTION, SOLUTION INTRAVENOUS at 12:34

## 2017-07-12 RX ADMIN — KETAMINE HYDROCHLORIDE 37.5 MG: 50 INJECTION, SOLUTION INTRAMUSCULAR; INTRAVENOUS at 12:34

## 2017-07-12 ASSESSMENT — PAIN SCALES - GENERAL: PAINLEVEL: NO PAIN (0)

## 2017-07-12 NOTE — MR AVS SNAPSHOT
After Visit Summary   7/12/2017    Sebastien Hoover    MRN: [id number]           Patient Information     Date Of Birth          1970        Visit Information        Provider Department      7/12/2017 12:00 PM UR BD 01 [facility]        Today's Diagnoses     Severe episode of recurrent major depressive disorder, without psychotic features (H)    -  1    Severe recurrent major depression without psychotic features (H)           Follow-ups after your visit        Your next 10 appointments already scheduled     Jul 19, 2017 10:00 AM CDT   Level 3 with UR CH 02   [facility] ([facility])    [address]  [facility] [address]   [phone]            Jul 26, 2017 10:00 AM CDT   Level 3 with UR CH 02   [facility] ([facility])    [address]  [facility] [address]   [phone]            Aug 02, 2017 10:00 AM CDT   Level 3 with UR CH 01   [facility] ([facility])    [address]  [facility] [address]   [phone]            Aug 09, 2017  9:30 AM CDT   Level 3 with UR CH 02   [facility] ([facility])    [address]  [facility] [address]   [phone]            Aug 16, 2017 10:00 AM CDT   Level 3 with UR CH 02   [facility] ([facility])    [address]  [facility] [address]   [phone]            Aug 23, 2017 10:00 AM CDT   Level 3 with UR CH 03   [facility] ([facility])    [address]  [facility] [address]   [phone]            Aug 30, 2017 10:00 AM CDT   Level 3  with UR CH 02   [facility] ([facility])    [address]  [facility] [address]   [phone]            Sep 06, 2017 10:30 AM CDT   Level 3 with UR CH 01   [facility] ([facility])    [address]  [facility] [address]   [phone]            Sep 07, 2017  1:30 PM CDT   Adult Med Follow UP with Venancio Ochoa MD   Psychiatry Clinic ([facility])    [address]  [address] [facility] [address] [phone]            Nov 02, 2017  2:00 PM CDT   Adult Med Follow UP with Venancio Ochoa MD   Psychiatry Clinic ([facility])    [address] [facility] [address] [phone]              Who to contact     If you have questions or need follow up information about today's clinic visit or your schedule please contact [facility], INFUSION SERVICES directly at [phone].  Normal or non-critical lab and imaging results will be communicated to you by MyChart, letter or phone within 4 business days after the clinic has received the results. If you do not hear from us within 7 days, please contact the clinic through [employer] or phone. If you have a critical or abnormal lab result, we will notify you by phone as soon as possible.  Submit refill requests through [employer] or call your pharmacy and they will forward the refill request to us. Please allow 3 business days for your refill to be completed.          Additional Information About Your Visit        [employer] Information     [employer] gives you secure access to your electronic health record. If you see a primary care provider, you can also send messages to your care team and make appointments. If you have questions, please call your primary care clinic.  If you do not have a primary care  provider, please call [phone] and they will assist you.        Care EveryWhere ID     This is your Care EveryWhere ID. This could be used by other organizations to access your [place] medical records  [id number]        Your Vitals Were     Pulse Temperature Respirations Pulse Oximetry          74 98  F (36.7  C) 16 96%         Blood Pressure from Last 3 Encounters:   07/12/17 104/63   07/07/17 112/76   07/05/17 113/61    Weight from Last 3 Encounters:   05/26/17 86.3 kg (190 lb 4.1 oz)   12/12/16 85.5 kg (188 lb 9.6 oz)   12/07/16 85.3 kg (188 lb)              Today, you had the following     No orders found for display       Primary Care Provider Office Phone # Fax #    Candy Sweta Ridley [phone]       [facility] [address]  [facility] [address]        Equal Access to Services     STEVEN ARTHUR : Hadii aad ku hadasho Soomaali, waaxda luqadaha, qaybta kaalmada adeegyada, waxay idiin hayjuden shaggy castillo . So [facility] [phone].    ATENCIÓN: Si habla español, tiene a madrigal disposición servicios gratuitos de asistencia lingüística. Llame al [phone].    We comply with applicable federal civil rights laws and [place] laws. We do not discriminate on the basis of race, color, national origin, age, disability sex, sexual orientation or gender identity.            Thank you!     Thank you for choosing [facility]  for your care. Our goal is always to provide you with excellent care. Hearing back from our patients is one way we can continue to improve our services. Please take a few minutes to complete the written survey that you may receive in the mail after your visit with us. Thank you!             Your Updated Medication List - Protect others around you: Learn how to safely use, store and throw away your medicines at www.disposemymeds.org.          This list is accurate as of: 7/12/17  2:38 PM.  Always use your most recent med list.                    Brand Name Dispense Instructions for use Diagnosis    cloNIDine 0.1 MG tablet    CATAPRES    45 tablet    1.5 tablets 1 hour prior to scheduled ketamine dose.    Severe episode of recurrent major depressive disorder, without psychotic features (H)       KETAMINE HCL           Ketamine HCl 50 MG/ML Sosy     15 mL    Apply 150 mg into one nostril as directed every 7 days 3 sprays each nostril    Major depressive disorder, recurrent, severe without psychotic features (H)       lithium 150 MG capsule     90 capsule    Take 1 capsule (150 mg) by mouth daily    Major depressive disorder, recurrent, severe without psychotic features (H)       LORazepam 0.5 MG tablet    ATIVAN    30 tablet    Take 1-2 tablets (0.5-1 mg) by mouth every 6 hours as needed for anxiety    Major depressive disorder, recurrent, severe without psychotic features (H)       methylphenidate 10 MG tablet    RITALIN    180 tablet    Take 2 tablets (20 mg) by mouth 3 times daily    Major depressive disorder, recurrent, severe without psychotic features (H)       sertraline 25 MG tablet    ZOLOFT    90 tablet    Take 1 tablet (25 mg) by mouth daily    Major depressive disorder, recurrent, severe without psychotic features (H)

## 2017-07-12 NOTE — PROGRESS NOTES
Infusion Nursing Note:  Sebastien Hoover presents today for ketamine.    Patient seen by provider today: No   present during visit today: Not Applicable.    Note: No new concerns at this time.    Intravenous Access:  Peripheral IV placed.    Post Infusion Assessment:  Patient tolerated infusion without incident.  Patient observed for 60 minutes post ketamine per protocol.  Blood return noted pre and post infusion.  Site patent and intact, free from redness, edema or discomfort.  No evidence of extravasations.  Access discontinued per protocol.    Discharge Plan:   Patient discharged in stable condition accompanied by: self, has transportation.  Departure Mode: Ambulatory.  Will return to clinic next week  Angela Davis RN

## 2017-07-19 ENCOUNTER — INFUSION THERAPY VISIT (OUTPATIENT)
Dept: INFUSION THERAPY | Facility: CLINIC | Age: 47
End: 2017-07-19
Attending: PSYCHIATRY & NEUROLOGY
Payer: MEDICARE

## 2017-07-19 VITALS
SYSTOLIC BLOOD PRESSURE: 119 MMHG | OXYGEN SATURATION: 96 % | DIASTOLIC BLOOD PRESSURE: 77 MMHG | RESPIRATION RATE: 18 BRPM | HEART RATE: 69 BPM | TEMPERATURE: 98 F

## 2017-07-19 DIAGNOSIS — F33.2 SEVERE EPISODE OF RECURRENT MAJOR DEPRESSIVE DISORDER, WITHOUT PSYCHOTIC FEATURES (H): Primary | ICD-10-CM

## 2017-07-19 DIAGNOSIS — F33.2 SEVERE RECURRENT MAJOR DEPRESSION WITHOUT PSYCHOTIC FEATURES (H): ICD-10-CM

## 2017-07-19 PROCEDURE — 96361 HYDRATE IV INFUSION ADD-ON: CPT

## 2017-07-19 PROCEDURE — 96365 THER/PROPH/DIAG IV INF INIT: CPT

## 2017-07-19 PROCEDURE — 25000128 H RX IP 250 OP 636: Performed by: PSYCHIATRY & NEUROLOGY

## 2017-07-19 PROCEDURE — 25000125 ZZHC RX 250: Performed by: PSYCHIATRY & NEUROLOGY

## 2017-07-19 RX ADMIN — KETAMINE HYDROCHLORIDE 37.5 MG: 50 INJECTION, SOLUTION INTRAMUSCULAR; INTRAVENOUS at 10:37

## 2017-07-19 RX ADMIN — SODIUM CHLORIDE 250 ML: 9 INJECTION, SOLUTION INTRAVENOUS at 10:40

## 2017-07-19 NOTE — MR AVS SNAPSHOT
After Visit Summary   7/19/2017    Sebastien Hoover    MRN: [id number]           Patient Information     Date Of Birth          1970        Visit Information        Provider Department      7/19/2017 10:00 AM UR CH 02 [facility]        Today's Diagnoses     Severe episode of recurrent major depressive disorder, without psychotic features (H)    -  1    Severe recurrent major depression without psychotic features (H)           Follow-ups after your visit        Your next 10 appointments already scheduled     Jul 26, 2017 10:00 AM CDT   Level 3 with UR CH 02   [facility] ([facility])    [address]  [facility] [address]   [phone]            Aug 02, 2017 10:00 AM CDT   Level 3 with UR CH 01   [facility] ([facility])    [address]  [facility] [address]   [phone]            Aug 09, 2017  9:30 AM CDT   Level 3 with UR CH 02   [facility] ([facility])    [address]  [facility] [address]   [phone]            Aug 16, 2017 10:00 AM CDT   Level 3 with UR CH 02   [facility] ([facility])    [address]  [facility] [address]   [phone]            Aug 23, 2017 10:00 AM CDT   Level 3 with UR CH 03   [facility] ([facility])    [address]  [facility] [address]   [phone]            Aug 30, 2017 10:00 AM CDT   Level 3 with UR CH 02   [facility] ([facility])    [address]  [facility] [address]   [phone]            Sep 06, 2017 10:30 AM CDT   Level 3  with UR CH 01   [facility], Infusion Services ([facility])    [address] [place] [address] [address]  [address]  [facility] [address]   [phone]            Sep 07, 2017  1:30 PM CDT   Adult Med Follow UP with Venancio Ochoa MD   Psychiatry Clinic ([facility])    [address]  [address] [facility] [address] [phone]            Nov 02, 2017  2:00 PM CDT   Adult Med Follow UP with Venancio Ochoa MD   Psychiatry Clinic ([facility])    [address]  [address] [facility] [address]   [phone]            Jan 04, 2018  1:00 PM CST   Adult Med Follow UP with Venancio Ochoa MD   Psychiatry Clinic ([facility])    [address]  [address] [facility] [address] [phone]              Who to contact     If you have questions or need follow up information about today's clinic visit or your schedule please contact [facility], INFUSION SERVICES directly at [phone].  Normal or non-critical lab and imaging results will be communicated to you by "[employer]hart, letter or phone within 4 business days after the clinic has received the results. If you do not hear from us within 7 days, please contact the clinic through "[employer]hart or phone. If you have a critical or abnormal lab result, we will notify you by phone as soon as possible.  Submit refill requests through [employer] or call your pharmacy and they will forward the refill request to us. Please allow 3 business days for your refill to be completed.          Additional Information About Your Visit        [employer] Information     [employer] gives you secure access to your electronic health record. If you see a primary care provider, you can also send messages to your care team and make appointments. If you have questions, please call your primary care clinic.  If you do not have a primary care  provider, please call [phone] and they will assist you.        Care EveryWhere ID     This is your Care EveryWhere ID. This could be used by other organizations to access your [place] medical records  [id number]        Your Vitals Were     Pulse Temperature Respirations Pulse Oximetry          69 98  F (36.7  C) (Oral) 18 96%         Blood Pressure from Last 3 Encounters:   07/19/17 119/77   07/12/17 104/63   07/07/17 112/76    Weight from Last 3 Encounters:   05/26/17 86.3 kg (190 lb 4.1 oz)   12/12/16 85.5 kg (188 lb 9.6 oz)   12/07/16 85.3 kg (188 lb)              Today, you had the following     No orders found for display       Primary Care Provider Office Phone # Fax #    Candy Sweta Ridley [phone]       [facility] [address]  [facility] [address]        Equal Access to Services     STEVEN ARTHUR : Hadii aad ku hadasho Soomaali, waaxda luqadaha, qaybta kaalmada adeegyada, waxay domingain hayjuden shaggy castillo . So [facility] [phone].    ATENCIÓN: Si habla español, tiene a madrigal disposición servicios gratuitos de asistencia lingüística. Llame al [phone].    We comply with applicable federal civil rights laws and [place] laws. We do not discriminate on the basis of race, color, national origin, age, disability sex, sexual orientation or gender identity.            Thank you!     Thank you for choosing [facility]  for your care. Our goal is always to provide you with excellent care. Hearing back from our patients is one way we can continue to improve our services. Please take a few minutes to complete the written survey that you may receive in the mail after your visit with us. Thank you!             Your Updated Medication List - Protect others around you: Learn how to safely use, store and throw away your medicines at www.disposemymeds.org.          This list is accurate as of: 7/19/17  2:38 PM.  Always use your most recent med  list.                   Brand Name Dispense Instructions for use Diagnosis    cloNIDine 0.1 MG tablet    CATAPRES    45 tablet    1.5 tablets 1 hour prior to scheduled ketamine dose.    Severe episode of recurrent major depressive disorder, without psychotic features (H)       KETAMINE HCL           Ketamine HCl 50 MG/ML Sosy     15 mL    Apply 150 mg into one nostril as directed every 7 days 3 sprays each nostril    Major depressive disorder, recurrent, severe without psychotic features (H)       lithium 150 MG capsule     90 capsule    Take 1 capsule (150 mg) by mouth daily    Major depressive disorder, recurrent, severe without psychotic features (H)       LORazepam 0.5 MG tablet    ATIVAN    30 tablet    Take 1-2 tablets (0.5-1 mg) by mouth every 6 hours as needed for anxiety    Major depressive disorder, recurrent, severe without psychotic features (H)       methylphenidate 10 MG tablet    RITALIN    180 tablet    Take 2 tablets (20 mg) by mouth 3 times daily    Major depressive disorder, recurrent, severe without psychotic features (H)       sertraline 25 MG tablet    ZOLOFT    90 tablet    Take 1 tablet (25 mg) by mouth daily    Major depressive disorder, recurrent, severe without psychotic features (H)

## 2017-07-19 NOTE — PROGRESS NOTES
Here for ketamine infusion. Depression is stable. No new health issues. Tolerated infusion. Vitals monitored every 15 minutes. Stayed for 1 hour post infusion. Left when discharged. Gait steady. Has ride home.

## 2017-07-26 ENCOUNTER — INFUSION THERAPY VISIT (OUTPATIENT)
Dept: INFUSION THERAPY | Facility: CLINIC | Age: 47
End: 2017-07-26
Attending: PSYCHIATRY & NEUROLOGY
Payer: MEDICARE

## 2017-07-26 VITALS
HEART RATE: 75 BPM | OXYGEN SATURATION: 96 % | TEMPERATURE: 98.5 F | SYSTOLIC BLOOD PRESSURE: 126 MMHG | DIASTOLIC BLOOD PRESSURE: 82 MMHG | RESPIRATION RATE: 18 BRPM

## 2017-07-26 DIAGNOSIS — F33.2 SEVERE EPISODE OF RECURRENT MAJOR DEPRESSIVE DISORDER, WITHOUT PSYCHOTIC FEATURES (H): Primary | ICD-10-CM

## 2017-07-26 DIAGNOSIS — F33.2 SEVERE RECURRENT MAJOR DEPRESSION WITHOUT PSYCHOTIC FEATURES (H): ICD-10-CM

## 2017-07-26 PROCEDURE — 25000125 ZZHC RX 250: Performed by: PSYCHIATRY & NEUROLOGY

## 2017-07-26 PROCEDURE — 96365 THER/PROPH/DIAG IV INF INIT: CPT

## 2017-07-26 PROCEDURE — 96361 HYDRATE IV INFUSION ADD-ON: CPT

## 2017-07-26 PROCEDURE — 25000128 H RX IP 250 OP 636: Performed by: PSYCHIATRY & NEUROLOGY

## 2017-07-26 RX ADMIN — KETAMINE HYDROCHLORIDE 37.5 MG: 50 INJECTION, SOLUTION INTRAMUSCULAR; INTRAVENOUS at 10:34

## 2017-07-26 RX ADMIN — SODIUM CHLORIDE 250 ML: 9 INJECTION, SOLUTION INTRAVENOUS at 10:37

## 2017-07-26 NOTE — MR AVS SNAPSHOT
After Visit Summary   7/26/2017    Sebastien Hoover    MRN: [id number]           Patient Information     Date Of Birth          1970        Visit Information        Provider Department      7/26/2017 10:00 AM UR CH 02 [facility]        Today's Diagnoses     Severe episode of recurrent major depressive disorder, without psychotic features (H)    -  1    Severe recurrent major depression without psychotic features (H)           Follow-ups after your visit        Your next 10 appointments already scheduled     Aug 02, 2017 10:00 AM CDT   Level 3 with UR CH 01   [facility] ([facility])    [address]  [facility] [address]   [phone]            Aug 09, 2017  9:30 AM CDT   Level 3 with UR CH 02   [facility] ([facility])    [address]  [facility] [address]   [phone]            Aug 16, 2017 10:00 AM CDT   Level 3 with UR CH 02   [facility] ([facility])    [address]  [facility] [address]   [phone]            Aug 23, 2017 10:00 AM CDT   Level 3 with UR CH 03   [facility] ([facility])    [address]  [facility] [address]   [phone]            Aug 30, 2017 10:00 AM CDT   Level 3 with UR CH 02   [facility] ([facility])    [address]  [facility] [address]   [phone]            Sep 06, 2017 10:30 AM CDT   Level 3 with UR CH 01   [facility] ([facility])    [address]  [facility] [address]   [phone]            Sep 07, 2017  1:30 PM CDT   Adult Med  Follow UP with Venancio Ochoa MD   Psychiatry Clinic ([facility])    [address]  [address] [facility] [address]   [phone]            Nov 02, 2017  2:00 PM CDT   Adult Med Follow UP with Venancio Ochoa MD   Psychiatry Clinic ([facility])    [address]  [address] [facility] [address]   [phone]            Jan 04, 2018  1:00 PM CST   Adult Med Follow UP with Venancio Ochoa MD   Psychiatry Clinic ([facility])    [address]  [address] [facility] [address]   [phone]            Mar 01, 2018  1:00 PM CST   Adult Med Follow UP with Venancio Ochoa MD   Psychiatry Clinic ([facility])    [address]  [address] [facility] [address]   [phone]              Who to contact     If you have questions or need follow up information about today's clinic visit or your schedule please contact [facility] directly at [phone].  Normal or non-critical lab and imaging results will be communicated to you by [email or web address], letter or phone within 4 business days after the clinic has received the results. If you do not hear from us within 7 days, please contact the clinic through [email or web address] or phone. If you have a critical or abnormal lab result, we will notify you by phone as soon as possible.  Submit refill requests through [employer] or call your pharmacy and they will forward the refill request to us. Please allow 3 business days for your refill to be completed.          Additional Information About Your Visit        [email or web address] Information     [employer] gives you secure access to your electronic health record. If you see a primary care provider, you can also send messages to your care team and make appointments. If you have questions, please call your primary care clinic.  If you do not have a primary care  provider, please call [phone] and they will assist you.        Care EveryWhere ID     This is your Care EveryWhere ID. This could be used by other organizations to access your [place] medical records  [id number]        Your Vitals Were     Pulse Temperature Respirations Pulse Oximetry          75 98.5  F (36.9  C) (Oral) 18 96%         Blood Pressure from Last 3 Encounters:   07/26/17 126/82   07/19/17 119/77   07/12/17 104/63    Weight from Last 3 Encounters:   05/26/17 86.3 kg (190 lb 4.1 oz)   12/12/16 85.5 kg (188 lb 9.6 oz)   12/07/16 85.3 kg (188 lb)              Today, you had the following     No orders found for display       Primary Care Provider Office Phone # Fax #    Candy Sweta Ridley [phone]       [facility] [address]  [facility] [address]        Equal Access to Services     TONY ARTHUR : Hadii aad ku hadasho Soomaali, waaxda luqadaha, qaybta kaalmada adeegyada, waxay idiin hayaan catherineeg vinicio castillo . So [facility] [phone].    ATENCIÓN: Si habla español, tiene a madrigal disposición servicios gratuitos de asistencia lingüística. Llame al [phone].    We comply with applicable federal civil rights laws and [place] laws. We do not discriminate on the basis of race, color, national origin, age, disability sex, sexual orientation or gender identity.            Thank you!     Thank you for choosing [facility]  for your care. Our goal is always to provide you with excellent care. Hearing back from our patients is one way we can continue to improve our services. Please take a few minutes to complete the written survey that you may receive in the mail after your visit with us. Thank you!             Your Updated Medication List - Protect others around you: Learn how to safely use, store and throw away your medicines at www.disposemymeds.org.          This list is accurate as of: 7/26/17 12:36 PM.  Always use your most recent med  list.                   Brand Name Dispense Instructions for use Diagnosis    cloNIDine 0.1 MG tablet    CATAPRES    45 tablet    1.5 tablets 1 hour prior to scheduled ketamine dose.    Severe episode of recurrent major depressive disorder, without psychotic features (H)       KETAMINE HCL           Ketamine HCl 50 MG/ML Sosy     15 mL    Apply 150 mg into one nostril as directed every 7 days 3 sprays each nostril    Major depressive disorder, recurrent, severe without psychotic features (H)       lithium 150 MG capsule     90 capsule    Take 1 capsule (150 mg) by mouth daily    Major depressive disorder, recurrent, severe without psychotic features (H)       LORazepam 0.5 MG tablet    ATIVAN    30 tablet    Take 1-2 tablets (0.5-1 mg) by mouth every 6 hours as needed for anxiety    Major depressive disorder, recurrent, severe without psychotic features (H)       methylphenidate 10 MG tablet    RITALIN    180 tablet    Take 2 tablets (20 mg) by mouth 3 times daily    Major depressive disorder, recurrent, severe without psychotic features (H)       sertraline 25 MG tablet    ZOLOFT    90 tablet    Take 1 tablet (25 mg) by mouth daily    Major depressive disorder, recurrent, severe without psychotic features (H)

## 2017-08-02 ENCOUNTER — INFUSION THERAPY VISIT (OUTPATIENT)
Dept: INFUSION THERAPY | Facility: CLINIC | Age: 47
End: 2017-08-02
Attending: PSYCHIATRY & NEUROLOGY
Payer: MEDICARE

## 2017-08-02 VITALS
TEMPERATURE: 98.1 F | SYSTOLIC BLOOD PRESSURE: 107 MMHG | HEART RATE: 68 BPM | OXYGEN SATURATION: 97 % | RESPIRATION RATE: 16 BRPM | DIASTOLIC BLOOD PRESSURE: 64 MMHG

## 2017-08-02 DIAGNOSIS — F33.2 SEVERE EPISODE OF RECURRENT MAJOR DEPRESSIVE DISORDER, WITHOUT PSYCHOTIC FEATURES (H): Primary | ICD-10-CM

## 2017-08-02 DIAGNOSIS — F33.2 SEVERE RECURRENT MAJOR DEPRESSION WITHOUT PSYCHOTIC FEATURES (H): ICD-10-CM

## 2017-08-02 PROCEDURE — 25000125 ZZHC RX 250: Performed by: PSYCHIATRY & NEUROLOGY

## 2017-08-02 PROCEDURE — 96365 THER/PROPH/DIAG IV INF INIT: CPT

## 2017-08-02 PROCEDURE — 96361 HYDRATE IV INFUSION ADD-ON: CPT

## 2017-08-02 PROCEDURE — 25000128 H RX IP 250 OP 636: Performed by: PSYCHIATRY & NEUROLOGY

## 2017-08-02 RX ADMIN — KETAMINE HYDROCHLORIDE 37.5 MG: 50 INJECTION, SOLUTION INTRAMUSCULAR; INTRAVENOUS at 10:30

## 2017-08-02 RX ADMIN — SODIUM CHLORIDE 100 ML: 9 INJECTION, SOLUTION INTRAVENOUS at 10:31

## 2017-08-02 NOTE — PROGRESS NOTES
Pt here for ketamine infusion.  States he thinks he needs it more than once a week.  Monitored on cont pulse ox and q 15 min VS during and for 1 hr post infusion.  Tolerated well.  RTC next Wednesday.

## 2017-08-02 NOTE — MR AVS SNAPSHOT
After Visit Summary   8/2/2017    Sebastien Hoover    MRN: [id number]           Patient Information     Date Of Birth          1970        Visit Information        Provider Department      8/2/2017 10:00 AM [facility]        Today's Diagnoses     Severe episode of recurrent major depressive disorder, without psychotic features (H)    -  1    Severe recurrent major depression without psychotic features (H)           Follow-ups after your visit        Your next 10 appointments already scheduled     Aug 09, 2017  9:30 AM CDT   Level 3 with UR CH 02   [facility]    [address]  [facility] [address]   [phone]            Aug 11, 2017 10:00 AM CDT   Level 3 with UR CH 05   [facility]    [address]  [facility] [address]   [phone]            Aug 16, 2017 10:00 AM CDT   Level 3 with UR CH 02   [facility]    [address]  [facility] [address]   [phone]            Aug 23, 2017 10:00 AM CDT   Level 3 with UR CH 03   [facility]    [address]  [facility] [address]   [phone]            Aug 25, 2017 12:30 PM CDT   Level 3 with UR CH 02   [facility]    [address]  [facility] [address]   [phone]            Aug 30, 2017 10:00 AM CDT   Level 3 with UR CH 02   [facility]    [address]  [facility] [address]   [phone]            Sep 06, 2017 10:30 AM CDT   Level 3 with  UR CH 01   [facility], Infusion Services ([facility])    [address] [place] [address] [address]  [address]  [facility] [address]   [phone]            Sep 07, 2017  1:30 PM CDT   Adult Med Follow UP with Venancio Ochoa MD   [facility]ry [facility] ([facility])    [address]  [address] [facility] [address] [phone]            Nov 02, 2017  2:00 PM CDT   Adult Med Follow UP with Venancio Ochoa MD   Psych[facility]ry Clinic ([facility])    [address]  [address] [facility] [address]   [phone]            Jan 04, 2018  1:00 PM CST   Adult Med Follow UP with Venancio Ochoa MD   Psychiatry Clinic ([facility])    [address]  [address] [facility] [address] [phone]              Who to contact     If you have questions or need follow up information about today's clinic visit or your schedule please contact [facility], INFUSION SERVICES directly at [phone].  Normal or non-critical lab and imaging results will be communicated to you by Harry'shart, letter or phone within 4 business days after the clinic has received the results. If you do not hear from us within 7 days, please contact the clinic through Harry'shart or phone. If you have a critical or abnormal lab result, we will notify you by phone as soon as possible.  Submit refill requests through [employer] or call your pharmacy and they will forward the refill request to us. Please allow 3 business days for your refill to be completed.          Additional Information About Your Visit        [employer] Information     [employer] gives you secure access to your electronic health record. If you see a primary care provider, you can also send messages to your care team and make appointments. If you have questions, please call your primary care clinic.  If you do not have a primary care  provider, please call [phone] and they will assist you.        Care EveryWhere ID     This is your Care EveryWhere ID. This could be used by other organizations to access your [place] medical records  [id number]        Your Vitals Were     Pulse Temperature Respirations Pulse Oximetry          68 98.1  F (36.7  C) (Oral) 16 97%         Blood Pressure from Last 3 Encounters:   08/02/17 107/64   07/26/17 126/82   07/19/17 119/77    Weight from Last 3 Encounters:   05/26/17 86.3 kg (190 lb 4.1 oz)   12/12/16 85.5 kg (188 lb 9.6 oz)   12/07/16 85.3 kg (188 lb)              Today, you had the following     No orders found for display       Primary Care Provider Office Phone # Fax #    Candy Sweta Ridley [phone]       [facility] [address]  [facility] [address]        Equal Access to Services     TONY ARTHUR : Hadii aad ku hadasho Soomaali, waaxda luqadaha, qaybta kaalmada adeegyada, waxay idiin hayaan shaggy castillo . So [facility] [phone].    ATENCIÓN: Si habla español, tiene a madrigal disposición servicios gratuitos de asistencia lingüística. Llame al [phone].    We comply with applicable federal civil rights laws and [place] laws. We do not discriminate on the basis of race, color, national origin, age, disability sex, sexual orientation or gender identity.            Thank you!     Thank you for choosing [facility]  for your care. Our goal is always to provide you with excellent care. Hearing back from our patients is one way we can continue to improve our services. Please take a few minutes to complete the written survey that you may receive in the mail after your visit with us. Thank you!             Your Updated Medication List - Protect others around you: Learn how to safely use, store and throw away your medicines at www.disposemymeds.org.          This list is accurate as of: 8/2/17 12:39 PM.  Always use your most recent med  list.                   Brand Name Dispense Instructions for use Diagnosis    cloNIDine 0.1 MG tablet    CATAPRES    45 tablet    1.5 tablets 1 hour prior to scheduled ketamine dose.    Severe episode of recurrent major depressive disorder, without psychotic features (H)       KETAMINE HCL           Ketamine HCl 50 MG/ML Sosy     15 mL    Apply 150 mg into one nostril as directed every 7 days 3 sprays each nostril    Major depressive disorder, recurrent, severe without psychotic features (H)       lithium 150 MG capsule     90 capsule    Take 1 capsule (150 mg) by mouth daily    Major depressive disorder, recurrent, severe without psychotic features (H)       LORazepam 0.5 MG tablet    ATIVAN    30 tablet    Take 1-2 tablets (0.5-1 mg) by mouth every 6 hours as needed for anxiety    Major depressive disorder, recurrent, severe without psychotic features (H)       methylphenidate 10 MG tablet    RITALIN    180 tablet    Take 2 tablets (20 mg) by mouth 3 times daily    Major depressive disorder, recurrent, severe without psychotic features (H)       sertraline 25 MG tablet    ZOLOFT    90 tablet    Take 1 tablet (25 mg) by mouth daily    Major depressive disorder, recurrent, severe without psychotic features (H)

## 2017-08-09 ENCOUNTER — INFUSION THERAPY VISIT (OUTPATIENT)
Dept: INFUSION THERAPY | Facility: CLINIC | Age: 47
End: 2017-08-09
Attending: PSYCHIATRY & NEUROLOGY
Payer: MEDICARE

## 2017-08-09 VITALS
SYSTOLIC BLOOD PRESSURE: 113 MMHG | HEART RATE: 76 BPM | RESPIRATION RATE: 16 BRPM | OXYGEN SATURATION: 95 % | DIASTOLIC BLOOD PRESSURE: 69 MMHG | TEMPERATURE: 98.2 F

## 2017-08-09 DIAGNOSIS — F33.2 SEVERE EPISODE OF RECURRENT MAJOR DEPRESSIVE DISORDER, WITHOUT PSYCHOTIC FEATURES (H): Primary | ICD-10-CM

## 2017-08-09 DIAGNOSIS — F33.2 SEVERE RECURRENT MAJOR DEPRESSION WITHOUT PSYCHOTIC FEATURES (H): ICD-10-CM

## 2017-08-09 PROCEDURE — 96365 THER/PROPH/DIAG IV INF INIT: CPT

## 2017-08-09 PROCEDURE — 25000125 ZZHC RX 250: Performed by: PSYCHIATRY & NEUROLOGY

## 2017-08-09 PROCEDURE — 25000128 H RX IP 250 OP 636: Performed by: PSYCHIATRY & NEUROLOGY

## 2017-08-09 PROCEDURE — 96361 HYDRATE IV INFUSION ADD-ON: CPT

## 2017-08-09 RX ADMIN — SODIUM CHLORIDE 100 ML: 9 INJECTION, SOLUTION INTRAVENOUS at 09:54

## 2017-08-09 RX ADMIN — KETAMINE HYDROCHLORIDE 37.5 MG: 50 INJECTION, SOLUTION INTRAMUSCULAR; INTRAVENOUS at 09:54

## 2017-08-09 NOTE — MR AVS SNAPSHOT
After Visit Summary   8/9/2017    Sebastien Hoover    MRN: [id number]           Patient Information     Date Of Birth          1970        Visit Information        Provider Department      8/9/2017 9:30 AM [facility]        Today's Diagnoses     Severe episode of recurrent major depressive disorder, without psychotic features (H)    -  1    Severe recurrent major depression without psychotic features (H)           Follow-ups after your visit        Your next 10 appointments already scheduled     Aug 11, 2017 10:00 AM CDT   Level 3 with UR CH 05   [facility]    [address]  [facility] [address]   [phone]            Aug 16, 2017 10:00 AM CDT   Level 3 with UR CH 02   [facility]    [address]  [facility] [address]   [phone]            Aug 23, 2017 10:00 AM CDT   Level 3 with UR CH 03   [facility])    [address]  [facility] [address]   [phone]            Aug 25, 2017 12:30 PM CDT   Level 3 with UR CH 02   [facility])    [address]  [facility] [address]   [phone]            Aug 30, 2017 10:00 AM CDT   Level 3 with UR CH 02   [facility])    [address]  [facility] [address]   [phone]            Sep 06, 2017 10:30 AM CDT   Level 3 with UR CH 01   [facility])    [address]  [facility] [address]   [phone]            Sep 07, 2017  1:30 PM CDT   Adult Med  Follow UP with Venancio Ochoa MD   Psychiatry Clinic ([facility])    [address]  [address] [facility] [address]   [phone]            Nov 02, 2017  2:00 PM CDT   Adult Med Follow UP with Venancio Ochoa MD   Psychiatry Clinic ([facility])    [address]  [address] [facility] [address]   [phone]            Jan 04, 2018  1:00 PM CST   Adult Med Follow UP with Venancio Ochoa MD   Psychiatry Clinic ([facility])    [address]  [address] [facility] [address]   [phone]            Mar 01, 2018  1:00 PM CST   Adult Med Follow UP with Venancio Ochoa MD   Psychiatry Clinic ([facility])    [address]  [address] [facility] [address]   [phone]              Who to contact     If you have questions or need follow up information about today's clinic visit or your schedule please contact [facility] directly at [phone].  Normal or non-critical lab and imaging results will be communicated to you by Sermohart, letter or phone within 4 business days after the clinic has received the results. If you do not hear from us within 7 days, please contact the clinic through Serm[employer] or phone. If you have a critical or abnormal lab result, we will notify you by phone as soon as possible.  Submit refill requests through [employer] or call your pharmacy and they will forward the refill request to us. Please allow 3 business days for your refill to be completed.          Additional Information About Your Visit        [employer] Information     [employer] gives you secure access to your electronic health record. If you see a primary care provider, you can also send messages to your care team and make appointments. If you have questions, please call your primary care clinic.  If you do not have a primary care  provider, please call [phone] and they will assist you.        Care EveryWhere ID     This is your Care EveryWhere ID. This could be used by other organizations to access your [place] medical records  [id number]        Your Vitals Were     Pulse Temperature Respirations Pulse Oximetry          76 98.2  F (36.8  C) (Oral) 16 95%         Blood Pressure from Last 3 Encounters:   08/09/17 113/69   08/02/17 107/64   07/26/17 126/82    Weight from Last 3 Encounters:   05/26/17 86.3 kg (190 lb 4.1 oz)   12/12/16 85.5 kg (188 lb 9.6 oz)   12/07/16 85.3 kg (188 lb)              Today, you had the following     No orders found for display       Primary Care Provider Office Phone # Fax #    Candy Sweta Ridley [phone]       [facility] [address]  [facility] [address]        Equal Access to Services     TONY ARTHUR : Hadii aad ku hadasho Soomaali, waaxda luqadaha, qaybta kaalmada adeegyada, waxay idiin hayaan shaggy castillo . So [facility] [phone].    ATENCIÓN: Si habla español, tiene a madrigal disposición servicios gratuitos de asistencia lingüística. Llame al [phone].    We comply with applicable federal civil rights laws and [place] laws. We do not discriminate on the basis of race, color, national origin, age, disability sex, sexual orientation or gender identity.            Thank you!     Thank you for choosing [facility]  for your care. Our goal is always to provide you with excellent care. Hearing back from our patients is one way we can continue to improve our services. Please take a few minutes to complete the written survey that you may receive in the mail after your visit with us. Thank you!             Your Updated Medication List - Protect others around you: Learn how to safely use, store and throw away your medicines at www.disposemymeds.org.          This list is accurate as of: 8/9/17  3:01 PM.  Always use your most recent med  list.                   Brand Name Dispense Instructions for use Diagnosis    cloNIDine 0.1 MG tablet    CATAPRES    45 tablet    1.5 tablets 1 hour prior to scheduled ketamine dose.    Severe episode of recurrent major depressive disorder, without psychotic features (H)       KETAMINE HCL           Ketamine HCl 50 MG/ML Sosy     15 mL    Apply 150 mg into one nostril as directed every 7 days 3 sprays each nostril    Major depressive disorder, recurrent, severe without psychotic features (H)       lithium 150 MG capsule     90 capsule    Take 1 capsule (150 mg) by mouth daily    Major depressive disorder, recurrent, severe without psychotic features (H)       LORazepam 0.5 MG tablet    ATIVAN    30 tablet    Take 1-2 tablets (0.5-1 mg) by mouth every 6 hours as needed for anxiety    Major depressive disorder, recurrent, severe without psychotic features (H)       methylphenidate 10 MG tablet    RITALIN    180 tablet    Take 2 tablets (20 mg) by mouth 3 times daily    Major depressive disorder, recurrent, severe without psychotic features (H)       sertraline 25 MG tablet    ZOLOFT    90 tablet    Take 1 tablet (25 mg) by mouth daily    Major depressive disorder, recurrent, severe without psychotic features (H)

## 2017-08-11 ENCOUNTER — INFUSION THERAPY VISIT (OUTPATIENT)
Dept: INFUSION THERAPY | Facility: CLINIC | Age: 47
End: 2017-08-11
Attending: PSYCHIATRY & NEUROLOGY
Payer: MEDICARE

## 2017-08-11 VITALS
SYSTOLIC BLOOD PRESSURE: 111 MMHG | RESPIRATION RATE: 16 BRPM | HEART RATE: 72 BPM | DIASTOLIC BLOOD PRESSURE: 78 MMHG | TEMPERATURE: 98.1 F | OXYGEN SATURATION: 95 %

## 2017-08-11 DIAGNOSIS — F33.2 SEVERE EPISODE OF RECURRENT MAJOR DEPRESSIVE DISORDER, WITHOUT PSYCHOTIC FEATURES (H): Primary | ICD-10-CM

## 2017-08-11 DIAGNOSIS — F33.2 SEVERE RECURRENT MAJOR DEPRESSION WITHOUT PSYCHOTIC FEATURES (H): ICD-10-CM

## 2017-08-11 PROCEDURE — 96365 THER/PROPH/DIAG IV INF INIT: CPT

## 2017-08-11 PROCEDURE — 25000125 ZZHC RX 250: Performed by: PSYCHIATRY & NEUROLOGY

## 2017-08-11 PROCEDURE — 96361 HYDRATE IV INFUSION ADD-ON: CPT

## 2017-08-11 PROCEDURE — 25000128 H RX IP 250 OP 636: Performed by: PSYCHIATRY & NEUROLOGY

## 2017-08-11 RX ADMIN — SODIUM CHLORIDE 250 ML: 9 INJECTION, SOLUTION INTRAVENOUS at 10:24

## 2017-08-11 RX ADMIN — KETAMINE HYDROCHLORIDE 37.5 MG: 50 INJECTION, SOLUTION INTRAMUSCULAR; INTRAVENOUS at 10:24

## 2017-08-11 NOTE — PROGRESS NOTES
Pt here for ketamine infusion.  Is trying 2 x week for the first time in several months.  Monitored on cont pulse ox and q 15 min VS during and for 1 hr post infusion.  Tolerated well.  RTC next week.

## 2017-08-11 NOTE — MR AVS SNAPSHOT
After Visit Summary   8/11/2017    Sebastien Hoover    MRN: [id number]           Patient Information     Date Of Birth          1970        Visit Information        Provider Department      8/11/2017 10:00 AM [facility]        Today's Diagnoses     Severe episode of recurrent major depressive disorder, without psychotic features (H)    -  1    Severe recurrent major depression without psychotic features (H)           Follow-ups after your visit        Your next 10 appointments already scheduled     Aug 16, 2017 10:00 AM CDT   Level 3 with UR CH 02   [facility]    [address] [place] [address] [address]  [address]  [facility] [address]   [phone]            Aug 23, 2017 10:00 AM CDT   Level 3 with UR CH 03   [facility]    [address] [place] [address] [address]  [address]  [facility] [address]   [phone]            Aug 25, 2017 12:30 PM CDT   Level 3 with UR CH 02   [facility]    [address] [place] [address] [address]  [address]  [facility] [address]   [phone]            Aug 30, 2017 10:00 AM CDT   Level 3 with UR CH 02   [facility])    [address] [place] [address] [address]  [address]  [facility] [address]   [phone]            Sep 06, 2017 10:30 AM CDT   Level 3 with UR CH 01   [facility]    [address] [place] [address] [address]  [address]  [facility] [address]   [phone]            Sep 07, 2017  1:30 PM CDT   Adult Med Follow UP with Venancio Ochoa MD   Psychiatry Clinic ([facility])    [address]  [address] [facility] [address]   [phone]            Nov 02, 2017  2:00 PM CDT   Adult  Med Follow UP with Venancio Ochoa MD   Psychiatry Clinic ([facility])    [address]  [address] [facility] [address]   [phone]            Jan 04, 2018  1:00 PM CST   Adult Med Follow UP with Venancio Ochoa MD   Psychiatry Clinic ([facility])    [address]  [address] [facility] [address]   [phone]            Mar 01, 2018  1:00 PM CST   Adult Med Follow UP with Venancio Ochoa MD   Psychiatry Clinic ([facility])    [address]  [address] [facility] [address]   [phone]            May 03, 2018  1:00 PM CDT   Adult Med Follow UP with Venancio Ochoa MD   Psychiatry Clinic ([facility])    [place] [id number]  [address] [facility] [address]   [phone]              Who to contact     If you have questions or need follow up information about today's clinic visit or your schedule please contact [facility] [place], [facility] directly at [phone].  Normal or non-critical lab and imaging results will be communicated to you by Datezrhart, letter or phone within 4 business days after the clinic has received the results. If you do not hear from us within 7 days, please contact the clinic through Datezrhart or phone. If you have a critical or abnormal lab result, we will notify you by phone as soon as possible.  Submit refill requests through [employer] or call your pharmacy and they will forward the refill request to us. Please allow 3 business days for your refill to be completed.          Additional Information About Your Visit        [employer] Information     [employer] gives you secure access to your electronic health record. If you see a primary care provider, you can also send messages to your care team and make appointments. If you have questions, please call your primary care clinic.  If you do not have a primary  care provider, please call [phone] and they will assist you.        Care EveryWhere ID     This is your Care EveryWhere ID. This could be used by other organizations to access your [place] medical records  [id number]        Your Vitals Were     Pulse Temperature Respirations Pulse Oximetry          72 98.1  F (36.7  C) (Oral) 16 95%         Blood Pressure from Last 3 Encounters:   08/11/17 111/78   08/09/17 113/69   08/02/17 107/64    Weight from Last 3 Encounters:   05/26/17 86.3 kg (190 lb 4.1 oz)   12/12/16 85.5 kg (188 lb 9.6 oz)   12/07/16 85.3 kg (188 lb)              Today, you had the following     No orders found for display       Primary Care Provider Office Phone # Fax #    Candy Sweta Ridley [phone]       [facility] [address]  [facility] [address]        Equal Access to Services     TONY ARTHUR : Hadii aad ku hadasho Soomaali, waaxda luqadaha, qaybta kaalmada adeegyada, waxay idiin hayaan catherineeg juddarajose espinoza'sofia . So [facility] [phone].    ATENCIÓN: Si habla español, tiene a madrigal disposición servicios gratuitos de asistencia lingüística. Llame al [phone].    We comply with applicable federal civil rights laws and [place] laws. We do not discriminate on the basis of race, color, national origin, age, disability sex, sexual orientation or gender identity.            Thank you!     Thank you for choosing [facility] SERVICES  for your care. Our goal is always to provide you with excellent care. Hearing back from our patients is one way we can continue to improve our services. Please take a few minutes to complete the written survey that you may receive in the mail after your visit with us. Thank you!             Your Updated Medication List - Protect others around you: Learn how to safely use, store and throw away your medicines at www.disposemymeds.org.          This list is accurate as of: 8/11/17  1:22 PM.  Always use your most recent  med list.                   Brand Name Dispense Instructions for use Diagnosis    cloNIDine 0.1 MG tablet    CATAPRES    45 tablet    1.5 tablets 1 hour prior to scheduled ketamine dose.    Severe episode of recurrent major depressive disorder, without psychotic features (H)       KETAMINE HCL           Ketamine HCl 50 MG/ML Sosy     15 mL    Apply 150 mg into one nostril as directed every 7 days 3 sprays each nostril    Major depressive disorder, recurrent, severe without psychotic features (H)       lithium 150 MG capsule     90 capsule    Take 1 capsule (150 mg) by mouth daily    Major depressive disorder, recurrent, severe without psychotic features (H)       LORazepam 0.5 MG tablet    ATIVAN    30 tablet    Take 1-2 tablets (0.5-1 mg) by mouth every 6 hours as needed for anxiety    Major depressive disorder, recurrent, severe without psychotic features (H)       methylphenidate 10 MG tablet    RITALIN    180 tablet    Take 2 tablets (20 mg) by mouth 3 times daily    Major depressive disorder, recurrent, severe without psychotic features (H)       sertraline 25 MG tablet    ZOLOFT    90 tablet    Take 1 tablet (25 mg) by mouth daily    Major depressive disorder, recurrent, severe without psychotic features (H)

## 2017-08-16 ENCOUNTER — INFUSION THERAPY VISIT (OUTPATIENT)
Dept: INFUSION THERAPY | Facility: CLINIC | Age: 47
End: 2017-08-16
Attending: PSYCHIATRY & NEUROLOGY
Payer: MEDICARE

## 2017-08-16 VITALS
OXYGEN SATURATION: 95 % | DIASTOLIC BLOOD PRESSURE: 63 MMHG | SYSTOLIC BLOOD PRESSURE: 104 MMHG | RESPIRATION RATE: 18 BRPM | TEMPERATURE: 98.4 F | HEART RATE: 71 BPM

## 2017-08-16 DIAGNOSIS — F33.2 SEVERE RECURRENT MAJOR DEPRESSION WITHOUT PSYCHOTIC FEATURES (H): ICD-10-CM

## 2017-08-16 DIAGNOSIS — F33.2 SEVERE EPISODE OF RECURRENT MAJOR DEPRESSIVE DISORDER, WITHOUT PSYCHOTIC FEATURES (H): Primary | ICD-10-CM

## 2017-08-16 PROCEDURE — 25000128 H RX IP 250 OP 636: Performed by: PSYCHIATRY & NEUROLOGY

## 2017-08-16 PROCEDURE — 25000125 ZZHC RX 250: Performed by: PSYCHIATRY & NEUROLOGY

## 2017-08-16 PROCEDURE — 96361 HYDRATE IV INFUSION ADD-ON: CPT

## 2017-08-16 PROCEDURE — 96365 THER/PROPH/DIAG IV INF INIT: CPT

## 2017-08-16 RX ADMIN — SODIUM CHLORIDE 250 ML: 9 INJECTION, SOLUTION INTRAVENOUS at 10:45

## 2017-08-16 RX ADMIN — KETAMINE HYDROCHLORIDE 37.5 MG: 50 INJECTION, SOLUTION INTRAMUSCULAR; INTRAVENOUS at 10:46

## 2017-08-16 ASSESSMENT — PAIN SCALES - GENERAL: PAINLEVEL: NO PAIN (0)

## 2017-08-16 NOTE — PROGRESS NOTES
Sebastien Hoover presents today for Ketamine infusion.   Patient seen by provider today: No   present during visit today: Not Applicable.  Note: N/A.  ?  Intravenous Access:  Peripheral IV placed.  Treatment Conditions:  Not Applicable.  ?  Post Infusion Assessment:  Patient tolerated infusion without incident.  ?  Discharge Plan:   Patient and/or family verbalized understanding of discharge instructions and all questions answered.  Departure Mode: Ambulatory.  ?  Nesha Calvert

## 2017-08-16 NOTE — MR AVS SNAPSHOT
After Visit Summary   8/16/2017    Sebastien Hoover    MRN: [id number]           Patient Information     Date Of Birth          1970        Visit Information        Provider Department      8/16/2017 10:00 AM UR CH 02 [facility] [place], Infusion Services        Today's Diagnoses     Severe episode of recurrent major depressive disorder, without psychotic features (H)    -  1    Severe recurrent major depression without psychotic features (H)           Follow-ups after your visit        Your next 10 appointments already scheduled     Aug 23, 2017 10:00 AM CDT   Level 3 with UR CH 03   [facility] [place], Infusion Services ([facility])    [address]  [address]  [facility] [address]   [phone]            Aug 25, 2017 12:30 PM CDT   Level 3 with UR CH 02   [facility], Infusion Services ([facility])    [address] [place] [address] [address]  [address]  [facility] [address]   [phone]            Aug 30, 2017 10:00 AM CDT   Level 3 with UR CH 02   [facility], Infusion Services ([facility])    [address] [place] [address] [address]  [address]  [facility] [address]   [phone]            Sep 06, 2017 10:30 AM CDT   Level 3 with UR CH 01   [facility], Infusion Services ([facility])    [address] [place] [address] [address]  [address]  [facility] [address]   [phone]            Sep 07, 2017  1:30 PM CDT   Adult Med Follow UP with Venancio Ochoa MD   Psychiatry Clinic ([facility])    [address]  [address] [facility] [address]   [phone]            Nov 02, 2017  2:00 PM CDT   Adult Med Follow UP with Venancio Ochoa MD   Psychiatry Clinic ([facility])    [address]  [address] [facility] [address]   [phone]            Jan 04, 2018  1:00 PM CST   Adult  Med Follow UP with Venancio Ochoa MD   Psychiatry Clinic ([facility])    [facility]  [address] [facility] [address]  [address] [facility] [address]   [phone]            Mar 01, 2018  1:00 PM CST   Adult Med Follow UP with Venancio Ochoa MD   Psychiatry Clinic ([facility])    [facility]  [address]  [address] [facility] [address]   [phone]            May 03, 2018  1:00 PM CDT   Adult Med Follow UP with Venancio Ochoa MD   Psychiatry Clinic ([facility])    [facility]  [address] [facility] [address]  [address] [facility] [address]   [phone]              Who to contact     If you have questions or need follow up information about today's clinic visit or your schedule please contact [facility] [place], [facility] SERVICES directly at [phone].  Normal or non-critical lab and imaging results will be communicated to you by ProtÃ[employer], letter or phone within 4 business days after the clinic has received the results. If you do not hear from us within 7 days, please contact the clinic through [employer] or phone. If you have a critical or abnormal lab result, we will notify you by phone as soon as possible.  Submit refill requests through Fingooroo or call your pharmacy and they will forward the refill request to us. Please allow 3 business days for your refill to be completed.          Additional Information About Your Visit        [employer] Information     Fingooroo gives you secure access to your electronic health record. If you see a primary care provider, you can also send messages to your care team and make appointments. If you have questions, please call your primary care clinic.  If you do not have a primary care provider, please call [phone] and they will assist you.        Care EveryWhere ID     This is your Care EveryWhere ID. This could be used by other organizations to access your [place] medical records  [id number]         Your Vitals Were     Pulse Temperature Respirations Pulse Oximetry          71 98.4  F (36.9  C) (Oral) 18 95%         Blood Pressure from Last 3 Encounters:   08/16/17 104/63   08/11/17 111/78   08/09/17 113/69    Weight from Last 3 Encounters:   05/26/17 86.3 kg (190 lb 4.1 oz)   12/12/16 85.5 kg (188 lb 9.6 oz)   12/07/16 85.3 kg (188 lb)              Today, you had the following     No orders found for display       Primary Care Provider Office Phone # Fax #    Candy Sweta Ridley [phone]       [facility] [address]  [facility] [address]        Equal Access to Services     STEVEN ARTHUR : Hadii aad ku hadasho Soomaali, waaxda luqadaha, qaybta kaalmada adeegyada, waxstevan mccain. So [facility] [phone].    ATENCIÓN: Si habla español, tiene a madrigal disposición servicios gratuitos de asistencia lingüística. Llame al [phone].    We comply with applicable federal civil rights laws and [place] laws. We do not discriminate on the basis of race, color, national origin, age, disability sex, sexual orientation or gender identity.            Thank you!     Thank you for choosing [facility]  for your care. Our goal is always to provide you with excellent care. Hearing back from our patients is one way we can continue to improve our services. Please take a few minutes to complete the written survey that you may receive in the mail after your visit with us. Thank you!             Your Updated Medication List - Protect others around you: Learn how to safely use, store and throw away your medicines at www.disposemymeds.org.          This list is accurate as of: 8/16/17 12:58 PM.  Always use your most recent med list.                   Brand Name Dispense Instructions for use Diagnosis    cloNIDine 0.1 MG tablet    CATAPRES    45 tablet    1.5 tablets 1 hour prior to scheduled ketamine dose.    Severe episode of recurrent major  depressive disorder, without psychotic features (H)       KETAMINE HCL           Ketamine HCl 50 MG/ML Sosy     15 mL    Apply 150 mg into one nostril as directed every 7 days 3 sprays each nostril    Major depressive disorder, recurrent, severe without psychotic features (H)       lithium 150 MG capsule     90 capsule    Take 1 capsule (150 mg) by mouth daily    Major depressive disorder, recurrent, severe without psychotic features (H)       LORazepam 0.5 MG tablet    ATIVAN    30 tablet    Take 1-2 tablets (0.5-1 mg) by mouth every 6 hours as needed for anxiety    Major depressive disorder, recurrent, severe without psychotic features (H)       methylphenidate 10 MG tablet    RITALIN    180 tablet    Take 2 tablets (20 mg) by mouth 3 times daily    Major depressive disorder, recurrent, severe without psychotic features (H)       sertraline 25 MG tablet    ZOLOFT    90 tablet    Take 1 tablet (25 mg) by mouth daily    Major depressive disorder, recurrent, severe without psychotic features (H)

## 2017-08-23 ENCOUNTER — INFUSION THERAPY VISIT (OUTPATIENT)
Dept: INFUSION THERAPY | Facility: CLINIC | Age: 47
End: 2017-08-23
Attending: PSYCHIATRY & NEUROLOGY
Payer: MEDICARE

## 2017-08-23 VITALS
OXYGEN SATURATION: 95 % | SYSTOLIC BLOOD PRESSURE: 113 MMHG | HEART RATE: 77 BPM | TEMPERATURE: 98.2 F | DIASTOLIC BLOOD PRESSURE: 73 MMHG | RESPIRATION RATE: 16 BRPM

## 2017-08-23 DIAGNOSIS — F33.2 SEVERE EPISODE OF RECURRENT MAJOR DEPRESSIVE DISORDER, WITHOUT PSYCHOTIC FEATURES (H): Primary | ICD-10-CM

## 2017-08-23 DIAGNOSIS — F33.2 SEVERE RECURRENT MAJOR DEPRESSION WITHOUT PSYCHOTIC FEATURES (H): ICD-10-CM

## 2017-08-23 PROCEDURE — 25000125 ZZHC RX 250: Performed by: PSYCHIATRY & NEUROLOGY

## 2017-08-23 PROCEDURE — 96365 THER/PROPH/DIAG IV INF INIT: CPT

## 2017-08-23 PROCEDURE — 96361 HYDRATE IV INFUSION ADD-ON: CPT

## 2017-08-23 PROCEDURE — 25000128 H RX IP 250 OP 636: Performed by: PSYCHIATRY & NEUROLOGY

## 2017-08-23 RX ADMIN — SODIUM CHLORIDE 250 ML: 9 INJECTION, SOLUTION INTRAVENOUS at 10:35

## 2017-08-23 RX ADMIN — KETAMINE HYDROCHLORIDE 37.5 MG: 50 INJECTION, SOLUTION INTRAMUSCULAR; INTRAVENOUS at 10:37

## 2017-08-23 ASSESSMENT — PAIN SCALES - GENERAL: PAINLEVEL: NO PAIN (0)

## 2017-08-23 NOTE — MR AVS SNAPSHOT
After Visit Summary   8/23/2017    Sebastien Hoover    MRN: [id number]           Patient Information     Date Of Birth          1970        Visit Information        Provider Department      8/23/2017 10:00 AM UR CH 03 [facility], Infusion Services        Today's Diagnoses     Severe episode of recurrent major depressive disorder, without psychotic features (H)    -  1    Severe recurrent major depression without psychotic features (H)           Follow-ups after your visit        Your next 10 appointments already scheduled     Aug 25, 2017 12:30 PM CDT   Level 3 with UR CH 02   [facility], Infusion Services ([facility])    [address] [place] [address] [address]  [address]  [facility] [address]   [phone]            Aug 30, 2017 10:00 AM CDT   Level 3 with UR CH 02   [facility], Infusion Services ([facility])    [address] [place] [address] [address]  [address]  [facility] [address]   [phone]            Sep 06, 2017 10:30 AM CDT   Level 3 with UR CH 01   [facility], Infusion Services ([facility])    [address] [place] [address] [address]  [address]  [facility] [address]   [phone]            Sep 07, 2017  1:30 PM CDT   Adult Med Follow UP with Venancio Ochoa MD   Psychiatry Clinic ([facility])    [address]  [address] [place] [address]   [phone]            Nov 02, 2017  2:00 PM CDT   Adult Med Follow UP with Venancio Ochoa MD   Psychiatry Clinic ([facility])    [address]  [address] [place] [address]   [phone]            Jan 04, 2018  1:00 PM CST   Adult Med Follow UP with Venancio Ochoa MD   Psychiatry Clinic ([facility])    [address]  [address] [place] [address]   [phone]            Mar 01, 2018  1:00 PM CST    Adult Med Follow UP with Venancio Ochoa MD   Psychiatry Clinic ([facility])    [address] [facility] [address] [phone]            May 03, 2018  1:00 PM CDT   Adult Med Follow UP with Venancio Ochoa MD   Psychiatry Clinic ([facility])    [address]  [address] [facility] [address] [phone]              Who to contact     If you have questions or need follow up information about today's clinic visit or your schedule please contact [facility] [place], INFUSION SERVICES directly at [phone].  Normal or non-critical lab and imaging results will be communicated to you by mig33hart, letter or phone within 4 business days after the clinic has received the results. If you do not hear from us within 7 days, please contact the clinic through mig33hart or phone. If you have a critical or abnormal lab result, we will notify you by phone as soon as possible.  Submit refill requests through P[employer]a or call your pharmacy and they will forward the refill request to us. Please allow 3 business days for your refill to be completed.          Additional Information About Your Visit        mig33harSenionLab Information     Pantea gives you secure access to your electronic health record. If you see a primary care provider, you can also send messages to your care team and make appointments. If you have questions, please call your primary care clinic.  If you do not have a primary care provider, please call [phone] and they will assist you.        Care EveryWhere ID     This is your Care EveryWhere ID. This could be used by other organizations to access your [place] medical records  [id number]        Your Vitals Were     Pulse Temperature Respirations Pulse Oximetry          77 98.2  F (36.8  C) (Oral) 16 95%         Blood Pressure from Last 3 Encounters:   08/23/17 113/73   08/16/17 104/63   08/11/17 111/78     Weight from Last 3 Encounters:   05/26/17 86.3 kg (190 lb 4.1 oz)   12/12/16 85.5 kg (188 lb 9.6 oz)   12/07/16 85.3 kg (188 lb)              Today, you had the following     No orders found for display       Primary Care Provider Office Phone # Fax #    Candy Ridley [phone]       [facility] [address]  [facility] [address]        Equal Access to Services     STEVEN ARTHUR : Hadii aad ku hadasho Soomaali, waaxda luqadaha, qaybta kaalmada adeegyada, waxay idiin hayaan adeeg juddarajose castillo . So [facility] [phone].    ATENCIÓN: Si habla español, tiene a madrigal disposición servicios gratuitos de asistencia lingüística. [facility] [phone].    We comply with applicable federal civil rights laws and [place] laws. We do not discriminate on the basis of race, color, national origin, age, disability sex, sexual orientation or gender identity.            Thank you!     Thank you for choosing [facility]  for your care. Our goal is always to provide you with excellent care. Hearing back from our patients is one way we can continue to improve our services. Please take a few minutes to complete the written survey that you may receive in the mail after your visit with us. Thank you!             Your Updated Medication List - Protect others around you: Learn how to safely use, store and throw away your medicines at www.disposemymeds.org.          This list is accurate as of: 8/23/17  3:20 PM.  Always use your most recent med list.                   Brand Name Dispense Instructions for use Diagnosis    cloNIDine 0.1 MG tablet    CATAPRES    45 tablet    1.5 tablets 1 hour prior to scheduled ketamine dose.    Severe episode of recurrent major depressive disorder, without psychotic features (H)       KETAMINE HCL           Ketamine HCl 50 MG/ML Sosy     15 mL    Apply 150 mg into one nostril as directed every 7 days 3 sprays each nostril    Major depressive  disorder, recurrent, severe without psychotic features (H)       lithium 150 MG capsule     90 capsule    Take 1 capsule (150 mg) by mouth daily    Major depressive disorder, recurrent, severe without psychotic features (H)       LORazepam 0.5 MG tablet    ATIVAN    30 tablet    Take 1-2 tablets (0.5-1 mg) by mouth every 6 hours as needed for anxiety    Major depressive disorder, recurrent, severe without psychotic features (H)       methylphenidate 10 MG tablet    RITALIN    180 tablet    Take 2 tablets (20 mg) by mouth 3 times daily    Major depressive disorder, recurrent, severe without psychotic features (H)       sertraline 25 MG tablet    ZOLOFT    90 tablet    Take 1 tablet (25 mg) by mouth daily    Major depressive disorder, recurrent, severe without psychotic features (H)

## 2017-08-23 NOTE — PROGRESS NOTES
Infusion Nursing Note:  Sebastien BERNABE Kiko presents today for ketamine.    Patient seen by provider today: No   present during visit today: Not Applicable.    Note: Patient states he is doing ok, and that anxiety and depression are stable.    Intravenous Access:  Peripheral IV placed.    Post Infusion Assessment:  Patient tolerated infusion without incident.  VS monitored per protocol  Patient observed for 60 minutes post ketamine per protocol.  Blood return noted pre and post infusion.  Site patent and intact, free from redness, edema or discomfort.  No evidence of extravasations.  Access discontinued per protocol.    Discharge Plan:   Patient discharged in stable condition accompanied by: self.  Departure Mode: Ambulatory.  Will return to clinic on Friday.  Angela Davis RN

## 2017-08-25 ENCOUNTER — INFUSION THERAPY VISIT (OUTPATIENT)
Dept: INFUSION THERAPY | Facility: CLINIC | Age: 47
End: 2017-08-25
Attending: PSYCHIATRY & NEUROLOGY
Payer: MEDICARE

## 2017-08-25 VITALS
SYSTOLIC BLOOD PRESSURE: 107 MMHG | TEMPERATURE: 98.2 F | OXYGEN SATURATION: 95 % | DIASTOLIC BLOOD PRESSURE: 62 MMHG | HEART RATE: 74 BPM | RESPIRATION RATE: 16 BRPM

## 2017-08-25 DIAGNOSIS — F33.2 SEVERE EPISODE OF RECURRENT MAJOR DEPRESSIVE DISORDER, WITHOUT PSYCHOTIC FEATURES (H): Primary | ICD-10-CM

## 2017-08-25 DIAGNOSIS — F33.2 SEVERE RECURRENT MAJOR DEPRESSION WITHOUT PSYCHOTIC FEATURES (H): ICD-10-CM

## 2017-08-25 PROCEDURE — 96361 HYDRATE IV INFUSION ADD-ON: CPT

## 2017-08-25 PROCEDURE — 25000125 ZZHC RX 250: Performed by: PSYCHIATRY & NEUROLOGY

## 2017-08-25 PROCEDURE — 25000128 H RX IP 250 OP 636: Performed by: PSYCHIATRY & NEUROLOGY

## 2017-08-25 PROCEDURE — 96365 THER/PROPH/DIAG IV INF INIT: CPT

## 2017-08-25 RX ADMIN — KETAMINE HYDROCHLORIDE 37.5 MG: 50 INJECTION, SOLUTION INTRAMUSCULAR; INTRAVENOUS at 13:11

## 2017-08-25 RX ADMIN — SODIUM CHLORIDE 250 ML: 9 INJECTION, SOLUTION INTRAVENOUS at 14:11

## 2017-08-25 NOTE — MR AVS SNAPSHOT
After Visit Summary   8/25/2017    Sebastien Hoover    MRN: [id number]           Patient Information     Date Of Birth          1970        Visit Information        Provider Department      8/25/2017 12:30 PM [facility]        Today's Diagnoses     Severe episode of recurrent major depressive disorder, without psychotic features (H)    -  1    Severe recurrent major depression without psychotic features (H)           Follow-ups after your visit        Your next 10 appointments already scheduled     Aug 30, 2017 10:00 AM CDT   Level 3 with UR CH 02   [facility]    [address] [place] [address] [address]  [address]  [facility] [address]   [phone]            Sep 06, 2017 10:30 AM CDT   Level 3 with UR CH 01   [facility]    [address] [place] [address] [address]  [address]  [facility] [address]   [phone]            Sep 07, 2017  1:30 PM CDT   Adult Med Follow UP with Venancio Ochoa MD   Psychiatry Clinic ([facility])    [place] [id number]  [address] [facility] [address]   [phone]            Sep 13, 2017 10:30 AM CDT   Level 3 with UR CH 06   [facility]    [address] [place] [address] [address]  [address]  [facility] [address]   [phone]            Sep 19, 2017 10:00 AM CDT   Level 3 with UR CH 01   [facility]    [address] [place] [address] [address]  [address]  [facility] [address]   [phone]            Sep 21, 2017 10:00 AM CDT   Level 3 with UR CH 04   [facility]    [address] [place] [address] [address]  [address] [place] [address]   [phone]            Sep 26, 2017 10:00 AM CDT   Level 3  with UR CH 06   [facility], Infusion Services ([facility])    [address] [place] [address] [address]  [address]  [facility] [address]   [phone]            Nov 02, 2017  2:00 PM CDT   Adult Med Follow UP with Venancio Ochoa MD   Psychiatry Clinic ([facility])    [address]  [address] [facility] [address]   [phone]            Jan 04, 2018  1:00 PM CST   Adult Med Follow UP with Venancio Ochoa MD   Psychiatry Clinic ([facility])    [address]  [address] [facility] [address]   [phone]            Mar 01, 2018  1:00 PM CST   Adult Med Follow UP with Venancio Ochoa MD   Psychiatry Clinic ([facility])    [address]  [address] [facility] [address] [phone]              Who to contact     If you have questions or need follow up information about today's clinic visit or your schedule please contact [facility], INFUSION SERVICES directly at [phone].  Normal or non-critical lab and imaging results will be communicated to you by Hector Beverageshart, letter or phone within 4 business days after the clinic has received the results. If you do not hear from us within 7 days, please contact the clinic through [employer] or phone. If you have a critical or abnormal lab result, we will notify you by phone as soon as possible.  Submit refill requests through Axial or call your pharmacy and they will forward the refill request to us. Please allow 3 business days for your refill to be completed.          Additional Information About Your Visit        Axial Information     [employer]ial gives you secure access to your electronic health record. If you see a primary care provider, you can also send messages to your care team and make appointments. If you have questions, please call your primary care clinic.  If you do not have a primary care  provider, please call [phone] and they will assist you.        Care EveryWhere ID     This is your Care EveryWhere ID. This could be used by other organizations to access your [place] medical records  [id number]        Your Vitals Were     Pulse Temperature Respirations Pulse Oximetry          74 98.2  F (36.8  C) 16 95%         Blood Pressure from Last 3 Encounters:   08/25/17 107/62   08/23/17 113/73   08/16/17 104/63    Weight from Last 3 Encounters:   05/26/17 86.3 kg (190 lb 4.1 oz)   12/12/16 85.5 kg (188 lb 9.6 oz)   12/07/16 85.3 kg (188 lb)              Today, you had the following     No orders found for display       Primary Care Provider Office Phone # Fax #    Candy Sweta Ridley [phone]       [facility] [address]  [facility] [address]        Equal Access to Services     STEVEN ARTHUR : Hadii aad ku hadasho Soomaali, waaxda luqadaha, qaybta kaalmada adeegyada, waxay domingain hayjuden shaggy castillo . So [facility] [phone].    ATENCIÓN: Si habla español, tiene a madrigal disposición servicios gratuitos de asistencia lingüística. Llame al [phone].    We comply with applicable federal civil rights laws and [place] laws. We do not discriminate on the basis of race, color, national origin, age, disability sex, sexual orientation or gender identity.            Thank you!     Thank you for choosing [facility]  for your care. Our goal is always to provide you with excellent care. Hearing back from our patients is one way we can continue to improve our services. Please take a few minutes to complete the written survey that you may receive in the mail after your visit with us. Thank you!             Your Updated Medication List - Protect others around you: Learn how to safely use, store and throw away your medicines at www.disposemymeds.org.          This list is accurate as of: 8/25/17  3:17 PM.  Always use your most recent med list.                    Brand Name Dispense Instructions for use Diagnosis    cloNIDine 0.1 MG tablet    CATAPRES    45 tablet    1.5 tablets 1 hour prior to scheduled ketamine dose.    Severe episode of recurrent major depressive disorder, without psychotic features (H)       KETAMINE HCL           Ketamine HCl 50 MG/ML Sosy     15 mL    Apply 150 mg into one nostril as directed every 7 days 3 sprays each nostril    Major depressive disorder, recurrent, severe without psychotic features (H)       lithium 150 MG capsule     90 capsule    Take 1 capsule (150 mg) by mouth daily    Major depressive disorder, recurrent, severe without psychotic features (H)       LORazepam 0.5 MG tablet    ATIVAN    30 tablet    Take 1-2 tablets (0.5-1 mg) by mouth every 6 hours as needed for anxiety    Major depressive disorder, recurrent, severe without psychotic features (H)       methylphenidate 10 MG tablet    RITALIN    180 tablet    Take 2 tablets (20 mg) by mouth 3 times daily    Major depressive disorder, recurrent, severe without psychotic features (H)       sertraline 25 MG tablet    ZOLOFT    90 tablet    Take 1 tablet (25 mg) by mouth daily    Major depressive disorder, recurrent, severe without psychotic features (H)

## 2017-08-25 NOTE — PROGRESS NOTES
Sebastien presents today for Ketamine infusion.   Patient seen by provider today: No   present during visit today: Not Applicable.  Note: N/A.  ?  Intravenous Access:  Peripheral IV placed.  Treatment Conditions:  Not Applicable.  ?  Post Infusion Assessment:  Patient tolerated infusion without incident.  ?  Discharge Plan:   Patient discharged in stable condition accompanied by: self and ride.  ?  Nesha Calvert

## 2017-08-30 ENCOUNTER — INFUSION THERAPY VISIT (OUTPATIENT)
Dept: INFUSION THERAPY | Facility: CLINIC | Age: 47
End: 2017-08-30
Attending: PSYCHIATRY & NEUROLOGY
Payer: MEDICARE

## 2017-08-30 VITALS
HEART RATE: 71 BPM | OXYGEN SATURATION: 97 % | RESPIRATION RATE: 16 BRPM | TEMPERATURE: 97.8 F | SYSTOLIC BLOOD PRESSURE: 110 MMHG | DIASTOLIC BLOOD PRESSURE: 64 MMHG

## 2017-08-30 DIAGNOSIS — F33.2 SEVERE EPISODE OF RECURRENT MAJOR DEPRESSIVE DISORDER, WITHOUT PSYCHOTIC FEATURES (H): Primary | ICD-10-CM

## 2017-08-30 DIAGNOSIS — F33.2 SEVERE RECURRENT MAJOR DEPRESSION WITHOUT PSYCHOTIC FEATURES (H): ICD-10-CM

## 2017-08-30 PROCEDURE — 25000128 H RX IP 250 OP 636: Performed by: PSYCHIATRY & NEUROLOGY

## 2017-08-30 PROCEDURE — 96365 THER/PROPH/DIAG IV INF INIT: CPT

## 2017-08-30 PROCEDURE — 25000125 ZZHC RX 250: Performed by: PSYCHIATRY & NEUROLOGY

## 2017-08-30 PROCEDURE — 96361 HYDRATE IV INFUSION ADD-ON: CPT

## 2017-08-30 RX ADMIN — KETAMINE HYDROCHLORIDE 37.5 MG: 50 INJECTION, SOLUTION INTRAMUSCULAR; INTRAVENOUS at 10:26

## 2017-08-30 RX ADMIN — SODIUM CHLORIDE 250 ML: 9 INJECTION, SOLUTION INTRAVENOUS at 10:19

## 2017-08-30 NOTE — PROGRESS NOTES
Pt here for ketamine infusion.  Monitored on cont pulse ox and q 15 min VS during and for 1 hr post infusion.  Tolerated well.  RTC next Wednesday.

## 2017-08-30 NOTE — MR AVS SNAPSHOT
After Visit Summary   8/30/2017    Sebastien Hoover    MRN: [id number]           Patient Information     Date Of Birth          1970        Visit Information        Provider Department      8/30/2017 10:00 AM UR [facility] 02 [facility]        Today's Diagnoses     Severe episode of recurrent major depressive disorder, without psychotic features (H)    -  1    Severe recurrent major depression without psychotic features (H)           Follow-ups after your visit        Your next 10 appointments already scheduled     Sep 06, 2017 10:30 AM CDT   Level 3 with UR CH 01   [facility] ([facility])    [address] [place] [address] S.  [address] [place] [address]   [phone]            Sep 07, 2017  1:30 PM CDT   Adult Med Follow UP with Venancio Ochoa MD   Psychiatry Clinic ([address]  [address] [facility] [address]   [phone]            Sep 13, 2017 10:30 AM CDT   Level 3 with UR CH 06   [facility] ([facility])    [address] [place] [address] S.  [address] [place] [address]   [phone]            Sep 19, 2017 10:00 AM CDT   Level 3 with UR CH 01   [facility] ([facility])    [address] [place] [address] [address].  [address]  [facility] [address]   [phone]            Sep 21, 2017 10:00 AM CDT   Level 3 with UR CH 04   [facility] ([facility])    [address] [place] [address] S.  [address]  [facility] [address]   [phone]            Sep 26, 2017 10:00 AM CDT   Level 3 with UR CH 06   [facility] ([facility])    [address] [place] [address] S.  [address] [place] [address]   [phone]            Nov 02, 2017  2:00 PM CDT   Adult  Med Follow UP with Venancio Ochoa MD   Psychiatry Clinic ([facility])    [address]  [address] [facility] [address]   [phone]            Jan 04, 2018  1:00 PM CST   Adult Med Follow UP with Venancio Ochoa MD   Psychiatry Clinic ([facility])    [address]  [address] [facility] [address]   [phone]            Mar 01, 2018  1:00 PM CST   Adult Med Follow UP with Venancio Ochoa MD   Psychiatry Clinic ([facility])    [address]  [address] [facility] [address]   [phone]            May 03, 2018  1:00 PM CDT   Adult Med Follow UP with Venancio Ochoa MD   Psychiatry Clinic ([facility])    [place] [id number]  [address] [facility] [address]   [phone]              Who to contact     If you have questions or need follow up information about today's clinic visit or your schedule please contact [facility] directly at [phone].  Normal or non-critical lab and imaging results will be communicated to you by RedThart, letter or phone within 4 business days after the clinic has received the results. If you do not hear from us within 7 days, please contact the clinic through RedThart or phone. If you have a critical or abnormal lab result, we will notify you by phone as soon as possible.  Submit refill requests through ArmedZilla or call your pharmacy and they will forward the refill request to us. Please allow 3 business days for your refill to be completed.          Additional Information About Your Visit        RedTharThe Tap Lab Information     ArmedZilla gives you secure access to your electronic health record. If you see a primary care provider, you can also send messages to your care team and make appointments. If you have questions, please call your primary care clinic.  If you do not have a primary  care provider, please call [phone] and they will assist you.        Care EveryWhere ID     This is your Care EveryWhere ID. This could be used by other organizations to access your [place] medical records  [id number]        Your Vitals Were     Pulse Temperature Respirations Pulse Oximetry          71 97.8  F (36.6  C) (Oral) 16 97%         Blood Pressure from Last 3 Encounters:   08/30/17 110/64   08/25/17 107/62   08/23/17 113/73    Weight from Last 3 Encounters:   05/26/17 86.3 kg (190 lb 4.1 oz)   12/12/16 85.5 kg (188 lb 9.6 oz)   12/07/16 85.3 kg (188 lb)              Today, you had the following     No orders found for display       Primary Care Provider Office Phone # Fax #    Candy Sweta Ridley [phone]       [facility] [address]  [facility] [address]        Equal Access to Services     TONY ARTHUR : Hadii aad ku hadasho Soomaali, waaxda luqadaha, qaybta kaalmada adeegyada, waxay idiin hayaan catherineeg juddarajose espinoza'sofia . So [facility] [phone].    ATENCIÓN: Si habla español, tiene a madrigal disposición servicios gratuitos de asistencia lingüística. Llame al [phone].    We comply with applicable federal civil rights laws and [place] laws. We do not discriminate on the basis of race, color, national origin, age, disability sex, sexual orientation or gender identity.            Thank you!     Thank you for choosing [facility] SERVICES  for your care. Our goal is always to provide you with excellent care. Hearing back from our patients is one way we can continue to improve our services. Please take a few minutes to complete the written survey that you may receive in the mail after your visit with us. Thank you!             Your Updated Medication List - Protect others around you: Learn how to safely use, store and throw away your medicines at www.disposemymeds.org.          This list is accurate as of: 8/30/17 12:32 PM.  Always use your most recent  med list.                   Brand Name Dispense Instructions for use Diagnosis    cloNIDine 0.1 MG tablet    CATAPRES    45 tablet    1.5 tablets 1 hour prior to scheduled ketamine dose.    Severe episode of recurrent major depressive disorder, without psychotic features (H)       KETAMINE HCL           Ketamine HCl 50 MG/ML Sosy     15 mL    Apply 150 mg into one nostril as directed every 7 days 3 sprays each nostril    Major depressive disorder, recurrent, severe without psychotic features (H)       lithium 150 MG capsule     90 capsule    Take 1 capsule (150 mg) by mouth daily    Major depressive disorder, recurrent, severe without psychotic features (H)       LORazepam 0.5 MG tablet    ATIVAN    30 tablet    Take 1-2 tablets (0.5-1 mg) by mouth every 6 hours as needed for anxiety    Major depressive disorder, recurrent, severe without psychotic features (H)       methylphenidate 10 MG tablet    RITALIN    180 tablet    Take 2 tablets (20 mg) by mouth 3 times daily    Major depressive disorder, recurrent, severe without psychotic features (H)       sertraline 25 MG tablet    ZOLOFT    90 tablet    Take 1 tablet (25 mg) by mouth daily    Major depressive disorder, recurrent, severe without psychotic features (H)

## 2017-09-06 ENCOUNTER — INFUSION THERAPY VISIT (OUTPATIENT)
Dept: INFUSION THERAPY | Facility: CLINIC | Age: 47
End: 2017-09-06
Attending: PSYCHIATRY & NEUROLOGY
Payer: MEDICARE

## 2017-09-06 VITALS
DIASTOLIC BLOOD PRESSURE: 72 MMHG | RESPIRATION RATE: 16 BRPM | TEMPERATURE: 98.4 F | HEART RATE: 68 BPM | SYSTOLIC BLOOD PRESSURE: 116 MMHG | OXYGEN SATURATION: 97 %

## 2017-09-06 DIAGNOSIS — F33.2 SEVERE RECURRENT MAJOR DEPRESSION WITHOUT PSYCHOTIC FEATURES (H): ICD-10-CM

## 2017-09-06 DIAGNOSIS — F33.2 SEVERE EPISODE OF RECURRENT MAJOR DEPRESSIVE DISORDER, WITHOUT PSYCHOTIC FEATURES (H): Primary | ICD-10-CM

## 2017-09-06 PROCEDURE — 25000125 ZZHC RX 250: Performed by: PSYCHIATRY & NEUROLOGY

## 2017-09-06 PROCEDURE — 25000128 H RX IP 250 OP 636: Performed by: PSYCHIATRY & NEUROLOGY

## 2017-09-06 PROCEDURE — 96365 THER/PROPH/DIAG IV INF INIT: CPT

## 2017-09-06 PROCEDURE — 96361 HYDRATE IV INFUSION ADD-ON: CPT

## 2017-09-06 RX ADMIN — KETAMINE HYDROCHLORIDE 37.5 MG: 50 INJECTION, SOLUTION INTRAMUSCULAR; INTRAVENOUS at 10:45

## 2017-09-06 RX ADMIN — SODIUM CHLORIDE 250 ML: 9 INJECTION, SOLUTION INTRAVENOUS at 10:45

## 2017-09-06 NOTE — MR AVS SNAPSHOT
After Visit Summary   9/6/2017    Sebastien Hoover    MRN: [id number]           Patient Information     Date Of Birth          1970        Visit Information        Provider Department      9/6/2017 10:30 AM [facility]        Today's Diagnoses     Severe episode of recurrent major depressive disorder, without psychotic features (H)    -  1    Severe recurrent major depression without psychotic features (H)           Follow-ups after your visit        Your next 10 appointments already scheduled     Sep 07, 2017  1:30 PM CDT   Adult Med Follow UP with Venancio Ochoa MD   Psychiatry Clinic ([facility])    [address]  [address] [facility] [address]   [phone]            Sep 13, 2017 10:30 AM CDT   Level 3 with UR CH 06   [facility], Infusion Services ([facility])    [address] [place] [address] S.  [address] [place] [address]   [phone]            Sep 19, 2017 10:00 AM CDT   Level 3 with UR CH 01   [facility])    [address] [place] [address] [address].  [address] [place] [address]   [phone]            Sep 21, 2017 10:00 AM CDT   Level 3 with UR CH 04   [facility])    [address] [place] [address] [address].  [address] [place] [address]   [phone]            Sep 26, 2017 10:00 AM CDT   Level 3 with UR CH 06   [facility])    [address] [place] [address] S.  [address] [place] [address]   [phone]            Oct 03, 2017 10:00 AM CDT   Level 3 with UR CH 01   [facility])    [address] [place] [address] [address]  [address] [place] [address]   [phone]            Oct 05, 2017 10:30 AM CDT   Level 3  with UR CH 02   [facility], Infusion Services ([facility])    [address] [place] [address] S[address]  [address]  [facility] [address]   [phone]            Nov 02, 2017  2:00 PM CDT   Adult Med Follow UP with Venancio Ochoa MD   Psychiatry Clinic ([facility])    [address] [address]  [address] [facility] [address]   [phone]            Jan 04, 2018  1:00 PM CST   Adult Med Follow UP with Venancio Ochoa MD   Psychiatry Clinic ([facility])    [address] [place] [address]  [address] [facility] [address]   [phone]            Mar 01, 2018  1:00 PM CST   Adult Med Follow UP with Venancio Ochoa MD   Psychiatry Clinic ([facility])    [address]  [address] [facility] [address] [phone]              Who to contact     If you have questions or need follow up information about today's clinic visit or your schedule please contact [facility], INFUSION SERVICES directly at [phone].  Normal or non-critical lab and imaging results will be communicated to you by Bagel Nashhart, letter or phone within 4 business days after the clinic has received the results. If you do not hear from us within 7 days, please contact the clinic through Bagel Nashhart or phone. If you have a critical or abnormal lab result, we will notify you by phone as soon as possible.  Submit refill requests through Transpera or call your pharmacy and they will forward the refill request to us. Please allow 3 business days for your refill to be completed.          Additional Information About Your Visit        Transpera Information     Transpera gives you secure access to your electronic health record. If you see a primary care provider, you can also send messages to your care team and make appointments. If you have questions, please call your primary care clinic.  If you do not have a primary care  provider, please call [phone] and they will assist you.        Care EveryWhere ID     This is your Care EveryWhere ID. This could be used by other organizations to access your [place] medical records  [id number]        Your Vitals Were     Pulse Temperature Respirations Pulse Oximetry          68 98.4  F (36.9  C) (Oral) 16 97%         Blood Pressure from Last 3 Encounters:   09/06/17 116/72   08/30/17 110/64   08/25/17 107/62    Weight from Last 3 Encounters:   05/26/17 86.3 kg (190 lb 4.1 oz)   12/12/16 85.5 kg (188 lb 9.6 oz)   12/07/16 85.3 kg (188 lb)              Today, you had the following     No orders found for display       Primary Care Provider Office Phone # Fax #    Candy Sweta Ridley [phone]       [facility] [address]  [facility] [address]        Equal Access to Services     [facility]Y [facility] : Hadii aad ku hadasho Soomaali, waaxda luqadaha, qaybta kaalmada adeegyada, waxay idiin hayaan catherineeg vinicio castillo . So [facility] [phone].    ATENCIÓN: Si habla español, tiene a madrigal disposición servicios gratuitos de asistencia lingüística. Llame al [phone].    We comply with applicable federal civil rights laws and [place] laws. We do not discriminate on the basis of race, color, national origin, age, disability sex, sexual orientation or gender identity.            Thank you!     Thank you for choosing [facility]  for your care. Our goal is always to provide you with excellent care. Hearing back from our patients is one way we can continue to improve our services. Please take a few minutes to complete the written survey that you may receive in the mail after your visit with us. Thank you!             Your Updated Medication List - Protect others around you: Learn how to safely use, store and throw away your medicines at www.disposemymeds.org.          This list is accurate as of: 9/6/17 12:49 PM.  Always use your most recent med  list.                   Brand Name Dispense Instructions for use Diagnosis    cloNIDine 0.1 MG tablet    CATAPRES    45 tablet    1.5 tablets 1 hour prior to scheduled ketamine dose.    Severe episode of recurrent major depressive disorder, without psychotic features (H)       KETAMINE HCL           Ketamine HCl 50 MG/ML Sosy     15 mL    Apply 150 mg into one nostril as directed every 7 days 3 sprays each nostril    Major depressive disorder, recurrent, severe without psychotic features (H)       lithium 150 MG capsule     90 capsule    Take 1 capsule (150 mg) by mouth daily    Major depressive disorder, recurrent, severe without psychotic features (H)       LORazepam 0.5 MG tablet    ATIVAN    30 tablet    Take 1-2 tablets (0.5-1 mg) by mouth every 6 hours as needed for anxiety    Major depressive disorder, recurrent, severe without psychotic features (H)       methylphenidate 10 MG tablet    RITALIN    180 tablet    Take 2 tablets (20 mg) by mouth 3 times daily    Major depressive disorder, recurrent, severe without psychotic features (H)       sertraline 25 MG tablet    ZOLOFT    90 tablet    Take 1 tablet (25 mg) by mouth daily    Major depressive disorder, recurrent, severe without psychotic features (H)

## 2017-09-07 ENCOUNTER — OFFICE VISIT (OUTPATIENT)
Dept: PSYCHIATRY | Facility: CLINIC | Age: 47
End: 2017-09-07
Attending: PSYCHIATRY & NEUROLOGY
Payer: MEDICARE

## 2017-09-07 VITALS
SYSTOLIC BLOOD PRESSURE: 141 MMHG | DIASTOLIC BLOOD PRESSURE: 89 MMHG | HEART RATE: 73 BPM | WEIGHT: 187.4 LBS | BODY MASS INDEX: 29.34 KG/M2

## 2017-09-07 DIAGNOSIS — F33.2 MAJOR DEPRESSIVE DISORDER, RECURRENT, SEVERE WITHOUT PSYCHOTIC FEATURES (H): ICD-10-CM

## 2017-09-07 PROCEDURE — 99212 OFFICE O/P EST SF 10 MIN: CPT | Mod: ZF

## 2017-09-07 RX ORDER — DEXTROAMPHETAMINE SACCHARATE, AMPHETAMINE ASPARTATE, DEXTROAMPHETAMINE SULFATE AND AMPHETAMINE SULFATE 2.5; 2.5; 2.5; 2.5 MG/1; MG/1; MG/1; MG/1
10 TABLET ORAL 3 TIMES DAILY
Qty: 90 TABLET | Refills: 0 | Status: SHIPPED | OUTPATIENT
Start: 2017-09-07 | End: 2017-09-07

## 2017-09-07 RX ORDER — SERTRALINE HYDROCHLORIDE 25 MG/1
25 TABLET, FILM COATED ORAL DAILY
Qty: 90 TABLET | Refills: 1 | Status: SHIPPED | OUTPATIENT
Start: 2017-09-07 | End: 2018-01-04

## 2017-09-07 RX ORDER — DEXTROAMPHETAMINE SACCHARATE, AMPHETAMINE ASPARTATE, DEXTROAMPHETAMINE SULFATE AND AMPHETAMINE SULFATE 2.5; 2.5; 2.5; 2.5 MG/1; MG/1; MG/1; MG/1
10 TABLET ORAL 3 TIMES DAILY
Qty: 90 TABLET | Refills: 0 | Status: SHIPPED | OUTPATIENT
Start: 2017-09-07 | End: 2017-11-02

## 2017-09-07 NOTE — MR AVS SNAPSHOT
After Visit Summary   9/7/2017    Sebastien Hoover    MRN: [id number]           Patient Information     Date Of Birth          1970        Visit Information        Provider Department      9/7/2017 1:30 PM Venancio Ochoa MD Psychiatry Clinic        Today's Diagnoses     Major depressive disorder, recurrent, severe without psychotic features (H)           Follow-ups after your visit        Your next 10 appointments already scheduled     Sep 13, 2017 10:30 AM CDT   Level 3 with UR CH 06   [facility] ([facility])    [address]  [employer] [address]   [phone]            Sep 19, 2017 10:00 AM CDT   Level 3 with UR CH 01   [facility], Infusion Services ([facility])    [address]  [address]  [employer] [address]   [phone]            Sep 21, 2017 10:00 AM CDT   Level 3 with UR CH 04   [facility], Infusion Services ([facility])    [address]  [address]  [employer] [address]   [phone]            Sep 26, 2017 10:00 AM CDT   Level 3 with UR CH 06   [facility], Infusion Services ([facility])    [address]  [address]  [employer] [address]   [phone]            Oct 03, 2017 10:00 AM CDT   Level 3 with UR CH 01   [facility], Infusion Services ([facility])    [address]  [address]  [employer] [address]   [phone]            Oct 05, 2017 10:30 AM CDT   Level 3 with UR CH 02   [facility], Infusion Services ([facility])    [address]  [address]  [employer] [address]   [phone]            Nov 02, 2017  2:00 PM CDT   Adult Med Follow UP with Venancio Ochoa MD   Psychiatry Clinic ([facility])    [facility]  [address]  [address] [facility] [address]   [phone]            Jan 04, 2018  1:00 PM CST   Adult Med Follow UP with Venancio Ochoa MD   Psychiatry Clinic ([facility])    [address]  [address] [facility] [address]   [phone]            Mar 01, 2018  1:00 PM CST   Adult Med Follow UP with Venancio Ochoa MD   Psychiatry Clinic ([facility])    [address]  [address] [facility] [address]   [phone]            May 03, 2018  1:00 PM CDT   Adult Med Follow UP with Venancio Ochoa MD   Psychiatry Clinic ([facility])    [place] [id number]  [address] [facility] [address]   [phone]              Who to contact     Please call your clinic at [phone] to:    Ask questions about your health    Make or cancel appointments    Discuss your medicines    Learn about your test results    Speak to your doctor   If you have compliments or concerns about an experience at your clinic, or if you wish to file a complaint, please contact [facility] Patient Relations at [phone] or email us at [email or web address]         Additional Information About Your Visit        Zhengtai Datahart Information     BiteHuntert gives you secure access to your electronic health record. If you see a primary care provider, you can also send messages to your care team and make appointments. If you have questions, please call your primary care clinic.  If you do not have a primary care provider, please call [phone] and they will assist you.      [employer] is an electronic gateway that provides easy, online access to your medical records. With [employer], you can request a clinic appointment, read your test results, renew a prescription or communicate with your care team.     To access your existing account, please contact your [facility] or call [phone] for assistance.        Care EveryWhere ID     This is your Care EveryWhere ID. This could be used by other organizations to access your [place] medical records  [id number]        Your Vitals Were     Pulse BMI (Body Mass Index)                73 29.34 kg/m2           Blood Pressure from Last 3 Encounters:   09/07/17 141/89   09/06/17 116/72   08/30/17 110/64    Weight from Last 3 Encounters:   09/07/17 85 kg (187 lb 6.4 oz)   07/06/17 84.6 kg (186 lb 9.6 oz)   05/26/17 86.3 kg (190 lb 4.1 oz)              Today, you had the following     No orders found for display         Today's Medication Changes          These changes are accurate as of: 9/7/17  1:52 PM.  If you have any questions, ask your nurse or doctor.               Start taking these medicines.        Dose/Directions    amphetamine-dextroamphetamine 10 MG per tablet   Commonly known as:  ADDERALL   Used for:  Major depressive disorder, recurrent, severe without psychotic features (H)   Started by:  Venancio Ochoa MD        Dose:  10 mg   Take 1 tablet (10 mg) by mouth 3 times daily   Quantity:  90 tablet   Refills:  0         Stop taking these medicines if you haven't already. Please contact your care team if you have questions.     methylphenidate 10 MG tablet   Commonly known as:  RITALIN   Stopped by:  Venancio Ochoa MD                Where to get your medicines      Some of these will need a paper prescription and others can be bought over the counter.  Ask your nurse if you have questions.     Bring a paper prescription for each of these medications     amphetamine-dextroamphetamine 10 MG per tablet    sertraline 25 MG tablet                Primary Care Provider Office Phone # Fax #    Candy Ridley [phone]       [facility] [address]  [facility] [address]        Equal Access to Services     STEVEN BROWN: Reynaldo Jo,  wacoletteda javiersoraida, qaybta karolando osuna, guillermo savageaakevin ah. So [facility] [phone].    ATENCIÓN: Si luiz hopkins, tiene a madrigal disposición servicios gratuitos de asistencia lingüística. Parish al [phone].    We comply with applicable federal civil rights laws and [place] laws. We do not discriminate on the basis of race, color, national origin, age, disability sex, sexual orientation or gender identity.            Thank you!     Thank you for choosing [facility]ATRY [facility]INIC  for your care. Our goal is always to provide you with excellent care. Hearing back from our patients is one way we can continue to improve our services. Please take a few minutes to complete the written survey that you may receive in the mail after your visit with us. Thank you!             Your Updated Medication List - Protect others around you: Learn how to safely use, store and throw away your medicines at www.disposemymeds.org.          This list is accurate as of: 9/7/17  1:52 PM.  Always use your most recent med list.                   Brand Name Dispense Instructions for use Diagnosis    amphetamine-dextroamphetamine 10 MG per tablet    ADDERALL    90 tablet    Take 1 tablet (10 mg) by mouth 3 times daily    Major depressive disorder, recurrent, severe without psychotic features (H)       cloNIDine 0.1 MG tablet    CATAPRES    45 tablet    1.5 tablets 1 hour prior to scheduled ketamine dose.    Severe episode of recurrent major depressive disorder, without psychotic features (H)       KETAMINE HCL           Ketamine HCl 50 MG/ML Sosy     15 mL    Apply 150 mg into one nostril as directed every 7 days 3 sprays each nostril    Major depressive disorder, recurrent, severe without psychotic features (H)       lithium 150 MG capsule     90 capsule    Take 1 capsule (150 mg) by mouth daily    Major depressive disorder, recurrent, severe without psychotic features (H)       LORazepam 0.5 MG tablet    ATIVAN    30 tablet     Take 1-2 tablets (0.5-1 mg) by mouth every 6 hours as needed for anxiety    Major depressive disorder, recurrent, severe without psychotic features (H)       sertraline 25 MG tablet    ZOLOFT    90 tablet    Take 1 tablet (25 mg) by mouth daily    Major depressive disorder, recurrent, severe without psychotic features (H)

## 2017-09-07 NOTE — NURSING NOTE
Chief Complaint   Patient presents with     Recheck Medication     MDD     Reviewed allergies, smoking status, and pharmacy preference  Administered abuse screening question   Obtained weight, blood pressure and heart rate

## 2017-09-13 ENCOUNTER — INFUSION THERAPY VISIT (OUTPATIENT)
Dept: INFUSION THERAPY | Facility: CLINIC | Age: 47
End: 2017-09-13
Attending: PSYCHIATRY & NEUROLOGY
Payer: MEDICARE

## 2017-09-13 VITALS
RESPIRATION RATE: 18 BRPM | SYSTOLIC BLOOD PRESSURE: 113 MMHG | DIASTOLIC BLOOD PRESSURE: 69 MMHG | OXYGEN SATURATION: 96 % | TEMPERATURE: 98 F | HEART RATE: 67 BPM

## 2017-09-13 DIAGNOSIS — F33.2 SEVERE EPISODE OF RECURRENT MAJOR DEPRESSIVE DISORDER, WITHOUT PSYCHOTIC FEATURES (H): Primary | ICD-10-CM

## 2017-09-13 DIAGNOSIS — F33.2 SEVERE RECURRENT MAJOR DEPRESSION WITHOUT PSYCHOTIC FEATURES (H): ICD-10-CM

## 2017-09-13 PROCEDURE — 25000128 H RX IP 250 OP 636: Performed by: PSYCHIATRY & NEUROLOGY

## 2017-09-13 PROCEDURE — 96365 THER/PROPH/DIAG IV INF INIT: CPT

## 2017-09-13 PROCEDURE — 25000125 ZZHC RX 250: Performed by: PSYCHIATRY & NEUROLOGY

## 2017-09-13 PROCEDURE — 96361 HYDRATE IV INFUSION ADD-ON: CPT

## 2017-09-13 RX ADMIN — KETAMINE HYDROCHLORIDE 37.5 MG: 50 INJECTION, SOLUTION INTRAMUSCULAR; INTRAVENOUS at 10:58

## 2017-09-13 RX ADMIN — SODIUM CHLORIDE 250 ML: 9 INJECTION, SOLUTION INTRAVENOUS at 10:58

## 2017-09-13 NOTE — MR AVS SNAPSHOT
After Visit Summary   9/13/2017    Sebastien Hoover    MRN: [id number]           Patient Information     Date Of Birth          1970        Visit Information        Provider Department      9/13/2017 10:30 AM [facility]        Today's Diagnoses     Severe episode of recurrent major depressive disorder, without psychotic features (H)    -  1    Severe recurrent major depression without psychotic features (H)           Follow-ups after your visit        Your next 10 appointments already scheduled     Sep 19, 2017 10:00 AM CDT   Level 3 with UR CH 01   [facility] ([facility]    [address]  [facility] [address]   [phone]            Sep 21, 2017 10:00 AM CDT   Level 3 with UR CH 04   [facility]    [address]  [facility] [address]   [phone]            Sep 26, 2017 10:00 AM CDT   Level 3 with UR CH 06   [facility]    [address]  [facility] [address]   [phone]            Oct 03, 2017 10:00 AM CDT   Level 3 with UR CH 01   [facility]    [address]  [facility] [address]   [phone]            Oct 05, 2017 10:30 AM CDT   Level 3 with UR CH 02   [facility]    [address]  [facility] [address]   [phone]            Oct 10, 2017 10:00 AM CDT   Level 3 with UR CH 01   [facility]    [address]  [facility] [address]   [phone]            Oct 12, 2017 10:30 AM CDT   Level 3  with UR CH 02   [facility], Infusion Services ([facility])    [address]  [facility] [address]   [phone]            Oct 17, 2017 10:00 AM CDT   Level 3 with UR CH 04   [facility], Infusion Services ([facility])    [address]  [facility] [address]   [phone]            Oct 19, 2017 10:30 AM CDT   Level 3 with UR CH 02   [facility], Infusion Services ([facility])    [address]  [address]  [facility] [address]   [phone]            Nov 02, 2017  2:00 PM CDT   Adult Med Follow UP with Venancio Ochoa MD   Psychiatry Clinic ([facility])    [place] [id number] [address] [facility] [id number] [phone]              Who to contact     If you have questions or need follow up information about today's clinic visit or your schedule please contact [facility] directly at [phone].  Normal or non-critical lab and imaging results will be communicated to you by InVivioLinkhart, letter or phone within 4 business days after the clinic has received the results. If you do not hear from us within 7 days, please contact the clinic through InVivioLinkhart or phone. If you have a critical or abnormal lab result, we will notify you by phone as soon as possible.  Submit refill requests through Snoox or call your pharmacy and they will forward the refill request to us. Please allow 3 business days for your refill to be completed.          Additional Information About Your Visit        InVivioLinkharClicko Information     Snoox gives you secure access to your electronic health record. If you see a primary care provider, you can also send messages to your care team and make appointments. If you have questions, please call your primary care clinic.  If you do not have a primary care  provider, please call [phone] and they will assist you.        Care EveryWhere ID     This is your Care EveryWhere ID. This could be used by other organizations to access your [place] medical records  [id number]        Your Vitals Were     Pulse Temperature Respirations Pulse Oximetry          67 98  F (36.7  C) (Oral) 18 96%         Blood Pressure from Last 3 Encounters:   09/13/17 113/69   09/06/17 116/72   08/30/17 110/64    Weight from Last 3 Encounters:   05/26/17 86.3 kg (190 lb 4.1 oz)   12/12/16 85.5 kg (188 lb 9.6 oz)   12/07/16 85.3 kg (188 lb)              Today, you had the following     No orders found for display       Primary Care Provider Office Phone # Fax #    Candy Sweta Ridley [phone]       [facility] [address]  [facility] [address]        Equal Access to Services     STEVEN ARTHUR : Hadii aad ku hadasho Soomaali, waaxda luqadaha, qaybta kaalmada adeegyada, waxay domingain hayjuden shaggy castillo . So [facility] [phone].    ATENCIÓN: Si habla español, tiene a madrigal disposición servicios gratuitos de asistencia lingüística. Llame al [phone].    We comply with applicable federal civil rights laws and [place] laws. We do not discriminate on the basis of race, color, national origin, age, disability sex, sexual orientation or gender identity.            Thank you!     Thank you for choosing [facility]  for your care. Our goal is always to provide you with excellent care. Hearing back from our patients is one way we can continue to improve our services. Please take a few minutes to complete the written survey that you may receive in the mail after your visit with us. Thank you!             Your Updated Medication List - Protect others around you: Learn how to safely use, store and throw away your medicines at www.disposemymeds.org.          This list is accurate as of: 9/13/17  1:39 PM.  Always use your most recent med  list.                   Brand Name Dispense Instructions for use Diagnosis    amphetamine-dextroamphetamine 10 MG per tablet    ADDERALL    90 tablet    Take 1 tablet (10 mg) by mouth 3 times daily    Major depressive disorder, recurrent, severe without psychotic features (H)       cloNIDine 0.1 MG tablet    CATAPRES    45 tablet    1.5 tablets 1 hour prior to scheduled ketamine dose.    Severe episode of recurrent major depressive disorder, without psychotic features (H)       KETAMINE HCL           Ketamine HCl 50 MG/ML Sosy     15 mL    Apply 150 mg into one nostril as directed every 7 days 3 sprays each nostril    Major depressive disorder, recurrent, severe without psychotic features (H)       lithium 150 MG capsule     90 capsule    Take 1 capsule (150 mg) by mouth daily    Major depressive disorder, recurrent, severe without psychotic features (H)       LORazepam 0.5 MG tablet    ATIVAN    30 tablet    Take 1-2 tablets (0.5-1 mg) by mouth every 6 hours as needed for anxiety    Major depressive disorder, recurrent, severe without psychotic features (H)       sertraline 25 MG tablet    ZOLOFT    90 tablet    Take 1 tablet (25 mg) by mouth daily    Major depressive disorder, recurrent, severe without psychotic features (H)

## 2017-09-19 ENCOUNTER — INFUSION THERAPY VISIT (OUTPATIENT)
Dept: INFUSION THERAPY | Facility: CLINIC | Age: 47
End: 2017-09-19
Attending: PSYCHIATRY & NEUROLOGY
Payer: MEDICARE

## 2017-09-19 VITALS
TEMPERATURE: 97.3 F | BODY MASS INDEX: 29.17 KG/M2 | OXYGEN SATURATION: 97 % | RESPIRATION RATE: 18 BRPM | DIASTOLIC BLOOD PRESSURE: 67 MMHG | SYSTOLIC BLOOD PRESSURE: 104 MMHG | WEIGHT: 186.29 LBS | HEART RATE: 69 BPM

## 2017-09-19 DIAGNOSIS — F33.2 SEVERE RECURRENT MAJOR DEPRESSION WITHOUT PSYCHOTIC FEATURES (H): ICD-10-CM

## 2017-09-19 DIAGNOSIS — F33.2 SEVERE EPISODE OF RECURRENT MAJOR DEPRESSIVE DISORDER, WITHOUT PSYCHOTIC FEATURES (H): Primary | ICD-10-CM

## 2017-09-19 PROCEDURE — 96365 THER/PROPH/DIAG IV INF INIT: CPT

## 2017-09-19 PROCEDURE — 96361 HYDRATE IV INFUSION ADD-ON: CPT

## 2017-09-19 PROCEDURE — 25000125 ZZHC RX 250: Performed by: PSYCHIATRY & NEUROLOGY

## 2017-09-19 PROCEDURE — 25000128 H RX IP 250 OP 636: Performed by: PSYCHIATRY & NEUROLOGY

## 2017-09-19 RX ADMIN — SODIUM CHLORIDE 250 ML: 9 INJECTION, SOLUTION INTRAVENOUS at 10:26

## 2017-09-19 RX ADMIN — KETAMINE HYDROCHLORIDE 37.5 MG: 50 INJECTION, SOLUTION INTRAMUSCULAR; INTRAVENOUS at 10:25

## 2017-09-19 NOTE — MR AVS SNAPSHOT
After Visit Summary   9/19/2017    Sebastien Hoover    MRN: [id number]           Patient Information     Date Of Birth          1970        Visit Information        Provider Department      9/19/2017 10:00 AM [facility]        Today's Diagnoses     Severe episode of recurrent major depressive disorder, without psychotic features (H)    -  1    Severe recurrent major depression without psychotic features (H)           Follow-ups after your visit        Your next 10 appointments already scheduled     Sep 21, 2017 10:00 AM CDT   Level 3 with UR CH 04   [facility] ([facility])    [address]  [facility] [address]   [phone]            Sep 26, 2017 10:00 AM CDT   Level 3 with UR CH 06   [facility])    [address]  [facility] [address]   [phone]            Oct 03, 2017 10:00 AM CDT   Level 3 with UR CH 01   [facility])    [address]  [facility] [address]   [phone]            Oct 05, 2017 10:30 AM CDT   Level 3 with UR CH 02   [facility])    [address]  [facility] [address]   [phone]            Oct 10, 2017 10:00 AM CDT   Level 3 with UR CH 01   [facility])    [address]  [facility] [address]   [phone]            Oct 12, 2017 10:30 AM CDT   Level 3 with UR CH 02   [facility] ([facility])    [address]  [facility] [address]   [phone]            Oct 17, 2017 10:00 AM CDT   Level 3  with UR CH 04   [facility], Infusion Services ([facility])    [address]  [facility] [address]   [phone]            Oct 19, 2017 10:30 AM CDT   Level 3 with UR CH 02   [facility], Infusion Services ([facility])    [address]  [facility] [address]   [phone]            Nov 02, 2017  2:00 PM CDT   Adult Med Follow UP with Venancio Ochoa MD   Psychiatry Clinic ([facility])    [address] [facility] [address] [phone]            Jan 04, 2018  1:00 PM CST   Adult Med Follow UP with Venancio Ochoa MD   Psychiatry Clinic ([facility])    [address] [facility] [address] [phone]              Who to contact     If you have questions or need follow up information about today's clinic visit or your schedule please contact [facility], INFUSION SERVICES directly at [phone].  Normal or non-critical lab and imaging results will be communicated to you by MyChart, letter or phone within 4 business days after the clinic has received the results. If you do not hear from us within 7 days, please contact the clinic through Atox Biohart or phone. If you have a critical or abnormal lab result, we will notify you by phone as soon as possible.  Submit refill requests through RunMyProcess or call your pharmacy and they will forward the refill request to us. Please allow 3 business days for your refill to be completed.          Additional Information About Your Visit        Atox BioharInSpa Information     RunMyProcess gives you secure access to your electronic health record. If you see a primary care provider, you can also send messages to your care team and make appointments. If you have questions, please call your primary care clinic.  If you do not have a primary care  provider, please call [phone] and they will assist you.        Care EveryWhere ID     This is your Care EveryWhere ID. This could be used by other organizations to access your [place] medical records  [id number]        Your Vitals Were     Pulse Temperature Respirations Pulse Oximetry BMI (Body Mass Index)       69 97.3  F (36.3  C) 18 97% 29.17 kg/m2        Blood Pressure from Last 3 Encounters:   09/19/17 104/67   09/13/17 113/69   09/06/17 116/72    Weight from Last 3 Encounters:   09/19/17 84.5 kg (186 lb 4.6 oz)   05/26/17 86.3 kg (190 lb 4.1 oz)   12/12/16 85.5 kg (188 lb 9.6 oz)              Today, you had the following     No orders found for display       Primary Care Provider Office Phone # Fax #    Candy Ridley [phone]       [facility] [address]  [facility] [address]        Equal Access to Services     TONY ARTHUR : Hadii aad ku hadasho Soomaali, waaxda luqadaha, qaybta kaalmada adeegyada, waxay idiin hayjuden shaggy castillo . So [facility] [phone].    ATENCIÓN: Si habla español, tiene a madrigal disposición servicios gratuitos de asistencia lingüística. Llame al [phone].    We comply with applicable federal civil rights laws and [place] laws. We do not discriminate on the basis of race, color, national origin, age, disability sex, sexual orientation or gender identity.            Thank you!     Thank you for choosing [facility] SERVICES  for your care. Our goal is always to provide you with excellent care. Hearing back from our patients is one way we can continue to improve our services. Please take a few minutes to complete the written survey that you may receive in the mail after your visit with us. Thank you!             Your Updated Medication List - Protect others around you: Learn how to safely use, store and throw away your medicines at www.disposemymeds.org.          This list is accurate as of: 9/19/17  1:22 PM.   Always use your most recent med list.                   Brand Name Dispense Instructions for use Diagnosis    amphetamine-dextroamphetamine 10 MG per tablet    ADDERALL    90 tablet    Take 1 tablet (10 mg) by mouth 3 times daily    Major depressive disorder, recurrent, severe without psychotic features (H)       cloNIDine 0.1 MG tablet    CATAPRES    45 tablet    1.5 tablets 1 hour prior to scheduled ketamine dose.    Severe episode of recurrent major depressive disorder, without psychotic features (H)       KETAMINE HCL           Ketamine HCl 50 MG/ML Sosy     15 mL    Apply 150 mg into one nostril as directed every 7 days 3 sprays each nostril    Major depressive disorder, recurrent, severe without psychotic features (H)       lithium 150 MG capsule     90 capsule    Take 1 capsule (150 mg) by mouth daily    Major depressive disorder, recurrent, severe without psychotic features (H)       LORazepam 0.5 MG tablet    ATIVAN    30 tablet    Take 1-2 tablets (0.5-1 mg) by mouth every 6 hours as needed for anxiety    Major depressive disorder, recurrent, severe without psychotic features (H)       sertraline 25 MG tablet    ZOLOFT    90 tablet    Take 1 tablet (25 mg) by mouth daily    Major depressive disorder, recurrent, severe without psychotic features (H)

## 2017-09-19 NOTE — PROGRESS NOTES
Infusion Nursing Note:  Sebastien Hoover presents today for ketamine infusion.    Patient seen by provider today: No   present during visit today: Not Applicable.    Note: N/A.    Intravenous Access:  Peripheral IV placed.    Treatment Conditions:  Not Applicable.      Post Infusion Assessment:  Patient tolerated infusion without incident.  Patient observed for 60 minutes post ketamine infusion, per protocol.  Blood return noted pre and post infusion.  No evidence of extravasations.  Access discontinued per protocol.    Discharge Plan:   Patient discharged in stable condition accompanied by: self.  Departure Mode: Ambulatory.    Angela Gann RN

## 2017-09-21 ENCOUNTER — INFUSION THERAPY VISIT (OUTPATIENT)
Dept: INFUSION THERAPY | Facility: CLINIC | Age: 47
End: 2017-09-21
Attending: PSYCHIATRY & NEUROLOGY
Payer: MEDICARE

## 2017-09-21 VITALS
SYSTOLIC BLOOD PRESSURE: 106 MMHG | RESPIRATION RATE: 16 BRPM | TEMPERATURE: 98 F | DIASTOLIC BLOOD PRESSURE: 70 MMHG | HEART RATE: 65 BPM | OXYGEN SATURATION: 96 %

## 2017-09-21 DIAGNOSIS — F33.2 SEVERE RECURRENT MAJOR DEPRESSION WITHOUT PSYCHOTIC FEATURES (H): ICD-10-CM

## 2017-09-21 DIAGNOSIS — F33.2 SEVERE EPISODE OF RECURRENT MAJOR DEPRESSIVE DISORDER, WITHOUT PSYCHOTIC FEATURES (H): Primary | ICD-10-CM

## 2017-09-21 PROCEDURE — 96365 THER/PROPH/DIAG IV INF INIT: CPT

## 2017-09-21 PROCEDURE — 25000128 H RX IP 250 OP 636: Performed by: PSYCHIATRY & NEUROLOGY

## 2017-09-21 PROCEDURE — 25000125 ZZHC RX 250: Performed by: PSYCHIATRY & NEUROLOGY

## 2017-09-21 RX ADMIN — KETAMINE HYDROCHLORIDE 37.5 MG: 50 INJECTION, SOLUTION INTRAMUSCULAR; INTRAVENOUS at 10:17

## 2017-09-21 NOTE — MR AVS SNAPSHOT
After Visit Summary   9/21/2017    Sebastien Hoover    MRN: [id number]           Patient Information     Date Of Birth          1970        Visit Information        Provider Department      9/21/2017 10:00 AM [facility]        Today's Diagnoses     Severe episode of recurrent major depressive disorder, without psychotic features (H)    -  1    Severe recurrent major depression without psychotic features (H)           Follow-ups after your visit        Your next 10 appointments already scheduled     Sep 26, 2017 10:00 AM CDT   Level 3 with UR CH 06   [facility] ([facility])    [address]  [facility] [address]   [phone]            Oct 03, 2017 10:00 AM CDT   Level 3 with UR CH 01   [facility])    [address]  [facility] [address]   [phone]            Oct 05, 2017 10:30 AM CDT   Level 3 with UR CH 02   [facility] ([facility])    [address]  [facility] [address]   [phone]            Oct 10, 2017 10:00 AM CDT   Level 3 with UR CH 01   [facility] ([facility])    [address]  [facility] [address]   [phone]            Oct 12, 2017 10:30 AM CDT   Level 3 with UR CH 02   [facility] ([facility])    [address]  [facility] [address]   [phone]            Oct 17, 2017 10:00 AM CDT   Level 3 with UR CH 04   [facility] ([facility])    [address]  [facility] [address]   [phone]            Oct 19, 2017 10:30 AM CDT   Level 3  with UR CH 02   [facility] ([facility])    [address] [place] [address] [address]  [address]  [facility] [address]   [phone]            Nov 02, 2017  2:00 PM CDT   Adult Med Follow UP with Venancio Ochoa MD   Psychiatry Clinic ([facility])    [address] [facility] [address]   [phone]            Jan 04, 2018  1:00 PM CST   Adult Med Follow UP with Venancio Ochoa MD   Psychiatry Clinic ([facility])    [address]  [address] [facility] [address]   [phone]            Mar 01, 2018  1:00 PM CST   Adult Med Follow UP with Venancio Ochoa MD   Psychiatry Clinic ([facility])    [address]  [address] [facility] [address] [phone]              Who to contact     If you have questions or need follow up information about today's clinic visit or your schedule please contact [facility], INFUSION SERVICES directly at [phone].  Normal or non-critical lab and imaging results will be communicated to you by Reachoohart, letter or phone within 4 business days after the clinic has received the results. If you do not hear from us within 7 days, please contact the clinic through Reachoohart or phone. If you have a critical or abnormal lab result, we will notify you by phone as soon as possible.  Submit refill requests through [employer] or call your pharmacy and they will forward the refill request to us. Please allow 3 business days for your refill to be completed.          Additional Information About Your Visit        Ar[employer] Information     [employer] gives you secure access to your electronic health record. If you see a primary care provider, you can also send messages to your care team and make appointments. If you have questions, please call your primary care clinic.  If you do not have a primary care  provider, please call [phone] and they will assist you.        Care EveryWhere ID     This is your Care EveryWhere ID. This could be used by other organizations to access your [place] medical records  [id number]        Your Vitals Were     Pulse Temperature Respirations Pulse Oximetry          65 98  F (36.7  C) 16 96%         Blood Pressure from Last 3 Encounters:   09/21/17 106/70   09/19/17 104/67   09/13/17 113/69    Weight from Last 3 Encounters:   09/19/17 84.5 kg (186 lb 4.6 oz)   05/26/17 86.3 kg (190 lb 4.1 oz)   12/12/16 85.5 kg (188 lb 9.6 oz)              Today, you had the following     No orders found for display       Primary Care Provider Office Phone # Fax #    Candy Ridley [phone]       [facility] [address]  [facility] [address]        Equal Access to Services     TONY [facility] : Hadii aad ku hadasho Soomaali, waaxda luqadaha, qaybta kaalmada adeegyada, waxay idiin hayjuden shaggy castillo . So [facility] [phone].    ATENCIÓN: Si habla español, tiene a madrigal disposición servicios gratuitos de asistencia lingüística. Llame al [phone].    We comply with applicable federal civil rights laws and [place] laws. We do not discriminate on the basis of race, color, national origin, age, disability sex, sexual orientation or gender identity.            Thank you!     Thank you for choosing [facility]  for your care. Our goal is always to provide you with excellent care. Hearing back from our patients is one way we can continue to improve our services. Please take a few minutes to complete the written survey that you may receive in the mail after your visit with us. Thank you!             Your Updated Medication List - Protect others around you: Learn how to safely use, store and throw away your medicines at www.disposemymeds.org.          This list is accurate as of: 9/21/17 12:13 PM.  Always use your most recent med  list.                   Brand Name Dispense Instructions for use Diagnosis    amphetamine-dextroamphetamine 10 MG per tablet    ADDERALL    90 tablet    Take 1 tablet (10 mg) by mouth 3 times daily    Major depressive disorder, recurrent, severe without psychotic features (H)       cloNIDine 0.1 MG tablet    CATAPRES    45 tablet    1.5 tablets 1 hour prior to scheduled ketamine dose.    Severe episode of recurrent major depressive disorder, without psychotic features (H)       KETAMINE HCL           Ketamine HCl 50 MG/ML Sosy     15 mL    Apply 150 mg into one nostril as directed every 7 days 3 sprays each nostril    Major depressive disorder, recurrent, severe without psychotic features (H)       lithium 150 MG capsule     90 capsule    Take 1 capsule (150 mg) by mouth daily    Major depressive disorder, recurrent, severe without psychotic features (H)       LORazepam 0.5 MG tablet    ATIVAN    30 tablet    Take 1-2 tablets (0.5-1 mg) by mouth every 6 hours as needed for anxiety    Major depressive disorder, recurrent, severe without psychotic features (H)       sertraline 25 MG tablet    ZOLOFT    90 tablet    Take 1 tablet (25 mg) by mouth daily    Major depressive disorder, recurrent, severe without psychotic features (H)

## 2017-09-26 ENCOUNTER — INFUSION THERAPY VISIT (OUTPATIENT)
Dept: INFUSION THERAPY | Facility: CLINIC | Age: 47
End: 2017-09-26
Attending: PSYCHIATRY & NEUROLOGY
Payer: MEDICARE

## 2017-09-26 VITALS
SYSTOLIC BLOOD PRESSURE: 103 MMHG | WEIGHT: 184.08 LBS | RESPIRATION RATE: 16 BRPM | HEART RATE: 68 BPM | TEMPERATURE: 98 F | BODY MASS INDEX: 28.82 KG/M2 | OXYGEN SATURATION: 96 % | DIASTOLIC BLOOD PRESSURE: 66 MMHG

## 2017-09-26 DIAGNOSIS — F33.2 SEVERE EPISODE OF RECURRENT MAJOR DEPRESSIVE DISORDER, WITHOUT PSYCHOTIC FEATURES (H): Primary | ICD-10-CM

## 2017-09-26 DIAGNOSIS — F33.2 SEVERE RECURRENT MAJOR DEPRESSION WITHOUT PSYCHOTIC FEATURES (H): ICD-10-CM

## 2017-09-26 PROCEDURE — 25000125 ZZHC RX 250: Performed by: PSYCHIATRY & NEUROLOGY

## 2017-09-26 PROCEDURE — 96361 HYDRATE IV INFUSION ADD-ON: CPT

## 2017-09-26 PROCEDURE — 96365 THER/PROPH/DIAG IV INF INIT: CPT

## 2017-09-26 PROCEDURE — 25000128 H RX IP 250 OP 636: Performed by: PSYCHIATRY & NEUROLOGY

## 2017-09-26 RX ADMIN — SODIUM CHLORIDE 250 ML: 9 INJECTION, SOLUTION INTRAVENOUS at 10:32

## 2017-09-26 RX ADMIN — KETAMINE HYDROCHLORIDE 37.5 MG: 50 INJECTION, SOLUTION INTRAMUSCULAR; INTRAVENOUS at 10:33

## 2017-09-26 NOTE — MR AVS SNAPSHOT
After Visit Summary   9/26/2017    Sebastien Hoover    MRN: [id number]           Patient Information     Date Of Birth          1970        Visit Information        Provider Department      9/26/2017 10:00 AM UR CH 06 [facility]        Today's Diagnoses     Severe episode of recurrent major depressive disorder, without psychotic features (H)    -  1    Severe recurrent major depression without psychotic features (H)           Follow-ups after your visit        Your next 10 appointments already scheduled     Oct 03, 2017 10:00 AM CDT   Level 3 with UR CH 01   [facility] ([facility])    [address]  [place] [facility] [address]   [phone]            Oct 05, 2017 10:30 AM CDT   Level 3 with UR CH 02   [facility] ([facility])    [address]  [place] [address]   [phone]            Oct 10, 2017 10:00 AM CDT   Level 3 with UR CH 01   [facility] ([facility])    [address]  [facility] [facility] [place] [place] [address]   [phone]            Oct 12, 2017 10:30 AM CDT   Level 3 with UR CH 02   [facility] ([facility])    [address]  [place] [address]   [phone]            Oct 17, 2017 10:00 AM CDT   Level 3 with UR CH 04   [facility] ([facility])    [address]  [facility] [place] [address]   [phone]            Oct 19, 2017 10:30 AM CDT   Level 3 with UR CH 02   [facility] ([facility])    [address]  [address]  [place] [place] [facility] [address]   [phone]            Nov 02, 2017  2:00 PM CDT   Adult Med  Follow UP with Venancio Ochoa MD   Psychiatry Clinic ([facility])    [address]  [address] [facility] [address]   [phone]            Jan 04, 2018  1:00 PM CST   Adult Med Follow UP with Venancio Ochoa MD   [facility]ry Clinic ([facility])    [address]  [address] [facility] [address]   [phone]            Mar 01, 2018  1:00 PM CST   Adult Med Follow UP with Venancio Ochoa MD   [facility]ry Clinic ([facility])    [address]  [address] [facility] [address]   [phone]            May 03, 2018  1:00 PM CDT   Adult Med Follow UP with Venancio Ochoa MD   Psych[facility]ry Clinic ([facility])    [address]  [address] [facility] [address]   [phone]              Who to contact     If you have questions or need follow up information about today's clinic visit or your schedule please contact [facility] [place], [facility] directly at [phone].  Normal or non-critical lab and imaging results will be communicated to you by Gourmet Originshart, letter or phone within 4 business days after the clinic has received the results. If you do not hear from us within 7 days, please contact the clinic through G[employer] Origins[employer] or phone. If you have a critical or abnormal lab result, we will notify you by phone as soon as possible.  Submit refill requests through i-nexus or call your pharmacy and they will forward the refill request to us. Please allow 3 business days for your refill to be completed.          Additional Information About Your Visit        [employer] Information     i-nexus gives you secure access to your electronic health record. If you see a primary care provider, you can also send messages to your care team and make appointments. If you have questions, please call your primary care clinic.  If you do not have a primary care  provider, please call [phone] and they will assist you.        Care EveryWhere ID     This is your Care EveryWhere ID. This could be used by other organizations to access your [place] medical records  [id number]        Your Vitals Were     Pulse Temperature Respirations Pulse Oximetry BMI (Body Mass Index)       68 98  F (36.7  C) 16 96% 28.82 kg/m2        Blood Pressure from Last 3 Encounters:   09/26/17 103/66   09/21/17 106/70   09/19/17 104/67    Weight from Last 3 Encounters:   09/26/17 83.5 kg (184 lb 1.4 oz)   09/19/17 84.5 kg (186 lb 4.6 oz)   05/26/17 86.3 kg (190 lb 4.1 oz)              Today, you had the following     No orders found for display       Primary Care Provider Office Phone # Fax #    Candy Sweta Ridley [phone]       [facility] [address]  [facility] [address]        Equal Access to Services     TONY ARTHUR : Hadii aad ku hadasho Soomaali, waaxda luqadaha, qaybta kaalmada adeegyada, waxay idiin hayjuden shaggy castillo . So [facility] [phone].    ATENCIÓN: Si habla español, tiene a madrigal disposición servicios gratuitos de asistencia lingüística. Llame al [phone].    We comply with applicable federal civil rights laws and [place] laws. We do not discriminate on the basis of race, color, national origin, age, disability sex, sexual orientation or gender identity.            Thank you!     Thank you for choosing [facility], [facility]  for your care. Our goal is always to provide you with excellent care. Hearing back from our patients is one way we can continue to improve our services. Please take a few minutes to complete the written survey that you may receive in the mail after your visit with us. Thank you!             Your Updated Medication List - Protect others around you: Learn how to safely use, store and throw away your medicines at www.disposemymeds.org.          This list is accurate as of: 9/26/17  4:52 PM.  Always  use your most recent med list.                   Brand Name Dispense Instructions for use Diagnosis    amphetamine-dextroamphetamine 10 MG per tablet    ADDERALL    90 tablet    Take 1 tablet (10 mg) by mouth 3 times daily    Major depressive disorder, recurrent, severe without psychotic features (H)       cloNIDine 0.1 MG tablet    CATAPRES    45 tablet    1.5 tablets 1 hour prior to scheduled ketamine dose.    Severe episode of recurrent major depressive disorder, without psychotic features (H)       KETAMINE HCL           Ketamine HCl 50 MG/ML Sosy     15 mL    Apply 150 mg into one nostril as directed every 7 days 3 sprays each nostril    Major depressive disorder, recurrent, severe without psychotic features (H)       lithium 150 MG capsule     90 capsule    Take 1 capsule (150 mg) by mouth daily    Major depressive disorder, recurrent, severe without psychotic features (H)       LORazepam 0.5 MG tablet    ATIVAN    30 tablet    Take 1-2 tablets (0.5-1 mg) by mouth every 6 hours as needed for anxiety    Major depressive disorder, recurrent, severe without psychotic features (H)       sertraline 25 MG tablet    ZOLOFT    90 tablet    Take 1 tablet (25 mg) by mouth daily    Major depressive disorder, recurrent, severe without psychotic features (H)

## 2017-09-28 NOTE — PROGRESS NOTES
"Daniel has ketamine infusions every 2 weeks.  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  His only reading is online.  Side effects of sedation after ketamine infusion.      Mood is mostly sad.  Sleep is \"not great, not terrible.\"  He doesn't nap during the day.  His appetite is okay.  His sugar cravings are worse; generally, they are worse in the fall/winter.  Concentration is not good.  Energy is poor, motivation poor.  He has minimal ability to experience pleasure.  No homicidal ideation, but possible thoughts of death.  No flashbacks, no auditory or visual/tactile/olfactory hallucinations.  No paranoia, no ideas of reference.  Anxiety is mostly social.  He has had one panic attack in the last couple months.  No OCD symptoms, no alcohol or substance abuse.      MENTAL STATUS EXAMINATION:  Patient is cooperative, eye contact good, grooming good.  Mood is sad, but reactive.  Affect is congruent, speech rate normal, movements normal, thought process logical.  No psychosis, no homicidal ideation, passive suicidal ideation.  Recent recall is okay.  Oriented x 4.      MEDICATIONS:   1. Clonidine 0.15 prior to ketamine.   2. Zoloft 25.   3. Lithium 150.   4. Ritalin 20 tid.   5. Ketamine IV and intranasal.   6. Lorazepam 0.5 prn.      ASSESSMENT:  Major depressive disorder - recurrent and severe without psychosis. Wonders about switching from Ritalin to Adderall to see if more effective and doesn't feel it wearing off.      PLAN:   1. Discontinue Ritalin.   2. Adderall 10 mg tid.      TOTAL TIME SPENT:  This was a 30-minute, face-to-face encounter, of which more than 50% of the time was spent on education about medication change and potential side effects.               ljl               judithl         FANNY ZELAYA MD             D: 2017 15:35   T: 2017 15:51   MT: PRAVEEN      Name:     DANIEL HUNT   MRN:      [id number]        Account:      [id number]   :      1970           " Service Date: 09/07/2017      Document: [id number]

## 2017-10-03 ENCOUNTER — INFUSION THERAPY VISIT (OUTPATIENT)
Dept: INFUSION THERAPY | Facility: CLINIC | Age: 47
End: 2017-10-03
Attending: PSYCHIATRY & NEUROLOGY
Payer: MEDICARE

## 2017-10-03 VITALS
TEMPERATURE: 98.5 F | HEART RATE: 78 BPM | DIASTOLIC BLOOD PRESSURE: 84 MMHG | OXYGEN SATURATION: 98 % | RESPIRATION RATE: 16 BRPM | SYSTOLIC BLOOD PRESSURE: 118 MMHG

## 2017-10-03 DIAGNOSIS — F33.2 SEVERE EPISODE OF RECURRENT MAJOR DEPRESSIVE DISORDER, WITHOUT PSYCHOTIC FEATURES (H): Primary | ICD-10-CM

## 2017-10-03 DIAGNOSIS — F33.2 SEVERE RECURRENT MAJOR DEPRESSION WITHOUT PSYCHOTIC FEATURES (H): ICD-10-CM

## 2017-10-03 PROCEDURE — 25000128 H RX IP 250 OP 636: Performed by: PSYCHIATRY & NEUROLOGY

## 2017-10-03 PROCEDURE — 96365 THER/PROPH/DIAG IV INF INIT: CPT

## 2017-10-03 PROCEDURE — 96361 HYDRATE IV INFUSION ADD-ON: CPT

## 2017-10-03 PROCEDURE — 25000125 ZZHC RX 250: Performed by: PSYCHIATRY & NEUROLOGY

## 2017-10-03 RX ADMIN — KETAMINE HYDROCHLORIDE 37.5 MG: 50 INJECTION, SOLUTION INTRAMUSCULAR; INTRAVENOUS at 10:20

## 2017-10-03 RX ADMIN — SODIUM CHLORIDE 250 ML: 9 INJECTION, SOLUTION INTRAVENOUS at 10:20

## 2017-10-03 NOTE — MR AVS SNAPSHOT
After Visit Summary   10/3/2017    Sebastien Hoover    MRN: [id number]           Patient Information     Date Of Birth          1970        Visit Information        Provider Department      10/3/2017 10:00 AM UR CH 01 [facility], Infusion Services        Today's Diagnoses     Severe episode of recurrent major depressive disorder, without psychotic features (H)    -  1    Severe recurrent major depression without psychotic features (H)           Follow-ups after your visit        Your next 10 appointments already scheduled     Oct 05, 2017 10:30 AM CDT   Level 3 with UR CH 02   [facility]fusion [facility] ([facility])    [address]  [facility] [address]   [phone]            Oct 12, 2017 10:30 AM CDT   Level 3 with UR CH 02   [facility], Infusion Services ([facility])    [address] [place] [address] [address]  [address]  [facility] [address]   [phone]            Oct 19, 2017 10:30 AM CDT   Level 3 with UR CH 02   [facility], Infusion Services ([facility])    [address] [place] [address] [address]  [address]  [facility] [address]   [phone]            Oct 26, 2017 10:30 AM CDT   Level 3 with UR BD 01   [facility], Infusion Services ([facility])    [address]  [facility] [address]   [phone]            Oct 31, 2017 10:30 AM CDT   Level 3 with UR BD 01   [facility], [facility]fusion Services ([facility])    [address] [place] [address] [address]  [address]  [facility] [address]   [phone]            Nov 02, 2017  2:00 PM CDT   Adult Med Follow UP with Venancio Ochoa MD   Psychiatry Clinic ([facility])    [address]  [address] [facility] [address]   [phone]            Nov 09, 2017 10:30 AM CST   Level 3  with UR BD 01   [facility], Infusion Services ([facility])    [address]  [facility] [address]   [phone]            Nov 16, 2017 10:30 AM CST   Level 3 with UR BD 01   [facility], Infusion Services ([facility])    [address]  [address]  [facility] [address]   [phone]            Jan 04, 2018  1:00 PM CST   Adult Med Follow UP with Venancio Ochoa MD   Psychiatry Clinic ([facility])    [address] [facility] [address] [phone]            Mar 01, 2018  1:00 PM CST   Adult Med Follow UP with Venancio Ochoa MD   Psychiatry Clinic ([facility])    [place] [id number] [facility] [address] [phone]              Who to contact     If you have questions or need follow up information about today's clinic visit or your schedule please contact [facility], INFUSION SERVICES directly at [phone].  Normal or non-critical lab and imaging results will be communicated to you by Signicathart, letter or phone within 4 business days after the clinic has received the results. If you do not hear from us within 7 days, please contact the clinic through Signicathart or phone. If you have a critical or abnormal lab result, we will notify you by phone as soon as possible.  Submit refill requests through HALKAR or call your pharmacy and they will forward the refill request to us. Please allow 3 business days for your refill to be completed.          Additional Information About Your Visit        HALKAR Information     HALKAR gives you secure access to your electronic health record. If you see a primary care provider, you can also send messages to your care team and make appointments. If you have questions, please call your primary care clinic.  If you do not have a primary care  provider, please call [phone] and they will assist you.        Care EveryWhere ID     This is your Care EveryWhere ID. This could be used by other organizations to access your [place] medical records  [id number]        Your Vitals Were     Pulse Temperature Respirations Pulse Oximetry          78 98.5  F (36.9  C) 16 98%         Blood Pressure from Last 3 Encounters:   10/03/17 118/84   09/26/17 103/66   09/21/17 106/70    Weight from Last 3 Encounters:   09/26/17 83.5 kg (184 lb 1.4 oz)   09/19/17 84.5 kg (186 lb 4.6 oz)   05/26/17 86.3 kg (190 lb 4.1 oz)              Today, you had the following     No orders found for display       Primary Care Provider Office Phone # Fax #    Candy Sweta Ridley [phone]       [facility] [address]  [facility] [address]        Equal Access to Services     TONY [facility] : Hadii aad ku hadasho Soomaali, waaxda luqadaha, qaybta kaalmada adeegyada, waxay idiin hayaan catherineeg juddarajose la'sofia . So [facility] [phone].    ATENCIÓN: Si habla español, tiene a madrigal disposición servicios gratuitos de asistencia lingüística. Llame al [phone].    We comply with applicable federal civil rights laws and [place] laws. We do not discriminate on the basis of race, color, national origin, age, disability, sex, sexual orientation, or gender identity.            Thank you!     Thank you for choosing [facility], [facility] SERVICES  for your care. Our goal is always to provide you with excellent care. Hearing back from our patients is one way we can continue to improve our services. Please take a few minutes to complete the written survey that you may receive in the mail after your visit with us. Thank you!             Your Updated Medication List - Protect others around you: Learn how to safely use, store and throw away your medicines at www.disposemymeds.org.          This list is accurate as of: 10/3/17 12:36 PM.  Always use your most recent med  list.                   Brand Name Dispense Instructions for use Diagnosis    amphetamine-dextroamphetamine 10 MG per tablet    ADDERALL    90 tablet    Take 1 tablet (10 mg) by mouth 3 times daily    Major depressive disorder, recurrent, severe without psychotic features (H)       cloNIDine 0.1 MG tablet    CATAPRES    45 tablet    1.5 tablets 1 hour prior to scheduled ketamine dose.    Severe episode of recurrent major depressive disorder, without psychotic features (H)       KETAMINE HCL           Ketamine HCl 50 MG/ML Sosy     15 mL    Apply 150 mg into one nostril as directed every 7 days 3 sprays each nostril    Major depressive disorder, recurrent, severe without psychotic features (H)       lithium 150 MG capsule     90 capsule    Take 1 capsule (150 mg) by mouth daily    Major depressive disorder, recurrent, severe without psychotic features (H)       LORazepam 0.5 MG tablet    ATIVAN    30 tablet    Take 1-2 tablets (0.5-1 mg) by mouth every 6 hours as needed for anxiety    Major depressive disorder, recurrent, severe without psychotic features (H)       sertraline 25 MG tablet    ZOLOFT    90 tablet    Take 1 tablet (25 mg) by mouth daily    Major depressive disorder, recurrent, severe without psychotic features (H)

## 2017-10-03 NOTE — PROGRESS NOTES
Infusion Nursing Note:  Sebastien Hoover presents today for ketamine.    Patient seen by provider today: No   present during visit today: Not Applicable.    Note: No concerns at this time.    Intravenous Access:  Peripheral IV placed.    Post Infusion Assessment:  Patient tolerated infusion without incident.  VS monitored per protocol.  Patient observed for 60 minutes post ketamine per protocol.  Blood return noted pre and post infusion.  Site patent and intact, free from redness, edema or discomfort.  No evidence of extravasations.  Access discontinued per protocol.    Discharge Plan:   Patient discharged in stable condition accompanied by: self, has transportation home  Departure Mode: Ambulatory.  Will return to clinic on Thursday.  Angela Davis RN

## 2017-10-05 ENCOUNTER — INFUSION THERAPY VISIT (OUTPATIENT)
Dept: INFUSION THERAPY | Facility: CLINIC | Age: 47
End: 2017-10-05
Attending: PSYCHIATRY & NEUROLOGY
Payer: MEDICARE

## 2017-10-05 VITALS
OXYGEN SATURATION: 99 % | SYSTOLIC BLOOD PRESSURE: 108 MMHG | HEART RATE: 74 BPM | TEMPERATURE: 98.6 F | DIASTOLIC BLOOD PRESSURE: 76 MMHG | RESPIRATION RATE: 16 BRPM

## 2017-10-05 DIAGNOSIS — F33.2 SEVERE EPISODE OF RECURRENT MAJOR DEPRESSIVE DISORDER, WITHOUT PSYCHOTIC FEATURES (H): Primary | ICD-10-CM

## 2017-10-05 DIAGNOSIS — F33.2 SEVERE RECURRENT MAJOR DEPRESSION WITHOUT PSYCHOTIC FEATURES (H): ICD-10-CM

## 2017-10-05 PROCEDURE — 25000125 ZZHC RX 250: Performed by: PSYCHIATRY & NEUROLOGY

## 2017-10-05 PROCEDURE — 25000128 H RX IP 250 OP 636: Performed by: PSYCHIATRY & NEUROLOGY

## 2017-10-05 PROCEDURE — 96365 THER/PROPH/DIAG IV INF INIT: CPT

## 2017-10-05 RX ADMIN — KETAMINE HYDROCHLORIDE 37.5 MG: 50 INJECTION, SOLUTION INTRAMUSCULAR; INTRAVENOUS at 10:59

## 2017-10-05 NOTE — PROGRESS NOTES
Pt here for ketamine infusion.  Ketamine infused via PIV over 1 hr.  Monitored on cont pulse ox and q 15 min VS during and for 1 hr post infusion.  Tolerated well.  RTC next week.

## 2017-10-05 NOTE — MR AVS SNAPSHOT
After Visit Summary   10/5/2017    Sebastien Hoover    MRN: [id number]           Patient Information     Date Of Birth          1970        Visit Information        Provider Department      10/5/2017 10:30 AM UR CH 02 [facility], Infusion Services        Today's Diagnoses     Severe episode of recurrent major depressive disorder, without psychotic features (H)    -  1    Severe recurrent major depression without psychotic features (H)           Follow-ups after your visit        Your next 10 appointments already scheduled     Oct 12, 2017 10:30 AM CDT   Level 3 with UR CH 02   [facility], Infusion Services ([facility])    [address]  [address]  [facility] [address]   [phone]            Oct 19, 2017 10:30 AM CDT   Level 3 with UR CH 02   [facility], Infusion Services ([facility])    [address] [place] [address] [address]  [address]  [facility] [address]   [phone]            Oct 26, 2017 10:30 AM CDT   Level 3 with UR BD 01   [facility], Infusion Services ([facility])    [address] [place] [address] [address]  [address]  [facility] [address]   [phone]            Oct 31, 2017 10:30 AM CDT   Level 3 with UR BD 01   [facility] ([facility])    [address] [place] [address] [address]  [address]  [facility] [address]   [phone]            Nov 02, 2017  2:00 PM CDT   Adult Med Follow UP with Venancio Ochoa MD   Psychiatry Clinic ([facility])    [address]  [address] [facility] [address]   [phone]            Nov 09, 2017 10:30 AM CST   Level 3 with UR BD 01   [facility], Infusion Services ([facility])    [address] [place] [address] [address]  [address]  [facility] [address]   [phone]            Nov 16, 2017 10:30 AM CST   Level 3  with UR BD 01   [facility], Infusion Services ([facility])    [address] [place] [address] [address]  [address]  [facility] [address]   [phone]            Jan 04, 2018  1:00 PM CST   Adult Med Follow UP with Venancio Ochoa MD   Psychiatry Clinic ([facility])    [address]  [address] [facility] [address] [phone]            Mar 01, 2018  1:00 PM CST   Adult Med Follow UP with Venancio Ochoa MD   Psychiatry Clinic ([facility])    [address]  [address] [facility] [address]   [phone]            May 03, 2018  1:00 PM CDT   Adult Med Follow UP with Venancio Ochoa MD   Psychiatry Clinic ([facility])    [address]  [address] [facility] [address] [phone]              Who to contact     If you have questions or need follow up information about today's clinic visit or your schedule please contact [facility], INFUSION SERVICES directly at [phone].  Normal or non-critical lab and imaging results will be communicated to you by Cook123hart, letter or phone within 4 business days after the clinic has received the results. If you do not hear from us within 7 days, please contact the clinic through Cook123hart or phone. If you have a critical or abnormal lab result, we will notify you by phone as soon as possible.  Submit refill requests through [employer] or call your pharmacy and they will forward the refill request to us. Please allow 3 business days for your refill to be completed.          Additional Information About Your Visit        [employer]onder[employer] Information     [employer] gives you secure access to your electronic health record. If you see a primary care provider, you can also send messages to your care team and make appointments. If you have questions, please call your primary care clinic.  If you do not have a primary care  provider, please call [phone] and they will assist you.        Care EveryWhere ID     This is your Care EveryWhere ID. This could be used by other organizations to access your [place] medical records  [id number]        Your Vitals Were     Pulse Temperature Respirations Pulse Oximetry          74 98.6  F (37  C) (Oral) 16 99%         Blood Pressure from Last 3 Encounters:   10/05/17 108/76   10/03/17 118/84   09/26/17 103/66    Weight from Last 3 Encounters:   09/26/17 83.5 kg (184 lb 1.4 oz)   09/19/17 84.5 kg (186 lb 4.6 oz)   05/26/17 86.3 kg (190 lb 4.1 oz)              Today, you had the following     No orders found for display       Primary Care Provider Office Phone # Fax #    Candy Sweta Ridley [phone]       [facility] [address]  [facility] [address]        Equal Access to Services     STEVEN ARTHUR : Hadii aad ku hadasho Soomaali, waaxda luqadaha, qaybta kaalmada adeegyada, waxay idiin hayaan catherineeg kharajose la'juden . So [facility] [phone].    ATENCIÓN: Si habla español, tiene a madrigal disposición servicios gratuitos de asistencia lingüística. Llame al [phone].    We comply with applicable federal civil rights laws and [place] laws. We do not discriminate on the basis of race, color, national origin, age, disability, sex, sexual orientation, or gender identity.            Thank you!     Thank you for choosing [facility], [facility] SERVICES  for your care. Our goal is always to provide you with excellent care. Hearing back from our patients is one way we can continue to improve our services. Please take a few minutes to complete the written survey that you may receive in the mail after your visit with us. Thank you!             Your Updated Medication List - Protect others around you: Learn how to safely use, store and throw away your medicines at www.disposemymeds.org.          This list is accurate as of: 10/5/17  2:29 PM.  Always use your most recent  med list.                   Brand Name Dispense Instructions for use Diagnosis    amphetamine-dextroamphetamine 10 MG per tablet    ADDERALL    90 tablet    Take 1 tablet (10 mg) by mouth 3 times daily    Major depressive disorder, recurrent, severe without psychotic features (H)       cloNIDine 0.1 MG tablet    CATAPRES    45 tablet    1.5 tablets 1 hour prior to scheduled ketamine dose.    Severe episode of recurrent major depressive disorder, without psychotic features (H)       KETAMINE HCL           Ketamine HCl 50 MG/ML Sosy     15 mL    Apply 150 mg into one nostril as directed every 7 days 3 sprays each nostril    Major depressive disorder, recurrent, severe without psychotic features (H)       lithium 150 MG capsule     90 capsule    Take 1 capsule (150 mg) by mouth daily    Major depressive disorder, recurrent, severe without psychotic features (H)       LORazepam 0.5 MG tablet    ATIVAN    30 tablet    Take 1-2 tablets (0.5-1 mg) by mouth every 6 hours as needed for anxiety    Major depressive disorder, recurrent, severe without psychotic features (H)       sertraline 25 MG tablet    ZOLOFT    90 tablet    Take 1 tablet (25 mg) by mouth daily    Major depressive disorder, recurrent, severe without psychotic features (H)

## 2017-10-12 ENCOUNTER — INFUSION THERAPY VISIT (OUTPATIENT)
Dept: INFUSION THERAPY | Facility: CLINIC | Age: 47
End: 2017-10-12
Attending: PSYCHIATRY & NEUROLOGY
Payer: MEDICARE

## 2017-10-12 VITALS
DIASTOLIC BLOOD PRESSURE: 72 MMHG | SYSTOLIC BLOOD PRESSURE: 112 MMHG | TEMPERATURE: 98.3 F | OXYGEN SATURATION: 96 % | HEART RATE: 85 BPM

## 2017-10-12 DIAGNOSIS — F33.2 SEVERE RECURRENT MAJOR DEPRESSION WITHOUT PSYCHOTIC FEATURES (H): ICD-10-CM

## 2017-10-12 DIAGNOSIS — F33.2 SEVERE EPISODE OF RECURRENT MAJOR DEPRESSIVE DISORDER, WITHOUT PSYCHOTIC FEATURES (H): Primary | ICD-10-CM

## 2017-10-12 PROCEDURE — 25000125 ZZHC RX 250: Performed by: PSYCHIATRY & NEUROLOGY

## 2017-10-12 PROCEDURE — 96365 THER/PROPH/DIAG IV INF INIT: CPT

## 2017-10-12 PROCEDURE — 25000128 H RX IP 250 OP 636: Performed by: PSYCHIATRY & NEUROLOGY

## 2017-10-12 RX ADMIN — KETAMINE HYDROCHLORIDE 37.5 MG: 50 INJECTION, SOLUTION INTRAMUSCULAR; INTRAVENOUS at 10:58

## 2017-10-12 NOTE — MR AVS SNAPSHOT
After Visit Summary   10/12/2017    Sebastien Hoover    MRN: [id number]           Patient Information     Date Of Birth          1970        Visit Information        Provider Department      10/12/2017 10:30 AM UR CH 02 [facility], Infusion Services        Today's Diagnoses     Severe episode of recurrent major depressive disorder, without psychotic features (H)    -  1    Severe recurrent major depression without psychotic features (H)           Follow-ups after your visit        Your next 10 appointments already scheduled     Oct 19, 2017 10:30 AM CDT   Level 3 with UR CH 02   [facility], Infusion Services ([facility])    [address] [place] [address] [address]  [address]  [facility] [address]   [phone]            Oct 26, 2017 10:30 AM CDT   Level 3 with UR BD 01   [facility], Infusion Services ([facility])    [address] [place] [address] [address]  [address]  [facility] [address]   [phone]            Oct 31, 2017 10:30 AM CDT   Level 3 with UR BD 01   [facility], Infusion Services ([facility])    [address] [place] [address] [address]  [address]  [facility] [address]   [phone]            Nov 02, 2017  2:00 PM CDT   Adult Med Follow UP with Venancio Ochoa MD   Psychiatry Clinic ([facility])    [place] [id number]  [address] [facility] [address]   [phone]            Nov 09, 2017 10:30 AM CST   Level 3 with UR BD 01   [facility] ([facility])    [address] [place] [address] [address]  [address]  [facility] [address]   [phone]            Nov 16, 2017 10:30 AM CST   Level 3 with UR BD 01   [facility] ([facility])    [address] [place] [address] [address]  [address]  [facility] [address]   [phone]            Jan 04, 2018  1:00 PM CST   Adult  Med Follow UP with Venancio Ochoa MD   Psychiatry Clinic ([facility])    [address]  [address] [facility] [address]   [phone]            Mar 01, 2018  1:00 PM CST   Adult Med Follow UP with Venancio Ochoa MD   Psychiatry Clinic ([facility])    [address]  [address] [facility] [address]   [phone]            May 03, 2018  1:00 PM CDT   Adult Med Follow UP with Venancio Ochoa MD   Psychiatry Clinic ([facility])    [address]  [address] [facility] [address]   [phone]            Jun 28, 2018  2:00 PM CDT   Adult Med Follow UP with Venancio Ochoa MD   Psychiatry Clinic ([facility])    [place] [id number]  [address] [facility] [address]   [phone]              Who to contact     If you have questions or need follow up information about today's clinic visit or your schedule please contact [facility], [facility] SERVICES directly at [phone].  Normal or non-critical lab and imaging results will be communicated to you by Roomhart, letter or phone within 4 business days after the clinic has received the results. If you do not hear from us within 7 days, please contact the clinic through Roomhart or phone. If you have a critical or abnormal lab result, we will notify you by phone as soon as possible.  Submit refill requests through [employer] or call your pharmacy and they will forward the refill request to us. Please allow 3 business days for your refill to be completed.          Additional Information About Your Visit        [employer] Information     [employer] gives you secure access to your electronic health record. If you see a primary care provider, you can also send messages to your care team and make appointments. If you have questions, please call your primary care clinic.  If you do not have a primary  care provider, please call [phone] and they will assist you.        Care EveryWhere ID     This is your Care EveryWhere ID. This could be used by other organizations to access your [place] medical records  [id number]        Your Vitals Were     Pulse Temperature Pulse Oximetry             85 98.3  F (36.8  C) 96%          Blood Pressure from Last 3 Encounters:   10/12/17 112/72   10/05/17 108/76   10/03/17 118/84    Weight from Last 3 Encounters:   09/26/17 83.5 kg (184 lb 1.4 oz)   09/19/17 84.5 kg (186 lb 4.6 oz)   05/26/17 86.3 kg (190 lb 4.1 oz)              Today, you had the following     No orders found for display       Primary Care Provider Office Phone # Fax #    Candy Ridley [phone]       [facility] [address]  [facility] [address]        Equal Access to Services     [facility] : Hadii aad ku hadasho Soomaali, waaxda luqadaha, qaybta kaalmada adeegyada, waxay idiin hayaan shaggy castillo . So [facility] [phone].    ATENCIÓN: Si habla español, tiene a madrigal disposición servicios gratuitos de asistencia lingüística. Llame al [phone].    We comply with applicable federal civil rights laws and [place] laws. We do not discriminate on the basis of race, color, national origin, age, disability, sex, sexual orientation, or gender identity.            Thank you!     Thank you for choosing [facility] SERVICES  for your care. Our goal is always to provide you with excellent care. Hearing back from our patients is one way we can continue to improve our services. Please take a few minutes to complete the written survey that you may receive in the mail after your visit with us. Thank you!             Your Updated Medication List - Protect others around you: Learn how to safely use, store and throw away your medicines at www.disposemymeds.org.          This list is accurate as of: 10/12/17  2:35 PM.  Always use your most recent med list.                    Brand Name Dispense Instructions for use Diagnosis    amphetamine-dextroamphetamine 10 MG per tablet    ADDERALL    90 tablet    Take 1 tablet (10 mg) by mouth 3 times daily    Major depressive disorder, recurrent, severe without psychotic features (H)       cloNIDine 0.1 MG tablet    CATAPRES    45 tablet    1.5 tablets 1 hour prior to scheduled ketamine dose.    Severe episode of recurrent major depressive disorder, without psychotic features (H)       KETAMINE HCL           Ketamine HCl 50 MG/ML Sosy     15 mL    Apply 150 mg into one nostril as directed every 7 days 3 sprays each nostril    Major depressive disorder, recurrent, severe without psychotic features (H)       lithium 150 MG capsule     90 capsule    Take 1 capsule (150 mg) by mouth daily    Major depressive disorder, recurrent, severe without psychotic features (H)       LORazepam 0.5 MG tablet    ATIVAN    30 tablet    Take 1-2 tablets (0.5-1 mg) by mouth every 6 hours as needed for anxiety    Major depressive disorder, recurrent, severe without psychotic features (H)       sertraline 25 MG tablet    ZOLOFT    90 tablet    Take 1 tablet (25 mg) by mouth daily    Major depressive disorder, recurrent, severe without psychotic features (H)

## 2017-10-12 NOTE — PROGRESS NOTES
Infusion Nursing Note:  Sebastien Hoover presents today for ketamine infusion.    Patient seen by provider today: No   present during visit today: Not Applicable.    Note: N/A.    Intravenous Access:  Peripheral IV placed.    Treatment Conditions:  Not Applicable.      Post Infusion Assessment:  Patient tolerated infusion without incident.  Patient observed for 60 minutes post ketamine infusio, per protocol.  Blood return noted pre and post infusion.  Site patent and intact, free from redness, edema or discomfort.  No evidence of extravasations.  Access discontinued per protocol.    Discharge Plan:   Patient discharged in stable condition accompanied by: self.  Departure Mode: Ambulatory.    Angela Gann RN

## 2017-10-19 ENCOUNTER — INFUSION THERAPY VISIT (OUTPATIENT)
Dept: INFUSION THERAPY | Facility: CLINIC | Age: 47
End: 2017-10-19
Attending: PSYCHIATRY & NEUROLOGY
Payer: MEDICARE

## 2017-10-19 VITALS
OXYGEN SATURATION: 98 % | DIASTOLIC BLOOD PRESSURE: 68 MMHG | TEMPERATURE: 97.9 F | SYSTOLIC BLOOD PRESSURE: 106 MMHG | HEART RATE: 70 BPM | RESPIRATION RATE: 18 BRPM | BODY MASS INDEX: 28.58 KG/M2 | WEIGHT: 182.54 LBS

## 2017-10-19 DIAGNOSIS — F33.2 SEVERE RECURRENT MAJOR DEPRESSION WITHOUT PSYCHOTIC FEATURES (H): ICD-10-CM

## 2017-10-19 DIAGNOSIS — F33.2 SEVERE EPISODE OF RECURRENT MAJOR DEPRESSIVE DISORDER, WITHOUT PSYCHOTIC FEATURES (H): Primary | ICD-10-CM

## 2017-10-19 PROCEDURE — 25000128 H RX IP 250 OP 636: Performed by: PSYCHIATRY & NEUROLOGY

## 2017-10-19 PROCEDURE — 96365 THER/PROPH/DIAG IV INF INIT: CPT

## 2017-10-19 PROCEDURE — 25000125 ZZHC RX 250: Performed by: PSYCHIATRY & NEUROLOGY

## 2017-10-19 RX ADMIN — KETAMINE HYDROCHLORIDE 37.5 MG: 50 INJECTION, SOLUTION INTRAMUSCULAR; INTRAVENOUS at 11:00

## 2017-10-19 NOTE — PROGRESS NOTES
Infusion Nursing Note:  Sebastien Hoover presents today for ketamine infusion.    Patient seen by provider today: No   present during visit today: Not Applicable.    Note:  NA .    Intravenous Access:  Peripheral IV placed.    Treatment Conditions:  Not Applicable.      Post Infusion Assessment:  Patient tolerated infusion without incident.  Patient observed for 60 minutes post ketamine infusion,  per protocol.  Blood return noted pre and post infusion.  Site patent and intact, free from redness, edema or discomfort.  No evidence of extravasations.  Access discontinued per protocol.    Discharge Plan:   Patient discharged in stable condition accompanied by: self.  Departure Mode: Ambulatory.    Angela Gann RN

## 2017-10-19 NOTE — MR AVS SNAPSHOT
After Visit Summary   10/19/2017    Sebastien Hoover    MRN: [id number]           Patient Information     Date Of Birth          1970        Visit Information        Provider Department      10/19/2017 10:30 AM UR CH 02 [facility], Infusion Services        Today's Diagnoses     Severe episode of recurrent major depressive disorder, without psychotic features (H)    -  1    Severe recurrent major depression without psychotic features (H)           Follow-ups after your visit        Your next 10 appointments already scheduled     Oct 26, 2017 10:30 AM CDT   Level 3 with UR BD 01   [facility], Infusion Services ([facility])    [address]  [address]  [facility] [address]   [phone]            Oct 31, 2017 10:30 AM CDT   Level 3 with UR BD 01   [facility], Infusion Services ([facility])    [address] [place] [address] [address]  [address]  [facility] [address]   [phone]            Nov 02, 2017  2:00 PM CDT   Adult Med Follow UP with Venancio Ochoa MD   Psychiatry Clinic ([facility])    [facility]  [address]nd [address]  [facility] [address]   [phone]            Nov 09, 2017 10:30 AM CST   Level 3 with UR BD 01   [facility], Infusion Services ([facility])    [address] [place] [address] [address]  [address]  [facility] [address]   [phone]            Nov 16, 2017 10:30 AM CST   Level 3 with UR BD 01   [facility], Infusion Services ([facility])    [address] [place] [address] [address]  [address]  [facility] [address]   [phone]            Jan 04, 2018  1:00 PM CST   Adult Med Follow UP with Venancio Ochoa MD   Psychiatry Clinic ([facility])    [facility]  [address]nd [address]  [address] [facility] [address]   [phone]            Mar 01, 2018  1:00 PM CST    Adult Med Follow UP with Venancio Ochoa MD   Psychiatry Clinic ([facility])    [facility]  [address]nd [facility] [address]  [address] [facility] [address]   [phone]            May 03, 2018  1:00 PM CDT   Adult Med Follow UP with Venancio Ochoa MD   Psychiatry Clinic ([facility])    [facility]  [address]nd [facility] [address]  [address] [facility] [address]   [phone]            Jun 28, 2018  2:00 PM CDT   Adult Med Follow UP with Venancio Ochoa MD   Psychiatry Clinic ([facility])    [address]  [address] [facility] [address]   [phone]            Sep 20, 2018  1:00 PM CDT   Adult Med Follow UP with Venancio Ochoa MD   Psychiatry Clinic ([facility])    [address]  [address] [facility] [address]   [phone]              Who to contact     If you have questions or need follow up information about today's clinic visit or your schedule please contact [facility] directly at [phone].  Normal or non-critical lab and imaging results will be communicated to you by [employer], letter or phone within 4 business days after the clinic has received the results. If you do not hear from us within 7 days, please contact the clinic through [employer] or phone. If you have a critical or abnormal lab result, we will notify you by phone as soon as possible.  Submit refill requests through [employer] or call your pharmacy and they will forward the refill request to us. Please allow 3 business days for your refill to be completed.          Additional Information About Your Visit        [employer] Information     [employer] gives you secure access to your electronic health record. If you see a primary care provider, you can also send messages to your care team and make appointments. If you have questions, please call your primary care clinic.  If you do not have a  primary care provider, please call [phone] and they will assist you.        Care EveryWhere ID     This is your Care EveryWhere ID. This could be used by other organizations to access your [place] medical records  [id number]        Your Vitals Were     Pulse Temperature Respirations Pulse Oximetry BMI (Body Mass Index)       70 97.9  F (36.6  C) 18 98% 28.58 kg/m2        Blood Pressure from Last 3 Encounters:   10/19/17 106/68   10/12/17 112/72   10/05/17 108/76    Weight from Last 3 Encounters:   10/19/17 82.8 kg (182 lb 8.7 oz)   09/26/17 83.5 kg (184 lb 1.4 oz)   09/19/17 84.5 kg (186 lb 4.6 oz)              Today, you had the following     No orders found for display       Primary Care Provider Office Phone # Fax #    Candy Ridley [phone]       [facility] [address]  [facility] [address]        Equal Access to Services     TONY ARTHUR : Hadii aad ku hadasho Soomaali, waaxda luqadaha, qaybta kaalmada adeegyada, waxstevan castillo . So [facility] [phone].    ATENCIÓN: Si habla español, tiene a madrigal disposición servicios gratuitos de asistencia lingüística. Llame al [phone].    We comply with applicable federal civil rights laws and [place] laws. We do not discriminate on the basis of race, color, national origin, age, disability, sex, sexual orientation, or gender identity.            Thank you!     Thank you for choosing [facility]  for your care. Our goal is always to provide you with excellent care. Hearing back from our patients is one way we can continue to improve our services. Please take a few minutes to complete the written survey that you may receive in the mail after your visit with us. Thank you!             Your Updated Medication List - Protect others around you: Learn how to safely use, store and throw away your medicines at www.disposemymeds.org.          This list is accurate as of: 10/19/17   1:20 PM.  Always use your most recent med list.                   Brand Name Dispense Instructions for use Diagnosis    amphetamine-dextroamphetamine 10 MG per tablet    ADDERALL    90 tablet    Take 1 tablet (10 mg) by mouth 3 times daily    Major depressive disorder, recurrent, severe without psychotic features (H)       cloNIDine 0.1 MG tablet    CATAPRES    45 tablet    1.5 tablets 1 hour prior to scheduled ketamine dose.    Severe episode of recurrent major depressive disorder, without psychotic features (H)       KETAMINE HCL           Ketamine HCl 50 MG/ML Sosy     15 mL    Apply 150 mg into one nostril as directed every 7 days 3 sprays each nostril    Major depressive disorder, recurrent, severe without psychotic features (H)       lithium 150 MG capsule     90 capsule    Take 1 capsule (150 mg) by mouth daily    Major depressive disorder, recurrent, severe without psychotic features (H)       LORazepam 0.5 MG tablet    ATIVAN    30 tablet    Take 1-2 tablets (0.5-1 mg) by mouth every 6 hours as needed for anxiety    Major depressive disorder, recurrent, severe without psychotic features (H)       sertraline 25 MG tablet    ZOLOFT    90 tablet    Take 1 tablet (25 mg) by mouth daily    Major depressive disorder, recurrent, severe without psychotic features (H)

## 2017-10-26 ENCOUNTER — INFUSION THERAPY VISIT (OUTPATIENT)
Dept: INFUSION THERAPY | Facility: CLINIC | Age: 47
End: 2017-10-26
Attending: PSYCHIATRY & NEUROLOGY
Payer: MEDICARE

## 2017-10-26 VITALS
DIASTOLIC BLOOD PRESSURE: 71 MMHG | OXYGEN SATURATION: 98 % | TEMPERATURE: 97.5 F | WEIGHT: 183.64 LBS | BODY MASS INDEX: 28.76 KG/M2 | SYSTOLIC BLOOD PRESSURE: 124 MMHG | HEART RATE: 76 BPM

## 2017-10-26 DIAGNOSIS — F33.2 SEVERE RECURRENT MAJOR DEPRESSION WITHOUT PSYCHOTIC FEATURES (H): ICD-10-CM

## 2017-10-26 DIAGNOSIS — F33.2 SEVERE EPISODE OF RECURRENT MAJOR DEPRESSIVE DISORDER, WITHOUT PSYCHOTIC FEATURES (H): Primary | ICD-10-CM

## 2017-10-26 PROCEDURE — 96361 HYDRATE IV INFUSION ADD-ON: CPT

## 2017-10-26 PROCEDURE — 25000125 ZZHC RX 250: Performed by: PSYCHIATRY & NEUROLOGY

## 2017-10-26 PROCEDURE — 96365 THER/PROPH/DIAG IV INF INIT: CPT

## 2017-10-26 PROCEDURE — 25000128 H RX IP 250 OP 636: Performed by: PSYCHIATRY & NEUROLOGY

## 2017-10-26 RX ADMIN — SODIUM CHLORIDE 250 ML: 9 INJECTION, SOLUTION INTRAVENOUS at 10:52

## 2017-10-26 RX ADMIN — KETAMINE HYDROCHLORIDE 37.5 MG: 50 INJECTION, SOLUTION INTRAMUSCULAR; INTRAVENOUS at 10:51

## 2017-10-26 NOTE — PROGRESS NOTES
Infusion Nursing Note:  Sebastien Hoover presents today for ketamine infusion.    Patient seen by provider today: No   present during visit today: Not Applicable.    Note: Denies changes.    Intravenous Access:  Peripheral IV placed.    Treatment Conditions:  Not Applicable.      Post Infusion Assessment:  Patient tolerated infusion without incident.  Patient observed for 60 minutes post ketamine infusion, per protocol.  Blood return noted pre and post infusion.  Site patent and intact, free from redness, edema or discomfort.  No evidence of extravasations.  Access discontinued per protocol.    Discharge Plan:   Patient discharged in stable condition accompanied by: self.  Departure Mode: Ambulatory.    Angela Gann RN

## 2017-10-26 NOTE — MR AVS SNAPSHOT
After Visit Summary   10/26/2017    Sebastien Hoover    MRN: [id number]           Patient Information     Date Of Birth          1970        Visit Information        Provider Department      10/26/2017 10:30 AM [facility]        Today's Diagnoses     Severe episode of recurrent major depressive disorder, without psychotic features (H)    -  1    Severe recurrent major depression without psychotic features (H)           Follow-ups after your visit        Your next 10 appointments already scheduled     Oct 31, 2017 10:30 AM CDT   Level 3 with UR BD 01   [facility]    [address] [place] [address] [address]  [address]  [facility] [address]   [phone]            Nov 02, 2017  2:00 PM CDT   Adult Med Follow UP with Venancio Ochoa MD   Psychiatry Clinic ([address]  [address] [facility] [address]   [phone]            Nov 09, 2017 10:30 AM CST   Level 3 with UR BD 01   [facility])    [address] [place] [address] [address]  [address] [place] [address]   [phone]            Nov 16, 2017 10:30 AM CST   Level 3 with UR BD 01   [facility])    [address] [address]  [address]  [facility] [address]   [phone]            Nov 21, 2017 10:00 AM CST   Level 3 with UR CH 04   [facility])    [address] [place] [address] [address]  [address]  [facility] [address]   [phone]            Nov 30, 2017 10:30 AM CST   Level 3 with UR CH 06   [facility])    [address] [facility] [address]  [address]  [facility] [address]   [phone]            Dec 07, 2017 10:30 AM CST   Level  3 with UR CH 06   [facility], [facility]fusion [facility] ([facility])    [address] [place] [address] [address]  [address]  [facility] [address]   [phone]            Jan 04, 2018  1:00 PM CST   Adult Med Follow UP with Venancio Ochoa MD   Psychiatry Clinic ([facility])    [address]  [address] [facility] [address]   [phone]            Mar 01, 2018  1:00 PM CST   Adult Med Follow UP with Venancio Ochoa MD   Psychiatry Clinic ([facility])    [address]  [address] [facility] [address]   [phone]            May 03, 2018  1:00 PM CDT   Adult Med Follow UP with Venancio Ochoa MD   Psychiatry Clinic ([facility])    [address]  [address] [facility] [address] [phone]              Who to contact     If you have questions or need follow up information about today's clinic visit or your schedule please contact [facility], INFUSION SERVICES directly at [phone].  Normal or non-critical lab and imaging results will be communicated to you by [employer], letter or phone within 4 business days after the clinic has received the results. If you do not hear from us within 7 days, please contact the clinic through [employer] or phone. If you have a critical or abnormal lab result, we will notify you by phone as soon as possible.  Submit refill requests through [employer] or call your pharmacy and they will forward the refill request to us. Please allow 3 business days for your refill to be completed.          Additional Information About Your Visit        [employer] Information     [employer] gives you secure access to your electronic health record. If you see a primary care provider, you can also send messages to your care team and make appointments. If you have questions, please call your primary care clinic.  If you do not have a primary care  provider, please call [phone] and they will assist you.        Care EveryWhere ID     This is your Care EveryWhere ID. This could be used by other organizations to access your [place] medical records  [id number]        Your Vitals Were     Pulse Temperature Pulse Oximetry BMI (Body Mass Index)          76 97.5  F (36.4  C) 98% 28.76 kg/m2         Blood Pressure from Last 3 Encounters:   10/26/17 124/71   10/19/17 106/68   10/12/17 112/72    Weight from Last 3 Encounters:   10/26/17 83.3 kg (183 lb 10.3 oz)   10/19/17 82.8 kg (182 lb 8.7 oz)   09/26/17 83.5 kg (184 lb 1.4 oz)              Today, you had the following     No orders found for display       Primary Care Provider Office Phone # Fax #    Candy Sweta Ridley [phone]       [facility] [address]  [facility] [address]        Equal Access to Services     TONY ARTHUR : Hadii aad ku hadasho Soomaali, waaxda luqadaha, qaybta kaalmada adeegyada, waxay idiin hayjuden shaggy castillo . So [facility] [phone].    ATENCIÓN: Si habla español, tiene a madrigal disposición servicios gratuitos de asistencia lingüística. [facility] [phone].    We comply with applicable federal civil rights laws and [place] laws. We do not discriminate on the basis of race, color, national origin, age, disability, sex, sexual orientation, or gender identity.            Thank you!     Thank you for choosing [facility] SERVICES  for your care. Our goal is always to provide you with excellent care. Hearing back from our patients is one way we can continue to improve our services. Please take a few minutes to complete the written survey that you may receive in the mail after your visit with us. Thank you!             Your Updated Medication List - Protect others around you: Learn how to safely use, store and throw away your medicines at www.disposemymeds.org.          This list is accurate as of: 10/26/17  1:19 PM.  Always use  your most recent med list.                   Brand Name Dispense Instructions for use Diagnosis    amphetamine-dextroamphetamine 10 MG per tablet    ADDERALL    90 tablet    Take 1 tablet (10 mg) by mouth 3 times daily    Major depressive disorder, recurrent, severe without psychotic features (H)       cloNIDine 0.1 MG tablet    CATAPRES    45 tablet    1.5 tablets 1 hour prior to scheduled ketamine dose.    Severe episode of recurrent major depressive disorder, without psychotic features (H)       KETAMINE HCL           Ketamine HCl 50 MG/ML Sosy     15 mL    Apply 150 mg into one nostril as directed every 7 days 3 sprays each nostril    Major depressive disorder, recurrent, severe without psychotic features (H)       lithium 150 MG capsule     90 capsule    Take 1 capsule (150 mg) by mouth daily    Major depressive disorder, recurrent, severe without psychotic features (H)       LORazepam 0.5 MG tablet    ATIVAN    30 tablet    Take 1-2 tablets (0.5-1 mg) by mouth every 6 hours as needed for anxiety    Major depressive disorder, recurrent, severe without psychotic features (H)       sertraline 25 MG tablet    ZOLOFT    90 tablet    Take 1 tablet (25 mg) by mouth daily    Major depressive disorder, recurrent, severe without psychotic features (H)

## 2017-10-31 ENCOUNTER — INFUSION THERAPY VISIT (OUTPATIENT)
Dept: INFUSION THERAPY | Facility: CLINIC | Age: 47
End: 2017-10-31
Attending: PSYCHIATRY & NEUROLOGY
Payer: MEDICARE

## 2017-10-31 VITALS
TEMPERATURE: 98.2 F | SYSTOLIC BLOOD PRESSURE: 126 MMHG | HEART RATE: 69 BPM | WEIGHT: 183.2 LBS | BODY MASS INDEX: 28.69 KG/M2 | OXYGEN SATURATION: 93 % | RESPIRATION RATE: 16 BRPM | DIASTOLIC BLOOD PRESSURE: 77 MMHG

## 2017-10-31 DIAGNOSIS — F33.2 SEVERE EPISODE OF RECURRENT MAJOR DEPRESSIVE DISORDER, WITHOUT PSYCHOTIC FEATURES (H): Primary | ICD-10-CM

## 2017-10-31 DIAGNOSIS — F33.2 SEVERE RECURRENT MAJOR DEPRESSION WITHOUT PSYCHOTIC FEATURES (H): ICD-10-CM

## 2017-10-31 PROCEDURE — 96365 THER/PROPH/DIAG IV INF INIT: CPT

## 2017-10-31 PROCEDURE — 96361 HYDRATE IV INFUSION ADD-ON: CPT

## 2017-10-31 PROCEDURE — 25000128 H RX IP 250 OP 636: Performed by: PSYCHIATRY & NEUROLOGY

## 2017-10-31 PROCEDURE — 25000125 ZZHC RX 250: Performed by: PSYCHIATRY & NEUROLOGY

## 2017-10-31 RX ADMIN — SODIUM CHLORIDE 250 ML: 9 INJECTION, SOLUTION INTRAVENOUS at 10:51

## 2017-10-31 RX ADMIN — KETAMINE HYDROCHLORIDE 37.5 MG: 50 INJECTION, SOLUTION INTRAMUSCULAR; INTRAVENOUS at 10:50

## 2017-10-31 NOTE — PROGRESS NOTES
Infusion Nursing Note:  Sebastien Hoover presents today for ketamine infusion.    Patient seen by provider today: No   present during visit today: Not Applicable.    Note: Denies new issues    Intravenous Access:  Peripheral IV placed.    Treatment Conditions:  Not Applicable.      Post Infusion Assessment:  Patient tolerated infusion without incident.  Patient observed for 60 minutes post ketamine infusion, per protocol.  Blood return noted pre and post infusion.  Site patent and intact, free from redness, edema or discomfort.  No evidence of extravasations.  Access discontinued per protocol.    Discharge Plan:   Patient discharged in stable condition accompanied by: self.  Departure Mode: Ambulatory.    Angela Gann RN

## 2017-10-31 NOTE — MR AVS SNAPSHOT
After Visit Summary   10/31/2017    Sebastien Hoover    MRN: [id number]           Patient Information     Date Of Birth          1970        Visit Information        Provider Department      10/31/2017 10:30 AM [facility] BD [facility]        Today's Diagnoses     Severe episode of recurrent major depressive disorder, without psychotic features (H)    -  1    Severe recurrent major depression without psychotic features (H)           Follow-ups after your visit        Your next 10 appointments already scheduled     Nov 02, 2017  2:00 PM CDT   Adult Med Follow UP with Venancio Ochoa MD   Psychiatry Clinic ([facility])    [address]  [address] [facility] [address]   [phone]            Nov 09, 2017 10:30 AM CST   Level 3 with UR BD 01   [facility] ([facility])    [address] [place] [address] [address]  [address] [place] [address]   [phone]            Nov 16, 2017 10:30 AM CST   Level 3 with UR BD 01   [facility]    [address] [place] [address] [address]  [address] [place] [address]   [phone]            Nov 21, 2017 10:00 AM CST   Level 3 with UR CH 04   [facility]    [address] [place] [address] [address]  [address]  [facility] [address]   [phone]            Nov 30, 2017 10:30 AM CST   Level 3 with UR CH 06   [facility]    [address] [place] [address] [address]  [address]  [facility] [address]   [phone]            Dec 07, 2017 10:30 AM CST   Level 3 with UR CH 06   [facility])    [address] [place] [address] [address]  [address]  [facility] [address]   [phone]            Jan 04, 2018  1:00 PM CST   Adult  Med Follow UP with Venancio Ochoa MD   Psychiatry Clinic ([facility])    [address]  [address] [facility] [address]   [phone]            Mar 01, 2018  1:00 PM CST   Adult Med Follow UP with Venancio Ochoa MD   Psychiatry Clinic ([facility])    [address]  [address] [facility] [address]   [phone]            May 03, 2018  1:00 PM CDT   Adult Med Follow UP with Venancio Ochoa MD   Psychiatry Clinic ([facility])    [address]  [address] [facility] [address]   [phone]            Jun 28, 2018  2:00 PM CDT   Adult Med Follow UP with Venancio Ochoa MD   Psychiatry Clinic ([facility])    [place] [id number]  [address] [facility] [address]   [phone]              Who to contact     If you have questions or need follow up information about today's clinic visit or your schedule please contact [facility] directly at [phone].  Normal or non-critical lab and imaging results will be communicated to you by Shopping Mailhart, letter or phone within 4 business days after the clinic has received the results. If you do not hear from us within 7 days, please contact the clinic through Shopping Mailhart or phone. If you have a critical or abnormal lab result, we will notify you by phone as soon as possible.  Submit refill requests through Fast Society or call your pharmacy and they will forward the refill request to us. Please allow 3 business days for your refill to be completed.          Additional Information About Your Visit        Fast Society Information     Fast Society gives you secure access to your electronic health record. If you see a primary care provider, you can also send messages to your care team and make appointments. If you have questions, please call your primary care clinic.  If you do not have a primary  care provider, please call [phone] and they will assist you.        Care EveryWhere ID     This is your Care EveryWhere ID. This could be used by other organizations to access your [place] medical records  [id number]        Your Vitals Were     Pulse Temperature Respirations Pulse Oximetry BMI (Body Mass Index)       69 98.2  F (36.8  C) 16 93% 28.69 kg/m2        Blood Pressure from Last 3 Encounters:   10/31/17 126/77   10/26/17 124/71   10/19/17 106/68    Weight from Last 3 Encounters:   10/31/17 83.1 kg (183 lb 3.2 oz)   10/26/17 83.3 kg (183 lb 10.3 oz)   10/19/17 82.8 kg (182 lb 8.7 oz)              Today, you had the following     No orders found for display       Primary Care Provider Office Phone # Fax #    Candy Ridley [phone]       [facility] [address]  [facility] [address]        Equal Access to Services     [facility] SANDHYA : Hadii aad ku hadasho Soomaali, waaxda luqadaha, qaybta kaalmada adeegyada, waxay domingain hayjuden shaggy castillo . So [facility] [phone].    ATENCIÓN: Si habla español, tiene a madrigal disposición servicios gratuitos de asistencia lingüística. Llame al [phone].    We comply with applicable federal civil rights laws and [place] laws. We do not discriminate on the basis of race, color, national origin, age, disability, sex, sexual orientation, or gender identity.            Thank you!     Thank you for choosing [facility]  for your care. Our goal is always to provide you with excellent care. Hearing back from our patients is one way we can continue to improve our services. Please take a few minutes to complete the written survey that you may receive in the mail after your visit with us. Thank you!             Your Updated Medication List - Protect others around you: Learn how to safely use, store and throw away your medicines at www.disposemymeds.org.          This list is accurate as of: 10/31/17  1:24  PM.  Always use your most recent med list.                   Brand Name Dispense Instructions for use Diagnosis    amphetamine-dextroamphetamine 10 MG per tablet    ADDERALL    90 tablet    Take 1 tablet (10 mg) by mouth 3 times daily    Major depressive disorder, recurrent, severe without psychotic features (H)       cloNIDine 0.1 MG tablet    CATAPRES    45 tablet    1.5 tablets 1 hour prior to scheduled ketamine dose.    Severe episode of recurrent major depressive disorder, without psychotic features (H)       KETAMINE HCL           Ketamine HCl 50 MG/ML Sosy     15 mL    Apply 150 mg into one nostril as directed every 7 days 3 sprays each nostril    Major depressive disorder, recurrent, severe without psychotic features (H)       lithium 150 MG capsule     90 capsule    Take 1 capsule (150 mg) by mouth daily    Major depressive disorder, recurrent, severe without psychotic features (H)       LORazepam 0.5 MG tablet    ATIVAN    30 tablet    Take 1-2 tablets (0.5-1 mg) by mouth every 6 hours as needed for anxiety    Major depressive disorder, recurrent, severe without psychotic features (H)       sertraline 25 MG tablet    ZOLOFT    90 tablet    Take 1 tablet (25 mg) by mouth daily    Major depressive disorder, recurrent, severe without psychotic features (H)

## 2017-11-02 ENCOUNTER — OFFICE VISIT (OUTPATIENT)
Dept: PSYCHIATRY | Facility: CLINIC | Age: 47
End: 2017-11-02
Attending: PSYCHIATRY & NEUROLOGY
Payer: MEDICARE

## 2017-11-02 VITALS
BODY MASS INDEX: 28.81 KG/M2 | SYSTOLIC BLOOD PRESSURE: 145 MMHG | WEIGHT: 184 LBS | DIASTOLIC BLOOD PRESSURE: 98 MMHG | HEART RATE: 78 BPM

## 2017-11-02 DIAGNOSIS — F32.2 SEVERE SINGLE CURRENT EPISODE OF MAJOR DEPRESSIVE DISORDER, WITHOUT PSYCHOTIC FEATURES (H): Primary | ICD-10-CM

## 2017-11-02 PROCEDURE — 99212 OFFICE O/P EST SF 10 MIN: CPT | Mod: ZF

## 2017-11-02 RX ORDER — METHYLPHENIDATE HYDROCHLORIDE 10 MG/1
20 TABLET ORAL 2 TIMES DAILY
Qty: 120 TABLET | Refills: 0 | Status: SHIPPED | OUTPATIENT
Start: 2017-11-02 | End: 2017-12-02

## 2017-11-02 RX ORDER — METHYLPHENIDATE HYDROCHLORIDE 10 MG/1
20 TABLET ORAL 2 TIMES DAILY
Qty: 120 TABLET | Refills: 0 | Status: SHIPPED | OUTPATIENT
Start: 2018-01-03 | End: 2018-01-04

## 2017-11-02 RX ORDER — METHYLPHENIDATE HYDROCHLORIDE 10 MG/1
20 TABLET ORAL 2 TIMES DAILY
Qty: 120 TABLET | Refills: 0 | Status: SHIPPED | OUTPATIENT
Start: 2017-12-03 | End: 2018-01-02

## 2017-11-02 NOTE — MR AVS SNAPSHOT
After Visit Summary   11/2/2017    Sebastien Hoover    MRN: [id number]           Patient Information     Date Of Birth          1970        Visit Information        Provider Department      11/2/2017 2:00 PM Venancio Ochoa MD Psychiatry Clinic        Today's Diagnoses     Severe single current episode of major depressive disorder, without psychotic features (H)    -  1       Follow-ups after your visit        Your next 10 appointments already scheduled     Dec 14, 2017 10:30 AM CST   Level 3 with UR CH 02   [facility])    [address]  [facility] [address]   [phone]            Dec 21, 2017 10:30 AM CST   Level 3 with UR CH 02   [facility])    [address]  [facility] [address]   [phone]            Dec 28, 2017 10:30 AM CST   Level 3 with UR CH 03   [facility])    [address] [place] [address] [address]  [address]  [facility] [address]   [phone]            Jan 04, 2018  1:00 PM CST   Adult Med Follow UP with Venancio Ochoa MD   Psychiatry Clinic ([facility])    [place] [id number]  [address] [facility] [address]   [phone]            Jan 05, 2018 10:00 AM CST   Level 3 with UR BD 01   [facility])    [address] [address]  [facility] [address]   [phone]            Jan 11, 2018 10:30 AM CST   Level 3 with UR CH 01   [facility])    [address]  [facility] [address]   [phone]            Jan 18, 2018 10:30 AM CST   Level 3 with UR CH 01   [facility] ([facility]    [address]  [facility] [address]   [phone]            Jan 25, 2018 10:30 AM CST   Level 3 with UR CH 01   [facility] ([facility])    [address] [place] [address] [address]  [address]  [facility] [address]   [phone]            Mar 01, 2018  1:00 PM CST   Adult Med Follow UP with Venancio Ochoa MD   Psychiatry Clinic ([facility])    [place] [id number] [facility] [address] [phone]            May 03, 2018  1:00 PM CDT   Adult Med Follow UP with Venancio Ochoa MD   Psychiatry Clinic ([facility])    [place] [id number] [facility] [address] [phone]              Who to contact     Please call your clinic at [phone] to:    Ask questions about your health    Make or cancel appointments    Discuss your medicines    Learn about your test results    Speak to your doctor   If you have compliments or concerns about an experience at your clinic, or if you wish to file a complaint, please contact [facility] Patient Relations at [phone] or email us at [email or web address]         Additional Information About Your Visit        Yugmahart Information     MobiDough gives you secure access to your electronic health record. If you see a primary care provider, you can also send messages to your care team and make appointments. If you have questions, please call your primary care clinic.  If you do not have a primary care provider, please call [phone] and they will assist you.      MobiDough is an electronic gateway that provides easy, online access to your medical records. With MobiDough, you can request a clinic appointment, read your test results, renew a prescription or communicate with your care team.     To access your existing account, please  contact your [facility] or call [phone] for assistance.        Care EveryWhere ID     This is your Care EveryWhere ID. This could be used by other organizations to access your [place] medical records  [id number]        Your Vitals Were     Pulse BMI (Body Mass Index)                78 28.81 kg/m2           Blood Pressure from Last 3 Encounters:   12/07/17 100/64   11/30/17 123/75   11/21/17 117/74    Weight from Last 3 Encounters:   12/07/17 82.8 kg (182 lb 8.7 oz)   11/02/17 83.5 kg (184 lb)   10/31/17 83.1 kg (183 lb 3.2 oz)              Today, you had the following     No orders found for display         Today's Medication Changes          These changes are accurate as of: 11/2/17 11:59 PM.  If you have any questions, ask your nurse or doctor.               Start taking these medicines.        Dose/Directions    * methylphenidate 10 MG tablet   Commonly known as:  RITALIN   Used for:  Severe single current episode of major depressive disorder, without psychotic features (H)   Started by:  Venancio Ochoa MD        Dose:  20 mg   Take 2 tablets (20 mg) by mouth 2 times daily   Quantity:  120 tablet   Refills:  0       * methylphenidate 10 MG tablet   Commonly known as:  RITALIN   Used for:  Severe single current episode of major depressive disorder, without psychotic features (H)   Started by:  Venancio Ochoa MD        Dose:  20 mg   Take 2 tablets (20 mg) by mouth 2 times daily   Quantity:  120 tablet   Refills:  0       * methylphenidate 10 MG tablet   Commonly known as:  RITALIN   Used for:  Severe single current episode of major depressive disorder, without psychotic features (H)   Started by:  Venancio Ochoa MD        Dose:  20 mg   Start taking on:  1/3/2018   Take 2 tablets (20 mg) by mouth 2 times daily   Quantity:  120 tablet   Refills:  0       * Notice:  This list has 3 medication(s) that are the same as other medications prescribed for you. Read the  directions carefully, and ask your doctor or other care provider to review them with you.      Stop taking these medicines if you haven't already. Please contact your care team if you have questions.     amphetamine-dextroamphetamine 10 MG per tablet   Commonly known as:  ADDERALL   Stopped by:  Venancio Ochoa MD                Where to get your medicines      Some of these will need a paper prescription and others can be bought over the counter.  Ask your nurse if you have questions.     Bring a paper prescription for each of these medications     methylphenidate 10 MG tablet    methylphenidate 10 MG tablet    methylphenidate 10 MG tablet                Primary Care Provider Office Phone # Fax #    Candy Sweta Ridley [phone]       [facility] [address]  [facility] [address]        Equal Access to Services     STEVEN ARTHUR : Reynaldo avendanoo Sodaniella, waaxda luqadaha, qaybta kaalmada adeegyada, guilelrmo mccain. So [facility] [place] [phone].    ATENCIÓN: Si habla español, tiene a madrigal disposición servicios gratuitos de asistencia lingüística. [facility] [phone].    We comply with applicable federal civil rights laws and [place] laws. We do not discriminate on the basis of race, color, national origin, age, disability, sex, sexual orientation, or gender identity.            Thank you!     Thank you for choosing PSYCHIATRY CLINIC  for your care. Our goal is always to provide you with excellent care. Hearing back from our patients is one way we can continue to improve our services. Please take a few minutes to complete the written survey that you may receive in the mail after your visit with us. Thank you!             Your Updated Medication List - Protect others around you: Learn how to safely use, store and throw away your medicines at www.disposemymeds.org.          This list is accurate as of: 11/2/17 11:59 PM.  Always use your most recent med  list.                   Brand Name Dispense Instructions for use Diagnosis    cloNIDine 0.1 MG tablet    CATAPRES    45 tablet    1.5 tablets 1 hour prior to scheduled ketamine dose.    Severe episode of recurrent major depressive disorder, without psychotic features (H)       KETAMINE HCL           Ketamine HCl 50 MG/ML Sosy     15 mL    Apply 150 mg into one nostril as directed every 7 days 3 sprays each nostril    Major depressive disorder, recurrent, severe without psychotic features (H)       lithium 150 MG capsule     90 capsule    Take 1 capsule (150 mg) by mouth daily    Major depressive disorder, recurrent, severe without psychotic features (H)       LORazepam 0.5 MG tablet    ATIVAN    30 tablet    Take 1-2 tablets (0.5-1 mg) by mouth every 6 hours as needed for anxiety    Major depressive disorder, recurrent, severe without psychotic features (H)       * methylphenidate 10 MG tablet    RITALIN    120 tablet    Take 2 tablets (20 mg) by mouth 2 times daily    Severe single current episode of major depressive disorder, without psychotic features (H)       * methylphenidate 10 MG tablet    RITALIN    120 tablet    Take 2 tablets (20 mg) by mouth 2 times daily    Severe single current episode of major depressive disorder, without psychotic features (H)       * methylphenidate 10 MG tablet   Start taking on:  1/3/2018    RITALIN    120 tablet    Take 2 tablets (20 mg) by mouth 2 times daily    Severe single current episode of major depressive disorder, without psychotic features (H)       sertraline 25 MG tablet    ZOLOFT    90 tablet    Take 1 tablet (25 mg) by mouth daily    Major depressive disorder, recurrent, severe without psychotic features (H)       * Notice:  This list has 3 medication(s) that are the same as other medications prescribed for you. Read the directions carefully, and ask your doctor or other care provider to review them with you.

## 2017-11-03 ASSESSMENT — PATIENT HEALTH QUESTIONNAIRE - PHQ9: SUM OF ALL RESPONSES TO PHQ QUESTIONS 1-9: 21

## 2017-11-09 ENCOUNTER — INFUSION THERAPY VISIT (OUTPATIENT)
Dept: INFUSION THERAPY | Facility: CLINIC | Age: 47
End: 2017-11-09
Attending: PSYCHIATRY & NEUROLOGY
Payer: MEDICARE

## 2017-11-09 VITALS
SYSTOLIC BLOOD PRESSURE: 125 MMHG | OXYGEN SATURATION: 98 % | TEMPERATURE: 98.1 F | RESPIRATION RATE: 16 BRPM | HEART RATE: 77 BPM | DIASTOLIC BLOOD PRESSURE: 67 MMHG

## 2017-11-09 DIAGNOSIS — F33.2 SEVERE EPISODE OF RECURRENT MAJOR DEPRESSIVE DISORDER, WITHOUT PSYCHOTIC FEATURES (H): Primary | ICD-10-CM

## 2017-11-09 DIAGNOSIS — F33.2 SEVERE RECURRENT MAJOR DEPRESSION WITHOUT PSYCHOTIC FEATURES (H): ICD-10-CM

## 2017-11-09 PROCEDURE — 96361 HYDRATE IV INFUSION ADD-ON: CPT

## 2017-11-09 PROCEDURE — 96365 THER/PROPH/DIAG IV INF INIT: CPT

## 2017-11-09 PROCEDURE — 25000125 ZZHC RX 250: Performed by: PSYCHIATRY & NEUROLOGY

## 2017-11-09 PROCEDURE — 25000128 H RX IP 250 OP 636: Performed by: PSYCHIATRY & NEUROLOGY

## 2017-11-09 RX ADMIN — SODIUM CHLORIDE 250 ML: 9 INJECTION, SOLUTION INTRAVENOUS at 11:07

## 2017-11-09 RX ADMIN — KETAMINE HYDROCHLORIDE 37.5 MG: 50 INJECTION, SOLUTION INTRAMUSCULAR; INTRAVENOUS at 11:08

## 2017-11-09 ASSESSMENT — PAIN SCALES - GENERAL: PAINLEVEL: NO PAIN (0)

## 2017-11-09 NOTE — MR AVS SNAPSHOT
After Visit Summary   11/9/2017    Sebastien Hoover    MRN: [id number]           Patient Information     Date Of Birth          1970        Visit Information        Provider Department      11/9/2017 10:30 AM UR BD 01 [facility], Infusion Services        Today's Diagnoses     Severe episode of recurrent major depressive disorder, without psychotic features (H)    -  1    Severe recurrent major depression without psychotic features (H)           Follow-ups after your visit        Your next 10 appointments already scheduled     Nov 16, 2017 10:30 AM CST   Level 3 with UR BD 01   [facility], Infusion Services ([facility])    [address] [place] [address] [address].  [address]  [facility] [address]   [phone]            Nov 21, 2017 10:00 AM CST   Level 3 with UR CH 04   [facility], Infusion Services ([facility])    [address] [place] [address] [address].  [address]  [facility] [address]   [phone]            Nov 30, 2017 10:30 AM CST   Level 3 with UR CH 06   [facility], Infusion Services ([facility])    [address] [place] [address] [address].  [address]  [facility] [address]   [phone]            Dec 07, 2017 10:30 AM CST   Level 3 with UR CH 06   [facility], Infusion Services ([facility])    [address] [place] [address] [address]  [address]  [facility] [address]   [phone]            Jan 04, 2018  1:00 PM CST   Adult Med Follow UP with Venancio Ochoa MD   Psychiatry Clinic ([facility])    [address]  [address] [facility] [address]   [phone]            Mar 01, 2018  1:00 PM CST   Adult Med Follow UP with Venancio Ochoa MD   Psychiatry Clinic ([facility])    [address]  [address] [facility] [address]   [phone]            May 03, 2018  1:00 PM CDT   Adult  Med Follow UP with Venancio Ocoha MD   Psychiatry Clinic ([facility])    [facility]  [address] [facility] [address]  [address] [facility] [address]   [phone]            Jun 28, 2018  2:00 PM CDT   Adult Med Follow UP with Venancio Ochoa MD   Psychiatry Clinic ([facility])    [facility]  [address]  [address] [facility] [address] [phone]            Sep 20, 2018  1:00 PM CDT   Adult Med Follow UP with Venancio Ochoa MD   Psychiatry Clinic ([facility])    [facility]  [address] [facility] [address]  [address] [facility] [address] [phone]              Who to contact     If you have questions or need follow up information about today's clinic visit or your schedule please contact [facility], [place], [facility] SERVICES directly at [phone].  Normal or non-critical lab and imaging results will be communicated to you by [employer], letter or phone within 4 business days after the clinic has received the results. If you do not hear from us within 7 days, please contact the clinic through [employer] or phone. If you have a critical or abnormal lab result, we will notify you by phone as soon as possible.  Submit refill requests through [employer] or call your pharmacy and they will forward the refill request to us. Please allow 3 business days for your refill to be completed.          Additional Information About Your Visit        [employer] Information     [employer] gives you secure access to your electronic health record. If you see a primary care provider, you can also send messages to your care team and make appointments. If you have questions, please call your primary care clinic.  If you do not have a primary care provider, please call [phone] and they will assist you.        Care EveryWhere ID     This is your Care EveryWhere ID. This could be used by other organizations to access your [place] medical records  [id number]         Your Vitals Were     Pulse Temperature Respirations Pulse Oximetry          77 98.1  F (36.7  C) 16 98%         Blood Pressure from Last 3 Encounters:   11/09/17 125/67   10/31/17 126/77   10/26/17 124/71    Weight from Last 3 Encounters:   10/31/17 83.1 kg (183 lb 3.2 oz)   10/26/17 83.3 kg (183 lb 10.3 oz)   10/19/17 82.8 kg (182 lb 8.7 oz)              Today, you had the following     No orders found for display       Primary Care Provider Office Phone # Fax #    Candy Sweta Ridley [phone]       [facility] [address]  [facility] [address]        Equal Access to Services     STEVEN ARTHUR : Hadii aad ku hadasho Soomaali, waaxda luqadaha, qaybta kaalmada adeegyada, waxstevan mccain. So [facility] [phone].    ATENCIÓN: Si habla español, tiene a madrigal disposición servicios gratuitos de asistencia lingüística. Llame al [phone].    We comply with applicable federal civil rights laws and [place] laws. We do not discriminate on the basis of race, color, national origin, age, disability, sex, sexual orientation, or gender identity.            Thank you!     Thank you for choosing [facility]  for your care. Our goal is always to provide you with excellent care. Hearing back from our patients is one way we can continue to improve our services. Please take a few minutes to complete the written survey that you may receive in the mail after your visit with us. Thank you!             Your Updated Medication List - Protect others around you: Learn how to safely use, store and throw away your medicines at www.disposemymeds.org.          This list is accurate as of: 11/9/17  3:00 PM.  Always use your most recent med list.                   Brand Name Dispense Instructions for use Diagnosis    cloNIDine 0.1 MG tablet    CATAPRES    45 tablet    1.5 tablets 1 hour prior to scheduled ketamine dose.    Severe episode of recurrent major  depressive disorder, without psychotic features (H)       KETAMINE HCL           Ketamine HCl 50 MG/ML Sosy     15 mL    Apply 150 mg into one nostril as directed every 7 days 3 sprays each nostril    Major depressive disorder, recurrent, severe without psychotic features (H)       lithium 150 MG capsule     90 capsule    Take 1 capsule (150 mg) by mouth daily    Major depressive disorder, recurrent, severe without psychotic features (H)       LORazepam 0.5 MG tablet    ATIVAN    30 tablet    Take 1-2 tablets (0.5-1 mg) by mouth every 6 hours as needed for anxiety    Major depressive disorder, recurrent, severe without psychotic features (H)       * methylphenidate 10 MG tablet    RITALIN    120 tablet    Take 2 tablets (20 mg) by mouth 2 times daily    Severe single current episode of major depressive disorder, without psychotic features (H)       * methylphenidate 10 MG tablet   Start taking on:  12/3/2017    RITALIN    120 tablet    Take 2 tablets (20 mg) by mouth 2 times daily    Severe single current episode of major depressive disorder, without psychotic features (H)       * methylphenidate 10 MG tablet   Start taking on:  1/3/2018    RITALIN    120 tablet    Take 2 tablets (20 mg) by mouth 2 times daily    Severe single current episode of major depressive disorder, without psychotic features (H)       sertraline 25 MG tablet    ZOLOFT    90 tablet    Take 1 tablet (25 mg) by mouth daily    Major depressive disorder, recurrent, severe without psychotic features (H)       * Notice:  This list has 3 medication(s) that are the same as other medications prescribed for you. Read the directions carefully, and ask your doctor or other care provider to review them with you.

## 2017-11-09 NOTE — PROGRESS NOTES
Infusion Nursing Note:  Sebastien BERNABE Kiko presents today for ketamine.    Patient seen by provider today: No   present during visit today: Not Applicable.    Note: Patient states he has not had any changes to his health.    Intravenous Access:  Peripheral IV placed.    Post Infusion Assessment:  Patient tolerated infusion without incident.  VS monitored per protocol.  Patient observed for 60 minutes post ketamine per protocol.  Blood return noted pre and post infusion.  Site patent and intact, free from redness, edema or discomfort.  No evidence of extravasations.  Access discontinued per protocol.    Discharge Plan:   Patient discharged in stable condition accompanied by: self, has transportation home.  Departure Mode: Ambulatory.  Will return to clinic next week.  Angela Davis RN

## 2017-11-13 NOTE — PROGRESS NOTES
Medication Management  Sebastien seen for 20 minutes for med review and discussion about ketamine treatments.  Tolerating meds.  Some benefit from ketamine q2-3 weeks.  Intranasal of questionable benefit.    No changes today    MSE:  Well groomed.  Pleasant and cooperative.  Mood down.  Affect congruent.  Movements nl.  Speech nl rate and flow.  Logical.  No FOI or ALEXA.  No SI.  I&J good.  Ox4    F/U 4-6 weeks.

## 2017-11-16 ENCOUNTER — INFUSION THERAPY VISIT (OUTPATIENT)
Dept: INFUSION THERAPY | Facility: CLINIC | Age: 47
End: 2017-11-16
Attending: PSYCHIATRY & NEUROLOGY
Payer: MEDICARE

## 2017-11-16 VITALS
HEART RATE: 78 BPM | OXYGEN SATURATION: 96 % | TEMPERATURE: 97.5 F | RESPIRATION RATE: 16 BRPM | DIASTOLIC BLOOD PRESSURE: 82 MMHG | SYSTOLIC BLOOD PRESSURE: 124 MMHG

## 2017-11-16 DIAGNOSIS — F33.2 SEVERE EPISODE OF RECURRENT MAJOR DEPRESSIVE DISORDER, WITHOUT PSYCHOTIC FEATURES (H): Primary | ICD-10-CM

## 2017-11-16 DIAGNOSIS — F33.2 SEVERE RECURRENT MAJOR DEPRESSION WITHOUT PSYCHOTIC FEATURES (H): ICD-10-CM

## 2017-11-16 PROCEDURE — 25000125 ZZHC RX 250: Performed by: PSYCHIATRY & NEUROLOGY

## 2017-11-16 PROCEDURE — 96365 THER/PROPH/DIAG IV INF INIT: CPT

## 2017-11-16 PROCEDURE — 25000128 H RX IP 250 OP 636: Performed by: PSYCHIATRY & NEUROLOGY

## 2017-11-16 PROCEDURE — 96361 HYDRATE IV INFUSION ADD-ON: CPT

## 2017-11-16 RX ADMIN — SODIUM CHLORIDE 250 ML: 9 INJECTION, SOLUTION INTRAVENOUS at 10:54

## 2017-11-16 RX ADMIN — KETAMINE HYDROCHLORIDE 37.5 MG: 50 INJECTION, SOLUTION INTRAMUSCULAR; INTRAVENOUS at 10:55

## 2017-11-16 ASSESSMENT — PAIN SCALES - GENERAL: PAINLEVEL: NO PAIN (0)

## 2017-11-16 NOTE — MR AVS SNAPSHOT
After Visit Summary   11/16/2017    Sebastien Hoover    MRN: [id number]           Patient Information     Date Of Birth          1970        Visit Information        Provider Department      11/16/2017 10:30 AM [facility]        Today's Diagnoses     Severe episode of recurrent major depressive disorder, without psychotic features (H)    -  1    Severe recurrent major depression without psychotic features (H)           Follow-ups after your visit        Your next 10 appointments already scheduled     Nov 21, 2017 10:00 AM CST   Level 3 with UR CH 04   [facility] ([facility])    [address]  [facility] [address]   [phone]            Nov 30, 2017 10:30 AM CST   Level 3 with UR CH 06   [facility]    [address]  [address]  [facility] [address]   [phone]            Dec 07, 2017 10:30 AM CST   Level 3 with UR CH 06   [facility])    [address]  [facility] [address]   [phone]            Dec 14, 2017 10:30 AM CST   Level 3 with UR CH 02   [facility])    [address]  [address]  [facility] [address]   [phone]            Dec 21, 2017 10:30 AM CST   Level 3 with UR CH 02   [facility])    [address]  [address]  [facility] [address]   [phone]            Dec 28, 2017 10:30 AM CST   Level 3 with UR CH 03   [facility])    [address]  [address]  [facility] [address]   [phone]            Jan 04, 2018  1:00 PM CST   Adult Med  Follow UP with Venancio Ochoa MD   Psychiatry Clinic ([facility])    [address]  [address] [facility] [address]   [phone]            Mar 01, 2018  1:00 PM CST   Adult Med Follow UP with Venancio Ochoa MD   Psychiatry Clinic ([facility])    [address]  [address] [facility] [address]   [phone]            May 03, 2018  1:00 PM CDT   Adult Med Follow UP with Venancio Ochoa MD   Psychiatry Clinic ([facility])    [address]  [address] [facility] [address]   [phone]            Jun 28, 2018  2:00 PM CDT   Adult Med Follow UP with Venancio Ochoa MD   Psychiatry Clinic ([facility])    [address]  [address] [facility] [address]   [phone]              Who to contact     If you have questions or need follow up information about today's clinic visit or your schedule please contact [facility] [place], [facility] SERVICES directly at [phone].  Normal or non-critical lab and imaging results will be communicated to you by [employer], letter or phone within 4 business days after the clinic has received the results. If you do not hear from us within 7 days, please contact the clinic through [employer] or phone. If you have a critical or abnormal lab result, we will notify you by phone as soon as possible.  Submit refill requests through [employer] or call your pharmacy and they will forward the refill request to us. Please allow 3 business days for your refill to be completed.          Additional Information About Your Visit        [employer] Information     [employer] gives you secure access to your electronic health record. If you see a primary care provider, you can also send messages to your care team and make appointments. If you have questions, please call your primary care clinic.  If you do not have a primary care  provider, please call [phone] and they will assist you.        Care EveryWhere ID     This is your Care EveryWhere ID. This could be used by other organizations to access your [place] medical records  [id number]        Your Vitals Were     Pulse Temperature Respirations Pulse Oximetry          78 97.5  F (36.4  C) 16 96%         Blood Pressure from Last 3 Encounters:   11/16/17 124/82   11/09/17 125/67   10/31/17 126/77    Weight from Last 3 Encounters:   10/31/17 83.1 kg (183 lb 3.2 oz)   10/26/17 83.3 kg (183 lb 10.3 oz)   10/19/17 82.8 kg (182 lb 8.7 oz)              Today, you had the following     No orders found for display       Primary Care Provider Office Phone # Fax #    Candy Sweta Ridley [phone]       [facility] [address]  [facility] [address]        Equal Access to Services     [facility]Y [facility] : Hadii aad ku hadasho Soomaali, waaxda luqadaha, qaybta kaalmada adeegyada, waxay idiin hayaan catherineeg kharajose la'sofia . So [facility] [phone].    ATENCIÓN: Si habla español, tiene a madrigal disposición servicios gratuitos de asistencia lingüística. Llame al [phone].    We comply with applicable federal civil rights laws and [place] laws. We do not discriminate on the basis of race, color, national origin, age, disability, sex, sexual orientation, or gender identity.            Thank you!     Thank you for choosing [facility] SERVICES  for your care. Our goal is always to provide you with excellent care. Hearing back from our patients is one way we can continue to improve our services. Please take a few minutes to complete the written survey that you may receive in the mail after your visit with us. Thank you!             Your Updated Medication List - Protect others around you: Learn how to safely use, store and throw away your medicines at www.disposemymeds.org.          This list is accurate as of: 11/16/17  2:19 PM.  Always use your most recent  med list.                   Brand Name Dispense Instructions for use Diagnosis    cloNIDine 0.1 MG tablet    CATAPRES    45 tablet    1.5 tablets 1 hour prior to scheduled ketamine dose.    Severe episode of recurrent major depressive disorder, without psychotic features (H)       KETAMINE HCL           Ketamine HCl 50 MG/ML Sosy     15 mL    Apply 150 mg into one nostril as directed every 7 days 3 sprays each nostril    Major depressive disorder, recurrent, severe without psychotic features (H)       lithium 150 MG capsule     90 capsule    Take 1 capsule (150 mg) by mouth daily    Major depressive disorder, recurrent, severe without psychotic features (H)       LORazepam 0.5 MG tablet    ATIVAN    30 tablet    Take 1-2 tablets (0.5-1 mg) by mouth every 6 hours as needed for anxiety    Major depressive disorder, recurrent, severe without psychotic features (H)       * methylphenidate 10 MG tablet    RITALIN    120 tablet    Take 2 tablets (20 mg) by mouth 2 times daily    Severe single current episode of major depressive disorder, without psychotic features (H)       * methylphenidate 10 MG tablet   Start taking on:  12/3/2017    RITALIN    120 tablet    Take 2 tablets (20 mg) by mouth 2 times daily    Severe single current episode of major depressive disorder, without psychotic features (H)       * methylphenidate 10 MG tablet   Start taking on:  1/3/2018    RITALIN    120 tablet    Take 2 tablets (20 mg) by mouth 2 times daily    Severe single current episode of major depressive disorder, without psychotic features (H)       sertraline 25 MG tablet    ZOLOFT    90 tablet    Take 1 tablet (25 mg) by mouth daily    Major depressive disorder, recurrent, severe without psychotic features (H)       * Notice:  This list has 3 medication(s) that are the same as other medications prescribed for you. Read the directions carefully, and ask your doctor or other care provider to review them with you.

## 2017-11-16 NOTE — PROGRESS NOTES
Infusion Nursing Note:  Sebastien Hoover presents today for ketamine.    Patient seen by provider today: No   present during visit today: Not Applicable.    Note: Patient states anxiety and depression are stable.    Intravenous Access:  Peripheral IV placed.    Post Infusion Assessment:  Patient tolerated infusion without incident.  VS monitored per protocol.  Patient observed for 60 minutes post ketamine per protocol.  Blood return noted pre and post infusion.  Site patent and intact, free from redness, edema or discomfort.  No evidence of extravasations.  Access discontinued per protocol.    Discharge Plan:   Patient discharged in stable condition accompanied by: self.  Departure Mode: Ambulatory.  Will return to clinic next week.  Angela Davis RN

## 2017-11-21 ENCOUNTER — INFUSION THERAPY VISIT (OUTPATIENT)
Dept: INFUSION THERAPY | Facility: CLINIC | Age: 47
End: 2017-11-21
Attending: PSYCHIATRY & NEUROLOGY
Payer: MEDICARE

## 2017-11-21 VITALS
OXYGEN SATURATION: 96 % | TEMPERATURE: 97.8 F | RESPIRATION RATE: 16 BRPM | HEART RATE: 66 BPM | DIASTOLIC BLOOD PRESSURE: 74 MMHG | SYSTOLIC BLOOD PRESSURE: 117 MMHG

## 2017-11-21 DIAGNOSIS — F33.2 SEVERE EPISODE OF RECURRENT MAJOR DEPRESSIVE DISORDER, WITHOUT PSYCHOTIC FEATURES (H): Primary | ICD-10-CM

## 2017-11-21 DIAGNOSIS — F33.2 SEVERE RECURRENT MAJOR DEPRESSION WITHOUT PSYCHOTIC FEATURES (H): ICD-10-CM

## 2017-11-21 PROCEDURE — 25000128 H RX IP 250 OP 636: Performed by: PSYCHIATRY & NEUROLOGY

## 2017-11-21 PROCEDURE — 96360 HYDRATION IV INFUSION INIT: CPT

## 2017-11-21 PROCEDURE — 25000125 ZZHC RX 250: Performed by: PSYCHIATRY & NEUROLOGY

## 2017-11-21 PROCEDURE — 96361 HYDRATE IV INFUSION ADD-ON: CPT

## 2017-11-21 PROCEDURE — 96365 THER/PROPH/DIAG IV INF INIT: CPT

## 2017-11-21 RX ADMIN — SODIUM CHLORIDE 250 ML: 9 INJECTION, SOLUTION INTRAVENOUS at 10:23

## 2017-11-21 RX ADMIN — KETAMINE HYDROCHLORIDE 37.5 MG: 50 INJECTION, SOLUTION INTRAMUSCULAR; INTRAVENOUS at 10:24

## 2017-11-21 NOTE — MR AVS SNAPSHOT
After Visit Summary   11/21/2017    Sebastien Hoover    MRN: [id number]           Patient Information     Date Of Birth          1970        Visit Information        Provider Department      11/21/2017 10:00 AM UR CH 04 [facility]        Today's Diagnoses     Severe episode of recurrent major depressive disorder, without psychotic features (H)    -  1    Severe recurrent major depression without psychotic features (H)           Follow-ups after your visit        Your next 10 appointments already scheduled     Nov 30, 2017 10:30 AM CST   Level 3 with UR CH 06   [facility] ([facility]    [address] [place] [address] [address]  [address]  [facility] [address]   [phone]            Dec 07, 2017 10:30 AM CST   Level 3 with UR CH 06   [facility]    [address] [place] [address] [address]  [address]  [facility] [address]   [phone]            Dec 14, 2017 10:30 AM CST   Level 3 with UR CH 02   [facility]    [address] [place] [address] [address]  [address]  [facility] [address]   [phone]            Dec 21, 2017 10:30 AM CST   Level 3 with UR CH 02   [facility]    [address] [place] [address] [address]  [address]  [facility] [address]   [phone]            Dec 28, 2017 10:30 AM CST   Level 3 with UR CH 03   [facility])    [address] [place] [address] [address]  [address]  [facility] [address]   [phone]            Jan 04, 2018  1:00 PM CST   Adult Med Follow UP with Venancio Ochoa MD   Psychiatry Clinic ([facility])    [address]  [address] [facility] [address]   [phone]            Mar 01, 2018  1:00 PM CST   Adult  Med Follow UP with Venancio Ochoa MD   Psychiatry Clinic ([facility])    [address]  [address] [facility] [address]   [phone]            May 03, 2018  1:00 PM CDT   Adult Med Follow UP with Venancio Ocoha MD   Psychiatry Clinic ([facility])    [address]  [address] [facility] [address]   [phone]            Jun 28, 2018  2:00 PM CDT   Adult Med Follow UP with Venancio Ochoa MD   Psychiatry Clinic ([facility])    [address]  [address] [facility] [address]   [phone]            Sep 20, 2018  1:00 PM CDT   Adult Med Follow UP with Venancio Ochoa MD   Psychiatry Clinic ([facility])    [address]  [address] [facility] [address] [phone]              Who to contact     If you have questions or need follow up information about today's clinic visit or your schedule please contact [facility], [place], [facility] SERVICES directly at [phone].  Normal or non-critical lab and imaging results will be communicated to you by CareKinesishart, letter or phone within 4 business days after the clinic has received the results. If you do not hear from us within 7 days, please contact the clinic through CareKinesishart or phone. If you have a critical or abnormal lab result, we will notify you by phone as soon as possible.  Submit refill requests through GMG33 or call your pharmacy and they will forward the refill request to us. Please allow 3 business days for your refill to be completed.          Additional Information About Your Visit        GMG33 Information     GMG33 gives you secure access to your electronic health record. If you see a primary care provider, you can also send messages to your care team and make appointments. If you have questions, please call your primary care clinic.  If you do not have a primary  care provider, please call [phone] and they will assist you.        Care EveryWhere ID     This is your Care EveryWhere ID. This could be used by other organizations to access your [place] medical records  [id number]        Your Vitals Were     Pulse Temperature Respirations Pulse Oximetry          66 97.8  F (36.6  C) (Oral) 16 96%         Blood Pressure from Last 3 Encounters:   11/21/17 117/74   11/16/17 124/82   11/09/17 125/67    Weight from Last 3 Encounters:   10/31/17 83.1 kg (183 lb 3.2 oz)   10/26/17 83.3 kg (183 lb 10.3 oz)   10/19/17 82.8 kg (182 lb 8.7 oz)              Today, you had the following     No orders found for display       Primary Care Provider Office Phone # Fax #    Candy Sweta Ridley [phone]       [facility] [address]  [facility] [address]        Equal Access to Services     STEVEN ARTHUR : Hadii aad ku hadasho Soomaali, waaxda luqadaha, qaybta kaalmada adeegyada, waxay idiin hayaan adeeg kharajose la'juden . So [facility] [phone].    ATENCIÓN: Si habla español, tiene a madrigal disposición servicios gratuitos de asistencia lingüística. Llame al [phone].    We comply with applicable federal civil rights laws and [place] laws. We do not discriminate on the basis of race, color, national origin, age, disability, sex, sexual orientation, or gender identity.            Thank you!     Thank you for choosing [facility], [facility] SERVICES  for your care. Our goal is always to provide you with excellent care. Hearing back from our patients is one way we can continue to improve our services. Please take a few minutes to complete the written survey that you may receive in the mail after your visit with us. Thank you!             Your Updated Medication List - Protect others around you: Learn how to safely use, store and throw away your medicines at www.disposemymeds.org.          This list is accurate as of: 11/21/17  1:03 PM.  Always use your  most recent med list.                   Brand Name Dispense Instructions for use Diagnosis    cloNIDine 0.1 MG tablet    CATAPRES    45 tablet    1.5 tablets 1 hour prior to scheduled ketamine dose.    Severe episode of recurrent major depressive disorder, without psychotic features (H)       KETAMINE HCL           Ketamine HCl 50 MG/ML Sosy     15 mL    Apply 150 mg into one nostril as directed every 7 days 3 sprays each nostril    Major depressive disorder, recurrent, severe without psychotic features (H)       lithium 150 MG capsule     90 capsule    Take 1 capsule (150 mg) by mouth daily    Major depressive disorder, recurrent, severe without psychotic features (H)       LORazepam 0.5 MG tablet    ATIVAN    30 tablet    Take 1-2 tablets (0.5-1 mg) by mouth every 6 hours as needed for anxiety    Major depressive disorder, recurrent, severe without psychotic features (H)       * methylphenidate 10 MG tablet    RITALIN    120 tablet    Take 2 tablets (20 mg) by mouth 2 times daily    Severe single current episode of major depressive disorder, without psychotic features (H)       * methylphenidate 10 MG tablet   Start taking on:  12/3/2017    RITALIN    120 tablet    Take 2 tablets (20 mg) by mouth 2 times daily    Severe single current episode of major depressive disorder, without psychotic features (H)       * methylphenidate 10 MG tablet   Start taking on:  1/3/2018    RITALIN    120 tablet    Take 2 tablets (20 mg) by mouth 2 times daily    Severe single current episode of major depressive disorder, without psychotic features (H)       sertraline 25 MG tablet    ZOLOFT    90 tablet    Take 1 tablet (25 mg) by mouth daily    Major depressive disorder, recurrent, severe without psychotic features (H)       * Notice:  This list has 3 medication(s) that are the same as other medications prescribed for you. Read the directions carefully, and ask your doctor or other care provider to review them with you.

## 2017-11-21 NOTE — PROGRESS NOTES
Infusion Nursing Note:  Sebastien Hoover presents today for ketamine.    Patient seen by provider today: No   present during visit today: Not Applicable.    Note: Patient states depression and anxiety are about the same.    Intravenous Access:  Peripheral IV placed.    Post Infusion Assessment:  Patient tolerated infusion without incident.  VS monitored per protocol.  Patient observed for 60 minutes post ketamine per protocol.  Blood return noted pre and post infusion.  Site patent and intact, free from redness, edema or discomfort.  No evidence of extravasations.  Access discontinued per protocol.    Discharge Plan:   Patient discharged in stable condition accompanied by: self.  Departure Mode: Ambulatory.  Will return to clinic next week.  Angela Davis RN

## 2017-11-30 ENCOUNTER — INFUSION THERAPY VISIT (OUTPATIENT)
Dept: INFUSION THERAPY | Facility: CLINIC | Age: 47
End: 2017-11-30
Attending: PSYCHIATRY & NEUROLOGY
Payer: MEDICARE

## 2017-11-30 VITALS
HEART RATE: 76 BPM | DIASTOLIC BLOOD PRESSURE: 75 MMHG | OXYGEN SATURATION: 97 % | RESPIRATION RATE: 16 BRPM | SYSTOLIC BLOOD PRESSURE: 123 MMHG | TEMPERATURE: 97.9 F

## 2017-11-30 DIAGNOSIS — F33.2 SEVERE EPISODE OF RECURRENT MAJOR DEPRESSIVE DISORDER, WITHOUT PSYCHOTIC FEATURES (H): Primary | ICD-10-CM

## 2017-11-30 DIAGNOSIS — F33.2 SEVERE RECURRENT MAJOR DEPRESSION WITHOUT PSYCHOTIC FEATURES (H): ICD-10-CM

## 2017-11-30 PROCEDURE — 96361 HYDRATE IV INFUSION ADD-ON: CPT

## 2017-11-30 PROCEDURE — 96365 THER/PROPH/DIAG IV INF INIT: CPT

## 2017-11-30 PROCEDURE — 25000125 ZZHC RX 250: Performed by: PSYCHIATRY & NEUROLOGY

## 2017-11-30 PROCEDURE — 25000128 H RX IP 250 OP 636: Performed by: PSYCHIATRY & NEUROLOGY

## 2017-11-30 RX ADMIN — SODIUM CHLORIDE 250 ML: 9 INJECTION, SOLUTION INTRAVENOUS at 10:55

## 2017-11-30 RX ADMIN — KETAMINE HYDROCHLORIDE 37.5 MG: 50 INJECTION, SOLUTION INTRAMUSCULAR; INTRAVENOUS at 10:57

## 2017-11-30 ASSESSMENT — PAIN SCALES - GENERAL: PAINLEVEL: NO PAIN (0)

## 2017-11-30 NOTE — PROGRESS NOTES
Morgan had a PHQ-9 of 26. He has not been exercising as much. He is not doing well on Adderall. He tried using a light box for five days without benefit. He was unable to get himself to twice-weekly ketamine injections, and is now receiving ketamine once per week. He is not socializing.      His mood is down. He has initial insomnia. He craves carbohydrates. His ability to concentrate enough to read is limited. His energy is poor. He has minimal interest in things. No homicidal ideation. He has some chronic suicidal thoughts without plan or intent. No flashbacks. No hallucinations, paranoia, or ideas of reference. He has some social anxiety. No panic attacks. He has very mild checking behaviors. No alcohol or substance use.      On exam, Morgan was cooperative, with intermittent eye contact and good grooming. Mood was sad and affect congruent. Speech was normal rate and flow. Movements were normal. Thought process was logical and gal-directed. No psychosis. Passive suicidal ideation, but no homicidal ideation. He was oriented x4 with OK recent memory.      MEDICATIONS:   1. Zoloft 25 mg   2. IV ketamine treatments   3. Ketamine nasal spray 1x/week   4. Clonidine 1.5 mg prior to ketamine   5. Adderall TID   6. Lithium 150 mg   7. Lorazepam 0.5-1 mg PRN      ASSESSMENT:   1. Treatment-refractory MDD, recurrent, without psychosis      PLAN:   1. D/C Adderall   2. Ritalin 20 mg BID with gradual cross taper      This was a 30-minute face-to-face encounter, with more than half spent on education about bright light therapy.         FANNY ZELAYA MD             D: 2017 12:27   T: 2017 12:46   MT:       Name:     DANIEL HUNT   MRN:      [id number]        Account:      [id number]   :      1970           Service Date: 2017      Document: [id number]

## 2017-11-30 NOTE — PROGRESS NOTES
Infusion Nursing Note:  Sebastien BERNABE Kiko presents today for ketamine.    Patient seen by provider today: No   present during visit today: Not Applicable.    Note: Patient states he is well, depression/anxiety are relatively stable.    Intravenous Access:  Peripheral IV placed.    Post Infusion Assessment:  Patient tolerated infusion without incident.  VS monitored per protocol.  Patient observed for 60 minutes post ketamine per protocol.  Blood return noted pre and post infusion.  Site patent and intact, free from redness, edema or discomfort.  No evidence of extravasations.  Access discontinued per protocol.    Discharge Plan:   Patient discharged in stable condition accompanied by: self.  Departure Mode: Ambulatory.  Will return to clinic next week.    Angela Davis RN

## 2017-11-30 NOTE — MR AVS SNAPSHOT
After Visit Summary   11/30/2017    Sebastien Hoover    MRN: [id number]           Patient Information     Date Of Birth          1970        Visit Information        Provider Department      11/30/2017 10:30 [facility]        Today's Diagnoses     Severe episode of recurrent major depressive disorder, without psychotic features (H)    -  1    Severe recurrent major depression without psychotic features (H)           Follow-ups after your visit        Your next 10 appointments already scheduled     Dec 07, 2017 10:30 AM CST   Level 3 with UR CH 06   [facility]    [address] [facility] [address]  [facility] [address]   [phone]            Dec 14, 2017 10:30 AM CST   Level 3 with UR CH 02   [facility]    [address] [place] [address] [address]  [address]  [facility] [address]   [phone]            Dec 21, 2017 10:30 AM CST   Level 3 with UR CH 02   [facility]    [address]  [address]  [facility] [address]   [phone]            Dec 28, 2017 10:30 AM CST   Level 3 with UR CH 03   [facility]    [address] [place] [address] [address]  [address]  [facility] [address]   [phone]            Jan 04, 2018  1:00 PM CST   Adult Med Follow UP with Venancio Ochoa MD   Psychiatry Clinic ([facility])    [address]  [address] [facility] [address]   [phone]            Jan 05, 2018 10:00 AM CST   Level 3 with UR BD 01   [facility]    [address] [place] [address] [address]  [address]  [facility] [address]   [phone]            Jan 11, 2018 10:30 AM CST   Level  3 with UR CH 01   [facility] ([facility])    [address]  [facility] [address]   [phone]            Jan 18, 2018 10:30 AM CST   Level 3 with UR CH 01   [facility] ([facility])    [address]  [address]  [facility] [address]   [phone]            Jan 25, 2018 10:30 AM CST   Level 3 with UR CH 01   [facility] ([facility])    [address]  [address]  [facility] [address]   [phone]            Mar 01, 2018  1:00 PM CST   Adult Med Follow UP with Venancio Ochoa MD   Psychiatry Clinic ([facility])    [place] [id number] [facility] [address] [phone]              Who to contact     If you have questions or need follow up information about today's clinic visit or your schedule please contact [facility] directly at [phone].  Normal or non-critical lab and imaging results will be communicated to you by OrderDynamicshart, letter or phone within 4 business days after the clinic has received the results. If you do not hear from us within 7 days, please contact the clinic through OrderDynamicshart or phone. If you have a critical or abnormal lab result, we will notify you by phone as soon as possible.  Submit refill requests through Envivio or call your pharmacy and they will forward the refill request to us. Please allow 3 business days for your refill to be completed.          Additional Information About Your Visit        OrderDynamicshart Information     Envivio gives you secure access to your electronic health record. If you see a primary care provider, you can also send messages to your care team and make appointments. If you have questions, please call your primary care clinic.  If you do not have a primary care  provider, please call [phone] and they will assist you.        Care EveryWhere ID     This is your Care EveryWhere ID. This could be used by other organizations to access your [place] medical records  [id number]        Your Vitals Were     Pulse Temperature Respirations Pulse Oximetry          76 97.9  F (36.6  C) 16 97%         Blood Pressure from Last 3 Encounters:   11/30/17 123/75   11/21/17 117/74   11/16/17 124/82    Weight from Last 3 Encounters:   10/31/17 83.1 kg (183 lb 3.2 oz)   10/26/17 83.3 kg (183 lb 10.3 oz)   10/19/17 82.8 kg (182 lb 8.7 oz)              Today, you had the following     No orders found for display       Primary Care Provider Office Phone # Fax #    Candy Sweta Ridley [phone]       [facility] [address]  [facility] [address]        Equal Access to Services     [facility] : Hadii aad ku hadasho Soomaali, waaxda luqadaha, qaybta kaalmada adeegyada, waxay idiin hayaan catherineeg kharajose la'sofia . So [facility] [phone].    ATENCIÓN: Si habla español, tiene a madrigal disposición servicios gratuitos de asistencia lingüística. Llame al [phone].    We comply with applicable federal civil rights laws and [place] laws. We do not discriminate on the basis of race, color, national origin, age, disability, sex, sexual orientation, or gender identity.            Thank you!     Thank you for choosing [facility], [facility] SERVICES  for your care. Our goal is always to provide you with excellent care. Hearing back from our patients is one way we can continue to improve our services. Please take a few minutes to complete the written survey that you may receive in the mail after your visit with us. Thank you!             Your Updated Medication List - Protect others around you: Learn how to safely use, store and throw away your medicines at www.disposemymeds.org.          This list is accurate as of: 11/30/17  1:23 PM.  Always use your most recent  med list.                   Brand Name Dispense Instructions for use Diagnosis    cloNIDine 0.1 MG tablet    CATAPRES    45 tablet    1.5 tablets 1 hour prior to scheduled ketamine dose.    Severe episode of recurrent major depressive disorder, without psychotic features (H)       KETAMINE HCL           Ketamine HCl 50 MG/ML Sosy     15 mL    Apply 150 mg into one nostril as directed every 7 days 3 sprays each nostril    Major depressive disorder, recurrent, severe without psychotic features (H)       lithium 150 MG capsule     90 capsule    Take 1 capsule (150 mg) by mouth daily    Major depressive disorder, recurrent, severe without psychotic features (H)       LORazepam 0.5 MG tablet    ATIVAN    30 tablet    Take 1-2 tablets (0.5-1 mg) by mouth every 6 hours as needed for anxiety    Major depressive disorder, recurrent, severe without psychotic features (H)       * methylphenidate 10 MG tablet    RITALIN    120 tablet    Take 2 tablets (20 mg) by mouth 2 times daily    Severe single current episode of major depressive disorder, without psychotic features (H)       * methylphenidate 10 MG tablet   Start taking on:  12/3/2017    RITALIN    120 tablet    Take 2 tablets (20 mg) by mouth 2 times daily    Severe single current episode of major depressive disorder, without psychotic features (H)       * methylphenidate 10 MG tablet   Start taking on:  1/3/2018    RITALIN    120 tablet    Take 2 tablets (20 mg) by mouth 2 times daily    Severe single current episode of major depressive disorder, without psychotic features (H)       sertraline 25 MG tablet    ZOLOFT    90 tablet    Take 1 tablet (25 mg) by mouth daily    Major depressive disorder, recurrent, severe without psychotic features (H)       * Notice:  This list has 3 medication(s) that are the same as other medications prescribed for you. Read the directions carefully, and ask your doctor or other care provider to review them with you.

## 2017-12-07 ENCOUNTER — INFUSION THERAPY VISIT (OUTPATIENT)
Dept: INFUSION THERAPY | Facility: CLINIC | Age: 47
End: 2017-12-07
Attending: PSYCHIATRY & NEUROLOGY
Payer: MEDICARE

## 2017-12-07 VITALS
SYSTOLIC BLOOD PRESSURE: 100 MMHG | DIASTOLIC BLOOD PRESSURE: 64 MMHG | BODY MASS INDEX: 28.58 KG/M2 | WEIGHT: 182.54 LBS | HEART RATE: 65 BPM | TEMPERATURE: 98.1 F | OXYGEN SATURATION: 96 % | RESPIRATION RATE: 16 BRPM

## 2017-12-07 DIAGNOSIS — F33.2 SEVERE EPISODE OF RECURRENT MAJOR DEPRESSIVE DISORDER, WITHOUT PSYCHOTIC FEATURES (H): Primary | ICD-10-CM

## 2017-12-07 DIAGNOSIS — F33.2 SEVERE RECURRENT MAJOR DEPRESSION WITHOUT PSYCHOTIC FEATURES (H): ICD-10-CM

## 2017-12-07 PROCEDURE — 25000128 H RX IP 250 OP 636: Performed by: PSYCHIATRY & NEUROLOGY

## 2017-12-07 PROCEDURE — 96361 HYDRATE IV INFUSION ADD-ON: CPT

## 2017-12-07 PROCEDURE — 25000125 ZZHC RX 250: Performed by: PSYCHIATRY & NEUROLOGY

## 2017-12-07 PROCEDURE — 96365 THER/PROPH/DIAG IV INF INIT: CPT

## 2017-12-07 RX ORDER — ERGOCALCIFEROL 1.25 MG/1
50000 CAPSULE, LIQUID FILLED ORAL WEEKLY
COMMUNITY
End: 2018-07-26

## 2017-12-07 RX ADMIN — SODIUM CHLORIDE 250 ML: 9 INJECTION, SOLUTION INTRAVENOUS at 10:47

## 2017-12-07 RX ADMIN — KETAMINE HYDROCHLORIDE 37.5 MG: 50 INJECTION, SOLUTION INTRAMUSCULAR; INTRAVENOUS at 10:46

## 2017-12-07 NOTE — MR AVS SNAPSHOT
After Visit Summary   12/7/2017    Sebastien Hoover    MRN: [id number]           Patient Information     Date Of Birth          1970        Visit Information        Provider Department      12/7/2017 10:30 AM [facility]        Today's Diagnoses     Severe episode of recurrent major depressive disorder, without psychotic features (H)    -  1    Severe recurrent major depression without psychotic features (H)           Follow-ups after your visit        Your next 10 appointments already scheduled     Dec 14, 2017 10:30 AM CST   Level 3 with UR CH 02   [facility] ([facility]    [address]  [address]  [facility] [address]   [phone]            Dec 21, 2017 10:30 AM CST   Level 3 with UR CH 02   [facility]    [address]  [facility] [address]   [phone]            Dec 28, 2017 10:30 AM CST   Level 3 with UR CH 03   [facility] ([facility])    [address] [place] [address] [address]  [address]  [facility] [address]   [phone]            Jan 04, 2018  1:00 PM CST   Adult Med Follow UP with Venancio Ochoa MD   Psychiatry Clinic ([facility])    [place] [id number]  [address] [facility] [address]   [phone]            Jan 05, 2018 10:00 AM CST   Level 3 with UR BD 01   [facility] ([facility]    [address] [place] [address] [address]  [address]  [facility] [address]   [phone]            Jan 11, 2018 10:30 AM CST   Level 3 with UR CH 01   [facility])    [address]  [address]  [facility] [address]   [phone]            Jan 18, 2018 10:30 AM CST   Level 3  with UR CH 01   [facility])    [address]  [address]  [facility] [address]   [phone]            Jan 25, 2018 10:30 AM CST   Level 3 with UR CH 01   [facility], [facility]fusion Services ([facility])    [address]  [address]  [facility] [address]   [phone]            Mar 01, 2018  1:00 PM CST   Adult Med Follow UP with Venancio Ochoa MD   Psychiatry Clinic ([facility])    [address] [facility] [address] [phone]            May 03, 2018  1:00 PM CDT   Adult Med Follow UP with Venancio Ochoa MD   Psychiatry Clinic ([facility])    [address] [facility] [id number] [phone]              Who to contact     If you have questions or need follow up information about today's clinic visit or your schedule please contact [facility], INFUSION SERVICES directly at [phone].  Normal or non-critical lab and imaging results will be communicated to you by MyChart, letter or phone within 4 business days after the clinic has received the results. If you do not hear from us within 7 days, please contact the clinic through [employer] or phone. If you have a critical or abnormal lab result, we will notify you by phone as soon as possible.  Submit refill requests through [employer] or call your pharmacy and they will forward the refill request to us. Please allow 3 business days for your refill to be completed.          Additional Information About Your Visit        [employer] Information     [employer] gives you secure access to your electronic health record. If you see a primary care provider, you can also send messages to your care team and make appointments. If you have questions, please call your primary care clinic.  If you do not have a primary care  provider, please call [phone] and they will assist you.        Care EveryWhere ID     This is your Care EveryWhere ID. This could be used by other organizations to access your [place] medical records  [id number]        Your Vitals Were     Pulse Temperature Respirations Pulse Oximetry BMI (Body Mass Index)       65 98.1  F (36.7  C) 16 96% 28.58 kg/m2        Blood Pressure from Last 3 Encounters:   12/07/17 100/64   11/30/17 123/75   11/21/17 117/74    Weight from Last 3 Encounters:   12/07/17 82.8 kg (182 lb 8.7 oz)   10/31/17 83.1 kg (183 lb 3.2 oz)   10/26/17 83.3 kg (183 lb 10.3 oz)              Today, you had the following     No orders found for display       Primary Care Provider Office Phone # Fax #    Candy Ridley [phone]       [facility] [address]  [facility] [address]        Equal Access to Services     TONY ARTHUR : Hadii aad ku hadasho Soomaali, waaxda luqadaha, qaybta kaalmada adeegyada, waxay idiin haysofia castillo . So [facility] [phone].    ATENCIÓN: Si habla español, tiene a madrgial disposición servicios gratuitos de asistencia lingüística. Llame al [phone].    We comply with applicable federal civil rights laws and [place] laws. We do not discriminate on the basis of race, color, national origin, age, disability, sex, sexual orientation, or gender identity.            Thank you!     Thank you for choosing [facility], [facility] SERVICES  for your care. Our goal is always to provide you with excellent care. Hearing back from our patients is one way we can continue to improve our services. Please take a few minutes to complete the written survey that you may receive in the mail after your visit with us. Thank you!             Your Updated Medication List - Protect others around you: Learn how to safely use, store and throw away your medicines at www.disposemymeds.org.          This list is accurate as of: 12/7/17  1:22 PM.   Always use your most recent med list.                   Brand Name Dispense Instructions for use Diagnosis    cloNIDine 0.1 MG tablet    CATAPRES    45 tablet    1.5 tablets 1 hour prior to scheduled ketamine dose.    Severe episode of recurrent major depressive disorder, without psychotic features (H)       KETAMINE HCL           Ketamine HCl 50 MG/ML Sosy     15 mL    Apply 150 mg into one nostril as directed every 7 days 3 sprays each nostril    Major depressive disorder, recurrent, severe without psychotic features (H)       lithium 150 MG capsule     90 capsule    Take 1 capsule (150 mg) by mouth daily    Major depressive disorder, recurrent, severe without psychotic features (H)       LORazepam 0.5 MG tablet    ATIVAN    30 tablet    Take 1-2 tablets (0.5-1 mg) by mouth every 6 hours as needed for anxiety    Major depressive disorder, recurrent, severe without psychotic features (H)       * methylphenidate 10 MG tablet    RITALIN    120 tablet    Take 2 tablets (20 mg) by mouth 2 times daily    Severe single current episode of major depressive disorder, without psychotic features (H)       * methylphenidate 10 MG tablet   Start taking on:  1/3/2018    RITALIN    120 tablet    Take 2 tablets (20 mg) by mouth 2 times daily    Severe single current episode of major depressive disorder, without psychotic features (H)       sertraline 25 MG tablet    ZOLOFT    90 tablet    Take 1 tablet (25 mg) by mouth daily    Major depressive disorder, recurrent, severe without psychotic features (H)       vitamin D 96971 UNIT capsule    ERGOCALCIFEROL     Take 50,000 Units by mouth once a week        * Notice:  This list has 2 medication(s) that are the same as other medications prescribed for you. Read the directions carefully, and ask your doctor or other care provider to review them with you.

## 2017-12-14 ENCOUNTER — INFUSION THERAPY VISIT (OUTPATIENT)
Dept: INFUSION THERAPY | Facility: CLINIC | Age: 47
End: 2017-12-14
Attending: PSYCHIATRY & NEUROLOGY
Payer: MEDICARE

## 2017-12-14 VITALS
RESPIRATION RATE: 14 BRPM | TEMPERATURE: 98 F | SYSTOLIC BLOOD PRESSURE: 112 MMHG | DIASTOLIC BLOOD PRESSURE: 69 MMHG | OXYGEN SATURATION: 96 % | HEART RATE: 66 BPM

## 2017-12-14 DIAGNOSIS — F33.2 SEVERE RECURRENT MAJOR DEPRESSION WITHOUT PSYCHOTIC FEATURES (H): ICD-10-CM

## 2017-12-14 DIAGNOSIS — F33.2 SEVERE EPISODE OF RECURRENT MAJOR DEPRESSIVE DISORDER, WITHOUT PSYCHOTIC FEATURES (H): Primary | ICD-10-CM

## 2017-12-14 PROCEDURE — 96361 HYDRATE IV INFUSION ADD-ON: CPT

## 2017-12-14 PROCEDURE — 25000125 ZZHC RX 250: Performed by: PSYCHIATRY & NEUROLOGY

## 2017-12-14 PROCEDURE — 25000128 H RX IP 250 OP 636: Performed by: PSYCHIATRY & NEUROLOGY

## 2017-12-14 PROCEDURE — 96365 THER/PROPH/DIAG IV INF INIT: CPT

## 2017-12-14 RX ADMIN — KETAMINE HYDROCHLORIDE 37.5 MG: 50 INJECTION, SOLUTION INTRAMUSCULAR; INTRAVENOUS at 10:58

## 2017-12-14 RX ADMIN — SODIUM CHLORIDE 250 ML: 9 INJECTION, SOLUTION INTRAVENOUS at 10:58

## 2017-12-14 NOTE — MR AVS SNAPSHOT
After Visit Summary   12/14/2017    Sebastien Hoover    MRN: [id number]           Patient Information     Date Of Birth          1970        Visit Information        Provider Department      12/14/2017 10:30 AM [facility]        Today's Diagnoses     Severe episode of recurrent major depressive disorder, without psychotic features (H)    -  1    Severe recurrent major depression without psychotic features (H)           Follow-ups after your visit        Your next 10 appointments already scheduled     Dec 21, 2017 10:30 AM CST   Level 3 with UR CH 02   [facility] ([facility])    [address]  [address]  [facility] [address]   [phone]            Dec 28, 2017 10:30 AM CST   Level 3 with UR CH 03   [facility] ([facility])    [address] [place] [address] [address]  [address]  [facility] [address]   [phone]            Jan 04, 2018  1:00 PM CST   Adult Med Follow UP with Venancio Ochoa MD   Psychiatry Clinic ([address]  [address] [facility] [address]   [phone]            Jan 05, 2018 10:00 AM CST   Level 3 with UR BD 01   [facility] ([facility])    [address]  [address]  [facility] [address]   [phone]            Jan 11, 2018 10:30 AM CST   Level 3 with UR CH 01   [facility] ([facility])    [address]  [facility] [address]   [phone]            Jan 18, 2018 10:30 AM CST   Level 3 with UR CH 01   [facility] ([facility])    [address] [place] [place] [address]  [address]  [facility] [address]   [phone]            Jan 25, 2018 10:30 AM CST   Level  3 with UR CH 01   [facility], Infusion Services ([facility])    [address] [place] [address] [address]  [address]  [facility] [address]   [phone]            Mar 01, 2018  1:00 PM CST   Adult Med Follow UP with Venancio Ochoa MD   Psychiatry Clinic ([facility])    [address]  [address] [facility] [address] [phone]            May 03, 2018  1:00 PM CDT   Adult Med Follow UP with Venancio Ochoa MD   Psychiatry Clinic ([facility])    [address]  [address] [facility] [address] [phone]            Jun 28, 2018  2:00 PM CDT   Adult Med Follow UP with Venancio Ochoa MD   Psychiatry Clinic ([facility])    [address]  [address] [facility] [address] [phone]              Who to contact     If you have questions or need follow up information about today's clinic visit or your schedule please contact [facility], INFUSION SERVICES directly at [phone].  Normal or non-critical lab and imaging results will be communicated to you by [employer], letter or phone within 4 business days after the clinic has received the results. If you do not hear from us within 7 days, please contact the clinic through [employer] or phone. If you have a critical or abnormal lab result, we will notify you by phone as soon as possible.  Submit refill requests through [employer] or call your pharmacy and they will forward the refill request to us. Please allow 3 business days for your refill to be completed.          Additional Information About Your Visit        [employer] Information     [employer] gives you secure access to your electronic health record. If you see a primary care provider, you can also send messages to your care team and make appointments. If you have questions, please call your primary care clinic.  If you do not have a primary care  provider, please call [phone] and they will assist you.        Care EveryWhere ID     This is your Care EveryWhere ID. This could be used by other organizations to access your [place] medical records  [id number]        Your Vitals Were     Pulse Temperature Respirations Pulse Oximetry          66 98  F (36.7  C) (Oral) 14 96%         Blood Pressure from Last 3 Encounters:   12/14/17 112/69   12/07/17 100/64   11/30/17 123/75    Weight from Last 3 Encounters:   12/07/17 82.8 kg (182 lb 8.7 oz)   10/31/17 83.1 kg (183 lb 3.2 oz)   10/26/17 83.3 kg (183 lb 10.3 oz)              Today, you had the following     No orders found for display       Primary Care Provider Office Phone # Fax #    Candy Sweta Ridley [phone]       [facility] [address]  [facility] [address]        Equal Access to Services     STEVEN ARTHUR : Hadii aad ku hadasho Soomaali, waaxda luqadaha, qaybta kaalmada adeegyada, waxay idiin hayaan adeeg kharash la'juden . So [facility] [phone].    ATENCIÓN: Si habla español, tiene a madrigal disposición servicios gratuitos de asistencia lingüística. Llame al [phone].    We comply with applicable federal civil rights laws and [place] laws. We do not discriminate on the basis of race, color, national origin, age, disability, sex, sexual orientation, or gender identity.            Thank you!     Thank you for choosing [facility], [facility] SERVICES  for your care. Our goal is always to provide you with excellent care. Hearing back from our patients is one way we can continue to improve our services. Please take a few minutes to complete the written survey that you may receive in the mail after your visit with us. Thank you!             Your Updated Medication List - Protect others around you: Learn how to safely use, store and throw away your medicines at www.disposemymeds.org.          This list is accurate as of: 12/14/17  1:34 PM.  Always use your most  recent med list.                   Brand Name Dispense Instructions for use Diagnosis    cloNIDine 0.1 MG tablet    CATAPRES    45 tablet    1.5 tablets 1 hour prior to scheduled ketamine dose.    Severe episode of recurrent major depressive disorder, without psychotic features (H)       KETAMINE HCL           Ketamine HCl 50 MG/ML Sosy     15 mL    Apply 150 mg into one nostril as directed every 7 days 3 sprays each nostril    Major depressive disorder, recurrent, severe without psychotic features (H)       lithium 150 MG capsule     90 capsule    Take 1 capsule (150 mg) by mouth daily    Major depressive disorder, recurrent, severe without psychotic features (H)       LORazepam 0.5 MG tablet    ATIVAN    30 tablet    Take 1-2 tablets (0.5-1 mg) by mouth every 6 hours as needed for anxiety    Major depressive disorder, recurrent, severe without psychotic features (H)       * methylphenidate 10 MG tablet    RITALIN    120 tablet    Take 2 tablets (20 mg) by mouth 2 times daily    Severe single current episode of major depressive disorder, without psychotic features (H)       * methylphenidate 10 MG tablet   Start taking on:  1/3/2018    RITALIN    120 tablet    Take 2 tablets (20 mg) by mouth 2 times daily    Severe single current episode of major depressive disorder, without psychotic features (H)       sertraline 25 MG tablet    ZOLOFT    90 tablet    Take 1 tablet (25 mg) by mouth daily    Major depressive disorder, recurrent, severe without psychotic features (H)       vitamin D 69036 UNIT capsule    ERGOCALCIFEROL     Take 50,000 Units by mouth once a week        * Notice:  This list has 2 medication(s) that are the same as other medications prescribed for you. Read the directions carefully, and ask your doctor or other care provider to review them with you.

## 2017-12-21 ENCOUNTER — INFUSION THERAPY VISIT (OUTPATIENT)
Dept: INFUSION THERAPY | Facility: CLINIC | Age: 47
End: 2017-12-21
Attending: PSYCHIATRY & NEUROLOGY
Payer: MEDICARE

## 2017-12-21 VITALS
TEMPERATURE: 98.1 F | SYSTOLIC BLOOD PRESSURE: 113 MMHG | DIASTOLIC BLOOD PRESSURE: 69 MMHG | OXYGEN SATURATION: 96 % | HEART RATE: 72 BPM

## 2017-12-21 DIAGNOSIS — F33.2 SEVERE EPISODE OF RECURRENT MAJOR DEPRESSIVE DISORDER, WITHOUT PSYCHOTIC FEATURES (H): Primary | ICD-10-CM

## 2017-12-21 DIAGNOSIS — F33.2 SEVERE RECURRENT MAJOR DEPRESSION WITHOUT PSYCHOTIC FEATURES (H): ICD-10-CM

## 2017-12-21 PROCEDURE — 96365 THER/PROPH/DIAG IV INF INIT: CPT

## 2017-12-21 PROCEDURE — 25000125 ZZHC RX 250: Performed by: PSYCHIATRY & NEUROLOGY

## 2017-12-21 PROCEDURE — 96361 HYDRATE IV INFUSION ADD-ON: CPT

## 2017-12-21 PROCEDURE — 25000128 H RX IP 250 OP 636: Performed by: PSYCHIATRY & NEUROLOGY

## 2017-12-21 RX ADMIN — SODIUM CHLORIDE 250 ML: 9 INJECTION, SOLUTION INTRAVENOUS at 10:58

## 2017-12-21 RX ADMIN — KETAMINE HYDROCHLORIDE 37.5 MG: 50 INJECTION, SOLUTION INTRAMUSCULAR; INTRAVENOUS at 10:55

## 2017-12-21 NOTE — PROGRESS NOTES
Pt here for ketamine infusion.  Med infused over 1 hr per pt preference via P IV.   Monitored on cont pulse ox and q 15 min VS during and for 1 hr post infusion.  Tolerated well.  RTC next week.

## 2017-12-21 NOTE — MR AVS SNAPSHOT
After Visit Summary   12/21/2017    Sebastien Hoover    MRN: [id number]           Patient Information     Date Of Birth          1970        Visit Information        Provider Department      12/21/2017 10:30 AM UR CH 02 [facility]        Today's Diagnoses     Severe episode of recurrent major depressive disorder, without psychotic features (H)    -  1    Severe recurrent major depression without psychotic features (H)           Follow-ups after your visit        Your next 10 appointments already scheduled     Dec 28, 2017 10:30 AM CST   Level 3 with UR CH 03   [facility] ([facility])    [address] [place] [address] [address]  [address]  [facility] [address]   [phone]            Jan 04, 2018  1:00 PM CST   Adult Med Follow UP with Venancio Ochoa MD   Psychiatry Clinic ([facility])    [address]  [address] [facility] [address]   [phone]            Jan 05, 2018 10:00 AM CST   Level 3 with UR BD 01   [facility] ([facility])    [address] [place] [address] [address]  [address]  [facility] [address]   [phone]            Jan 11, 2018 10:30 AM CST   Level 3 with UR CH 01   [facility] ([facility])    [address]  [address]  [facility] [address]   [phone]            Jan 18, 2018 10:30 AM CST   Level 3 with UR CH 01   [facility] ([facility])    [address] [place] [address] [address]  [address]  [facility] [address]   [phone]            Jan 25, 2018 10:30 AM CST   Level 3 with UR CH 01   [facility] ([facility])    [address]  [address]  [facility] [address]   [phone]            Mar 01, 2018  1:00 PM CST   Adult  Med Follow UP with Venancio Ochoa MD   Psychiatry Clinic ([facility])    [address]  [address] [facility] [address]   [phone]            May 03, 2018  1:00 PM CDT   Adult Med Follow UP with Venancio Ochoa MD   Psychiatry Clinic ([facility])    [address]  [address] [facility] [address]   [phone]            Jun 28, 2018  2:00 PM CDT   Adult Med Follow UP with Venancio Ochoa MD   Psychiatry Clinic ([facility])    [address]  [address] [facility] [address]   [phone]            Sep 20, 2018  1:00 PM CDT   Adult Med Follow UP with Venancio Ochoa MD   Psychiatry Clinic ([facility])    [address]  [address] [facility] [address] [phone]              Who to contact     If you have questions or need follow up information about today's clinic visit or your schedule please contact [facility] [place], [facility] directly at [phone].  Normal or non-critical lab and imaging results will be communicated to you by Go Dishhart, letter or phone within 4 business days after the clinic has received the results. If you do not hear from us within 7 days, please contact the clinic through Go Dishhart or phone. If you have a critical or abnormal lab result, we will notify you by phone as soon as possible.  Submit refill requests through [employer] or call your pharmacy and they will forward the refill request to us. Please allow 3 business days for your refill to be completed.          Additional Information About Your Visit        [employer]PS Information     [employer] gives you secure access to your electronic health record. If you see a primary care provider, you can also send messages to your care team and make appointments. If you have questions, please call your primary care clinic.  If you do not have a primary  care provider, please call [phone] and they will assist you.        Care EveryWhere ID     This is your Care EveryWhere ID. This could be used by other organizations to access your [place] medical records  [id number]        Your Vitals Were     Pulse Temperature Pulse Oximetry             72 98.1  F (36.7  C) (Oral) 96%          Blood Pressure from Last 3 Encounters:   12/21/17 113/69   12/14/17 112/69   12/07/17 100/64    Weight from Last 3 Encounters:   12/07/17 82.8 kg (182 lb 8.7 oz)   10/31/17 83.1 kg (183 lb 3.2 oz)   10/26/17 83.3 kg (183 lb 10.3 oz)              Today, you had the following     No orders found for display       Primary Care Provider Office Phone # Fax #    Candy Sweta Ridley [phone]       [facility] [address]  [facility] [address]        Equal Access to Services     STEVEN ARTHUR : Hadii aad ku hadasho Soomaali, waaxda luqadaha, qaybta kaalmada adeegyada, waxay idiin hayaan adeeg kharajose la'juden . So [facility] [phone].    ATENCIÓN: Si habla español, tiene a madrigal disposición servicios gratuitos de asistencia lingüística. Llame al [phone].    We comply with applicable federal civil rights laws and [place] laws. We do not discriminate on the basis of race, color, national origin, age, disability, sex, sexual orientation, or gender identity.            Thank you!     Thank you for choosing [facility], [facility]  for your care. Our goal is always to provide you with excellent care. Hearing back from our patients is one way we can continue to improve our services. Please take a few minutes to complete the written survey that you may receive in the mail after your visit with us. Thank you!             Your Updated Medication List - Protect others around you: Learn how to safely use, store and throw away your medicines at www.disposemymeds.org.          This list is accurate as of: 12/21/17 12:57 PM.  Always use your most recent  med list.                   Brand Name Dispense Instructions for use Diagnosis    cloNIDine 0.1 MG tablet    CATAPRES    45 tablet    1.5 tablets 1 hour prior to scheduled ketamine dose.    Severe episode of recurrent major depressive disorder, without psychotic features (H)       KETAMINE HCL           Ketamine HCl 50 MG/ML Sosy     15 mL    Apply 150 mg into one nostril as directed every 7 days 3 sprays each nostril    Major depressive disorder, recurrent, severe without psychotic features (H)       lithium 150 MG capsule     90 capsule    Take 1 capsule (150 mg) by mouth daily    Major depressive disorder, recurrent, severe without psychotic features (H)       LORazepam 0.5 MG tablet    ATIVAN    30 tablet    Take 1-2 tablets (0.5-1 mg) by mouth every 6 hours as needed for anxiety    Major depressive disorder, recurrent, severe without psychotic features (H)       * methylphenidate 10 MG tablet    RITALIN    120 tablet    Take 2 tablets (20 mg) by mouth 2 times daily    Severe single current episode of major depressive disorder, without psychotic features (H)       * methylphenidate 10 MG tablet   Start taking on:  1/3/2018    RITALIN    120 tablet    Take 2 tablets (20 mg) by mouth 2 times daily    Severe single current episode of major depressive disorder, without psychotic features (H)       sertraline 25 MG tablet    ZOLOFT    90 tablet    Take 1 tablet (25 mg) by mouth daily    Major depressive disorder, recurrent, severe without psychotic features (H)       vitamin D 75292 UNIT capsule    ERGOCALCIFEROL     Take 50,000 Units by mouth once a week        * Notice:  This list has 2 medication(s) that are the same as other medications prescribed for you. Read the directions carefully, and ask your doctor or other care provider to review them with you.

## 2018-01-04 ENCOUNTER — OFFICE VISIT (OUTPATIENT)
Dept: PSYCHIATRY | Facility: CLINIC | Age: 48
End: 2018-01-04
Attending: PSYCHIATRY & NEUROLOGY
Payer: MEDICARE

## 2018-01-04 VITALS
SYSTOLIC BLOOD PRESSURE: 151 MMHG | DIASTOLIC BLOOD PRESSURE: 104 MMHG | WEIGHT: 186.6 LBS | HEART RATE: 91 BPM | BODY MASS INDEX: 29.22 KG/M2

## 2018-01-04 DIAGNOSIS — F33.2 MAJOR DEPRESSIVE DISORDER, RECURRENT, SEVERE WITHOUT PSYCHOTIC FEATURES (H): ICD-10-CM

## 2018-01-04 DIAGNOSIS — F32.2 SEVERE SINGLE CURRENT EPISODE OF MAJOR DEPRESSIVE DISORDER, WITHOUT PSYCHOTIC FEATURES (H): ICD-10-CM

## 2018-01-04 PROCEDURE — G0463 HOSPITAL OUTPT CLINIC VISIT: HCPCS | Mod: ZF

## 2018-01-04 RX ORDER — METHYLPHENIDATE HYDROCHLORIDE 10 MG/1
20 TABLET ORAL 2 TIMES DAILY
Qty: 120 TABLET | Refills: 0 | Status: SHIPPED | OUTPATIENT
Start: 2018-01-04 | End: 2018-03-01

## 2018-01-04 RX ORDER — METHYLPHENIDATE HYDROCHLORIDE 10 MG/1
20 TABLET ORAL 2 TIMES DAILY
Qty: 120 TABLET | Refills: 0 | Status: SHIPPED | OUTPATIENT
Start: 2018-01-04 | End: 2018-01-04

## 2018-01-04 RX ORDER — SERTRALINE HYDROCHLORIDE 25 MG/1
12.5 TABLET, FILM COATED ORAL DAILY
Qty: 1 TABLET | Refills: 1
Start: 2018-01-04 | End: 2018-11-28

## 2018-01-04 RX ORDER — KETAMINE HCL 50MG/ML(1)
150 SYRINGE (ML) INTRAVENOUS
Qty: 15 ML | Refills: 3 | Status: SHIPPED | OUTPATIENT
Start: 2018-01-04 | End: 2018-02-09

## 2018-01-04 NOTE — MR AVS SNAPSHOT
After Visit Summary   1/4/2018    Sebastien Hoover    MRN: [id number]           Patient Information     Date Of Birth          1970        Visit Information        Provider Department      1/4/2018 1:00 PM Venancio Ochoa MD Psychiatry Clinic        Today's Diagnoses     Major depressive disorder, recurrent, severe without psychotic features (H)        Severe single current episode of major depressive disorder, without psychotic features (H)           Follow-ups after your visit        Your next 10 appointments already scheduled     Jan 05, 2018 10:00 AM CST   Level 3 with UR BD 01   [facility], Infusion Services ([facility])    [address]  [address]  [facility] [address]   [phone]            Jan 11, 2018 10:30 AM CST   Level 3 with UR CH 01   [facility], Infusion Services ([facility])    [address] [place] [address] [address]  [address]  [facility] [address]   [phone]            Jan 18, 2018 10:30 AM CST   Level 3 with UR CH 01   [facility], Infusion Services ([facility])    [address]  [address]  [facility] [address]   [phone]            Jan 25, 2018 10:30 AM CST   Level 3 with UR CH 01   [facility], Infusion Services ([facility])    [address] [place] [address] [address]  [address]  [facility] [address]   [phone]            Mar 01, 2018  1:00 PM CST   Adult Med Follow UP with Venancio Ochoa MD   Psychiatry Clinic ([facility])    [address]  [address] [facility] [address]   [phone]            May 03, 2018  1:00 PM CDT   Adult Med Follow UP with Venancio Ochoa MD   Psychiatry Clinic ([facility])    [address]  [address] [facility] [address]   [phone]            Jun 28, 2018  2:00 PM CDT    Adult Med Follow UP with Venancio Ochoa MD   Psychiatry Clinic ([facility])    [address]  [address] [facility] [address]   [phone]            Sep 20, 2018  1:00 PM CDT   Adult Med Follow UP with Venancio Ochoa MD   Psychiatry Clinic ([facility])    [address]  [address] [facility] [address] [phone]            Nov 22, 2018  1:00 PM CST   Adult Med Follow UP with Venancio Ochoa MD   Psychiatry Clinic ([facility])    [address]  [address] [facility] [address] [phone]              Who to contact     Please call your clinic at [phone] to:    Ask questions about your health    Make or cancel appointments    Discuss your medicines    Learn about your test results    Speak to your doctor   If you have compliments or concerns about an experience at your clinic, or if you wish to file a complaint, please contact [facility] Relations at [phone] or email us at [email or web address]         Additional Information About Your Visit        [employer] Information     [employer] gives you secure access to your electronic health record. If you see a primary care provider, you can also send messages to your care team and make appointments. If you have questions, please call your primary care clinic.  If you do not have a primary care provider, please call [phone] and they will assist you.      [employer] is an electronic gateway that provides easy, online access to your medical records. With [employer], you can request a clinic appointment, read your test results, renew a prescription or communicate with your care team.     To access your existing account, please contact your [facility] or call [phone] for assistance.        Care EveryWhere ID     This is your Care EveryWhere  ID. This could be used by other organizations to access your [place] medical records  [id number]        Your Vitals Were     Pulse BMI (Body Mass Index)                91 29.22 kg/m2           Blood Pressure from Last 3 Encounters:   01/04/18 (!) 151/104   12/21/17 113/69   12/14/17 112/69    Weight from Last 3 Encounters:   01/04/18 84.6 kg (186 lb 9.6 oz)   12/07/17 82.8 kg (182 lb 8.7 oz)   11/02/17 83.5 kg (184 lb)              Today, you had the following     No orders found for display         Today's Medication Changes          These changes are accurate as of: 1/4/18  1:26 PM.  If you have any questions, ask your nurse or doctor.               Start taking these medicines.        Dose/Directions    methylphenidate 10 MG tablet   Commonly known as:  RITALIN   Used for:  Severe single current episode of major depressive disorder, without psychotic features (H)   Started by:  Venancio Ochoa MD        Dose:  20 mg   Take 2 tablets (20 mg) by mouth 2 times daily   Quantity:  120 tablet   Refills:  0         These medicines have changed or have updated prescriptions.        Dose/Directions    sertraline 25 MG tablet   Commonly known as:  ZOLOFT   This may have changed:  how much to take   Used for:  Major depressive disorder, recurrent, severe without psychotic features (H)   Changed by:  Venancio Ochoa MD        Dose:  12.5 mg   Take 0.5 tablets (12.5 mg) by mouth daily   Quantity:  1 tablet   Refills:  1            Where to get your medicines      Some of these will need a paper prescription and others can be bought over the counter.  Ask your nurse if you have questions.     Bring a paper prescription for each of these medications     Ketamine HCl 50 MG/ML Sosy    methylphenidate 10 MG tablet       You don't need a prescription for these medications     sertraline 25 MG tablet                Primary Care Provider Office Phone # Fax #    Candy Ridley [phone]       [facility] [address] [facility] [address]        Equal Access to Services     STEVEN ARTHUR : Hadii nancy haq jayna Jo, wachevy javiersriramha, luisa dinorashavonaline coongeorgianaaline, waxay idiin hayjudekevin whaleyclaudiajose mccain. So [facility] [phone].    ATENCIÓN: Si habla español, tiene a madrigal disposición servicios gratuitos de asistencia lingüística. Llame al [phone].    We comply with applicable federal civil rights laws and [place] laws. We do not discriminate on the basis of race, color, national origin, age, disability, sex, sexual orientation, or gender identity.            Thank you!     Thank you for choosing PSYCHIATRY CLINIC  for your care. Our goal is always to provide you with excellent care. Hearing back from our patients is one way we can continue to improve our services. Please take a few minutes to complete the written survey that you may receive in the mail after your visit with us. Thank you!             Your Updated Medication List - Protect others around you: Learn how to safely use, store and throw away your medicines at www.disposemymeds.org.          This list is accurate as of: 1/4/18  1:26 PM.  Always use your most recent med list.                   Brand Name Dispense Instructions for use Diagnosis    cloNIDine 0.1 MG tablet    CATAPRES    45 tablet    1.5 tablets 1 hour prior to scheduled ketamine dose.    Severe episode of recurrent major depressive disorder, without psychotic features (H)       KETAMINE HCL           Ketamine HCl 50 MG/ML Sosy     15 mL    Apply 150 mg into one nostril as directed every 7 days 3 sprays each nostril    Major depressive disorder, recurrent, severe without psychotic features (H)       lithium 150 MG capsule     90 capsule    Take 1 capsule (150 mg) by mouth daily    Major depressive disorder, recurrent, severe without psychotic features (H)       LORazepam 0.5 MG tablet    ATIVAN    30 tablet    Take 1-2 tablets (0.5-1 mg) by mouth every 6 hours as needed  for anxiety    Major depressive disorder, recurrent, severe without psychotic features (H)       methylphenidate 10 MG tablet    RITALIN    120 tablet    Take 2 tablets (20 mg) by mouth 2 times daily    Severe single current episode of major depressive disorder, without psychotic features (H)       sertraline 25 MG tablet    ZOLOFT    1 tablet    Take 0.5 tablets (12.5 mg) by mouth daily    Major depressive disorder, recurrent, severe without psychotic features (H)       vitamin D 01867 UNIT capsule    ERGOCALCIFEROL     Take 50,000 Units by mouth once a week

## 2018-01-04 NOTE — NURSING NOTE
Chief Complaint   Patient presents with     Recheck Medication     Severe single current episode of major depressive disorder     Reviewed allergies, smoking status, and pharmacy preference  Obtained weight, blood pressure and heart rate x2

## 2018-01-05 ENCOUNTER — INFUSION THERAPY VISIT (OUTPATIENT)
Dept: INFUSION THERAPY | Facility: CLINIC | Age: 48
End: 2018-01-05
Attending: PSYCHIATRY & NEUROLOGY
Payer: MEDICARE

## 2018-01-05 VITALS
DIASTOLIC BLOOD PRESSURE: 81 MMHG | TEMPERATURE: 97.8 F | HEART RATE: 70 BPM | OXYGEN SATURATION: 97 % | SYSTOLIC BLOOD PRESSURE: 123 MMHG | RESPIRATION RATE: 16 BRPM

## 2018-01-05 DIAGNOSIS — F33.2 SEVERE EPISODE OF RECURRENT MAJOR DEPRESSIVE DISORDER, WITHOUT PSYCHOTIC FEATURES (H): Primary | ICD-10-CM

## 2018-01-05 DIAGNOSIS — F33.2 SEVERE RECURRENT MAJOR DEPRESSION WITHOUT PSYCHOTIC FEATURES (H): ICD-10-CM

## 2018-01-05 PROCEDURE — 25000125 ZZHC RX 250: Performed by: PSYCHIATRY & NEUROLOGY

## 2018-01-05 PROCEDURE — 96365 THER/PROPH/DIAG IV INF INIT: CPT

## 2018-01-05 PROCEDURE — 25000128 H RX IP 250 OP 636: Performed by: PSYCHIATRY & NEUROLOGY

## 2018-01-05 PROCEDURE — 96361 HYDRATE IV INFUSION ADD-ON: CPT

## 2018-01-05 RX ADMIN — SODIUM CHLORIDE 250 ML: 9 INJECTION, SOLUTION INTRAVENOUS at 10:15

## 2018-01-05 RX ADMIN — KETAMINE HYDROCHLORIDE 37.5 MG: 50 INJECTION, SOLUTION INTRAMUSCULAR; INTRAVENOUS at 10:17

## 2018-01-05 ASSESSMENT — PAIN SCALES - GENERAL: PAINLEVEL: NO PAIN (0)

## 2018-01-05 NOTE — MR AVS SNAPSHOT
After Visit Summary   1/5/2018    Sebastien Hoover    MRN: [id number]           Patient Information     Date Of Birth          1970        Visit Information        Provider Department      1/5/2018 10:00 AM [facility]        Today's Diagnoses     Severe episode of recurrent major depressive disorder, without psychotic features (H)    -  1    Severe recurrent major depression without psychotic features (H)           Follow-ups after your visit        Your next 10 appointments already scheduled     Jan 11, 2018 10:30 AM CST   Level 3 with UR CH 01   [facility] ([facility]    [address]  [address]  [facility] [address]   [phone]            Jan 18, 2018 10:30 AM CST   Level 3 with UR CH 01   [facility] ([facility]    [address]  [address]  [facility] [address]   [phone]            Jan 25, 2018 10:30 AM CST   Level 3 with UR CH 01   [facility] ([facility]    [address]  [facility] [address]   [phone]            Feb 01, 2018  8:00 AM CST   Level 3 with UR CH 02   [facility] ([facility]    [address]  [address]  [facility] [address]   [phone]            Feb 08, 2018 10:30 AM CST   Level 3 with UR CH 01   [facility]    [address]  [address]  [facility] [address]   [phone]            Feb 15, 2018 10:30 AM CST   Level 3 with UR CH 03   [facility] ([facility]    [address]  [address]  [facility] [address]   [phone]            Feb 22, 2018 10:30 AM CST   Level 3 with  UR CH 01   [facility], Infusion Services ([facility])    [address]  [facility] [address]   [phone]            Mar 01, 2018  1:00 PM CST   Adult Med Follow UP with Venancio Ochoa MD   [facility] ([facility])    [place] [id number]  [address] [address]   [phone]            Mar 02, 2018 10:00 AM CST   Level 3 with UR BD 01   [facility], Infusion Services ([facility])    [address] [place] [address] [address]  [address]  [facility] [address]   [phone]            May 03, 2018  1:00 PM CDT   Adult Med Follow UP with Venancio Ochoa MD   [facility]ry Clinic ([facility])    [place] [id number]  [address] [facility] [address]   [phone]              Who to contact     If you have questions or need follow up information about today's clinic visit or your schedule please contact [facility], INFUSION SERVICES directly at [phone].  Normal or non-critical lab and imaging results will be communicated to you by Shopcasterhart, letter or phone within 4 business days after the clinic has received the results. If you do not hear from us within 7 days, please contact the clinic through Shopcasterhart or phone. If you have a critical or abnormal lab result, we will notify you by phone as soon as possible.  Submit refill requests through [employer] or call your pharmacy and they will forward the refill request to us. Please allow 3 business days for your refill to be completed.          Additional Information About Your Visit        [employer] Information     [employer] gives you secure access to your electronic health record. If you see a primary care provider, you can also send messages to your care team and make appointments. If you have questions, please call your primary care clinic.  If you do not have a primary care provider,  please call [phone] and they will assist you.        Care EveryWhere ID     This is your Care EveryWhere ID. This could be used by other organizations to access your [place] medical records  [id number]        Your Vitals Were     Pulse Temperature Respirations Pulse Oximetry          70 97.8  F (36.6  C) (Oral) 16 97%         Blood Pressure from Last 3 Encounters:   01/05/18 123/81   12/21/17 113/69   12/14/17 112/69    Weight from Last 3 Encounters:   12/07/17 82.8 kg (182 lb 8.7 oz)   10/31/17 83.1 kg (183 lb 3.2 oz)   10/26/17 83.3 kg (183 lb 10.3 oz)              Today, you had the following     No orders found for display       Primary Care Provider Office Phone # Fax #    Candy Sweta Ridley [phone]       [facility] [address]  [facility] [address]        Equal Access to Services     TONY [facility] : Hadii aad ku hadasho Soomaali, waaxda luqadaha, qaybta kaalmada adeegyada, waxay idiin hayaan shaggy whaleyarajose la'sofia . So [facility] [phone].    ATENCIÓN: Si habla español, tiene a madrigal disposición servicios gratuitos de asistencia lingüística. Llame al [phone].    We comply with applicable federal civil rights laws and [place] laws. We do not discriminate on the basis of race, color, national origin, age, disability, sex, sexual orientation, or gender identity.            Thank you!     Thank you for choosing [facility], [facility] SERVICES  for your care. Our goal is always to provide you with excellent care. Hearing back from our patients is one way we can continue to improve our services. Please take a few minutes to complete the written survey that you may receive in the mail after your visit with us. Thank you!             Your Updated Medication List - Protect others around you: Learn how to safely use, store and throw away your medicines at www.disposemymeds.org.          This list is accurate as of: 1/5/18  3:18 PM.  Always use your most recent med  list.                   Brand Name Dispense Instructions for use Diagnosis    cloNIDine 0.1 MG tablet    CATAPRES    45 tablet    1.5 tablets 1 hour prior to scheduled ketamine dose.    Severe episode of recurrent major depressive disorder, without psychotic features (H)       KETAMINE HCL           Ketamine HCl 50 MG/ML Sosy     15 mL    Apply 150 mg into one nostril as directed every 7 days 3 sprays each nostril    Major depressive disorder, recurrent, severe without psychotic features (H)       lithium 150 MG capsule     90 capsule    Take 1 capsule (150 mg) by mouth daily    Major depressive disorder, recurrent, severe without psychotic features (H)       LORazepam 0.5 MG tablet    ATIVAN    30 tablet    Take 1-2 tablets (0.5-1 mg) by mouth every 6 hours as needed for anxiety    Major depressive disorder, recurrent, severe without psychotic features (H)       methylphenidate 10 MG tablet    RITALIN    120 tablet    Take 2 tablets (20 mg) by mouth 2 times daily    Severe single current episode of major depressive disorder, without psychotic features (H)       sertraline 25 MG tablet    ZOLOFT    1 tablet    Take 0.5 tablets (12.5 mg) by mouth daily    Major depressive disorder, recurrent, severe without psychotic features (H)       vitamin D 48728 UNIT capsule    ERGOCALCIFEROL     Take 50,000 Units by mouth once a week

## 2018-01-05 NOTE — PROGRESS NOTES
Infusion Nursing Note:  Sebastien Hoover presents today for ketamine.    Patient seen by provider today: No   present during visit today: Not Applicable.    Note: Patient missed last week due to the weather.  States it had been stressful for him around the holidays.  Depression and anxiety are worse.    Intravenous Access:  Peripheral IV placed.    Post Infusion Assessment:  Patient tolerated infusion without incident.  VS monitored per protocol.  Patient observed for 60 minutes post ketamine per protocol.  Blood return noted pre and post infusion.  Site patent and intact, free from redness, edema or discomfort.  No evidence of extravasations.  Access discontinued per protocol.    Discharge Plan:   Patient discharged in stable condition accompanied by: self.  Departure Mode: Ambulatory.  Will return to clinic next week  Angela Davis RN

## 2018-01-11 ENCOUNTER — INFUSION THERAPY VISIT (OUTPATIENT)
Dept: INFUSION THERAPY | Facility: CLINIC | Age: 48
End: 2018-01-11
Attending: PSYCHIATRY & NEUROLOGY
Payer: MEDICARE

## 2018-01-11 VITALS
RESPIRATION RATE: 16 BRPM | HEART RATE: 64 BPM | DIASTOLIC BLOOD PRESSURE: 64 MMHG | OXYGEN SATURATION: 95 % | SYSTOLIC BLOOD PRESSURE: 103 MMHG | BODY MASS INDEX: 28.89 KG/M2 | TEMPERATURE: 97.9 F | WEIGHT: 184.53 LBS

## 2018-01-11 DIAGNOSIS — F33.2 SEVERE RECURRENT MAJOR DEPRESSION WITHOUT PSYCHOTIC FEATURES (H): ICD-10-CM

## 2018-01-11 DIAGNOSIS — F33.2 SEVERE EPISODE OF RECURRENT MAJOR DEPRESSIVE DISORDER, WITHOUT PSYCHOTIC FEATURES (H): Primary | ICD-10-CM

## 2018-01-11 PROCEDURE — 25000128 H RX IP 250 OP 636: Performed by: PSYCHIATRY & NEUROLOGY

## 2018-01-11 PROCEDURE — 25000125 ZZHC RX 250: Performed by: PSYCHIATRY & NEUROLOGY

## 2018-01-11 PROCEDURE — 96361 HYDRATE IV INFUSION ADD-ON: CPT

## 2018-01-11 PROCEDURE — 96365 THER/PROPH/DIAG IV INF INIT: CPT

## 2018-01-11 RX ADMIN — KETAMINE HYDROCHLORIDE 37.5 MG: 50 INJECTION, SOLUTION INTRAMUSCULAR; INTRAVENOUS at 11:06

## 2018-01-11 RX ADMIN — SODIUM CHLORIDE 250 ML: 9 INJECTION, SOLUTION INTRAVENOUS at 11:06

## 2018-01-11 NOTE — MR AVS SNAPSHOT
After Visit Summary   1/11/2018    Sebastien Hoover    MRN: [id number]           Patient Information     Date Of Birth          1970        Visit Information        Provider Department      1/11/2018 10:30 AM [facility] CH [facility]        Today's Diagnoses     Severe episode of recurrent major depressive disorder, without psychotic features (H)    -  1    Severe recurrent major depression without psychotic features (H)           Follow-ups after your visit        Your next 10 appointments already scheduled     Jan 18, 2018 10:30 AM CST   Level 3 with UR CH 01   [facility] ([facility])    [address]  [facility] [address]   [phone]            Jan 25, 2018 10:30 AM CST   Level 3 with UR CH 01   [facility] ([facility])    [address]  [facility] [address]   [phone]            Feb 01, 2018 10:30 AM CST   Level 3 with UR CH 02   [facility] ([facility])    [address]  [address]  [facility] [address]   [phone]            Feb 08, 2018 10:30 AM CST   Level 3 with UR CH 01   [facility] ([facility])    [address]  [address]  [facility] [address]   [phone]            Feb 15, 2018 10:30 AM CST   Level 3 with UR CH 03   [facility] ([facility])    [address]  [address]  [facility] [address]   [phone]            Feb 22, 2018 10:30 AM CST   Level 3 with UR CH 01   [facility] ([facility])    [address]  [address]  [facility] [address]   [phone]            Mar 01, 2018  1:00 PM CST   Adult Med  Follow UP with Venancio Ochoa MD   Psychiatry Clinic ([facility])    [address]  [address] [facility] [address]   [phone]            Mar 02, 2018 10:00 AM CST   Level 3 with UR BD 01   [facility], Infusion Services ([facility])    [address] [place] [address] [address]  [address]  [facility] [address]   [phone]            May 03, 2018  1:00 PM CDT   Adult Med Follow UP with Venancio Ochoa MD   Psychiatry Clinic ([facility])    [address]  [address] [facility] [address]   [phone]            Jun 28, 2018  2:00 PM CDT   Adult Med Follow UP with Venancio Ochoa MD   Psychiatry Clinic ([facility])    [place] [id number]  [address] [facility] [address] [phone]              Who to contact     If you have questions or need follow up information about today's clinic visit or your schedule please contact [facility], INFUSION SERVICES directly at [phone].  Normal or non-critical lab and imaging results will be communicated to you by Teachbasehart, letter or phone within 4 business days after the clinic has received the results. If you do not hear from us within 7 days, please contact the clinic through [employer] or phone. If you have a critical or abnormal lab result, we will notify you by phone as soon as possible.  Submit refill requests through [employer] or call your pharmacy and they will forward the refill request to us. Please allow 3 business days for your refill to be completed.          Additional Information About Your Visit        [employer] Information     [employer] gives you secure access to your electronic health record. If you see a primary care provider, you can also send messages to your care team and make appointments. If you have questions, please call your primary care clinic.  If you do not have a primary care  provider, please call [phone] and they will assist you.        Care EveryWhere ID     This is your Care EveryWhere ID. This could be used by other organizations to access your [place] medical records  [id number]        Your Vitals Were     Pulse Temperature Respirations Pulse Oximetry BMI (Body Mass Index)       64 97.9  F (36.6  C) 16 95% 28.89 kg/m2        Blood Pressure from Last 3 Encounters:   01/11/18 103/64   01/05/18 123/81   12/21/17 113/69    Weight from Last 3 Encounters:   01/11/18 83.7 kg (184 lb 8.4 oz)   12/07/17 82.8 kg (182 lb 8.7 oz)   10/31/17 83.1 kg (183 lb 3.2 oz)              Today, you had the following     No orders found for display       Primary Care Provider Office Phone # Fax #    Candy Ridley [phone]       [facility] [address]  [facility] [address]        Equal Access to Services     TONY ARTHUR : Hadii aad ku hadasho Soomaali, waaxda luqadaha, qaybta kaalmada adeegyada, waxay domingain haysofia castillo . So [facility] [phone].    ATENCIÓN: Si habla español, tiene a madrigal disposición servicios gratuitos de asistencia lingüística. Llame al [phone].    We comply with applicable federal civil rights laws and [place] laws. We do not discriminate on the basis of race, color, national origin, age, disability, sex, sexual orientation, or gender identity.            Thank you!     Thank you for choosing [facility], [facility] SERVICES  for your care. Our goal is always to provide you with excellent care. Hearing back from our patients is one way we can continue to improve our services. Please take a few minutes to complete the written survey that you may receive in the mail after your visit with us. Thank you!             Your Updated Medication List - Protect others around you: Learn how to safely use, store and throw away your medicines at www.disposemymeds.org.          This list is accurate as of: 1/11/18  2:09 PM.   Always use your most recent med list.                   Brand Name Dispense Instructions for use Diagnosis    cloNIDine 0.1 MG tablet    CATAPRES    45 tablet    1.5 tablets 1 hour prior to scheduled ketamine dose.    Severe episode of recurrent major depressive disorder, without psychotic features (H)       KETAMINE HCL           Ketamine HCl 50 MG/ML Sosy     15 mL    Apply 150 mg into one nostril as directed every 7 days 3 sprays each nostril    Major depressive disorder, recurrent, severe without psychotic features (H)       lithium 150 MG capsule     90 capsule    Take 1 capsule (150 mg) by mouth daily    Major depressive disorder, recurrent, severe without psychotic features (H)       LORazepam 0.5 MG tablet    ATIVAN    30 tablet    Take 1-2 tablets (0.5-1 mg) by mouth every 6 hours as needed for anxiety    Major depressive disorder, recurrent, severe without psychotic features (H)       methylphenidate 10 MG tablet    RITALIN    120 tablet    Take 2 tablets (20 mg) by mouth 2 times daily    Severe single current episode of major depressive disorder, without psychotic features (H)       sertraline 25 MG tablet    ZOLOFT    1 tablet    Take 0.5 tablets (12.5 mg) by mouth daily    Major depressive disorder, recurrent, severe without psychotic features (H)       vitamin D 58407 UNIT capsule    ERGOCALCIFEROL     Take 50,000 Units by mouth once a week

## 2018-01-11 NOTE — PROGRESS NOTES
Infusion Nursing Note:  Sebastien Hoover presents today for ketamine infusion.    Patient seen by provider today: No   present during visit today: Not Applicable.    Note: N/A.    Intravenous Access:  Peripheral IV placed.    Treatment Conditions:  Not Applicable.      Post Infusion Assessment:  Patient tolerated infusion without incident.  Patient observed for 60 minutes post ketaine infusion, per protocol.  Blood return noted pre and post infusion.  Site patent and intact, free from redness, edema or discomfort.  No evidence of extravasations.  Access discontinued per protocol.    Discharge Plan:   Patient discharged in stable condition accompanied by: self.  Departure Mode: Ambulatory.    Angela Gann RN

## 2018-01-18 ENCOUNTER — INFUSION THERAPY VISIT (OUTPATIENT)
Dept: INFUSION THERAPY | Facility: CLINIC | Age: 48
End: 2018-01-18
Attending: PSYCHIATRY & NEUROLOGY
Payer: MEDICARE

## 2018-01-18 VITALS
OXYGEN SATURATION: 97 % | TEMPERATURE: 98.6 F | HEART RATE: 66 BPM | RESPIRATION RATE: 16 BRPM | DIASTOLIC BLOOD PRESSURE: 77 MMHG | SYSTOLIC BLOOD PRESSURE: 114 MMHG

## 2018-01-18 DIAGNOSIS — F33.2 SEVERE RECURRENT MAJOR DEPRESSION WITHOUT PSYCHOTIC FEATURES (H): ICD-10-CM

## 2018-01-18 DIAGNOSIS — F33.2 SEVERE EPISODE OF RECURRENT MAJOR DEPRESSIVE DISORDER, WITHOUT PSYCHOTIC FEATURES (H): Primary | ICD-10-CM

## 2018-01-18 PROCEDURE — 25000125 ZZHC RX 250: Performed by: PSYCHIATRY & NEUROLOGY

## 2018-01-18 PROCEDURE — 96365 THER/PROPH/DIAG IV INF INIT: CPT

## 2018-01-18 PROCEDURE — 25000128 H RX IP 250 OP 636: Performed by: PSYCHIATRY & NEUROLOGY

## 2018-01-18 PROCEDURE — 96361 HYDRATE IV INFUSION ADD-ON: CPT

## 2018-01-18 RX ADMIN — KETAMINE HYDROCHLORIDE 37.5 MG: 50 INJECTION, SOLUTION INTRAMUSCULAR; INTRAVENOUS at 10:55

## 2018-01-18 RX ADMIN — SODIUM CHLORIDE 250 ML: 9 INJECTION, SOLUTION INTRAVENOUS at 10:53

## 2018-01-18 ASSESSMENT — PAIN SCALES - GENERAL: PAINLEVEL: NO PAIN (0)

## 2018-01-18 NOTE — PROGRESS NOTES
Infusion Nursing Note:  Sebastien SRINIVASA Hoover presents today for ketamine.    Patient seen by provider today: No   present during visit today: Not Applicable.    Note: Patient states his anxiety and depression aren't doing very good.    Intravenous Access:  Peripheral IV placed.    Post Infusion Assessment:  Patient tolerated infusion without incident.  VS monitored per protocol  Patient observed for 60 minutes post ketamine per protocol.  Blood return noted pre and post infusion.  Site patent and intact, free from redness, edema or discomfort.  No evidence of extravasations.  Access discontinued per protocol.    Discharge Plan:   Patient discharged in stable condition accompanied by: self.  Departure Mode: Ambulatory.  Will return to clinic next week.  Angela Davis RN

## 2018-01-18 NOTE — MR AVS SNAPSHOT
After Visit Summary   1/18/2018    Sebastien Hoover    MRN: [id number]           Patient Information     Date Of Birth          1970        Visit Information        Provider Department      1/18/2018 10:30 AM UR CH 01 [facility]        Today's Diagnoses     Severe episode of recurrent major depressive disorder, without psychotic features (H)    -  1    Severe recurrent major depression without psychotic features (H)           Follow-ups after your visit        Your next 10 appointments already scheduled     Jan 25, 2018 10:30 AM CST   Level 3 with UR CH 01   [facility] ([facility])    [address] [place] [address] [address].  [address]  [facility] [address]   [phone]            Feb 01, 2018 10:30 AM CST   Level 3 with UR CH 02   [facility] ([facility])    [address] [place] [address] [address].  [address]  [facility] [address]   [phone]            Feb 08, 2018 10:30 AM CST   Level 3 with UR CH 01   [facility] ([facility])    [address] [place] [address] [address]  [address]  [facility] [address]   [phone]            Feb 15, 2018 10:30 AM CST   Level 3 with UR CH 03   [facility] ([facility])    [address] [place] [address] [address]  [address]  [facility] [address]   [phone]            Feb 22, 2018 10:30 AM CST   Level 3 with UR CH 01   [facility] ([facility])    [address] [place] [address] [address]  [address]  [facility] [address]   [phone]            Mar 01, 2018  1:00 PM CST   Adult Med Follow UP with Venancio Ochoa MD   Psychiatry Clinic ([facility])    [address]  [address] [facility] [address]   [phone]            Mar 02, 2018 10:00 AM CST   Level 3  with UR BD 01   [facility] Infusion Services ([facility])    [address] [place] [address] [address].  [address]  [facility] [address]   [phone]            May 03, 2018  1:00 PM CDT   Adult Med Follow UP with Venancio Ochoa MD   Psychiatry Clinic ([facility])    [address] [address]  [address] [facility] [address] [phone]            Jun 28, 2018  2:00 PM CDT   Adult Med Follow UP with Venancio Ochoa MD   Psychiatry Clinic ([facility])    [address] [place] [address]  [address] [facility] [address] [phone]            Sep 20, 2018  1:00 PM CDT   Adult Med Follow UP with Venancio Ochoa MD   Psychiatry Clinic ([facility])    [address]  [address] [facility] [address] [phone]              Who to contact     If you have questions or need follow up information about today's clinic visit or your schedule please contact [facility], INFUSION SERVICES directly at [phone].  Normal or non-critical lab and imaging results will be communicated to you by Divine [employer]meticshart, letter or phone within 4 business days after the clinic has received the results. If you do not hear from us within 7 days, please contact the clinic through [employer] or phone. If you have a critical or abnormal lab result, we will notify you by phone as soon as possible.  Submit refill requests through Tiinkk or call your pharmacy and they will forward the refill request to us. Please allow 3 business days for your refill to be completed.          Additional Information About Your Visit        Tiinkk Information     [employer]k gives you secure access to your electronic health record. If you see a primary care provider, you can also send messages to your care team and make appointments. If you have questions, please call your primary care clinic.  If you do not have a primary care  provider, please call [phone] and they will assist you.        Care EveryWhere ID     This is your Care EveryWhere ID. This could be used by other organizations to access your [place] medical records  [id number]        Your Vitals Were     Pulse Temperature Respirations Pulse Oximetry          66 98.6  F (37  C) (Oral) 16 97%         Blood Pressure from Last 3 Encounters:   01/18/18 114/77   01/11/18 103/64   01/05/18 123/81    Weight from Last 3 Encounters:   01/11/18 83.7 kg (184 lb 8.4 oz)   12/07/17 82.8 kg (182 lb 8.7 oz)   10/31/17 83.1 kg (183 lb 3.2 oz)              Today, you had the following     No orders found for display       Primary Care Provider Office Phone # Fax #    Candy Sweta Ridley [phone]       [facility] [address]  [facility] [address]        Equal Access to Services     STEVEN ARTHUR : Hadii aad ku hadasho Soomaali, waaxda luqadaha, qaybta kaalmada adeegyada, waxay idiin hayaan catherineeg kharajose la'juden . So [facility] [phone].    ATENCIÓN: Si habla español, tiene a madrigal disposición servicios gratuitos de asistencia lingüística. Llame al [phone].    We comply with applicable federal civil rights laws and [place] laws. We do not discriminate on the basis of race, color, national origin, age, disability, sex, sexual orientation, or gender identity.            Thank you!     Thank you for choosing [facility], [facility] SERVICES  for your care. Our goal is always to provide you with excellent care. Hearing back from our patients is one way we can continue to improve our services. Please take a few minutes to complete the written survey that you may receive in the mail after your visit with us. Thank you!             Your Updated Medication List - Protect others around you: Learn how to safely use, store and throw away your medicines at www.disposemymeds.org.          This list is accurate as of: 1/18/18  2:09 PM.  Always use your most recent  med list.                   Brand Name Dispense Instructions for use Diagnosis    cloNIDine 0.1 MG tablet    CATAPRES    45 tablet    1.5 tablets 1 hour prior to scheduled ketamine dose.    Severe episode of recurrent major depressive disorder, without psychotic features (H)       KETAMINE HCL           Ketamine HCl 50 MG/ML Sosy     15 mL    Apply 150 mg into one nostril as directed every 7 days 3 sprays each nostril    Major depressive disorder, recurrent, severe without psychotic features (H)       lithium 150 MG capsule     90 capsule    Take 1 capsule (150 mg) by mouth daily    Major depressive disorder, recurrent, severe without psychotic features (H)       LORazepam 0.5 MG tablet    ATIVAN    30 tablet    Take 1-2 tablets (0.5-1 mg) by mouth every 6 hours as needed for anxiety    Major depressive disorder, recurrent, severe without psychotic features (H)       methylphenidate 10 MG tablet    RITALIN    120 tablet    Take 2 tablets (20 mg) by mouth 2 times daily    Severe single current episode of major depressive disorder, without psychotic features (H)       sertraline 25 MG tablet    ZOLOFT    1 tablet    Take 0.5 tablets (12.5 mg) by mouth daily    Major depressive disorder, recurrent, severe without psychotic features (H)       vitamin D 38148 UNIT capsule    ERGOCALCIFEROL     Take 50,000 Units by mouth once a week

## 2018-01-25 ENCOUNTER — INFUSION THERAPY VISIT (OUTPATIENT)
Dept: INFUSION THERAPY | Facility: CLINIC | Age: 48
End: 2018-01-25
Attending: PSYCHIATRY & NEUROLOGY
Payer: MEDICARE

## 2018-01-25 VITALS
HEART RATE: 68 BPM | DIASTOLIC BLOOD PRESSURE: 69 MMHG | TEMPERATURE: 98.1 F | SYSTOLIC BLOOD PRESSURE: 112 MMHG | RESPIRATION RATE: 16 BRPM | OXYGEN SATURATION: 94 %

## 2018-01-25 DIAGNOSIS — F33.2 SEVERE EPISODE OF RECURRENT MAJOR DEPRESSIVE DISORDER, WITHOUT PSYCHOTIC FEATURES (H): Primary | ICD-10-CM

## 2018-01-25 DIAGNOSIS — F33.2 SEVERE RECURRENT MAJOR DEPRESSION WITHOUT PSYCHOTIC FEATURES (H): ICD-10-CM

## 2018-01-25 PROCEDURE — 96361 HYDRATE IV INFUSION ADD-ON: CPT

## 2018-01-25 PROCEDURE — 25000128 H RX IP 250 OP 636: Performed by: PSYCHIATRY & NEUROLOGY

## 2018-01-25 PROCEDURE — 96365 THER/PROPH/DIAG IV INF INIT: CPT

## 2018-01-25 PROCEDURE — 25000125 ZZHC RX 250: Performed by: PSYCHIATRY & NEUROLOGY

## 2018-01-25 RX ADMIN — SODIUM CHLORIDE 250 ML: 9 INJECTION, SOLUTION INTRAVENOUS at 10:46

## 2018-01-25 RX ADMIN — KETAMINE HYDROCHLORIDE 37.5 MG: 50 INJECTION, SOLUTION INTRAMUSCULAR; INTRAVENOUS at 10:47

## 2018-01-25 ASSESSMENT — PAIN SCALES - GENERAL: PAINLEVEL: NO PAIN (0)

## 2018-01-25 NOTE — PROGRESS NOTES
Infusion Nursing Note:  Sebastien Hoover presents today for ketamine.    Patient seen by provider today: No   present during visit today: Not Applicable.    Note: Patient states depression and anxiety are not good.    Intravenous Access:  Peripheral IV placed.    Post Infusion Assessment:  Patient tolerated infusion without incident.  VS monitored per protocol.  Patient observed for 60 minutes post ketamine per protocol.  Blood return noted pre and post infusion.  Site patent and intact, free from redness, edema or discomfort.  No evidence of extravasations.  Access discontinued per protocol.    Discharge Plan:   Patient discharged in stable condition accompanied by: self.  Departure Mode: Ambulatory.  Will return to clinic next week.  Angela Davis RN

## 2018-01-25 NOTE — MR AVS SNAPSHOT
After Visit Summary   1/25/2018    Sebastien Hoover    MRN: [id number]           Patient Information     Date Of Birth          1970        Visit Information        Provider Department      1/25/2018 10:30 AM [facility]        Today's Diagnoses     Severe episode of recurrent major depressive disorder, without psychotic features (H)    -  1    Severe recurrent major depression without psychotic features (H)           Follow-ups after your visit        Your next 10 appointments already scheduled     Feb 01, 2018 10:30 AM CST   Level 3 with UR CH 02   [facility] ([facility])    [address]  [address]  [facility] [address]   [phone]            Feb 08, 2018 10:30 AM CST   Level 3 with UR CH 01   [facility] ([facility])    [address] [place] [address] [address]  [address]  [facility] [address]   [phone]            Feb 15, 2018 10:30 AM CST   Level 3 with UR CH 03   [facility] ([facility])    [address]  [address]  [facility] [address]   [phone]            Feb 22, 2018 10:30 AM CST   Level 3 with UR CH 01   [facility])    [address]  [address]  [facility] [address]   [phone]            Mar 01, 2018  1:00 PM CST   Adult Med Follow UP with Venancio Ochoa MD   Psychiatry Clinic ([facility])    [address]  [address] [facility] [address]   [phone]            Mar 02, 2018 10:00 AM CST   Level 3 with UR BD 01   [facility])    [address] [place] [address] [address]  [address]  [facility] [address]   [phone]            May 03, 2018  1:00 PM CDT   Adult  Med Follow UP with Venancio Ochoa MD   Psychiatry Clinic ([facility])    [address]  [address] [facility] [address]   [phone]            Jun 28, 2018  2:00 PM CDT   Adult Med Follow UP with Venancio Ochoa MD   Psychiatry Clinic ([facility])    [address]  [address] [facility] [address]   [phone]            Sep 20, 2018  1:00 PM CDT   Adult Med Follow UP with Venancio Ochoa MD   Psychiatry Clinic ([facility])    [address]  [address] [facility] [address]   [phone]            Nov 22, 2018  1:00 PM CST   Adult Med Follow UP with Venancio Ochoa MD   Psychiatry Clinic ([facility])    [address]  [address] [facility] [address]   [phone]              Who to contact     If you have questions or need follow up information about today's clinic visit or your schedule please contact [facility], [place], [facility] SERVICES directly at [phone].  Normal or non-critical lab and imaging results will be communicated to you by You.Dohart, letter or phone within 4 business days after the clinic has received the results. If you do not hear from us within 7 days, please contact the clinic through You.Dohart or phone. If you have a critical or abnormal lab result, we will notify you by phone as soon as possible.  Submit refill requests through YeePay or call your pharmacy and they will forward the refill request to us. Please allow 3 business days for your refill to be completed.          Additional Information About Your Visit        YeePay Information     [employer] gives you secure access to your electronic health record. If you see a primary care provider, you can also send messages to your care team and make appointments. If you have questions, please call your primary care clinic.  If you do not have a primary  care provider, please call [phone] and they will assist you.        Care EveryWhere ID     This is your Care EveryWhere ID. This could be used by other organizations to access your [place] medical records  [id number]        Your Vitals Were     Pulse Temperature Respirations Pulse Oximetry          68 98.1  F (36.7  C) (Oral) 16 94%         Blood Pressure from Last 3 Encounters:   01/25/18 112/69   01/18/18 114/77   01/11/18 103/64    Weight from Last 3 Encounters:   01/11/18 83.7 kg (184 lb 8.4 oz)   12/07/17 82.8 kg (182 lb 8.7 oz)   10/31/17 83.1 kg (183 lb 3.2 oz)              Today, you had the following     No orders found for display       Primary Care Provider Office Phone # Fax #    Candy Sweta Ridley [phone]       [facility] [address]  [facility] [address]        Equal Access to Services     STEVEN ARTHUR : Hadii aad ku hadasho Soomaali, waaxda luqadaha, qaybta kaalmada adeegyada, waxay idiin hayaan adeeg kharajose la'juden . So [facility] [phone].    ATENCIÓN: Si habla español, tiene a madrigal disposición servicios gratuitos de asistencia lingüística. Llame al [phone].    We comply with applicable federal civil rights laws and [place] laws. We do not discriminate on the basis of race, color, national origin, age, disability, sex, sexual orientation, or gender identity.            Thank you!     Thank you for choosing [facility], [facility] SERVICES  for your care. Our goal is always to provide you with excellent care. Hearing back from our patients is one way we can continue to improve our services. Please take a few minutes to complete the written survey that you may receive in the mail after your visit with us. Thank you!             Your Updated Medication List - Protect others around you: Learn how to safely use, store and throw away your medicines at www.disposemymeds.org.          This list is accurate as of 1/25/18  2:09 PM.  Always use your most  recent med list.                   Brand Name Dispense Instructions for use Diagnosis    cloNIDine 0.1 MG tablet    CATAPRES    45 tablet    1.5 tablets 1 hour prior to scheduled ketamine dose.    Severe episode of recurrent major depressive disorder, without psychotic features (H)       KETAMINE HCL           Ketamine HCl 50 MG/ML Sosy     15 mL    Apply 150 mg into one nostril as directed every 7 days 3 sprays each nostril    Major depressive disorder, recurrent, severe without psychotic features (H)       lithium 150 MG capsule     90 capsule    Take 1 capsule (150 mg) by mouth daily    Major depressive disorder, recurrent, severe without psychotic features (H)       LORazepam 0.5 MG tablet    ATIVAN    30 tablet    Take 1-2 tablets (0.5-1 mg) by mouth every 6 hours as needed for anxiety    Major depressive disorder, recurrent, severe without psychotic features (H)       methylphenidate 10 MG tablet    RITALIN    120 tablet    Take 2 tablets (20 mg) by mouth 2 times daily    Severe single current episode of major depressive disorder, without psychotic features (H)       sertraline 25 MG tablet    ZOLOFT    1 tablet    Take 0.5 tablets (12.5 mg) by mouth daily    Major depressive disorder, recurrent, severe without psychotic features (H)       vitamin D 04503 UNIT capsule    ERGOCALCIFEROL     Take 50,000 Units by mouth once a week

## 2018-02-01 ENCOUNTER — INFUSION THERAPY VISIT (OUTPATIENT)
Dept: INFUSION THERAPY | Facility: CLINIC | Age: 48
End: 2018-02-01
Attending: PSYCHIATRY & NEUROLOGY
Payer: MEDICARE

## 2018-02-01 VITALS
HEART RATE: 65 BPM | OXYGEN SATURATION: 96 % | RESPIRATION RATE: 16 BRPM | DIASTOLIC BLOOD PRESSURE: 63 MMHG | SYSTOLIC BLOOD PRESSURE: 112 MMHG | TEMPERATURE: 97.4 F

## 2018-02-01 DIAGNOSIS — F33.2 SEVERE RECURRENT MAJOR DEPRESSION WITHOUT PSYCHOTIC FEATURES (H): ICD-10-CM

## 2018-02-01 DIAGNOSIS — F33.2 SEVERE EPISODE OF RECURRENT MAJOR DEPRESSIVE DISORDER, WITHOUT PSYCHOTIC FEATURES (H): Primary | ICD-10-CM

## 2018-02-01 PROCEDURE — 96365 THER/PROPH/DIAG IV INF INIT: CPT

## 2018-02-01 PROCEDURE — 25000125 ZZHC RX 250: Performed by: PSYCHIATRY & NEUROLOGY

## 2018-02-01 PROCEDURE — 25000128 H RX IP 250 OP 636: Performed by: PSYCHIATRY & NEUROLOGY

## 2018-02-01 PROCEDURE — 96361 HYDRATE IV INFUSION ADD-ON: CPT

## 2018-02-01 RX ADMIN — SODIUM CHLORIDE 250 ML: 9 INJECTION, SOLUTION INTRAVENOUS at 10:52

## 2018-02-01 RX ADMIN — KETAMINE HYDROCHLORIDE 37.5 MG: 50 INJECTION, SOLUTION INTRAMUSCULAR; INTRAVENOUS at 10:54

## 2018-02-01 ASSESSMENT — PAIN SCALES - GENERAL: PAINLEVEL: NO PAIN (0)

## 2018-02-01 NOTE — MR AVS SNAPSHOT
After Visit Summary   2/1/2018    Sebastien Hoover    MRN: [id number]           Patient Information     Date Of Birth          1970        Visit Information        Provider Department      2/1/2018 10:30 AM UR [facility] 02 [facility]        Today's Diagnoses     Severe episode of recurrent major depressive disorder, without psychotic features (H)    -  1    Severe recurrent major depression without psychotic features (H)           Follow-ups after your visit        Your next 10 appointments already scheduled     Feb 08, 2018 10:30 AM CST   Level 3 with UR CH 01   [facility] ([facility])    [address]  [address]  [facility] [address]   [phone]            Feb 15, 2018 10:30 AM CST   Level 3 with UR CH 03   [facility])    [address]  [address]  [facility] [address]   [phone]            Feb 22, 2018 10:30 AM CST   Level 3 with UR CH 01   [facility])    [address] [place] [address] [address]  [address]  [facility] [address]   [phone]            Mar 01, 2018  1:00 PM CST   Adult Med Follow UP with Venancio Ochoa MD   Psychiatry Clinic ([facility])    [place] [id number]  [address] [facility] [address]   [phone]            Mar 02, 2018 10:00 AM CST   Level 3 with UR BD 01   [facility] ([facility])    [address] [place] [address] [address]  [address]  [facility] [address]   [phone]            Mar 08, 2018 10:30 AM CST   Level 3 with UR CH 03   [facility] ([facility])    [address]  [facility] [address]   [phone]            Mar 15, 2018 10:30 AM CDT   Level 3  with UR CH 01   [facility] ([facility])    [address]  [facility] [address]   [phone]            Mar 22, 2018 10:30 AM CDT   Level 3 with UR CH 01   [facility]    [address]  [address]  [facility] [address]   [phone]            Mar 29, 2018 10:30 AM CDT   Level 3 with UR CH 03   [facility], Infusion Services ([facility])    [address]  [address]  [facility] [address]   [phone]            May 03, 2018  1:00 PM CDT   Adult Med Follow UP with Venancio Ochoa MD   Psychiatry Clinic ([facility])    [place] [id number] [facility] [address] [phone]              Who to contact     If you have questions or need follow up information about today's clinic visit or your schedule please contact [facility], [facility] directly at [phone].  Normal or non-critical lab and imaging results will be communicated to you by Rent the Runwayhart, letter or phone within 4 business days after the clinic has received the results. If you do not hear from us within 7 days, please contact the clinic through [employer] or phone. If you have a critical or abnormal lab result, we will notify you by phone as soon as possible.  Submit refill requests through [employer] or call your pharmacy and they will forward the refill request to us. Please allow 3 business days for your refill to be completed.          Additional Information About Your Visit        [employer]way[employer]OD[employer] Information     [employer] gives you secure access to your electronic health record. If you see a primary care provider, you can also send messages to your care team and make appointments. If you have questions, please call your primary care clinic.  If you do not have a primary care  provider, please call [phone] and they will assist you.        Care EveryWhere ID     This is your Care EveryWhere ID. This could be used by other organizations to access your [place] medical records  [id number]        Your Vitals Were     Pulse Temperature Respirations Pulse Oximetry          65 97.4  F (36.3  C) (Oral) 16 96%         Blood Pressure from Last 3 Encounters:   02/01/18 112/63   01/25/18 112/69   01/18/18 114/77    Weight from Last 3 Encounters:   01/11/18 83.7 kg (184 lb 8.4 oz)   12/07/17 82.8 kg (182 lb 8.7 oz)   10/31/17 83.1 kg (183 lb 3.2 oz)              Today, you had the following     No orders found for display       Primary Care Provider Office Phone # Fax #    Candy Sweta Ridley [phone]       [facility] [address]  [facility] [address]        Equal Access to Services     STEVEN ARTHUR : Hadii aad ku hadasho Soomaali, waaxda luqadaha, qaybta kaalmada adeegyada, waxay idiin hayaan adeeg kharajose la'sofia . So [facility] [phone].    ATENCIÓN: Si habla español, tiene a madrigal disposición servicios gratuitos de asistencia lingüística. Llame al [phone].    We comply with applicable federal civil rights laws and [place] laws. We do not discriminate on the basis of race, color, national origin, age, disability, sex, sexual orientation, or gender identity.            Thank you!     Thank you for choosing [facility], [facility] SERVICES  for your care. Our goal is always to provide you with excellent care. Hearing back from our patients is one way we can continue to improve our services. Please take a few minutes to complete the written survey that you may receive in the mail after your visit with us. Thank you!             Your Updated Medication List - Protect others around you: Learn how to safely use, store and throw away your medicines at www.disposemymeds.org.          This list is accurate as of 2/1/18  2:14 PM.  Always use your most recent  med list.                   Brand Name Dispense Instructions for use Diagnosis    cloNIDine 0.1 MG tablet    CATAPRES    45 tablet    1.5 tablets 1 hour prior to scheduled ketamine dose.    Severe episode of recurrent major depressive disorder, without psychotic features (H)       KETAMINE HCL           Ketamine HCl 50 MG/ML Sosy     15 mL    Apply 150 mg into one nostril as directed every 7 days 3 sprays each nostril    Major depressive disorder, recurrent, severe without psychotic features (H)       lithium 150 MG capsule     90 capsule    Take 1 capsule (150 mg) by mouth daily    Major depressive disorder, recurrent, severe without psychotic features (H)       LORazepam 0.5 MG tablet    ATIVAN    30 tablet    Take 1-2 tablets (0.5-1 mg) by mouth every 6 hours as needed for anxiety    Major depressive disorder, recurrent, severe without psychotic features (H)       methylphenidate 10 MG tablet    RITALIN    120 tablet    Take 2 tablets (20 mg) by mouth 2 times daily    Severe single current episode of major depressive disorder, without psychotic features (H)       sertraline 25 MG tablet    ZOLOFT    1 tablet    Take 0.5 tablets (12.5 mg) by mouth daily    Major depressive disorder, recurrent, severe without psychotic features (H)       vitamin D 64669 UNIT capsule    ERGOCALCIFEROL     Take 50,000 Units by mouth once a week

## 2018-02-01 NOTE — PROGRESS NOTES
Infusion Nursing Note:  Sebastien BERNABE Jefryjase presents today for ketamine.    Patient seen by provider today: No   present during visit today: Not Applicable.    Note: Patient states he is still having problems with anxiety and depression.    Intravenous Access:  Peripheral IV placed.    Post Infusion Assessment:  Patient tolerated infusion without incident.  VS monitored per protocol.  Patient observed for 60 minutes post ketamine per protocol.  Blood return noted pre and post infusion.  Site patent and intact, free from redness, edema or discomfort.  No evidence of extravasations.  Access discontinued per protocol.    Discharge Plan:   Patient discharged in stable condition accompanied by: self.  Departure Mode: Ambulatory.  Will return to clinic next Thursday.  Angela Davis RN

## 2018-02-08 ENCOUNTER — INFUSION THERAPY VISIT (OUTPATIENT)
Dept: INFUSION THERAPY | Facility: CLINIC | Age: 48
End: 2018-02-08
Attending: PSYCHIATRY & NEUROLOGY
Payer: MEDICARE

## 2018-02-08 VITALS
OXYGEN SATURATION: 96 % | HEART RATE: 67 BPM | SYSTOLIC BLOOD PRESSURE: 114 MMHG | TEMPERATURE: 98.3 F | BODY MASS INDEX: 29.38 KG/M2 | DIASTOLIC BLOOD PRESSURE: 67 MMHG | WEIGHT: 187.61 LBS | RESPIRATION RATE: 16 BRPM

## 2018-02-08 DIAGNOSIS — F33.2 SEVERE RECURRENT MAJOR DEPRESSION WITHOUT PSYCHOTIC FEATURES (H): ICD-10-CM

## 2018-02-08 DIAGNOSIS — F33.2 SEVERE EPISODE OF RECURRENT MAJOR DEPRESSIVE DISORDER, WITHOUT PSYCHOTIC FEATURES (H): Primary | ICD-10-CM

## 2018-02-08 PROCEDURE — 25000125 ZZHC RX 250: Performed by: PSYCHIATRY & NEUROLOGY

## 2018-02-08 PROCEDURE — 96365 THER/PROPH/DIAG IV INF INIT: CPT

## 2018-02-08 PROCEDURE — 25000128 H RX IP 250 OP 636: Performed by: PSYCHIATRY & NEUROLOGY

## 2018-02-08 RX ADMIN — KETAMINE HYDROCHLORIDE 37.5 MG: 50 INJECTION, SOLUTION INTRAMUSCULAR; INTRAVENOUS at 11:02

## 2018-02-08 RX ADMIN — SODIUM CHLORIDE 250 ML: 9 INJECTION, SOLUTION INTRAVENOUS at 11:02

## 2018-02-08 NOTE — MR AVS SNAPSHOT
After Visit Summary   2/8/2018    Sebastien Hoover    MRN: [id number]           Patient Information     Date Of Birth          1970        Visit Information        Provider Department      2/8/2018 10:30 AM [facility]        Today's Diagnoses     Severe episode of recurrent major depressive disorder, without psychotic features (H)    -  1    Severe recurrent major depression without psychotic features (H)           Follow-ups after your visit        Your next 10 appointments already scheduled     Feb 15, 2018 10:30 AM CST   Level 3 with UR CH 03   [facility]    [address]  [address]  [facility] [address]   [phone]            Feb 22, 2018 10:30 AM CST   Level 3 with UR CH 01   [facility])    [address] [place] [address] [address]  [address]  [facility] [address]   [phone]            Mar 01, 2018  1:00 PM CST   Adult Med Follow UP with Venancio Ochoa MD   Psychiatry Clinic ([facility])    [address]  [address] [facility] [address]   [phone]            Mar 02, 2018 10:00 AM CST   Level 3 with UR BD 01   [facility]    [address]  [place] [place] [facility] [place] [address]   [phone]            Mar 08, 2018 10:30 AM CST   Level 3 with UR CH 03   [facility]    [address] [place] [address] [address]  [address]  [facility] [address]   [phone]            Mar 15, 2018 10:30 AM CDT   Level 3 with UR CH 01   [facility]    [address]  [facility] [place] [address]   [phone]            Mar 22, 2018 10:30 AM CDT   Level 3  with UR CH 01   [facility], [facility]fusion [facility] ([facility])    [address]  [address]  [facility] [address]   [phone]            Mar 29, 2018 10:30 AM CDT   Level 3 with UR CH 03   [facility], Infusion Services ([facility])    [address]  [address]  [facility] [address]   [phone]            May 03, 2018  1:00 PM CDT   Adult Med Follow UP with Venancio Ochoa MD   Psychiatry Clinic ([facility])    [address] [facility] [address] [phone]            Jun 28, 2018  2:00 PM CDT   Adult Med Follow UP with Venancio Ochoa MD   Psychiatry Clinic ([facility])    [address] [facility] [address] [phone]              Who to contact     If you have questions or need follow up information about today's clinic visit or your schedule please contact [facility], INFUSION SERVICES directly at [phone].  Normal or non-critical lab and imaging results will be communicated to you by MyChart, letter or phone within 4 business days after the clinic has received the results. If you do not hear from us within 7 days, please contact the clinic through Digital Bloomhart or phone. If you have a critical or abnormal lab result, we will notify you by phone as soon as possible.  Submit refill requests through Tucoola or call your pharmacy and they will forward the refill request to us. Please allow 3 business days for your refill to be completed.          Additional Information About Your Visit        [employer] Communication Information     Tuc[employer]a gives you secure access to your electronic health record. If you see a primary care provider, you can also send messages to your care team and make appointments. If you have questions, please call your primary care clinic.  If you do not have a primary care  provider, please call [phone] and they will assist you.        Care EveryWhere ID     This is your Care EveryWhere ID. This could be used by other organizations to access your [place] medical records  [id number]        Your Vitals Were     Pulse Temperature Respirations Pulse Oximetry BMI (Body Mass Index)       67 98.3  F (36.8  C) 16 96% 29.38 kg/m2        Blood Pressure from Last 3 Encounters:   02/08/18 114/67   02/01/18 112/63   01/25/18 112/69    Weight from Last 3 Encounters:   02/08/18 85.1 kg (187 lb 9.8 oz)   01/11/18 83.7 kg (184 lb 8.4 oz)   12/07/17 82.8 kg (182 lb 8.7 oz)              Today, you had the following     No orders found for display       Primary Care Provider Office Phone # Fax #    Candy Ridley [phone]       [facility] [address]  [facility] [address]        Equal Access to Services     TONY ARTHUR : Hadii aad ku hadasho Soomaali, waaxda luqadaha, qaybta kaalmada adeegyada, waxay alvin castillo . So [facility] [phone].    ATENCIÓN: Si habla español, tiene a madrigal disposición servicios gratuitos de asistencia lingüística. Llame al [phone].    We comply with applicable federal civil rights laws and [place] laws. We do not discriminate on the basis of race, color, national origin, age, disability, sex, sexual orientation, or gender identity.            Thank you!     Thank you for choosing [facility], [facility] SERVICES  for your care. Our goal is always to provide you with excellent care. Hearing back from our patients is one way we can continue to improve our services. Please take a few minutes to complete the written survey that you may receive in the mail after your visit with us. Thank you!             Your Updated Medication List - Protect others around you: Learn how to safely use, store and throw away your medicines at www.disposemymeds.org.          This list is accurate as of 2/8/18  3:38 PM.   Always use your most recent med list.                   Brand Name Dispense Instructions for use Diagnosis    cloNIDine 0.1 MG tablet    CATAPRES    45 tablet    1.5 tablets 1 hour prior to scheduled ketamine dose.    Severe episode of recurrent major depressive disorder, without psychotic features (H)       KETAMINE HCL           Ketamine HCl 50 MG/ML Sosy     15 mL    Apply 150 mg into one nostril as directed every 7 days 3 sprays each nostril    Major depressive disorder, recurrent, severe without psychotic features (H)       lithium 150 MG capsule     90 capsule    Take 1 capsule (150 mg) by mouth daily    Major depressive disorder, recurrent, severe without psychotic features (H)       LORazepam 0.5 MG tablet    ATIVAN    30 tablet    Take 1-2 tablets (0.5-1 mg) by mouth every 6 hours as needed for anxiety    Major depressive disorder, recurrent, severe without psychotic features (H)       methylphenidate 10 MG tablet    RITALIN    120 tablet    Take 2 tablets (20 mg) by mouth 2 times daily    Severe single current episode of major depressive disorder, without psychotic features (H)       sertraline 25 MG tablet    ZOLOFT    1 tablet    Take 0.5 tablets (12.5 mg) by mouth daily    Major depressive disorder, recurrent, severe without psychotic features (H)       vitamin D 57906 UNIT capsule    ERGOCALCIFEROL     Take 50,000 Units by mouth once a week

## 2018-02-08 NOTE — PROGRESS NOTES
Infusion Nursing Note:  Sebastien Hoover presents today for ketamine infusion..    Patient seen by provider today: No   present during visit today: Not Applicable.    Note: N/A.    Intravenous Access:  Peripheral IV placed.    Treatment Conditions:  Not Applicable.      Post Infusion Assessment:  Patient tolerated infusion without incident.  Patient observed for 60 minutes post ketamine infusion, per protocol.  Blood return noted pre and post infusion.  Site patent and intact, free from redness, edema or discomfort.  No evidence of extravasations.  Access discontinued per protocol.    Discharge Plan:   Patient discharged in stable condition accompanied by: self.  Departure Mode: Ambulatory.    Angela Gann RN

## 2018-02-09 ENCOUNTER — TELEPHONE (OUTPATIENT)
Dept: PSYCHIATRY | Facility: CLINIC | Age: 48
End: 2018-02-09

## 2018-02-09 DIAGNOSIS — F33.2 MAJOR DEPRESSIVE DISORDER, RECURRENT, SEVERE WITHOUT PSYCHOTIC FEATURES (H): ICD-10-CM

## 2018-02-09 NOTE — TELEPHONE ENCOUNTER
Treva Jamil Michelle, HENRY Ochoa/Doris Hayden from the [facility] pharmacy is calling about the Ketamine script written on 1/4. They need clarification if there was a change in the medication or if it was written incorrectly. She says that previously the scripts were for 150mg per mil with 3 sprays in each nostril every 7 days. The script from 1/4 is different than this.       [phone]       Last appt: 1/4/18    Last prescribed:  Ketamine HCl 50 MG/ML SOSY 15 mL 3 1/4/2018  No   Sig: Apply 150 mg into one nostril as directed every 7 days 3 sprays each nostril   Class: Local Print   Route: Intranasal     Routed to Dr. Ochoa to confirm dose

## 2018-02-09 NOTE — TELEPHONE ENCOUNTER
Venancio Ochoa MD Bove, Michelle, RN        Caller: Unspecified (Today, 10:58 AM)                     Script was written incorrectly.  Go with the prior dose sig.       Called [facility] to clarify directions although unable to speak directly with staff.  LVM on pharmacist line with directions:  Ketamine nasal spray 150 mg/mL- instill 3 sprays in each nostril every 7 days.  Writer stated that it appeared that there was a refill remaining per [place]-, however if no refills, okay to fill a 30 d/s of this.  Requested a c/b if there were any questions.

## 2018-02-15 ENCOUNTER — INFUSION THERAPY VISIT (OUTPATIENT)
Dept: INFUSION THERAPY | Facility: CLINIC | Age: 48
End: 2018-02-15
Attending: PSYCHIATRY & NEUROLOGY
Payer: MEDICARE

## 2018-02-15 VITALS
OXYGEN SATURATION: 96 % | RESPIRATION RATE: 16 BRPM | HEART RATE: 74 BPM | DIASTOLIC BLOOD PRESSURE: 62 MMHG | TEMPERATURE: 98.6 F | SYSTOLIC BLOOD PRESSURE: 113 MMHG

## 2018-02-15 DIAGNOSIS — F33.2 SEVERE EPISODE OF RECURRENT MAJOR DEPRESSIVE DISORDER, WITHOUT PSYCHOTIC FEATURES (H): Primary | ICD-10-CM

## 2018-02-15 DIAGNOSIS — F33.2 SEVERE RECURRENT MAJOR DEPRESSION WITHOUT PSYCHOTIC FEATURES (H): ICD-10-CM

## 2018-02-15 PROCEDURE — 96361 HYDRATE IV INFUSION ADD-ON: CPT

## 2018-02-15 PROCEDURE — 25000125 ZZHC RX 250: Performed by: PSYCHIATRY & NEUROLOGY

## 2018-02-15 PROCEDURE — 25000128 H RX IP 250 OP 636: Performed by: PSYCHIATRY & NEUROLOGY

## 2018-02-15 PROCEDURE — 96365 THER/PROPH/DIAG IV INF INIT: CPT

## 2018-02-15 RX ADMIN — SODIUM CHLORIDE 250 ML: 9 INJECTION, SOLUTION INTRAVENOUS at 10:43

## 2018-02-15 RX ADMIN — KETAMINE HYDROCHLORIDE 37.5 MG: 50 INJECTION, SOLUTION INTRAMUSCULAR; INTRAVENOUS at 10:44

## 2018-02-15 ASSESSMENT — PAIN SCALES - GENERAL: PAINLEVEL: NO PAIN (0)

## 2018-02-15 NOTE — PROGRESS NOTES
Infusion Nursing Note:  Sebastien Hoover presents today for ketamine.    Patient seen by provider today: No   present during visit today: Not Applicable.    Note: Patient states depression is fairly stable.    Intravenous Access:  Peripheral IV placed.    Post Infusion Assessment:  Patient tolerated infusion without incident.  VS monitored per protocol.  Patient observed for 60 minutes post ketamine per protocol.  Blood return noted pre and post infusion.  Site patent and intact, free from redness, edema or discomfort.  No evidence of extravasations.  Access discontinued per protocol.    Discharge Plan:   Patient discharged in stable condition accompanied by: self has transportation home  Departure Mode: Ambulatory.  Will return to clinic next Thursday.  Angela Davis RN

## 2018-02-15 NOTE — MR AVS SNAPSHOT
After Visit Summary   2/15/2018    Sebastien Hoover    MRN: [id number]           Patient Information     Date Of Birth          1970        Visit Information        Provider Department      2/15/2018 10:30 AM [facility]        Today's Diagnoses     Severe episode of recurrent major depressive disorder, without psychotic features (H)    -  1    Severe recurrent major depression without psychotic features (H)           Follow-ups after your visit        Your next 10 appointments already scheduled     Feb 22, 2018 10:30 AM CST   Level 3 with UR CH 01   [facility]    [address] [place] [address] [address]  [address]  [facility] [address]   [phone]            Mar 01, 2018  1:00 PM CST   Adult Med Follow UP with Venancio Ochoa MD   Psychiatry Clinic ([facility])    [address]  [address] [facility] [address]   [phone]            Mar 02, 2018 10:00 AM CST   Level 3 with UR BD 01   [facility]    [address] [place] [address] [address]  [address] [place] [address]   [phone]            Mar 08, 2018 10:30 AM CST   Level 3 with UR CH 03   [facility])    [address]  [facility] [address]   [phone]            Mar 15, 2018 10:30 AM CDT   Level 3 with UR CH 01   [facility])    [address] [place] [address] [address]  [address]  [facility] [address]   [phone]            Mar 22, 2018 10:30 AM CDT   Level 3 with UR CH 01   [facility])    [address] [facility] [address]  [facility] [address]   [phone]            Mar 29, 2018 10:30 AM CDT   Level 3  with UR CH 03   [facility], Infusion Services ([facility])    [address] [place] [address] [address]  [address]  [facility] [address]   [phone]            May 03, 2018  1:00 PM CDT   Adult Med Follow UP with Venancio Ochoa MD   Psychiatry Clinic ([facility])    [address]  [address] [facility] [address] [phone]            Jun 28, 2018  2:00 PM CDT   Adult Med Follow UP with Venancio Ochoa MD   Psychiatry Clinic ([facility])    [address]  [address] [facility] [address]   [phone]            Sep 20, 2018  1:00 PM CDT   Adult Med Follow UP with Venancio Ochoa MD   Psychiatry Clinic ([facility])    [address]  [address] [facility] [address] [phone]              Who to contact     If you have questions or need follow up information about today's clinic visit or your schedule please contact [facility], INFUSION SERVICES directly at [phone].  Normal or non-critical lab and imaging results will be communicated to you by Response Biomedicalhart, letter or phone within 4 business days after the clinic has received the results. If you do not hear from us within 7 days, please contact the clinic through [employer] or phone. If you have a critical or abnormal lab result, we will notify you by phone as soon as possible.  Submit refill requests through Cat[employer]ult or call your pharmacy and they will forward the refill request to us. Please allow 3 business days for your refill to be completed.          Additional Information About Your Visit        Cat[employer]ult Information     [employer] gives you secure access to your electronic health record. If you see a primary care provider, you can also send messages to your care team and make appointments. If you have questions, please call your primary care clinic.  If you do not have a primary care  provider, please call [phone] and they will assist you.        Care EveryWhere ID     This is your Care EveryWhere ID. This could be used by other organizations to access your [place] medical records  [id number]        Your Vitals Were     Pulse Temperature Respirations Pulse Oximetry          74 98.6  F (37  C) 16 96%         Blood Pressure from Last 3 Encounters:   02/15/18 113/62   02/08/18 114/67   02/01/18 112/63    Weight from Last 3 Encounters:   02/08/18 85.1 kg (187 lb 9.8 oz)   01/11/18 83.7 kg (184 lb 8.4 oz)   12/07/17 82.8 kg (182 lb 8.7 oz)              Today, you had the following     No orders found for display       Primary Care Provider Office Phone # Fax #    Candy Ridley [phone]       [facility] [address]  [facility] [address]        Equal Access to Services     TONY [facility] : Hadii aad ku hadasho Soomaali, waaxda luqadaha, qaybta kaalmada adeegyada, waxay idiin hayjuden shaggy castillo . So [facility] [phone].    ATENCIÓN: Si habla español, tiene a madrigal disposición servicios gratuitos de asistencia lingüística. Llame al [phone].    We comply with applicable federal civil rights laws and [place] laws. We do not discriminate on the basis of race, color, national origin, age, disability, sex, sexual orientation, or gender identity.            Thank you!     Thank you for choosing [facility] SERVICES  for your care. Our goal is always to provide you with excellent care. Hearing back from our patients is one way we can continue to improve our services. Please take a few minutes to complete the written survey that you may receive in the mail after your visit with us. Thank you!             Your Updated Medication List - Protect others around you: Learn how to safely use, store and throw away your medicines at www.disposemymeds.org.          This list is accurate as of 2/15/18  1:05 PM.  Always use your most recent med  list.                   Brand Name Dispense Instructions for use Diagnosis    cloNIDine 0.1 MG tablet    CATAPRES    45 tablet    1.5 tablets 1 hour prior to scheduled ketamine dose.    Severe episode of recurrent major depressive disorder, without psychotic features (H)       COMPOUND CONTAINING CONTROLLED SUBSTANCE - PHARMACY TO MIX COMPOUNDED MEDICATION    CMPD RX    1 Bottle    Ketamine 150 mg/mL. Instill 3 sprays in each nostril every 7 days.  30 d/s.    Major depressive disorder, recurrent, severe without psychotic features (H)       KETAMINE HCL           lithium 150 MG capsule     90 capsule    Take 1 capsule (150 mg) by mouth daily    Major depressive disorder, recurrent, severe without psychotic features (H)       LORazepam 0.5 MG tablet    ATIVAN    30 tablet    Take 1-2 tablets (0.5-1 mg) by mouth every 6 hours as needed for anxiety    Major depressive disorder, recurrent, severe without psychotic features (H)       methylphenidate 10 MG tablet    RITALIN    120 tablet    Take 2 tablets (20 mg) by mouth 2 times daily    Severe single current episode of major depressive disorder, without psychotic features (H)       sertraline 25 MG tablet    ZOLOFT    1 tablet    Take 0.5 tablets (12.5 mg) by mouth daily    Major depressive disorder, recurrent, severe without psychotic features (H)       vitamin D 35169 UNIT capsule    ERGOCALCIFEROL     Take 50,000 Units by mouth once a week

## 2018-02-22 ENCOUNTER — INFUSION THERAPY VISIT (OUTPATIENT)
Dept: INFUSION THERAPY | Facility: CLINIC | Age: 48
End: 2018-02-22
Attending: PSYCHIATRY & NEUROLOGY
Payer: MEDICARE

## 2018-02-22 VITALS
RESPIRATION RATE: 16 BRPM | DIASTOLIC BLOOD PRESSURE: 76 MMHG | SYSTOLIC BLOOD PRESSURE: 117 MMHG | WEIGHT: 188.05 LBS | OXYGEN SATURATION: 96 % | TEMPERATURE: 97.7 F | HEART RATE: 65 BPM | BODY MASS INDEX: 29.45 KG/M2

## 2018-02-22 DIAGNOSIS — F33.2 SEVERE EPISODE OF RECURRENT MAJOR DEPRESSIVE DISORDER, WITHOUT PSYCHOTIC FEATURES (H): Primary | ICD-10-CM

## 2018-02-22 DIAGNOSIS — F33.2 SEVERE RECURRENT MAJOR DEPRESSION WITHOUT PSYCHOTIC FEATURES (H): ICD-10-CM

## 2018-02-22 PROCEDURE — 25000125 ZZHC RX 250: Performed by: PSYCHIATRY & NEUROLOGY

## 2018-02-22 PROCEDURE — 96361 HYDRATE IV INFUSION ADD-ON: CPT

## 2018-02-22 PROCEDURE — 96365 THER/PROPH/DIAG IV INF INIT: CPT

## 2018-02-22 PROCEDURE — 25000128 H RX IP 250 OP 636: Performed by: PSYCHIATRY & NEUROLOGY

## 2018-02-22 RX ADMIN — KETAMINE HYDROCHLORIDE 37.5 MG: 50 INJECTION, SOLUTION INTRAMUSCULAR; INTRAVENOUS at 10:43

## 2018-02-22 RX ADMIN — SODIUM CHLORIDE 250 ML: 9 INJECTION, SOLUTION INTRAVENOUS at 10:43

## 2018-02-22 NOTE — MR AVS SNAPSHOT
After Visit Summary   2/22/2018    Sebastien Hoover    MRN: [id number]           Patient Information     Date Of Birth          1970        Visit Information        Provider Department      2/22/2018 10:30 AM [facility] 01 [facility]        Today's Diagnoses     Severe episode of recurrent major depressive disorder, without psychotic features (H)    -  1    Severe recurrent major depression without psychotic features (H)           Follow-ups after your visit        Your next 10 appointments already scheduled     Mar 01, 2018  1:00 PM CST   Adult Med Follow UP with Venancio Ochoa MD   Psychiatry Clinic ([facility])    [address]  [address] [facility] [address]   [phone]            Mar 02, 2018 10:00 AM CST   Level 3 with UR BD 01   [facility] ([facility])    [address] [place] [address] [address]  [address] [place] [address]   [phone]            Mar 08, 2018 10:30 AM CST   Level 3 with UR CH 03   [facility] ([facility])    [address] [place] [address] [address]  [address] [place] [address]   [phone]            Mar 15, 2018 10:30 AM CDT   Level 3 with UR CH 01   [facility])    [address] [place] [address] [address]  [address]  [facility] [address]   [phone]            Mar 22, 2018 10:30 AM CDT   Level 3 with UR CH 01   [facility] ([facility])    [address] [place] [address] [address]  [address]  [facility] [address]   [phone]            Mar 29, 2018 10:30 AM CDT   Level 3 with UR CH 03   [facility] ([facility])    [address] [place] [address] [address]  [address]  [facility] [address]   [phone]            May 03, 2018  1:00 PM CDT   Adult  Med Follow UP with Venancio Ochoa MD   Psychiatry Clinic ([facility])    [address]  [address] [facility] [address]   [phone]            Jun 28, 2018  2:00 PM CDT   Adult Med Follow UP with Venancio Ochoa MD   Psychiatry Clinic ([facility])    [address]  [address] [facility] [address]   [phone]            Sep 20, 2018  1:00 PM CDT   Adult Med Follow UP with Venancio Ochoa MD   Psychiatry Clinic ([facility])    [address]  [address] [facility] [address]   [phone]            Nov 22, 2018  1:00 PM CST   Adult Med Follow UP with Venancio Ochoa MD   Psychiatry Clinic ([facility])    [address]  [address] [facility] [address]   [phone]              Who to contact     If you have questions or need follow up information about today's clinic visit or your schedule please contact [facility] [place] directly at [phone].  Normal or non-critical lab and imaging results will be communicated to you by Kinesio Capturehart, letter or phone within 4 business days after the clinic has received the results. If you do not hear from us within 7 days, please contact the clinic through K[employer] Capturehart or phone. If you have a critical or abnormal lab result, we will notify you by phone as soon as possible.  Submit refill requests through [employer] or call your pharmacy and they will forward the refill request to us. Please allow 3 business days for your refill to be completed.          Additional Information About Your Visit        [employer] Information     [employer] gives you secure access to your electronic health record. If you see a primary care provider, you can also send messages to your care team and make appointments. If you have questions, please call your primary care clinic.  If you do not have a primary  care provider, please call [phone] and they will assist you.        Care EveryWhere ID     This is your Care EveryWhere ID. This could be used by other organizations to access your [place] medical records  [id number]        Your Vitals Were     Pulse Temperature Respirations Pulse Oximetry BMI (Body Mass Index)       65 97.7  F (36.5  C) 16 96% 29.45 kg/m2        Blood Pressure from Last 3 Encounters:   02/22/18 117/76   02/15/18 113/62   02/08/18 114/67    Weight from Last 3 Encounters:   02/22/18 85.3 kg (188 lb 0.8 oz)   02/08/18 85.1 kg (187 lb 9.8 oz)   01/11/18 83.7 kg (184 lb 8.4 oz)              Today, you had the following     No orders found for display       Primary Care Provider Office Phone # Fax #    Candy Ridley [phone]       [facility] [address]  [facility] [address]        Equal Access to Services     [facility] : Hadii aad ku hadasho Soomaali, waaxda luqadaha, qaybta kaalmada adeegyada, waxay domingain haysofia castillo . So [facility] [phone].    ATENCIÓN: Si habla español, tiene a madrigal disposición servicios gratuitos de asistencia lingüística. Llame al [phone].    We comply with applicable federal civil rights laws and [place] laws. We do not discriminate on the basis of race, color, national origin, age, disability, sex, sexual orientation, or gender identity.            Thank you!     Thank you for choosing [facility]  for your care. Our goal is always to provide you with excellent care. Hearing back from our patients is one way we can continue to improve our services. Please take a few minutes to complete the written survey that you may receive in the mail after your visit with us. Thank you!             Your Updated Medication List - Protect others around you: Learn how to safely use, store and throw away your medicines at www.disposemymeds.org.          This list is accurate as of 2/22/18  1:15 PM.   Always use your most recent med list.                   Brand Name Dispense Instructions for use Diagnosis    cloNIDine 0.1 MG tablet    CATAPRES    45 tablet    1.5 tablets 1 hour prior to scheduled ketamine dose.    Severe episode of recurrent major depressive disorder, without psychotic features (H)       COMPOUND CONTAINING CONTROLLED SUBSTANCE - PHARMACY TO MIX COMPOUNDED MEDICATION    CMPD RX    1 Bottle    Ketamine 150 mg/mL. Instill 3 sprays in each nostril every 7 days.  30 d/s.    Major depressive disorder, recurrent, severe without psychotic features (H)       KETAMINE HCL           lithium 150 MG capsule     90 capsule    Take 1 capsule (150 mg) by mouth daily    Major depressive disorder, recurrent, severe without psychotic features (H)       LORazepam 0.5 MG tablet    ATIVAN    30 tablet    Take 1-2 tablets (0.5-1 mg) by mouth every 6 hours as needed for anxiety    Major depressive disorder, recurrent, severe without psychotic features (H)       methylphenidate 10 MG tablet    RITALIN    120 tablet    Take 2 tablets (20 mg) by mouth 2 times daily    Severe single current episode of major depressive disorder, without psychotic features (H)       sertraline 25 MG tablet    ZOLOFT    1 tablet    Take 0.5 tablets (12.5 mg) by mouth daily    Major depressive disorder, recurrent, severe without psychotic features (H)       vitamin D 48666 UNIT capsule    ERGOCALCIFEROL     Take 50,000 Units by mouth once a week

## 2018-03-01 ENCOUNTER — OFFICE VISIT (OUTPATIENT)
Dept: PSYCHIATRY | Facility: CLINIC | Age: 48
End: 2018-03-01
Attending: PSYCHIATRY & NEUROLOGY
Payer: MEDICARE

## 2018-03-01 VITALS
WEIGHT: 189.2 LBS | BODY MASS INDEX: 29.63 KG/M2 | DIASTOLIC BLOOD PRESSURE: 94 MMHG | SYSTOLIC BLOOD PRESSURE: 134 MMHG | HEART RATE: 89 BPM

## 2018-03-01 DIAGNOSIS — F32.2 SEVERE SINGLE CURRENT EPISODE OF MAJOR DEPRESSIVE DISORDER, WITHOUT PSYCHOTIC FEATURES (H): ICD-10-CM

## 2018-03-01 PROCEDURE — G0463 HOSPITAL OUTPT CLINIC VISIT: HCPCS | Mod: ZF

## 2018-03-01 RX ORDER — METHYLPHENIDATE HYDROCHLORIDE 10 MG/1
20 TABLET ORAL 2 TIMES DAILY
Qty: 120 TABLET | Refills: 0 | Status: SHIPPED | OUTPATIENT
Start: 2018-03-01 | End: 2018-03-01

## 2018-03-01 RX ORDER — METHYLPHENIDATE HYDROCHLORIDE 10 MG/1
20 TABLET ORAL 2 TIMES DAILY
Qty: 120 TABLET | Refills: 0 | Status: SHIPPED | OUTPATIENT
Start: 2018-03-01 | End: 2018-05-03

## 2018-03-01 ASSESSMENT — PAIN SCALES - GENERAL: PAINLEVEL: NO PAIN (0)

## 2018-03-01 NOTE — MR AVS SNAPSHOT
After Visit Summary   3/1/2018    Sebastien Hoover    MRN: [id number]           Patient Information     Date Of Birth          1970        Visit Information        Provider Department      3/1/2018 1:00 PM Venancio Ochoa MD Psychiatry Clinic        Today's Diagnoses     Severe single current episode of major depressive disorder, without psychotic features (H)           Follow-ups after your visit        Your next 10 appointments already scheduled     Apr 12, 2018 10:30 AM CDT   Level 3 with UR CH 03   [facility] [place] ([facility])    [address] [place] [address] [address]  [address] [place] [address]   [phone]            Apr 20, 2018 10:30 AM CDT   Level 3 with UR CH 04   [facility], Infusion Services ([facility])    [address] [place] [address] [address]  [address] [place] [address]   [phone]            Apr 26, 2018 10:30 AM CDT   Level 3 with UR CH 04   [facility], [facility]fusion [facility] ([facility])    [address] [place] [address] [address]  [address] [place] [address]   [phone]            May 03, 2018  1:00 PM CDT   Adult Med Follow UP with Venancio Ochoa MD   Psychiatry Clinic ([facility])    [address]  [facility] [address]   [phone]            Jun 28, 2018  2:00 PM CDT   Adult Med Follow UP with Venancio Ochoa MD   Psychiatry Clinic ([facility])    [address]  [facility] [address]   [phone]            Sep 20, 2018  1:00 PM CDT   Adult Med Follow UP with Venancio Ochoa MD   Psychiatry Clinic ([facility])    [address]  [facility] [address] [address]   [phone]            Nov 15, 2018  2:00 PM CST   Adult Med Follow UP with Venancio Ochoa MD   Psychiatry Clinic ([facility])    [address]  [address] [address]   [phone]            Jan 17, 2019  1:00 PM CST   Adult Med Follow UP with Venancio Ochoa MD   Psychiatry Clinic ([facility])    [address]  [address] [address] [phone]            Mar 14, 2019  1:00 PM CDT   Adult Med Follow UP with Venancio Ochoa MD   Psychiatry Clinic ([facility])    [address]  [address] [id number] [phone]              Who to contact     Please call your clinic at [phone] to:    Ask questions about your health    Make or cancel appointments    Discuss your medicines    Learn about your test results    Speak to your doctor            Additional Information About Your Visit        [employer] Information     [employer] gives you secure access to your electronic health record. If you see a primary care provider, you can also send messages to your care team and make appointments. If you have questions, please call your primary care clinic.  If you do not have a primary care provider, please call [phone] and they will assist you.      Mind[employer] is an electronic gateway that provides easy, online access to your medical records. With [employer], you can request a clinic appointment, read your test results, renew a prescription or communicate with your care team.     To access your existing account, please contact your [facility] or call [phone] for assistance.        Care EveryWhere ID     This is your Care EveryWhere ID. This could be used by other organizations to access your [place] medical records  [id number]        Your Vitals Were     Pulse BMI (Body Mass Index)                89 29.63 kg/m2           Blood Pressure from Last 3 Encounters:   04/05/18 112/70   03/29/18 105/62   03/22/18 108/73    Weight from  Last 3 Encounters:   04/05/18 89 kg (196 lb 3.4 oz)   03/01/18 85.8 kg (189 lb 3.2 oz)   02/22/18 85.3 kg (188 lb 0.8 oz)              Today, you had the following     No orders found for display         Today's Medication Changes          These changes are accurate as of 3/1/18 11:59 PM.  If you have any questions, ask your nurse or doctor.               Start taking these medicines.        Dose/Directions    methylphenidate 10 MG tablet   Commonly known as:  RITALIN   Used for:  Severe single current episode of major depressive disorder, without psychotic features (H)   Started by:  Venancio Ochoa MD        Dose:  20 mg   Take 2 tablets (20 mg) by mouth 2 times daily   Quantity:  120 tablet   Refills:  0            Where to get your medicines      Some of these will need a paper prescription and others can be bought over the counter.  Ask your nurse if you have questions.     Bring a paper prescription for each of these medications     methylphenidate 10 MG tablet                Primary Care Provider Office Phone # Fax #    Candy Sweta Ridley [phone]       [facility] [address]  [facility] [address]        Equal Access to Services     [facility] AH: Hadii nancy avendanoo Sodaniella, waaxda luqadaha, qaybta kaalmada adefernyyada, guillermo castillo . So [facility] [phone].    ATENCIÓN: Si habla español, tiene a madrigal disposición servicios gratuitos de asistencia lingüística. Llame al [phone].    We comply with applicable federal civil rights laws and [place] laws. We do not discriminate on the basis of race, color, national origin, age, disability, sex, sexual orientation, or gender identity.            Thank you!     Thank you for choosing PSYCH[facility]ATRY CLINIC  for your care. Our goal is always to provide you with excellent care. Hearing back from our patients is one way we can continue to improve our services. Please take a few minutes to complete  the written survey that you may receive in the mail after your visit with us. Thank you!             Your Updated Medication List - Protect others around you: Learn how to safely use, store and throw away your medicines at www.disposemymeds.org.          This list is accurate as of 3/1/18 11:59 PM.  Always use your most recent med list.                   Brand Name Dispense Instructions for use Diagnosis    cloNIDine 0.1 MG tablet    CATAPRES    45 tablet    1.5 tablets 1 hour prior to scheduled ketamine dose.    Severe episode of recurrent major depressive disorder, without psychotic features (H)       COMPOUND CONTAINING CONTROLLED SUBSTANCE - PHARMACY TO MIX COMPOUNDED MEDICATION    CMPD RX    1 Bottle    Ketamine 150 mg/mL. Instill 3 sprays in each nostril every 7 days.  30 d/s.    Major depressive disorder, recurrent, severe without psychotic features (H)       KETAMINE HCL           lithium 150 MG capsule     90 capsule    Take 1 capsule (150 mg) by mouth daily    Major depressive disorder, recurrent, severe without psychotic features (H)       LORazepam 0.5 MG tablet    ATIVAN    30 tablet    Take 1-2 tablets (0.5-1 mg) by mouth every 6 hours as needed for anxiety    Major depressive disorder, recurrent, severe without psychotic features (H)       methylphenidate 10 MG tablet    RITALIN    120 tablet    Take 2 tablets (20 mg) by mouth 2 times daily    Severe single current episode of major depressive disorder, without psychotic features (H)       sertraline 25 MG tablet    ZOLOFT    1 tablet    Take 0.5 tablets (12.5 mg) by mouth daily    Major depressive disorder, recurrent, severe without psychotic features (H)       vitamin D 92619 UNIT capsule    ERGOCALCIFEROL     Take 50,000 Units by mouth once a week

## 2018-03-02 ENCOUNTER — INFUSION THERAPY VISIT (OUTPATIENT)
Dept: INFUSION THERAPY | Facility: CLINIC | Age: 48
End: 2018-03-02
Attending: PSYCHIATRY & NEUROLOGY
Payer: MEDICARE

## 2018-03-02 VITALS
DIASTOLIC BLOOD PRESSURE: 67 MMHG | TEMPERATURE: 97.4 F | OXYGEN SATURATION: 96 % | RESPIRATION RATE: 16 BRPM | HEART RATE: 69 BPM | SYSTOLIC BLOOD PRESSURE: 107 MMHG

## 2018-03-02 DIAGNOSIS — F33.2 SEVERE RECURRENT MAJOR DEPRESSION WITHOUT PSYCHOTIC FEATURES (H): ICD-10-CM

## 2018-03-02 DIAGNOSIS — F33.2 SEVERE EPISODE OF RECURRENT MAJOR DEPRESSIVE DISORDER, WITHOUT PSYCHOTIC FEATURES (H): Primary | ICD-10-CM

## 2018-03-02 PROCEDURE — 96365 THER/PROPH/DIAG IV INF INIT: CPT

## 2018-03-02 PROCEDURE — 96361 HYDRATE IV INFUSION ADD-ON: CPT

## 2018-03-02 PROCEDURE — 25000125 ZZHC RX 250: Performed by: PSYCHIATRY & NEUROLOGY

## 2018-03-02 PROCEDURE — 25000128 H RX IP 250 OP 636: Performed by: PSYCHIATRY & NEUROLOGY

## 2018-03-02 RX ADMIN — KETAMINE HYDROCHLORIDE 37.5 MG: 50 INJECTION, SOLUTION INTRAMUSCULAR; INTRAVENOUS at 10:20

## 2018-03-02 RX ADMIN — SODIUM CHLORIDE 250 ML: 9 INJECTION, SOLUTION INTRAVENOUS at 10:21

## 2018-03-02 NOTE — MR AVS SNAPSHOT
After Visit Summary   3/2/2018    Sebastien Hoover    MRN: [id number]           Patient Information     Date Of Birth          1970        Visit Information        Provider Department      3/2/2018 10:00 AM [facility] BD [facility]        Today's Diagnoses     Severe episode of recurrent major depressive disorder, without psychotic features (H)    -  1    Severe recurrent major depression without psychotic features (H)           Follow-ups after your visit        Your next 10 appointments already scheduled     Mar 08, 2018 10:30 AM CST   Level 3 with UR CH 03   [facility] ([facility])    [address]  [facility] [address]   [phone]            Mar 15, 2018 10:30 AM CDT   Level 3 with UR CH 01   [facility])    [address]  [facility] [address]   [phone]            Mar 22, 2018 10:30 AM CDT   Level 3 with UR CH 01   [facility] ([facility])    [address]  [facility] [address]   [phone]            Mar 29, 2018 10:30 AM CDT   Level 3 with UR CH 03   [facility] ([facility])    [address]  [address]  [facility] [address]   [phone]            Apr 05, 2018 10:30 AM CDT   Level 3 with UR CH 03   [facility])    [address]  [address]  [facility] [address]   [phone]            Apr 12, 2018 10:30 AM CDT   Level 3 with UR CH 03   [facility])    [address]  [address]  [facility] [address]   [phone]            Apr 19, 2018 10:30 AM CDT   Level 3 with  UR CH 03   [facility] ([facility])    [address]  [facility] [address]   [phone]            Apr 26, 2018 10:30 AM CDT   Level 3 with UR CH 04   [facility], Infusion Services ([facility])    [address]  [address]  [facility] [address]   [phone]            May 03, 2018  1:00 PM CDT   Adult Med Follow UP with Venancio Ochoa MD   Psychiatry Clinic ([facility])    [place] [id number]  [address] [place] [phone]            Jun 28, 2018  2:00 PM CDT   Adult Med Follow UP with Venancio Ochoa MD   Psychiatry Clinic ([facility])    [place] [address]  [address] [place] [id number] [phone]              Who to contact     If you have questions or need follow up information about today's clinic visit or your schedule please contact [facility], [facility] SERVICES directly at [phone].  Normal or non-critical lab and imaging results will be communicated to you by Dormirhart, letter or phone within 4 business days after the clinic has received the results. If you do not hear from us within 7 days, please contact the clinic through Dormirhart or phone. If you have a critical or abnormal lab result, we will notify you by phone as soon as possible.  Submit refill requests through [employer] or call your pharmacy and they will forward the refill request to us. Please allow 3 business days for your refill to be completed.          Additional Information About Your Visit        [employer] Information     [employer] gives you secure access to your electronic health record. If you see a primary care provider, you can also send messages to your care team and make appointments. If you have questions, please call your primary care clinic.  If you do not have a primary care  provider, please call [phone] and they will assist you.        Care EveryWhere ID     This is your Care EveryWhere ID. This could be used by other organizations to access your [place] medical records  [id number]        Your Vitals Were     Pulse Temperature Respirations Pulse Oximetry          69 97.4  F (36.3  C) 16 96%         Blood Pressure from Last 3 Encounters:   03/02/18 107/67   02/22/18 117/76   02/15/18 113/62    Weight from Last 3 Encounters:   02/22/18 85.3 kg (188 lb 0.8 oz)   02/08/18 85.1 kg (187 lb 9.8 oz)   01/11/18 83.7 kg (184 lb 8.4 oz)              Today, you had the following     No orders found for display       Primary Care Provider Office Phone # Fax #    Candy Sweta Ridley [phone]       [facility] [address]  [facility] [address]        Equal Access to Services     [facility] : Hadii aad ku hadasho Soomaali, waaxda luqadaha, qaybta kaalmada adeegyada, waxay idiin hayaan catherineeg juddarajose espinoza'sofia . So [facility] [phone].    ATENCIÓN: Si habla español, tiene a madrigal disposición servicios gratuitos de asistencia lingüística. Llame al [phone].    We comply with applicable federal civil rights laws and [place] laws. We do not discriminate on the basis of race, color, national origin, age, disability, sex, sexual orientation, or gender identity.            Thank you!     Thank you for choosing [facility]  for your care. Our goal is always to provide you with excellent care. Hearing back from our patients is one way we can continue to improve our services. Please take a few minutes to complete the written survey that you may receive in the mail after your visit with us. Thank you!             Your Updated Medication List - Protect others around you: Learn how to safely use, store and throw away your medicines at www.disposemymeds.org.          This list is accurate as of 3/2/18  2:21 PM.  Always use your most recent med  list.                   Brand Name Dispense Instructions for use Diagnosis    cloNIDine 0.1 MG tablet    CATAPRES    45 tablet    1.5 tablets 1 hour prior to scheduled ketamine dose.    Severe episode of recurrent major depressive disorder, without psychotic features (H)       COMPOUND CONTAINING CONTROLLED SUBSTANCE - PHARMACY TO MIX COMPOUNDED MEDICATION    CMPD RX    1 Bottle    Ketamine 150 mg/mL. Instill 3 sprays in each nostril every 7 days.  30 d/s.    Major depressive disorder, recurrent, severe without psychotic features (H)       KETAMINE HCL           lithium 150 MG capsule     90 capsule    Take 1 capsule (150 mg) by mouth daily    Major depressive disorder, recurrent, severe without psychotic features (H)       LORazepam 0.5 MG tablet    ATIVAN    30 tablet    Take 1-2 tablets (0.5-1 mg) by mouth every 6 hours as needed for anxiety    Major depressive disorder, recurrent, severe without psychotic features (H)       methylphenidate 10 MG tablet    RITALIN    120 tablet    Take 2 tablets (20 mg) by mouth 2 times daily    Severe single current episode of major depressive disorder, without psychotic features (H)       sertraline 25 MG tablet    ZOLOFT    1 tablet    Take 0.5 tablets (12.5 mg) by mouth daily    Major depressive disorder, recurrent, severe without psychotic features (H)       vitamin D 08198 UNIT capsule    ERGOCALCIFEROL     Take 50,000 Units by mouth once a week

## 2018-03-08 ENCOUNTER — INFUSION THERAPY VISIT (OUTPATIENT)
Dept: INFUSION THERAPY | Facility: CLINIC | Age: 48
End: 2018-03-08
Attending: PSYCHIATRY & NEUROLOGY
Payer: MEDICARE

## 2018-03-08 VITALS
DIASTOLIC BLOOD PRESSURE: 65 MMHG | HEART RATE: 69 BPM | RESPIRATION RATE: 16 BRPM | TEMPERATURE: 97.8 F | OXYGEN SATURATION: 95 % | SYSTOLIC BLOOD PRESSURE: 110 MMHG

## 2018-03-08 DIAGNOSIS — F33.2 SEVERE EPISODE OF RECURRENT MAJOR DEPRESSIVE DISORDER, WITHOUT PSYCHOTIC FEATURES (H): Primary | ICD-10-CM

## 2018-03-08 DIAGNOSIS — F33.2 SEVERE RECURRENT MAJOR DEPRESSION WITHOUT PSYCHOTIC FEATURES (H): ICD-10-CM

## 2018-03-08 PROCEDURE — 96365 THER/PROPH/DIAG IV INF INIT: CPT

## 2018-03-08 PROCEDURE — 25000125 ZZHC RX 250: Performed by: PSYCHIATRY & NEUROLOGY

## 2018-03-08 PROCEDURE — 25000128 H RX IP 250 OP 636: Performed by: PSYCHIATRY & NEUROLOGY

## 2018-03-08 RX ADMIN — SODIUM CHLORIDE 250 ML: 9 INJECTION, SOLUTION INTRAVENOUS at 11:44

## 2018-03-08 RX ADMIN — KETAMINE HYDROCHLORIDE 37.5 MG: 50 INJECTION, SOLUTION INTRAMUSCULAR; INTRAVENOUS at 11:44

## 2018-03-08 NOTE — MR AVS SNAPSHOT
After Visit Summary   3/8/2018    Sebastien Hoover    MRN: [id number]           Patient Information     Date Of Birth          1970        Visit Information        Provider Department      3/8/2018 10:30 AM UR CH 03 [facility]        Today's Diagnoses     Severe episode of recurrent major depressive disorder, without psychotic features (H)    -  1    Severe recurrent major depression without psychotic features (H)           Follow-ups after your visit        Your next 10 appointments already scheduled     Mar 15, 2018 10:30 AM CDT   Level 3 with UR CH 01   [facility] ([facility])    [address]  [address]  [facility] [address]   [phone]            Mar 22, 2018 10:30 AM CDT   Level 3 with UR CH 01   [facility] ([facility])    [address]  [address]  [facility] [address]   [phone]            Mar 29, 2018 10:30 AM CDT   Level 3 with UR CH 03   [facility] ([facility])    [address]  [address]  [facility] [address]   [phone]            Apr 05, 2018 10:30 AM CDT   Level 3 with UR CH 03   [facility] ([facility])    [address]  [address]  [facility] [address]   [phone]            Apr 12, 2018 10:30 AM CDT   Level 3 with UR CH 03   [facility] ([facility])    [address]  [address]  [facility] [address]   [phone]            Apr 19, 2018 10:30 AM CDT   Level 3 with UR CH 03   [facility] ([facility])    [address]  [address]  [facility] [address]   [phone]            Apr 26, 2018 10:30 AM CDT   Level 3 with  UR CH 04   [facility]fusion Services ([facility])    [address] [place] [address] [address].  [address]  [facility] [address]   [phone]            May 03, 2018  1:00 PM CDT   Adult Med Follow UP with Venancio Ochoa MD   Psychiatry Clinic ([facility])    [address]  [facility] [address] [phone]            Jun 28, 2018  2:00 PM CDT   Adult Med Follow UP with Venancio Ochoa MD   Psychiatry Clinic ([facility])    [address]  [facility] [address] [address] [phone]            Sep 20, 2018  1:00 PM CDT   Adult Med Follow UP with Venancio Ochoa MD   Psychiatry Clinic ([facility])    [place] [id number]  [address] [place] [address] [phone]              Who to contact     If you have questions or need follow up information about today's clinic visit or your schedule please contact [facility], INFUSION SERVICES directly at [phone].  Normal or non-critical lab and imaging results will be communicated to you by Proxima Cancionhart, letter or phone within 4 business days after the clinic has received the results. If you do not hear from us within 7 days, please contact the clinic through Proxima Cancionhart or phone. If you have a critical or abnormal lab result, we will notify you by phone as soon as possible.  Submit refill requests through [employer] or call your pharmacy and they will forward the refill request to us. Please allow 3 business days for your refill to be completed.          Additional Information About Your Visit        Green Chips Information     Green Chips gives you secure access to your electronic health record. If you see a primary care provider, you can also send messages to your care team and make appointments. If you have questions, please call your primary care clinic.  If you do not have a primary  care provider, please call [phone] and they will assist you.        Care EveryWhere ID     This is your Care EveryWhere ID. This could be used by other organizations to access your [place] medical records  [id number]        Your Vitals Were     Pulse Temperature Respirations Pulse Oximetry          69 97.8  F (36.6  C) (Oral) 16 95%         Blood Pressure from Last 3 Encounters:   03/08/18 110/65   03/02/18 107/67   02/22/18 117/76    Weight from Last 3 Encounters:   02/22/18 85.3 kg (188 lb 0.8 oz)   02/08/18 85.1 kg (187 lb 9.8 oz)   01/11/18 83.7 kg (184 lb 8.4 oz)              Today, you had the following     No orders found for display       Primary Care Provider Office Phone # Fax #    Candy Sweta Ridley [phone]       [facility] [address]  [facility] [address]        Equal Access to Services     STEVEN ARTHUR : Hadii aad ku hadasho Soomaali, waaxda luqadaha, qaybta kaalmada adeegyada, waxay idiin hayaan adeeg kharajose la'juden . So [facility] [phone].    ATENCIÓN: Si habla español, tiene a madrigal disposición servicios gratuitos de asistencia lingüística. Llame al [phone].    We comply with applicable federal civil rights laws and [place] laws. We do not discriminate on the basis of race, color, national origin, age, disability, sex, sexual orientation, or gender identity.            Thank you!     Thank you for choosing [facility], [facility] SERVICES  for your care. Our goal is always to provide you with excellent care. Hearing back from our patients is one way we can continue to improve our services. Please take a few minutes to complete the written survey that you may receive in the mail after your visit with us. Thank you!             Your Updated Medication List - Protect others around you: Learn how to safely use, store and throw away your medicines at www.disposemymeds.org.          This list is accurate as of 3/8/18  2:52 PM.  Always use your most  recent med list.                   Brand Name Dispense Instructions for use Diagnosis    cloNIDine 0.1 MG tablet    CATAPRES    45 tablet    1.5 tablets 1 hour prior to scheduled ketamine dose.    Severe episode of recurrent major depressive disorder, without psychotic features (H)       COMPOUND CONTAINING CONTROLLED SUBSTANCE - PHARMACY TO MIX COMPOUNDED MEDICATION    CMPD RX    1 Bottle    Ketamine 150 mg/mL. Instill 3 sprays in each nostril every 7 days.  30 d/s.    Major depressive disorder, recurrent, severe without psychotic features (H)       KETAMINE HCL           lithium 150 MG capsule     90 capsule    Take 1 capsule (150 mg) by mouth daily    Major depressive disorder, recurrent, severe without psychotic features (H)       LORazepam 0.5 MG tablet    ATIVAN    30 tablet    Take 1-2 tablets (0.5-1 mg) by mouth every 6 hours as needed for anxiety    Major depressive disorder, recurrent, severe without psychotic features (H)       methylphenidate 10 MG tablet    RITALIN    120 tablet    Take 2 tablets (20 mg) by mouth 2 times daily    Severe single current episode of major depressive disorder, without psychotic features (H)       sertraline 25 MG tablet    ZOLOFT    1 tablet    Take 0.5 tablets (12.5 mg) by mouth daily    Major depressive disorder, recurrent, severe without psychotic features (H)       vitamin D 92149 UNIT capsule    ERGOCALCIFEROL     Take 50,000 Units by mouth once a week

## 2018-03-08 NOTE — PROGRESS NOTES
Here for ketamine infusion. Depression is stable. No new health issues. At 12:30 had episode of confusion and agitation. Pulled out his PIV. He calmed down and was reoriented in 5-10 minutes. He did not want the PIV restarted. Said he would drink extra fluids. Vitals monitored every 15 minutes. Stayed for 1 hour post infusion. Left ambulatory when discharged. Gait steady. Stated he was alright to go home.  Has ride home.

## 2018-03-15 ENCOUNTER — INFUSION THERAPY VISIT (OUTPATIENT)
Dept: INFUSION THERAPY | Facility: CLINIC | Age: 48
End: 2018-03-15
Attending: PSYCHIATRY & NEUROLOGY
Payer: MEDICARE

## 2018-03-15 VITALS
RESPIRATION RATE: 16 BRPM | TEMPERATURE: 97.4 F | OXYGEN SATURATION: 96 % | DIASTOLIC BLOOD PRESSURE: 84 MMHG | HEART RATE: 64 BPM | SYSTOLIC BLOOD PRESSURE: 122 MMHG

## 2018-03-15 DIAGNOSIS — F33.2 SEVERE RECURRENT MAJOR DEPRESSION WITHOUT PSYCHOTIC FEATURES (H): ICD-10-CM

## 2018-03-15 DIAGNOSIS — F33.2 SEVERE EPISODE OF RECURRENT MAJOR DEPRESSIVE DISORDER, WITHOUT PSYCHOTIC FEATURES (H): Primary | ICD-10-CM

## 2018-03-15 PROCEDURE — 96361 HYDRATE IV INFUSION ADD-ON: CPT

## 2018-03-15 PROCEDURE — 96365 THER/PROPH/DIAG IV INF INIT: CPT

## 2018-03-15 PROCEDURE — 25000128 H RX IP 250 OP 636: Performed by: PSYCHIATRY & NEUROLOGY

## 2018-03-15 PROCEDURE — 25000125 ZZHC RX 250: Performed by: PSYCHIATRY & NEUROLOGY

## 2018-03-15 RX ADMIN — KETAMINE HYDROCHLORIDE 37.5 MG: 50 INJECTION, SOLUTION INTRAMUSCULAR; INTRAVENOUS at 12:50

## 2018-03-15 RX ADMIN — SODIUM CHLORIDE 250 ML: 9 INJECTION, SOLUTION INTRAVENOUS at 12:50

## 2018-03-15 NOTE — MR AVS SNAPSHOT
After Visit Summary   3/15/2018    Sebastien Hoover    MRN: [id number]           Patient Information     Date Of Birth          1970        Visit Information        Provider Department      3/15/2018 12:00 PM [facility]        Today's Diagnoses     Severe episode of recurrent major depressive disorder, without psychotic features (H)    -  1    Severe recurrent major depression without psychotic features (H)           Follow-ups after your visit        Your next 10 appointments already scheduled     Mar 22, 2018 10:30 AM CDT   Level 3 with UR CH 01   [facility] ([facility])    [address]  [facility] [address]   [phone]            Mar 29, 2018 10:30 AM CDT   Level 3 with UR CH 03   [facility])    [address]  [facility] [address]   [phone]            Apr 05, 2018 10:30 AM CDT   Level 3 with UR CH 03   [facility])    [address]  [facility] [address]   [phone]            Apr 12, 2018 10:30 AM CDT   Level 3 with UR CH 03   [facility])    [address]  [address]  [facility] [address]   [phone]            Apr 19, 2018 10:30 AM CDT   Level 3 with UR CH 03   [facility])    [address]  [address]  [facility] [address]   [phone]            Apr 26, 2018 10:30 AM CDT   Level 3 with UR CH 04   [facility])    [address]  [facility] [address]   [phone]            May 03, 2018  1:00 PM CDT   Adult Med  Follow UP with Venancio Ochoa MD   Psychiatry Clinic ([facility])    [place] [id number]  [address] [address]   [phone]            Jun 28, 2018  2:00 PM CDT   Adult Med Follow UP with Venancio Ochoa MD   Psychiatry Clinic ([facility])    [place] [address]  [address] [address] [phone]            Sep 20, 2018  1:00 PM CDT   Adult Med Follow UP with Venancio Ochoa MD   Psychiatry Clinic ([facility])    [place] [address]  [address] [address] [phone]            Nov 15, 2018  2:00 PM CST   Adult Med Follow UP with Venancio Ochoa MD   Psychiatry Clinic ([facility])    [place] [id number]  [facility] [address] [address] [phone]              Who to contact     If you have questions or need follow up information about today's clinic visit or your schedule please contact [facility] [place], [facility] SERVICES directly at [phone].  Normal or non-critical lab and imaging results will be communicated to you by X-1hart, letter or phone within 4 business days after the clinic has received the results. If you do not hear from us within 7 days, please contact the clinic through X-1hart or phone. If you have a critical or abnormal lab result, we will notify you by phone as soon as possible.  Submit refill requests through [employer] or call your pharmacy and they will forward the refill request to us. Please allow 3 business days for your refill to be completed.          Additional Information About Your Visit        [employer] Information     [employer] gives you secure access to your electronic health record. If you see a primary care provider, you can also send messages to your care team and make appointments. If you have questions, please call your primary care clinic.  If you do not have a  primary care provider, please call [phone] and they will assist you.        Care EveryWhere ID     This is your Care EveryWhere ID. This could be used by other organizations to access your [place] medical records  [id number]        Your Vitals Were     Pulse Temperature Respirations Pulse Oximetry          64 97.4  F (36.3  C) 16 96%         Blood Pressure from Last 3 Encounters:   03/15/18 122/84   03/08/18 110/65   03/02/18 107/67    Weight from Last 3 Encounters:   02/22/18 85.3 kg (188 lb 0.8 oz)   02/08/18 85.1 kg (187 lb 9.8 oz)   01/11/18 83.7 kg (184 lb 8.4 oz)              Today, you had the following     No orders found for display       Primary Care Provider Office Phone # Fax #    Candy Sweta Ridley [phone]       [facility] [address]  [facility] [address]        Equal Access to Services     TONY [facility] : Hadii aad ku hadasho Soomaali, waaxda luqadaha, qaybta kaalmada adeegyada, waxay idiin hayaan catherineeg kharajose la'sofia . So [facility] [phone].    ATENCIÓN: Si habla español, tiene a madrigal disposición servicios gratuitos de asistencia lingüística. Llame al [phone].    We comply with applicable federal civil rights laws and [place] laws. We do not discriminate on the basis of race, color, national origin, age, disability, sex, sexual orientation, or gender identity.            Thank you!     Thank you for choosing [facility], [facility] SERVICES  for your care. Our goal is always to provide you with excellent care. Hearing back from our patients is one way we can continue to improve our services. Please take a few minutes to complete the written survey that you may receive in the mail after your visit with us. Thank you!             Your Updated Medication List - Protect others around you: Learn how to safely use, store and throw away your medicines at www.disposemymeds.org.          This list is accurate as of 3/15/18  3:08 PM.  Always use your most  recent med list.                   Brand Name Dispense Instructions for use Diagnosis    cloNIDine 0.1 MG tablet    CATAPRES    45 tablet    1.5 tablets 1 hour prior to scheduled ketamine dose.    Severe episode of recurrent major depressive disorder, without psychotic features (H)       COMPOUND CONTAINING CONTROLLED SUBSTANCE - PHARMACY TO MIX COMPOUNDED MEDICATION    CMPD RX    1 Bottle    Ketamine 150 mg/mL. Instill 3 sprays in each nostril every 7 days.  30 d/s.    Major depressive disorder, recurrent, severe without psychotic features (H)       KETAMINE HCL           lithium 150 MG capsule     90 capsule    Take 1 capsule (150 mg) by mouth daily    Major depressive disorder, recurrent, severe without psychotic features (H)       LORazepam 0.5 MG tablet    ATIVAN    30 tablet    Take 1-2 tablets (0.5-1 mg) by mouth every 6 hours as needed for anxiety    Major depressive disorder, recurrent, severe without psychotic features (H)       methylphenidate 10 MG tablet    RITALIN    120 tablet    Take 2 tablets (20 mg) by mouth 2 times daily    Severe single current episode of major depressive disorder, without psychotic features (H)       sertraline 25 MG tablet    ZOLOFT    1 tablet    Take 0.5 tablets (12.5 mg) by mouth daily    Major depressive disorder, recurrent, severe without psychotic features (H)       vitamin D 47547 UNIT capsule    ERGOCALCIFEROL     Take 50,000 Units by mouth once a week

## 2018-03-22 ENCOUNTER — INFUSION THERAPY VISIT (OUTPATIENT)
Dept: INFUSION THERAPY | Facility: CLINIC | Age: 48
End: 2018-03-22
Attending: PSYCHIATRY & NEUROLOGY
Payer: MEDICARE

## 2018-03-22 VITALS
SYSTOLIC BLOOD PRESSURE: 108 MMHG | OXYGEN SATURATION: 97 % | DIASTOLIC BLOOD PRESSURE: 73 MMHG | RESPIRATION RATE: 16 BRPM | HEART RATE: 70 BPM | TEMPERATURE: 96.8 F

## 2018-03-22 DIAGNOSIS — F33.2 SEVERE RECURRENT MAJOR DEPRESSION WITHOUT PSYCHOTIC FEATURES (H): ICD-10-CM

## 2018-03-22 DIAGNOSIS — F33.2 SEVERE EPISODE OF RECURRENT MAJOR DEPRESSIVE DISORDER, WITHOUT PSYCHOTIC FEATURES (H): Primary | ICD-10-CM

## 2018-03-22 PROCEDURE — 25000125 ZZHC RX 250: Performed by: PSYCHIATRY & NEUROLOGY

## 2018-03-22 PROCEDURE — 25000128 H RX IP 250 OP 636: Performed by: PSYCHIATRY & NEUROLOGY

## 2018-03-22 PROCEDURE — 96361 HYDRATE IV INFUSION ADD-ON: CPT

## 2018-03-22 PROCEDURE — 96365 THER/PROPH/DIAG IV INF INIT: CPT

## 2018-03-22 RX ADMIN — KETAMINE HYDROCHLORIDE 37.5 MG: 50 INJECTION, SOLUTION INTRAMUSCULAR; INTRAVENOUS at 10:57

## 2018-03-22 RX ADMIN — SODIUM CHLORIDE 250 ML: 9 INJECTION, SOLUTION INTRAVENOUS at 10:55

## 2018-03-22 ASSESSMENT — PAIN SCALES - GENERAL: PAINLEVEL: NO PAIN (0)

## 2018-03-22 NOTE — MR AVS SNAPSHOT
After Visit Summary   3/22/2018    Sebastien Hoover    MRN: [id number]           Patient Information     Date Of Birth          1970        Visit Information        Provider Department      3/22/2018 10:30 AM [facility]        Today's Diagnoses     Severe episode of recurrent major depressive disorder, without psychotic features (H)    -  1    Severe recurrent major depression without psychotic features (H)           Follow-ups after your visit        Your next 10 appointments already scheduled     Mar 29, 2018 10:30 AM CDT   Level 3 with UR CH 03   [facility])    [address]  [address]  [facility] [address]   [phone]            Apr 05, 2018 10:30 AM CDT   Level 3 with UR CH 03   [facility])    [address] [place] [address] [address]  [address]  [facility] [address]   [phone]            Apr 12, 2018 10:30 AM CDT   Level 3 with UR CH 03   [facility])    [address] [place] [address] [address]  [address]  [facility] [address]   [phone]            Apr 20, 2018 10:30 AM CDT   Level 3 with UR CH 04   [facility])    [address]  [address]  [facility] [address]   [phone]            Apr 26, 2018 10:30 AM CDT   Level 3 with UR CH 04   [facility] ([facility])    [address] [place] [address] [address].  [address]  [facility] [address]   [phone]            May 03, 2018  1:00 PM CDT   Adult Med Follow UP with Venancio Ochoa MD   Psychiatry Clinic ([facility])    [address]  [address] [place] [address]   [phone]            Jun 28, 2018  2:00 PM CDT   Adult  Med Follow UP with Venancio Ochoa MD   Psychiatry Clinic ([facility])    [place] [id number]  [address] [address]   [phone]            Sep 20, 2018  1:00 PM CDT   Adult Med Follow UP with Venancio Ochoa MD   Psychiatry Clinic ([facility])    [place] [id number]  [facility] [address] [address] [phone]            Nov 15, 2018  2:00 PM CST   Adult Med Follow UP with Venancio Ochoa MD   Psychiatry Clinic ([facility])    [place] [address]  [address] [place] [address] [phone]            Jan 17, 2019  1:00 PM CST   Adult Med Follow UP with Venancio Ochoa MD   Psychiatry Clinic ([facility])    [place] [address]  [facility] [address] [address] [phone]              Who to contact     If you have questions or need follow up information about today's clinic visit or your schedule please contact [facility] [place], [facility] SERVICES directly at [phone].  Normal or non-critical lab and imaging results will be communicated to you by Bubbles and Beyondhart, letter or phone within 4 business days after the clinic has received the results. If you do not hear from us within 7 days, please contact the clinic through Bubbles and Beyondhart or phone. If you have a critical or abnormal lab result, we will notify you by phone as soon as possible.  Submit refill requests through [employer] or call your pharmacy and they will forward the refill request to us. Please allow 3 business days for your refill to be completed.          Additional Information About Your Visit        Check[employer] Information     [employer] gives you secure access to your electronic health record. If you see a primary care provider, you can also send messages to your care team and make appointments. If you have questions, please call your primary care clinic.  If you do not have  a primary care provider, please call [phone] and they will assist you.        Care EveryWhere ID     This is your Care EveryWhere ID. This could be used by other organizations to access your [place] medical records  [id number]        Your Vitals Were     Pulse Temperature Respirations Pulse Oximetry          70 96.8  F (36  C) 16 97%         Blood Pressure from Last 3 Encounters:   03/22/18 108/73   03/15/18 122/84   03/08/18 110/65    Weight from Last 3 Encounters:   02/22/18 85.3 kg (188 lb 0.8 oz)   02/08/18 85.1 kg (187 lb 9.8 oz)   01/11/18 83.7 kg (184 lb 8.4 oz)              Today, you had the following     No orders found for display       Primary Care Provider Office Phone # Fax #    Candy Sweta Ridley [phone]       [facility] [address]  [facility] [address]        Equal Access to Services     STEVEN ARTHUR : Hadii aad ku hadasho Soomaali, waaxda luqadaha, qaybta kaalmada adeegyada, waxay idiin hayaan shaggy kharajose la'sofia . So [facility] [phone].    ATENCIÓN: Si habla español, tiene a madrigal disposición servicios gratuitos de asistencia lingüística. Llame al [phone].    We comply with applicable federal civil rights laws and [place] laws. We do not discriminate on the basis of race, color, national origin, age, disability, sex, sexual orientation, or gender identity.            Thank you!     Thank you for choosing [facility], [facility] SERVICES  for your care. Our goal is always to provide you with excellent care. Hearing back from our patients is one way we can continue to improve our services. Please take a few minutes to complete the written survey that you may receive in the mail after your visit with us. Thank you!             Your Updated Medication List - Protect others around you: Learn how to safely use, store and throw away your medicines at www.disposemymeds.org.          This list is accurate as of 3/22/18  3:43 PM.  Always use your most  recent med list.                   Brand Name Dispense Instructions for use Diagnosis    cloNIDine 0.1 MG tablet    CATAPRES    45 tablet    1.5 tablets 1 hour prior to scheduled ketamine dose.    Severe episode of recurrent major depressive disorder, without psychotic features (H)       COMPOUND CONTAINING CONTROLLED SUBSTANCE - PHARMACY TO MIX COMPOUNDED MEDICATION    CMPD RX    1 Bottle    Ketamine 150 mg/mL. Instill 3 sprays in each nostril every 7 days.  30 d/s.    Major depressive disorder, recurrent, severe without psychotic features (H)       KETAMINE HCL           lithium 150 MG capsule     90 capsule    Take 1 capsule (150 mg) by mouth daily    Major depressive disorder, recurrent, severe without psychotic features (H)       LORazepam 0.5 MG tablet    ATIVAN    30 tablet    Take 1-2 tablets (0.5-1 mg) by mouth every 6 hours as needed for anxiety    Major depressive disorder, recurrent, severe without psychotic features (H)       methylphenidate 10 MG tablet    RITALIN    120 tablet    Take 2 tablets (20 mg) by mouth 2 times daily    Severe single current episode of major depressive disorder, without psychotic features (H)       sertraline 25 MG tablet    ZOLOFT    1 tablet    Take 0.5 tablets (12.5 mg) by mouth daily    Major depressive disorder, recurrent, severe without psychotic features (H)       vitamin D 55051 UNIT capsule    ERGOCALCIFEROL     Take 50,000 Units by mouth once a week

## 2018-03-22 NOTE — PROGRESS NOTES
Infusion Nursing Note:  Sebastien Hoover presents today for ketamine.    Patient seen by provider today: No   present during visit today: Not Applicable.    Note: No new concerns at this time.    Intravenous Access:  Peripheral IV placed.    Post Infusion Assessment:  Patient tolerated infusion without incident. VS monitored per protocol.  Patient observed for 60 minutes post ketamine per protocol.  Blood return noted pre and post infusion.  Site patent and intact, free from redness, edema or discomfort.  No evidence of extravasations.  Access discontinued per protocol.    Discharge Plan:   Patient discharged in stable condition accompanied by: self, has transportation home.  Departure Mode: Ambulatory.  Will return to clinic next Thursday.  Angela Davis RN

## 2018-03-29 ENCOUNTER — INFUSION THERAPY VISIT (OUTPATIENT)
Dept: INFUSION THERAPY | Facility: CLINIC | Age: 48
End: 2018-03-29
Attending: PSYCHIATRY & NEUROLOGY
Payer: MEDICARE

## 2018-03-29 VITALS
OXYGEN SATURATION: 93 % | RESPIRATION RATE: 16 BRPM | HEART RATE: 65 BPM | SYSTOLIC BLOOD PRESSURE: 105 MMHG | DIASTOLIC BLOOD PRESSURE: 62 MMHG

## 2018-03-29 DIAGNOSIS — F33.2 SEVERE RECURRENT MAJOR DEPRESSION WITHOUT PSYCHOTIC FEATURES (H): ICD-10-CM

## 2018-03-29 DIAGNOSIS — F33.2 SEVERE EPISODE OF RECURRENT MAJOR DEPRESSIVE DISORDER, WITHOUT PSYCHOTIC FEATURES (H): Primary | ICD-10-CM

## 2018-03-29 PROCEDURE — 25000125 ZZHC RX 250: Performed by: PSYCHIATRY & NEUROLOGY

## 2018-03-29 PROCEDURE — 25000128 H RX IP 250 OP 636: Performed by: PSYCHIATRY & NEUROLOGY

## 2018-03-29 PROCEDURE — 96365 THER/PROPH/DIAG IV INF INIT: CPT

## 2018-03-29 PROCEDURE — 96361 HYDRATE IV INFUSION ADD-ON: CPT

## 2018-03-29 RX ADMIN — SODIUM CHLORIDE 250 ML: 9 INJECTION, SOLUTION INTRAVENOUS at 10:52

## 2018-03-29 RX ADMIN — KETAMINE HYDROCHLORIDE 37.5 MG: 50 INJECTION, SOLUTION INTRAMUSCULAR; INTRAVENOUS at 10:51

## 2018-03-29 NOTE — PROGRESS NOTES
Pt here for ketamine infusion.  Med infused over 1 hr via P IV.  Monitored on cont pulse ox and q 15 min VS during and for 1 hr post infusion.  Tolerated well.  RTC next week.

## 2018-03-29 NOTE — MR AVS SNAPSHOT
After Visit Summary   3/29/2018    Sebastien Hoover    MRN: [id number]           Patient Information     Date Of Birth          1970        Visit Information        Provider Department      3/29/2018 10:30 AM UR CH 03 [facility], Infusion Services        Today's Diagnoses     Severe episode of recurrent major depressive disorder, without psychotic features (H)    -  1    Severe recurrent major depression without psychotic features (H)           Follow-ups after your visit        Your next 10 appointments already scheduled     Apr 05, 2018 10:30 AM CDT   Level 3 with UR CH 03   [facility], Infusion Services ([facility])    [address]  [address]  [facility] [address]   [phone]            Apr 12, 2018 10:30 AM CDT   Level 3 with UR CH 03   [facility], Infusion Services ([facility])    [address] [place] [address] [address]  [address]  [facility] [address]   [phone]            Apr 20, 2018 10:30 AM CDT   Level 3 with UR CH 04   [facility], Infusion Services ([facility])    [address] [place] [address] [address]  [address]  [facility] [address]   [phone]            Apr 26, 2018 10:30 AM CDT   Level 3 with UR CH 04   [facility], Infusion Services ([facility])    [address] [place] [address] [address]  [address]  [facility] [address]   [phone]            May 03, 2018  1:00 PM CDT   Adult Med Follow UP with Venancio Ochoa MD   Psychiatry Clinic ([facility])    [address]  [address] [address]   [phone]            Jun 28, 2018  2:00 PM CDT   Adult Med Follow UP with Venancio Ochoa MD   Psychiatry Clinic ([facility])    [address]  [address] [address]   [phone]            Sep 20, 2018  1:00 PM CDT    Adult Med Follow UP with Venancio Ochoa MD   Psychiatry Clinic ([facility])    [place] [id number]  [address] [address]   [phone]            Nov 15, 2018  2:00 PM CST   Adult Med Follow UP with Venancio Ochoa MD   Psychiatry Clinic ([facility])    [place] [id number]  [address] [address] [phone]            Jan 17, 2019  1:00 PM CST   Adult Med Follow UP with Venancio Ochoa MD   Psychiatry Clinic ([facility])    [place] [id number]  [facility] [address] [address] [phone]            Mar 14, 2019  1:00 PM CDT   Adult Med Follow UP with Venancio Ochoa MD   Psychiatry Clinic ([facility])    [place] [id number]  [facility] [address] [address] [phone]              Who to contact     If you have questions or need follow up information about today's clinic visit or your schedule please contact [facility] [place], [facility] SERVICES directly at [phone].  Normal or non-critical lab and imaging results will be communicated to you by [employer], letter or phone within 4 business days after the clinic has received the results. If you do not hear from us within 7 days, please contact the clinic through [employer] or phone. If you have a critical or abnormal lab result, we will notify you by phone as soon as possible.  Submit refill requests through [employer] or call your pharmacy and they will forward the refill request to us. Please allow 3 business days for your refill to be completed.          Additional Information About Your Visit        [employer] Information     [employer] gives you secure access to your electronic health record. If you see a primary care provider, you can also send messages to your care team and make appointments. If you have questions, please call your primary care clinic.  If you do  not have a primary care provider, please call [phone] and they will assist you.        Care EveryWhere ID     This is your Care EveryWhere ID. This could be used by other organizations to access your [place] medical records  [id number]        Your Vitals Were     Pulse Respirations Pulse Oximetry             65 16 93%          Blood Pressure from Last 3 Encounters:   03/29/18 105/62   03/22/18 108/73   03/15/18 122/84    Weight from Last 3 Encounters:   02/22/18 85.3 kg (188 lb 0.8 oz)   02/08/18 85.1 kg (187 lb 9.8 oz)   01/11/18 83.7 kg (184 lb 8.4 oz)              Today, you had the following     No orders found for display       Primary Care Provider Office Phone # Fax #    Candy Ridley [phone]       [facility] [address]  [facility] [address]        Equal Access to Services     STEVEN ARTHUR : Hadii aad ku hadasho Soomaali, waaxda luqadaha, qaybta kaalmada adeegyada, waxay idiin hayjuden shaggy castillo . So [facility] [phone].    ATENCIÓN: Si habla español, tiene a madrigal disposición servicios gratuitos de asistencia lingüística. Llame al [phone].    We comply with applicable federal civil rights laws and [place] laws. We do not discriminate on the basis of race, color, national origin, age, disability, sex, sexual orientation, or gender identity.            Thank you!     Thank you for choosing [facility]  for your care. Our goal is always to provide you with excellent care. Hearing back from our patients is one way we can continue to improve our services. Please take a few minutes to complete the written survey that you may receive in the mail after your visit with us. Thank you!             Your Updated Medication List - Protect others around you: Learn how to safely use, store and throw away your medicines at www.disposemymeds.org.          This list is accurate as of 3/29/18  1:16 PM.  Always use your most recent med  list.                   Brand Name Dispense Instructions for use Diagnosis    cloNIDine 0.1 MG tablet    CATAPRES    45 tablet    1.5 tablets 1 hour prior to scheduled ketamine dose.    Severe episode of recurrent major depressive disorder, without psychotic features (H)       COMPOUND CONTAINING CONTROLLED SUBSTANCE - PHARMACY TO MIX COMPOUNDED MEDICATION    CMPD RX    1 Bottle    Ketamine 150 mg/mL. Instill 3 sprays in each nostril every 7 days.  30 d/s.    Major depressive disorder, recurrent, severe without psychotic features (H)       KETAMINE HCL           lithium 150 MG capsule     90 capsule    Take 1 capsule (150 mg) by mouth daily    Major depressive disorder, recurrent, severe without psychotic features (H)       LORazepam 0.5 MG tablet    ATIVAN    30 tablet    Take 1-2 tablets (0.5-1 mg) by mouth every 6 hours as needed for anxiety    Major depressive disorder, recurrent, severe without psychotic features (H)       methylphenidate 10 MG tablet    RITALIN    120 tablet    Take 2 tablets (20 mg) by mouth 2 times daily    Severe single current episode of major depressive disorder, without psychotic features (H)       sertraline 25 MG tablet    ZOLOFT    1 tablet    Take 0.5 tablets (12.5 mg) by mouth daily    Major depressive disorder, recurrent, severe without psychotic features (H)       vitamin D 00641 UNIT capsule    ERGOCALCIFEROL     Take 50,000 Units by mouth once a week

## 2018-04-02 NOTE — PROGRESS NOTES
Service Date: 03/01/2018      PSYCHIATRY CLINIC PROGRESS NOTE:  The patient returns for medication management and supportive therapy.      INTERIM HISTORY:  Since the last visit, ketamine infusions weekly plus one weekly intranasal route.   Feels his usual seasonal worsening.  Wants to come off Ritalin, but hard to function without it.   RECENT SYMPTOMS:   Depression:  Passive suicidal ideation, depressed mood, anhedonia, low motivation, low energy, no insomnia, sugar cravings, excessive guilt, feelings of worthlessness, poor concentration.   Elevated:  No manic symptoms   Psychosis:  None.   Panic Attacks:  None.   Anxiety:  Some excessive worry, social anxiety.   Trauma Related:  None.      ADVERSE EFFECTS:  Mild headaches.      MEDICAL CONCERNS:  Vitamin D deficiency.      RECENT SUBSTANCE USE:  No alcohol, no tobacco, no cannabis.      SOCIAL/FAMILY HISTORY:  Lives with parents.  Looking into going to [place] to enroll in a rapastinel antidepressant trial.      MEDICAL/SURGICAL HISTORY:  See EMR medical problem list.      MEDICAL REVIEW OF SYSTEMS:  A comprehensive review of systems was performed and is negative other than noted in the HPI.      ALLERGIES:  No new allergies.      CURRENT MEDICATIONS:  See EMR med section.      VITALS:  See rooming note.      MENTAL STATUS EXAM:  Alert, well-groomed, cooperative.  Speech normal rate and flow.  Normal psychomotor.  Mood sad, affect congruent.  Thought process/associations logical.  No active suicidal ideation.  No psychosis.  Insight and judgment good.  Oriented x 4.  Attention span and concentration good.  Recent and remote memory intact.  Fund of knowledge good.  Gait and station normal.      LABS AND DATA:  None new.      DIAGNOSIS AND ASSESSMENT:  MDD - recurrent and severe.  Will consider drug trial in [place].  Modest response to ketamine.      PLAN:   1. Medications:  no changes.   2. Therapy:  none currently.   3. RTC:  2-3 months.   4. Crisis numbers:   provided routinely in AVS.      TREATMENT RISK STATEMENT:  The patient understands the risks, benefits, adverse effects, and alternatives.  Agrees to treatment with the capacity to do so.  No medical contraindications to treatment.  Agrees to call clinic for any problems.  The patient understands to call 911 or come to the nearest ED if life-threatening or urgent symptoms present.      TOTAL TIME SPENT:  30-minute, face-to-face encounter of which more than 50% of the time was spent on education about current and future medications.               ljl         FANNY ZELAYA MD             D: 2018   T: 2018   MT: PRAVEEN      Name:     DANIEL HUNT   MRN:      [id number]        Account:      [id number]   :      1970           Service Date: 2018      Document: [id number]

## 2018-04-05 ENCOUNTER — INFUSION THERAPY VISIT (OUTPATIENT)
Dept: INFUSION THERAPY | Facility: CLINIC | Age: 48
End: 2018-04-05
Attending: PSYCHIATRY & NEUROLOGY
Payer: MEDICARE

## 2018-04-05 VITALS
SYSTOLIC BLOOD PRESSURE: 112 MMHG | DIASTOLIC BLOOD PRESSURE: 70 MMHG | WEIGHT: 196.21 LBS | RESPIRATION RATE: 16 BRPM | BODY MASS INDEX: 30.72 KG/M2 | TEMPERATURE: 97.6 F | HEART RATE: 71 BPM | OXYGEN SATURATION: 94 %

## 2018-04-05 DIAGNOSIS — F33.2 SEVERE RECURRENT MAJOR DEPRESSION WITHOUT PSYCHOTIC FEATURES (H): ICD-10-CM

## 2018-04-05 DIAGNOSIS — F33.2 SEVERE EPISODE OF RECURRENT MAJOR DEPRESSIVE DISORDER, WITHOUT PSYCHOTIC FEATURES (H): Primary | ICD-10-CM

## 2018-04-05 PROCEDURE — 96365 THER/PROPH/DIAG IV INF INIT: CPT

## 2018-04-05 PROCEDURE — 25000128 H RX IP 250 OP 636: Performed by: PSYCHIATRY & NEUROLOGY

## 2018-04-05 PROCEDURE — 96361 HYDRATE IV INFUSION ADD-ON: CPT

## 2018-04-05 PROCEDURE — 25000125 ZZHC RX 250: Performed by: PSYCHIATRY & NEUROLOGY

## 2018-04-05 RX ADMIN — SODIUM CHLORIDE 250 ML: 9 INJECTION, SOLUTION INTRAVENOUS at 10:45

## 2018-04-05 RX ADMIN — KETAMINE HYDROCHLORIDE 37.5 MG: 50 INJECTION, SOLUTION INTRAMUSCULAR; INTRAVENOUS at 10:44

## 2018-04-05 NOTE — MR AVS SNAPSHOT
After Visit Summary   4/5/2018    Sebastien Hoover    MRN: [id number]           Patient Information     Date Of Birth          1970        Visit Information        Provider Department      4/5/2018 10:30 AM UR CH 03 [facility], Infusion Services        Today's Diagnoses     Severe episode of recurrent major depressive disorder, without psychotic features (H)    -  1    Severe recurrent major depression without psychotic features (H)           Follow-ups after your visit        Your next 10 appointments already scheduled     Apr 12, 2018 10:30 AM CDT   Level 3 with UR CH 03   [facility], Infusion Services ([facility])    [address]  [facility] [address]   [phone]            Apr 20, 2018 10:30 AM CDT   Level 3 with UR CH 04   [facility], Infusion Services ([facility])    [address] [place] [address] [address]  [address]  [facility] [address]   [phone]            Apr 26, 2018 10:30 AM CDT   Level 3 with UR CH 04   [facility], Infusion Services ([facility])    [address] [place] [address] [address]  [address]  [facility] [address]   [phone]            May 03, 2018  1:00 PM CDT   Adult Med Follow UP with Venancio Ochoa MD   Psychiatry Clinic ([facility])    [address]  [address] [place] [address]   [phone]            Jun 28, 2018  2:00 PM CDT   Adult Med Follow UP with Venancio Ochoa MD   Psychiatry Clinic ([facility])    [address]  [address] [place] [address]   [phone]            Sep 20, 2018  1:00 PM CDT   Adult Med Follow UP with Venancio Ochoa MD   Psychiatry Clinic ([facility])    [address]  [address] [place] [address]   [phone]            Nov 15, 2018  2:00 PM  CST   Adult Med Follow UP with Venancio Ochoa MD   Psychiatry Clinic ([facility])    [place] [id number]  [address] [address] [phone]            Jan 17, 2019  1:00 PM CST   Adult Med Follow UP with Venancio Ochoa MD   Psychiatry Clinic ([facility])    [place] [id number]  [address] [address] [phone]            Mar 14, 2019  1:00 PM CDT   Adult Med Follow UP with Venancio Ohcoa MD   Psychiatry Clinic ([facility])    [place] [id number]  [address] [address] [phone]              Who to contact     If you have questions or need follow up information about today's clinic visit or your schedule please contact [facility] [place], [facility] SERVICES directly at [phone].  Normal or non-critical lab and imaging results will be communicated to you by [employer], letter or phone within 4 business days after the clinic has received the results. If you do not hear from us within 7 days, please contact the clinic through [employer] or phone. If you have a critical or abnormal lab result, we will notify you by phone as soon as possible.  Submit refill requests through [employer] or call your pharmacy and they will forward the refill request to us. Please allow 3 business days for your refill to be completed.          Additional Information About Your Visit        [employer] Information     [employer] gives you secure access to your electronic health record. If you see a primary care provider, you can also send messages to your care team and make appointments. If you have questions, please call your primary care clinic.  If you do not have a primary care provider, please call [phone] and they will assist you.        Care EveryWhere ID     This is your Care EveryWhere ID. This could be used by other organizations to access your [facility]  records  [id number]        Your Vitals Were     Pulse Temperature Respirations Pulse Oximetry BMI (Body Mass Index)       71 97.6  F (36.4  C) 16 94% 30.72 kg/m2        Blood Pressure from Last 3 Encounters:   04/05/18 112/70   03/29/18 105/62   03/22/18 108/73    Weight from Last 3 Encounters:   04/05/18 89 kg (196 lb 3.4 oz)   02/22/18 85.3 kg (188 lb 0.8 oz)   02/08/18 85.1 kg (187 lb 9.8 oz)              Today, you had the following     No orders found for display       Primary Care Provider Office Phone # Fax #    Candy Ridley [phone]       [facility] [address]  [facility] [address]        Equal Access to Services     STEVEN ARTHUR : Hadii aad ku hadasho Sodaniella, waaxda luqadaha, qaybta kaalmada adefernyyaaline, guillermo castillo . So [facility] [phone].    ATENCIÓN: Si habla español, tiene a madrigal disposición servicios gratuitos de asistencia lingüística. Parish al [phone].    We comply with applicable federal civil rights laws and [place] laws. We do not discriminate on the basis of race, color, national origin, age, disability, sex, sexual orientation, or gender identity.            Thank you!     Thank you for choosing [facility]  for your care. Our goal is always to provide you with excellent care. Hearing back from our patients is one way we can continue to improve our services. Please take a few minutes to complete the written survey that you may receive in the mail after your visit with us. Thank you!             Your Updated Medication List - Protect others around you: Learn how to safely use, store and throw away your medicines at www.disposemymeds.org.          This list is accurate as of 4/5/18  3:39 PM.  Always use your most recent med list.                   Brand Name Dispense Instructions for use Diagnosis    cloNIDine 0.1 MG tablet    CATAPRES    45 tablet    1.5 tablets 1 hour prior to scheduled  ketamine dose.    Severe episode of recurrent major depressive disorder, without psychotic features (H)       COMPOUND CONTAINING CONTROLLED SUBSTANCE - PHARMACY TO MIX COMPOUNDED MEDICATION    CMPD RX    1 Bottle    Ketamine 150 mg/mL. Instill 3 sprays in each nostril every 7 days.  30 d/s.    Major depressive disorder, recurrent, severe without psychotic features (H)       KETAMINE HCL           lithium 150 MG capsule     90 capsule    Take 1 capsule (150 mg) by mouth daily    Major depressive disorder, recurrent, severe without psychotic features (H)       LORazepam 0.5 MG tablet    ATIVAN    30 tablet    Take 1-2 tablets (0.5-1 mg) by mouth every 6 hours as needed for anxiety    Major depressive disorder, recurrent, severe without psychotic features (H)       methylphenidate 10 MG tablet    RITALIN    120 tablet    Take 2 tablets (20 mg) by mouth 2 times daily    Severe single current episode of major depressive disorder, without psychotic features (H)       sertraline 25 MG tablet    ZOLOFT    1 tablet    Take 0.5 tablets (12.5 mg) by mouth daily    Major depressive disorder, recurrent, severe without psychotic features (H)       vitamin D 40693 UNIT capsule    ERGOCALCIFEROL     Take 50,000 Units by mouth once a week

## 2018-04-12 ENCOUNTER — INFUSION THERAPY VISIT (OUTPATIENT)
Dept: INFUSION THERAPY | Facility: CLINIC | Age: 48
End: 2018-04-12
Attending: PSYCHIATRY & NEUROLOGY
Payer: MEDICARE

## 2018-04-12 VITALS — BODY MASS INDEX: 30.65 KG/M2 | WEIGHT: 195.77 LBS | TEMPERATURE: 97.9 F

## 2018-04-12 DIAGNOSIS — F33.2 SEVERE EPISODE OF RECURRENT MAJOR DEPRESSIVE DISORDER, WITHOUT PSYCHOTIC FEATURES (H): Primary | ICD-10-CM

## 2018-04-12 DIAGNOSIS — F33.2 SEVERE RECURRENT MAJOR DEPRESSION WITHOUT PSYCHOTIC FEATURES (H): ICD-10-CM

## 2018-04-12 PROCEDURE — 25000128 H RX IP 250 OP 636: Performed by: PSYCHIATRY & NEUROLOGY

## 2018-04-12 PROCEDURE — 25000125 ZZHC RX 250: Performed by: PSYCHIATRY & NEUROLOGY

## 2018-04-12 PROCEDURE — 96361 HYDRATE IV INFUSION ADD-ON: CPT

## 2018-04-12 PROCEDURE — 96365 THER/PROPH/DIAG IV INF INIT: CPT

## 2018-04-12 RX ADMIN — KETAMINE HYDROCHLORIDE 37.5 MG: 50 INJECTION, SOLUTION INTRAMUSCULAR; INTRAVENOUS at 10:58

## 2018-04-12 RX ADMIN — SODIUM CHLORIDE 250 ML: 9 INJECTION, SOLUTION INTRAVENOUS at 10:59

## 2018-04-12 NOTE — MR AVS SNAPSHOT
After Visit Summary   4/12/2018    Sebastien Hoover    MRN: [id number]           Patient Information     Date Of Birth          1970        Visit Information        Provider Department      4/12/2018 10:30 AM [facility]        Today's Diagnoses     Severe episode of recurrent major depressive disorder, without psychotic features (H)    -  1    Severe recurrent major depression without psychotic features (H)           Follow-ups after your visit        Your next 10 appointments already scheduled     Apr 20, 2018 10:30 AM CDT   Level 3 with UR CH 04   [facility]    [address]  [address]  [facility] [address]   [phone]            Apr 26, 2018 10:30 AM CDT   Level 3 with UR CH 04   [facility]    [address] [place] [address] [address]  [address]  [facility] [address]   [phone]            May 03, 2018  1:00 PM CDT   Adult Med Follow UP with Venancio Ochoa MD   Psychiatry Clinic ([place] [id number]  [address] [place] [address]   [phone]            May 04, 2018 10:30 AM CDT   Level 3 with UR CH 06   [facility]    [address]  [address]  [facility] [address]   [phone]            May 10, 2018 12:00 PM CDT   Level 3 with UR CH 02   [facility]    [address] [place] [address] [address]  [address]  [facility] [address]   [phone]            May 17, 2018 12:00 PM CDT   Level 3 with UR CH 05   [facility]    [address] [place] [address] [address]  [address]  [facility] [address]   [phone]            May 24, 2018 10:30 AM CDT   Level  3 with UR CH 02   [facility] ([facility])    [address]  [facility] [address]   [phone]            May 31, 2018 12:00 PM CDT   Level 3 with UR CH 04   [facility], [facility]fusion Services ([facility])    [address]  [address]  [facility] [address]   [phone]            Jun 28, 2018  2:00 PM CDT   Adult Med Follow UP with Venancio Ochoa MD   Psychiatry Clinic ([facility])    [address]  [facility] [phone]            Sep 20, 2018  1:00 PM CDT   Adult Med Follow UP with Venancio Ochoa MD   Psychiatry Clinic ([facility])    [address]  [facility] [address] [id number] [phone]              Who to contact     If you have questions or need follow up information about today's clinic visit or your schedule please contact [facility], INFUSION SERVICES directly at [phone].  Normal or non-critical lab and imaging results will be communicated to you by MyChart, letter or phone within 4 business days after the clinic has received the results. If you do not hear from us within 7 days, please contact the clinic through [employer] or phone. If you have a critical or abnormal lab result, we will notify you by phone as soon as possible.  Submit refill requests through [employer] or call your pharmacy and they will forward the refill request to us. Please allow 3 business days for your refill to be completed.          Additional Information About Your Visit        [employer] Information     [employer] gives you secure access to your electronic health record. If you see a primary care provider, you can also send messages to your care team and make appointments. If you have questions, please call your primary care clinic.  If you do not have a primary  care provider, please call [phone] and they will assist you.        Care EveryWhere ID     This is your Care EveryWhere ID. This could be used by other organizations to access your [place] medical records  [id number]        Your Vitals Were     Temperature BMI (Body Mass Index)                97.9  F (36.6  C) 30.65 kg/m2           Blood Pressure from Last 3 Encounters:   04/05/18 112/70   03/29/18 105/62   03/22/18 108/73    Weight from Last 3 Encounters:   04/12/18 88.8 kg (195 lb 12.3 oz)   04/05/18 89 kg (196 lb 3.4 oz)   02/22/18 85.3 kg (188 lb 0.8 oz)              Today, you had the following     No orders found for display       Primary Care Provider Office Phone # Fax #    Junior Buenrostro [phone]       [facility] [id number] [facility] [address]        Equal Access to Services     STEVEN ARTHUR : Hadii aad ku hadasho Soomaali, waaxda luqadaha, qaybta kaalmada adeegyada, waxay alvin castillo . So [facility] [phone].    ATENCIÓN: Si habla español, tiene a madrigal disposición servicios gratuitos de asistencia lingüística. Llame al [phone].    We comply with applicable federal civil rights laws and [place] laws. We do not discriminate on the basis of race, color, national origin, age, disability, sex, sexual orientation, or gender identity.            Thank you!     Thank you for choosing [facility]  for your care. Our goal is always to provide you with excellent care. Hearing back from our patients is one way we can continue to improve our services. Please take a few minutes to complete the written survey that you may receive in the mail after your visit with us. Thank you!             Your Updated Medication List - Protect others around you: Learn how to safely use, store and throw away your medicines at www.disposemymeds.org.          This list is accurate as of 4/12/18  2:43 PM.  Always use your most recent med list.                    Brand Name Dispense Instructions for use Diagnosis    cloNIDine 0.1 MG tablet    CATAPRES    45 tablet    1.5 tablets 1 hour prior to scheduled ketamine dose.    Severe episode of recurrent major depressive disorder, without psychotic features (H)       COMPOUND CONTAINING CONTROLLED SUBSTANCE - PHARMACY TO MIX COMPOUNDED MEDICATION    CMPD RX    1 Bottle    Ketamine 150 mg/mL. Instill 3 sprays in each nostril every 7 days.  30 d/s.    Major depressive disorder, recurrent, severe without psychotic features (H)       KETAMINE HCL           lithium 150 MG capsule     90 capsule    Take 1 capsule (150 mg) by mouth daily    Major depressive disorder, recurrent, severe without psychotic features (H)       LORazepam 0.5 MG tablet    ATIVAN    30 tablet    Take 1-2 tablets (0.5-1 mg) by mouth every 6 hours as needed for anxiety    Major depressive disorder, recurrent, severe without psychotic features (H)       methylphenidate 10 MG tablet    RITALIN    120 tablet    Take 2 tablets (20 mg) by mouth 2 times daily    Severe single current episode of major depressive disorder, without psychotic features (H)       sertraline 25 MG tablet    ZOLOFT    1 tablet    Take 0.5 tablets (12.5 mg) by mouth daily    Major depressive disorder, recurrent, severe without psychotic features (H)       vitamin D 77615 UNIT capsule    ERGOCALCIFEROL     Take 50,000 Units by mouth once a week

## 2018-04-20 ENCOUNTER — INFUSION THERAPY VISIT (OUTPATIENT)
Dept: INFUSION THERAPY | Facility: CLINIC | Age: 48
End: 2018-04-20
Attending: PSYCHIATRY & NEUROLOGY
Payer: MEDICARE

## 2018-04-20 VITALS
TEMPERATURE: 97.9 F | SYSTOLIC BLOOD PRESSURE: 109 MMHG | OXYGEN SATURATION: 95 % | HEART RATE: 74 BPM | RESPIRATION RATE: 16 BRPM | DIASTOLIC BLOOD PRESSURE: 73 MMHG

## 2018-04-20 DIAGNOSIS — F33.2 SEVERE RECURRENT MAJOR DEPRESSION WITHOUT PSYCHOTIC FEATURES (H): ICD-10-CM

## 2018-04-20 DIAGNOSIS — F33.2 SEVERE EPISODE OF RECURRENT MAJOR DEPRESSIVE DISORDER, WITHOUT PSYCHOTIC FEATURES (H): Primary | ICD-10-CM

## 2018-04-20 PROCEDURE — 96365 THER/PROPH/DIAG IV INF INIT: CPT

## 2018-04-20 PROCEDURE — 25000125 ZZHC RX 250: Performed by: PSYCHIATRY & NEUROLOGY

## 2018-04-20 PROCEDURE — 25000128 H RX IP 250 OP 636: Performed by: PSYCHIATRY & NEUROLOGY

## 2018-04-20 PROCEDURE — 96361 HYDRATE IV INFUSION ADD-ON: CPT

## 2018-04-20 RX ADMIN — SODIUM CHLORIDE 250 ML: 9 INJECTION, SOLUTION INTRAVENOUS at 11:02

## 2018-04-20 RX ADMIN — KETAMINE HYDROCHLORIDE 38 MG: 100 INJECTION, SOLUTION, CONCENTRATE INTRAMUSCULAR; INTRAVENOUS at 11:02

## 2018-04-20 NOTE — PROGRESS NOTES
Sebastien presents today for Ketamine infusion.   Patient seen by provider today: No   present during visit today: Not Applicable.  Note: N/A.  ?  Intravenous Access:  Peripheral IV placed.  Treatment Conditions:  Not Applicable.  ?  Post Infusion Assessment:  Patient tolerated infusion without incident.  ?  Discharge Plan:   Patient and/or family verbalized understanding of discharge instructions and all questions answered.  ?  Nesha Calvert RN

## 2018-04-20 NOTE — MR AVS SNAPSHOT
After Visit Summary   4/20/2018    Sebastien Hoover    MRN: [id number]           Patient Information     Date Of Birth          1970        Visit Information        Provider Department      4/20/2018 10:30 AM [facility]        Today's Diagnoses     Severe episode of recurrent major depressive disorder, without psychotic features (H)    -  1    Severe recurrent major depression without psychotic features (H)           Follow-ups after your visit        Your next 10 appointments already scheduled     Apr 26, 2018 10:30 AM CDT   Level 3 with UR CH 04   [facility] ([facility])    [address] [place] [address] [address]  [address]  [facility] [address]   [phone]            May 03, 2018  1:00 PM CDT   Adult Med Follow UP with Venancio Ochoa MD   Psychiatry Clinic ([place] [id number]  [address] [place] [address]   [phone]            May 04, 2018 10:30 AM CDT   Level 3 with UR CH 06   [facility] ([facility])    [address] [place] [address] [address]  [address] [place] [address]   [phone]            May 10, 2018 12:00 PM CDT   Level 3 with UR CH 02   [facility] ([facility])    [address] [place] [address] [address]  [address]  [facility] [address]   [phone]            May 17, 2018 12:00 PM CDT   Level 3 with UR CH 05   [facility])    [address] [place] [address] [address]  [address]  [facility] [address]   [phone]            May 24, 2018 10:30 AM CDT   Level 3 with UR CH 02   [facility] ([facility])    [address] [place] [address] [address]  [address]  [facility] [address]   [phone]            May 31, 2018 12:00 PM CDT   Level  3 with UR CH 04   [facility] ([facility])    [address] [place] [address] [address]  [address]  [facility] [address]   [phone]            Jun 28, 2018  2:00 PM CDT   Adult Med Follow UP with Venancio Ochoa MD   Psychiatry Clinic ([facility])    [address]  [facility] [address] [phone]            Sep 20, 2018  1:00 PM CDT   Adult Med Follow UP with Venancio Ochoa MD   Psychiatry Clinic ([facility])    [address]  [facility] [address] [phone] [phone]            Nov 15, 2018  2:00 PM CST   Adult Med Follow UP with Venancio Ochoa MD   Psychiatry Clinic ([facility])    [place] [id number]  [address] [place] [phone]              Who to contact     If you have questions or need follow up information about today's clinic visit or your schedule please contact [facility], INFUSION SERVICES directly at [phone].  Normal or non-critical lab and imaging results will be communicated to you by [employer], letter or phone within 4 business days after the clinic has received the results. If you do not hear from us within 7 days, please contact the clinic through [employer] or phone. If you have a critical or abnormal lab result, we will notify you by phone as soon as possible.  Submit refill requests through [employer] or call your pharmacy and they will forward the refill request to us. Please allow 3 business days for your refill to be completed.          Additional Information About Your Visit        [employer] Information     [employer] gives you secure access to your electronic health record. If you see a primary care provider, you can also send messages to your care team and make appointments. If you have questions, please call your primary care clinic.  If you do not have a  primary care provider, please call [phone] and they will assist you.        Care EveryWhere ID     This is your Care EveryWhere ID. This could be used by other organizations to access your [place] medical records  [id number]        Your Vitals Were     Pulse Temperature Respirations Pulse Oximetry          74 97.9  F (36.6  C) (Oral) 16 95%         Blood Pressure from Last 3 Encounters:   04/20/18 109/73   04/05/18 112/70   03/29/18 105/62    Weight from Last 3 Encounters:   04/12/18 88.8 kg (195 lb 12.3 oz)   04/05/18 89 kg (196 lb 3.4 oz)   02/22/18 85.3 kg (188 lb 0.8 oz)              Today, you had the following     No orders found for display       Primary Care Provider Office Phone # Fax #    Junior Buenrostro [phone]       [facility] [id number] [facility] [address]        Equal Access to Services     STEVEN ARTHUR : Hadii aad ku hadasho Soomaali, waaxda luqadaha, qaybta kaalmada adeegyada, waxay idiin hayaan shaggy whaleyarajose castillo . So [facility] [phone].    ATENCIÓN: Si habla español, tiene a madrigal disposición servicios gratuitos de asistencia lingüística. Llame al [phone].    We comply with applicable federal civil rights laws and [place] laws. We do not discriminate on the basis of race, color, national origin, age, disability, sex, sexual orientation, or gender identity.            Thank you!     Thank you for choosing [facility] SERVICES  for your care. Our goal is always to provide you with excellent care. Hearing back from our patients is one way we can continue to improve our services. Please take a few minutes to complete the written survey that you may receive in the mail after your visit with us. Thank you!             Your Updated Medication List - Protect others around you: Learn how to safely use, store and throw away your medicines at www.disposemymeds.org.          This list is accurate as of 4/20/18  1:32 PM.  Always use your most  recent med list.                   Brand Name Dispense Instructions for use Diagnosis    cloNIDine 0.1 MG tablet    CATAPRES    45 tablet    1.5 tablets 1 hour prior to scheduled ketamine dose.    Severe episode of recurrent major depressive disorder, without psychotic features (H)       COMPOUND CONTAINING CONTROLLED SUBSTANCE - PHARMACY TO MIX COMPOUNDED MEDICATION    CMPD RX    1 Bottle    Ketamine 150 mg/mL. Instill 3 sprays in each nostril every 7 days.  30 d/s.    Major depressive disorder, recurrent, severe without psychotic features (H)       KETAMINE HCL           lithium 150 MG capsule     90 capsule    Take 1 capsule (150 mg) by mouth daily    Major depressive disorder, recurrent, severe without psychotic features (H)       LORazepam 0.5 MG tablet    ATIVAN    30 tablet    Take 1-2 tablets (0.5-1 mg) by mouth every 6 hours as needed for anxiety    Major depressive disorder, recurrent, severe without psychotic features (H)       methylphenidate 10 MG tablet    RITALIN    120 tablet    Take 2 tablets (20 mg) by mouth 2 times daily    Severe single current episode of major depressive disorder, without psychotic features (H)       sertraline 25 MG tablet    ZOLOFT    1 tablet    Take 0.5 tablets (12.5 mg) by mouth daily    Major depressive disorder, recurrent, severe without psychotic features (H)       vitamin D 48294 UNIT capsule    ERGOCALCIFEROL     Take 50,000 Units by mouth once a week

## 2018-04-26 ENCOUNTER — INFUSION THERAPY VISIT (OUTPATIENT)
Dept: INFUSION THERAPY | Facility: CLINIC | Age: 48
End: 2018-04-26
Attending: PSYCHIATRY & NEUROLOGY
Payer: MEDICARE

## 2018-04-26 VITALS
SYSTOLIC BLOOD PRESSURE: 106 MMHG | RESPIRATION RATE: 16 BRPM | HEART RATE: 68 BPM | DIASTOLIC BLOOD PRESSURE: 60 MMHG | OXYGEN SATURATION: 94 % | WEIGHT: 193.12 LBS | BODY MASS INDEX: 30.24 KG/M2

## 2018-04-26 DIAGNOSIS — F33.2 SEVERE EPISODE OF RECURRENT MAJOR DEPRESSIVE DISORDER, WITHOUT PSYCHOTIC FEATURES (H): Primary | ICD-10-CM

## 2018-04-26 DIAGNOSIS — F33.2 SEVERE RECURRENT MAJOR DEPRESSION WITHOUT PSYCHOTIC FEATURES (H): ICD-10-CM

## 2018-04-26 PROCEDURE — 96365 THER/PROPH/DIAG IV INF INIT: CPT

## 2018-04-26 PROCEDURE — 25000128 H RX IP 250 OP 636: Performed by: PSYCHIATRY & NEUROLOGY

## 2018-04-26 PROCEDURE — 25000125 ZZHC RX 250: Performed by: PSYCHIATRY & NEUROLOGY

## 2018-04-26 PROCEDURE — 96361 HYDRATE IV INFUSION ADD-ON: CPT

## 2018-04-26 RX ADMIN — KETAMINE HYDROCHLORIDE 35 MG: 100 INJECTION, SOLUTION, CONCENTRATE INTRAMUSCULAR; INTRAVENOUS at 10:51

## 2018-04-26 RX ADMIN — SODIUM CHLORIDE 250 ML: 9 INJECTION, SOLUTION INTRAVENOUS at 10:51

## 2018-04-26 NOTE — MR AVS SNAPSHOT
After Visit Summary   4/26/2018    Sebastien Hoover    MRN: [id number]           Patient Information     Date Of Birth          1970        Visit Information        Provider Department      4/26/2018 10:30 AM [facility] 04 [facility]        Today's Diagnoses     Severe episode of recurrent major depressive disorder, without psychotic features (H)    -  1    Severe recurrent major depression without psychotic features (H)           Follow-ups after your visit        Your next 10 appointments already scheduled     May 03, 2018  1:00 PM CDT   Adult Med Follow UP with Venancio Ochoa MD   Psychiatry Clinic ([facility])    [address]  [address] [place] [address]   [phone]            May 04, 2018 10:30 AM CDT   Level 3 with UR CH 06   [facility])    [address] [place] [address] [address]  [address] [place] [address]   [phone]            May 10, 2018 12:00 PM CDT   Level 3 with UR CH 02   [facility])    [address] [place] [address] [address]  [address] [place] [address]   [phone]            May 17, 2018 12:00 PM CDT   Level 3 with UR CH 05   [facility])    [address] [place] [address] [address]  [address]  [facility] [address]   [phone]            May 24, 2018 10:30 AM CDT   Level 3 with UR CH 02   [facility])    [address] [place] [address] S[address]  [address]  [facility] [address]   [phone]            May 31, 2018 12:00 PM CDT   Level 3 with UR CH 04   [facility])    [address] [place] [address] [address]  [address]  [facility] [address]   [phone]            Jun 07, 2018 10:30 AM CDT   Level  3 with UR CH 02   [facility] ([facility])    [address]  [address]  [facility] [address]   [phone]            Jun 14, 2018 12:00 PM CDT   Level 3 with UR CH 03   [facility], Infusion Services ([facility])    [address]  [address]  [facility] [address]   [phone]            Jun 21, 2018 10:30 AM CDT   Level 3 with UR CH 02   [facility] [place], Infusion Services ([facility])    [address]  [address]  [facility] [address]   [phone]            Jun 28, 2018  2:00 PM CDT   Adult Med Follow UP with Venancio cOhoa MD   Psychiatry Clinic ([facility])    [address]  [address] [place] [phone]              Who to contact     If you have questions or need follow up information about today's clinic visit or your schedule please contact [facility] [place], INFUSION SERVICES directly at [phone].  Normal or non-critical lab and imaging results will be communicated to you by [employer], letter or phone within 4 business days after the clinic has received the results. If you do not hear from us within 7 days, please contact the clinic through [employer] or phone. If you have a critical or abnormal lab result, we will notify you by phone as soon as possible.  Submit refill requests through [employer] or call your pharmacy and they will forward the refill request to us. Please allow 3 business days for your refill to be completed.          Additional Information About Your Visit        [employer] Information     [employer] gives you secure access to your electronic health record. If you see a primary care provider, you can also send messages to your care team and make appointments. If you have questions, please call your primary care clinic.  If you do not have a primary care  provider, please call [phone] and they will assist you.        Care EveryWhere ID     This is your Care EveryWhere ID. This could be used by other organizations to access your [place] medical records  [id number]        Your Vitals Were     Pulse Respirations Pulse Oximetry BMI (Body Mass Index)          68 16 94% 30.24 kg/m2         Blood Pressure from Last 3 Encounters:   04/26/18 106/60   04/20/18 109/73   04/05/18 112/70    Weight from Last 3 Encounters:   04/26/18 87.6 kg (193 lb 2 oz)   04/12/18 88.8 kg (195 lb 12.3 oz)   04/05/18 89 kg (196 lb 3.4 oz)              Today, you had the following     No orders found for display       Primary Care Provider Office Phone # Fax #    Junior Buenrostro [phone]       [facility] [id number] [facility] [address]        Equal Access to Services     STEVEN ARTHUR : Hadii aad ku hadasho Soomaali, waaxda luqadaha, qaybta kaalmada adeegyada, waxay domingain hayjuden shaggy castillo . So [facility] [phone].    ATENCIÓN: Si habla español, tiene a madrigal disposición servicios gratuitos de asistencia lingüística. Llame al [phone].    We comply with applicable federal civil rights laws and [place] laws. We do not discriminate on the basis of race, color, national origin, age, disability, sex, sexual orientation, or gender identity.            Thank you!     Thank you for choosing [facility]  for your care. Our goal is always to provide you with excellent care. Hearing back from our patients is one way we can continue to improve our services. Please take a few minutes to complete the written survey that you may receive in the mail after your visit with us. Thank you!             Your Updated Medication List - Protect others around you: Learn how to safely use, store and throw away your medicines at www.disposemymeds.org.          This list is accurate as of 4/26/18  3:46 PM.  Always use your most recent med list.                    Brand Name Dispense Instructions for use Diagnosis    cloNIDine 0.1 MG tablet    CATAPRES    45 tablet    1.5 tablets 1 hour prior to scheduled ketamine dose.    Severe episode of recurrent major depressive disorder, without psychotic features (H)       COMPOUND CONTAINING CONTROLLED SUBSTANCE - PHARMACY TO MIX COMPOUNDED MEDICATION    CMPD RX    1 Bottle    Ketamine 150 mg/mL. Instill 3 sprays in each nostril every 7 days.  30 d/s.    Major depressive disorder, recurrent, severe without psychotic features (H)       KETAMINE HCL           lithium 150 MG capsule     90 capsule    Take 1 capsule (150 mg) by mouth daily    Major depressive disorder, recurrent, severe without psychotic features (H)       LORazepam 0.5 MG tablet    ATIVAN    30 tablet    Take 1-2 tablets (0.5-1 mg) by mouth every 6 hours as needed for anxiety    Major depressive disorder, recurrent, severe without psychotic features (H)       methylphenidate 10 MG tablet    RITALIN    120 tablet    Take 2 tablets (20 mg) by mouth 2 times daily    Severe single current episode of major depressive disorder, without psychotic features (H)       sertraline 25 MG tablet    ZOLOFT    1 tablet    Take 0.5 tablets (12.5 mg) by mouth daily    Major depressive disorder, recurrent, severe without psychotic features (H)       vitamin D 23815 UNIT capsule    ERGOCALCIFEROL     Take 50,000 Units by mouth once a week

## 2018-05-03 ENCOUNTER — OFFICE VISIT (OUTPATIENT)
Dept: PSYCHIATRY | Facility: CLINIC | Age: 48
End: 2018-05-03
Attending: PSYCHIATRY & NEUROLOGY
Payer: MEDICARE

## 2018-05-03 VITALS
WEIGHT: 188.4 LBS | BODY MASS INDEX: 29.5 KG/M2 | SYSTOLIC BLOOD PRESSURE: 138 MMHG | DIASTOLIC BLOOD PRESSURE: 93 MMHG | HEART RATE: 93 BPM

## 2018-05-03 DIAGNOSIS — F32.2 SEVERE SINGLE CURRENT EPISODE OF MAJOR DEPRESSIVE DISORDER, WITHOUT PSYCHOTIC FEATURES (H): ICD-10-CM

## 2018-05-03 DIAGNOSIS — F41.0 PANIC ATTACK: Primary | ICD-10-CM

## 2018-05-03 PROCEDURE — G0463 HOSPITAL OUTPT CLINIC VISIT: HCPCS | Mod: ZF

## 2018-05-03 RX ORDER — PROPRANOLOL HYDROCHLORIDE 10 MG/1
10-20 TABLET ORAL DAILY PRN
Qty: 60 TABLET | Refills: 2 | Status: SHIPPED | OUTPATIENT
Start: 2018-05-03 | End: 2018-09-06

## 2018-05-03 RX ORDER — METHYLPHENIDATE HYDROCHLORIDE 10 MG/1
20 TABLET ORAL 2 TIMES DAILY
Qty: 120 TABLET | Refills: 0 | Status: SHIPPED | OUTPATIENT
Start: 2018-05-03 | End: 2018-06-28

## 2018-05-03 ASSESSMENT — PAIN SCALES - GENERAL: PAINLEVEL: NO PAIN (0)

## 2018-05-03 NOTE — NURSING NOTE
Chief Complaint   Patient presents with     RECHECK     Severe single current episode of major depressive disorder, without psychotic features

## 2018-05-03 NOTE — MR AVS SNAPSHOT
After Visit Summary   5/3/2018    Sebastien Hoover    MRN: [id number]           Patient Information     Date Of Birth          1970        Visit Information        Provider Department      5/3/2018 1:00 PM Venancio Ochoa MD [facility]iatry Clinic        Today's Diagnoses     Panic attack    -  1    Severe single current episode of major depressive disorder, without psychotic features (H)           Follow-ups after your visit        Your next 10 appointments already scheduled     Jun 28, 2018  2:00 PM CDT   Adult Med Follow UP with Venancio Ochoa MD   Psychiatry Clinic ([facility])    [place] [id number]  [address] [place] [address]   [phone]            Sep 20, 2018  1:00 PM CDT   Adult Med Follow UP with Venancio Ochoa MD   Psychiatry Clinic ([facility])    [place] [id number]  [address] [place] [id number] [phone]            Nov 15, 2018  2:00 PM CST   Adult Med Follow UP with Venancio Ochoa MD   Psychiatry Clinic ([facility])    [place] [id number]  [address] [place] [id number] [phone]            Jan 17, 2019  1:00 PM CST   Adult Med Follow UP with Venancio Ochoa MD   Psychiatry Clinic ([facility])    [address]  [address] [place] [id number] [phone]            Mar 14, 2019  1:00 PM CDT   Adult Med Follow UP with Venancio Ochoa MD   Psychiatry Clinic ([facility])    [place] [id number]  [address] [place] [id number] [phone]              Who to contact     Please call your clinic at [phone] to:    Ask questions about your health    Make or cancel appointments    Discuss your medicines    Learn about your test results    Speak to your doctor            Additional Information About Your Visit        [place]  Information     "[employer] gives you secure access to your electronic health record. If you see a primary care provider, you can also send messages to your care team and make appointments. If you have questions, please call your primary care clinic.  If you do not have a primary care provider, please call [phone] and they will assist you.      "[employer] is an electronic gateway that provides easy, online access to your medical records. With "[employer] you can request a clinic appointment, read your test results, renew a prescription or communicate with your care team.     To access your existing account, please contact your [facility] or call [phone] for assistance.        Care EveryWhere ID     This is your Care EveryWhere ID. This could be used by other organizations to access your [facility] medical records  [id number]        Your Vitals Were     Pulse BMI (Body Mass Index)                93 29.5 kg/m2           Blood Pressure from Last 3 Encounters:   05/03/18 (!) 138/93   04/26/18 106/60   04/20/18 109/73    Weight from Last 3 Encounters:   05/03/18 85.5 kg (188 lb 6.4 oz)   04/26/18 87.6 kg (193 lb 2 oz)   04/12/18 88.8 kg (195 lb 12.3 oz)              Today, you had the following     No orders found for display         Today's Medication Changes          These changes are accurate as of 5/3/18 11:59 PM.  If you have any questions, ask your nurse or doctor.               Start taking these medicines.        Dose/Directions    propranolol 10 MG tablet   Commonly known as:  INDERAL   Used for:  Panic attack   Started by:  Venancio Ochoa MD        Dose:  10-20 mg   Take 1-2 tablets (10-20 mg) by mouth daily as needed   Quantity:  60 tablet   Refills:  2            Where to get your medicines      These medications were sent to [facility]pharmacy #[id number] [address] [address]     Phone:  [phone]     propranolol 10 MG  tablet         Some of these will need a paper prescription and others can be bought over the counter.  Ask your nurse if you have questions.     Bring a paper prescription for each of these medications     methylphenidate 10 MG tablet                Primary Care Provider Office Phone # Fax #    Junior Buenrostro [phone]       [facility] [id number] [facility] [address]        Equal Access to Services     [facility] : Hadii aad ku hadasho Soomaali, waaxda luqadaha, qaybta kaalmada adeegyada, waxay idiin hayaan adeeg kharash lafarzana . So [facility] [phone].    ATENCIÓN: Si habla español, tiene a madrigal disposición servicios gratuitos de asistencia lingüística. [facility] [phone].    We comply with applicable federal civil rights laws and [place] laws. We do not discriminate on the basis of race, color, national origin, age, disability, sex, sexual orientation, or gender identity.            Thank you!     Thank you for choosing PSYCHIATRY CLINIC  for your care. Our goal is always to provide you with excellent care. Hearing back from our patients is one way we can continue to improve our services. Please take a few minutes to complete the written survey that you may receive in the mail after your visit with us. Thank you!             Your Updated Medication List - Protect others around you: Learn how to safely use, store and throw away your medicines at www.disposemymeds.org.          This list is accurate as of 5/3/18 11:59 PM.  Always use your most recent med list.                   Brand Name Dispense Instructions for use Diagnosis    cloNIDine 0.1 MG tablet    CATAPRES    45 tablet    1.5 tablets 1 hour prior to scheduled ketamine dose.    Severe episode of recurrent major depressive disorder, without psychotic features (H)       COMPOUND CONTAINING CONTROLLED SUBSTANCE - PHARMACY TO MIX COMPOUNDED MEDICATION    CMPD RX    1 Bottle    Ketamine 150 mg/mL. Instill 3 sprays in each  nostril every 7 days.  30 d/s.    Major depressive disorder, recurrent, severe without psychotic features (H)       KETAMINE HCL           lithium 150 MG capsule     90 capsule    Take 1 capsule (150 mg) by mouth daily    Major depressive disorder, recurrent, severe without psychotic features (H)       LORazepam 0.5 MG tablet    ATIVAN    30 tablet    Take 1-2 tablets (0.5-1 mg) by mouth every 6 hours as needed for anxiety    Major depressive disorder, recurrent, severe without psychotic features (H)       methylphenidate 10 MG tablet    RITALIN    120 tablet    Take 2 tablets (20 mg) by mouth 2 times daily    Severe single current episode of major depressive disorder, without psychotic features (H)       propranolol 10 MG tablet    INDERAL    60 tablet    Take 1-2 tablets (10-20 mg) by mouth daily as needed    Panic attack       sertraline 25 MG tablet    ZOLOFT    1 tablet    Take 0.5 tablets (12.5 mg) by mouth daily    Major depressive disorder, recurrent, severe without psychotic features (H)       vitamin D 13062 UNIT capsule    ERGOCALCIFEROL     Take 50,000 Units by mouth once a week

## 2018-05-22 NOTE — PROGRESS NOTES
Service Date: 05/03/2018      PSYCHIATRY CLINIC PROGRESS NOTE      The patient returns for medication management and supportive therapy.      Interim History   Since the last visit Sebastien has had very low energy, no motivation, isolation, and increased anxiety. There has been much less benefit from ketamine and increased side effects. Total dose has been decreasing. He is using clonidine to prevent panic attacks from ketamine. He has been trying a ketogenic diet.       RECENT SYMPTOMS   Depression:   Patient endorses passive suicidal ideation, very depressed mood, being mostly anhedonic, low energy, insomnia, excessive guilt, and feelings of worthlessness.       Elevated mood:   Patient denies symptoms of jeanne.      Psychosis:   Patient denies feelings of psychosis.      Panic Attacks:   Patient endorses occasional panic attacks.      Anxiety:   Patient endorses excessive worries, social anxiety, and nervousness/tension.      Trauma-related:   Patient denies symptoms related to trauma.      ADVERSE EFFECTS:   Anxiety towards the end of his ketamine infusion.      MEDICAL CONCERNS   None recent.      SUBSTANCE USE:   Patient denies all substance use.      SOCIAL/FAMILY HISTORY:   Patient lives with his parents. He is disabled. He has not worked since 2011. He is isolated, having only left the house one in the past month.      MEDICAL/SURGICAL HISTORY:   See EMR medical problems list.      MEDICAL REVIEW OF SYSTEMS:   A comprehensive review of systems was performed and is negative other than as noted in the HPI.      ALLERGY:   None new.      CURRENT MEDICATIONS:   See EMR med sections.      VITALS:   See rooming note.      MENTAL STATUS EXAM:   On examination, patient was alert, well groomed, and cooperative with the interview. Speech was normal rate and flow. Movements were normal. Mood and affect were sad. Thought process was logical with wishes of death and without psychosis. Insight and judgment were good.  Patient was oriented x4 with OK attention, distractibility, and intact memory. Fund of knowledge was good. Gait and station were normal.      Labs and Data:   WHODAS completed.      DIAGNOSIS AND ASSESSMENT:   1. MDD, recurrent and severe without psychosis   2. Panic attacks      PLAN:   1. Medications:    a. Hold clonidine   b. Propranolol 10-20 mg prior to ketamine infusion   2. Therapy: None   3. RTC in 2 months   4. Other:   5. Crisis numbers: provided routinely in AVS.      Treatment Risk Statement: The patient understands the risks, benefits, adverse effects, and alternatives. Agrees to treatment with the capacity to do so. No medical contraindications to treatment. Agrees to call clinic for any problems. The patient understands to call 911 or come to the nearest ED is life-threatening or urgent symptoms present.      PSYCHIATRY CLINIC INDIVIDUAL PSYCHOTHERAPY NOTE:   Start time: 1:15   End time: 1:35   Date reviewed:    Date due next:   Subjective: The supportive psychotherapy session addressed issues related to coping with chronic illness   Progress: Fair to good   Plan: RTC in two months      Psychotherapy services during this visit included myself and the patient.      TREATMENT PLAN:   Symptom/Problem: Depression, anxiety   Measurable Goals/Functional Improvement: Be free of suicidal thoughts, report feeling more positive   Interventions/Gains Made: Building on strengths   Discharge Criteria: Symptom resolution         FANNY ZELAYA MD             D: 2018   T: 2018   MT: LENA      Name:     DANIEL HUNT   MRN:      [id number]        Account:      [id number]   :      1970           Service Date: 2018      Document: [id number]

## 2018-06-28 ENCOUNTER — OFFICE VISIT (OUTPATIENT)
Dept: PSYCHIATRY | Facility: CLINIC | Age: 48
End: 2018-06-28
Attending: PSYCHIATRY & NEUROLOGY
Payer: MEDICARE

## 2018-06-28 VITALS
HEART RATE: 106 BPM | WEIGHT: 185.4 LBS | BODY MASS INDEX: 29.03 KG/M2 | SYSTOLIC BLOOD PRESSURE: 141 MMHG | DIASTOLIC BLOOD PRESSURE: 97 MMHG

## 2018-06-28 DIAGNOSIS — F33.2 MAJOR DEPRESSIVE DISORDER, RECURRENT, SEVERE WITHOUT PSYCHOTIC FEATURES (H): ICD-10-CM

## 2018-06-28 PROCEDURE — G0463 HOSPITAL OUTPT CLINIC VISIT: HCPCS | Mod: ZF

## 2018-06-28 RX ORDER — LITHIUM CARBONATE 150 MG/1
150 CAPSULE ORAL DAILY
Qty: 1 CAPSULE | Refills: 0
Start: 2018-06-28 | End: 2018-09-20

## 2018-06-28 ASSESSMENT — PAIN SCALES - GENERAL: PAINLEVEL: NO PAIN (0)

## 2018-06-28 NOTE — MR AVS SNAPSHOT
After Visit Summary   6/28/2018    Sebastien Hoover    MRN: [id number]           Patient Information     Date Of Birth          1970        Visit Information        Provider Department      6/28/2018 2:00 PM Venancio Ochoa MD Psychiatry Clinic        Today's Diagnoses     Major depressive disorder, recurrent, severe without psychotic features (H)           Follow-ups after your visit        Your next 10 appointments already scheduled     Sep 20, 2018  1:00 PM CDT   Adult Med Follow UP with Venancio Ochoa MD   Psychiatry Clinic ([facility])    [address]  [address] [place] [id number] [phone]            Nov 15, 2018  2:00 PM CST   Adult Med Follow UP with Venancio Ochoa MD   Psychiatry Clinic ([facility])    [place] [id number]  [address] [place] [address] [phone]            Jan 17, 2019  1:00 PM CST   Adult Med Follow UP with Venancio Ochoa MD   Psychiatry Clinic ([facility])    [place] [id number]  [address] [place] [address] [phone]            Mar 14, 2019  1:00 PM CDT   Adult Med Follow UP with Venancio Ochoa MD   Psychiatry Clinic ([facility])    [place] [id number]  [address] [place] [address] [phone]              Who to contact     Please call your clinic at [phone] to:    Ask questions about your health    Make or cancel appointments    Discuss your medicines    Learn about your test results    Speak to your doctor            Additional Information About Your Visit        MyChart Information     [employer] gives you secure access to your electronic health record. If you see a primary care provider, you can also send messages to your care team and make appointments. If you have questions, please call your primary care clinic.  If you do not have a primary  care provider, please call [phone] and they will assist you.      [employer] is an electronic gateway that provides easy, online access to your medical records. With [employer], you can request a clinic appointment, read your test results, renew a prescription or communicate with your care team.     To access your existing account, please contact your [facility] or call [phone] for assistance.        Care EveryWhere ID     This is your Care EveryWhere ID. This could be used by other organizations to access your [facility] medical records  [id number]        Your Vitals Were     Pulse BMI (Body Mass Index)                106 29.03 kg/m2           Blood Pressure from Last 3 Encounters:   07/12/18 117/64   06/28/18 (!) 141/97   05/03/18 (!) 138/93    Weight from Last 3 Encounters:   07/12/18 86.6 kg (191 lb)   06/28/18 84.1 kg (185 lb 6.4 oz)   05/03/18 85.5 kg (188 lb 6.4 oz)              Today, you had the following     No orders found for display         Today's Medication Changes          These changes are accurate as of 6/28/18 11:59 PM.  If you have any questions, ask your nurse or doctor.               Start taking these medicines.        Dose/Directions    COMPOUND CONTAINING CONTROLLED SUBSTANCE - PHARMACY TO MIX COMPOUNDED MEDICATION   Commonly known as:  CMPD RX   Used for:  Major depressive disorder, recurrent, severe without psychotic features (H)   Started by:  Venanico Ochoa MD        25 mg troches.  One sublingual dario daily.   Quantity:  30 each   Refills:  1         Stop taking these medicines if you haven't already. Please contact your care team if you have questions.     LORazepam 0.5 MG tablet   Commonly known as:  ATIVAN   Stopped by:  Venancio Ochoa MD           methylphenidate 10 MG tablet   Commonly known as:  RITALIN   Stopped by:  Venancio Ochoa MD                Where to get your medicines      Some of these will need a paper prescription and  others can be bought over the counter.  Ask your nurse if you have questions.     Bring a paper prescription for each of these medications     COMPOUND CONTAINING CONTROLLED SUBSTANCE - PHARMACY TO MIX COMPOUNDED MEDICATION       You don't need a prescription for these medications     lithium 150 MG capsule                Primary Care Provider Office Phone # Fax #    Junior Buenrostro [phone]       [facility] [id number] [facility] [address]        Equal Access to Services     STEVEN ARTHUR : Hadii aad ku hadasho Soomaali, waaxda luqadaha, qaybta kaalmada adeegyada, waxay idiin hayaan adeeg khclaudiash lafarzana . So [facility] [phone].    ATENCIÓN: Si erasmola sandeep, tiene a madrigal disposición servicios gratuitos de asistencia lingüística. [facility] [phone].    We comply with applicable federal civil rights laws and [place] laws. We do not discriminate on the basis of race, color, national origin, age, disability, sex, sexual orientation, or gender identity.            Thank you!     Thank you for choosing PSYCHIATRY CLINIC  for your care. Our goal is always to provide you with excellent care. Hearing back from our patients is one way we can continue to improve our services. Please take a few minutes to complete the written survey that you may receive in the mail after your visit with us. Thank you!             Your Updated Medication List - Protect others around you: Learn how to safely use, store and throw away your medicines at www.disposemymeds.org.          This list is accurate as of 6/28/18 11:59 PM.  Always use your most recent med list.                   Brand Name Dispense Instructions for use Diagnosis    COMPOUND CONTAINING CONTROLLED SUBSTANCE - PHARMACY TO MIX COMPOUNDED MEDICATION    CMPD RX    1 Bottle    Ketamine 150 mg/mL. Instill 3 sprays in each nostril every 7 days.  30 d/s.    Major depressive disorder, recurrent, severe without psychotic features (H)       COMPOUND  CONTAINING CONTROLLED SUBSTANCE - PHARMACY TO MIX COMPOUNDED MEDICATION    CMPD RX    30 each    25 mg troches.  One sublingual dario daily.    Major depressive disorder, recurrent, severe without psychotic features (H)       KETAMINE HCL           lithium 150 MG capsule     1 capsule    Take 1 capsule (150 mg) by mouth daily    Major depressive disorder, recurrent, severe without psychotic features (H)       propranolol 10 MG tablet    INDERAL    60 tablet    Take 1-2 tablets (10-20 mg) by mouth daily as needed    Panic attack       sertraline 25 MG tablet    ZOLOFT    1 tablet    Take 0.5 tablets (12.5 mg) by mouth daily    Major depressive disorder, recurrent, severe without psychotic features (H)       vitamin D 80081 UNIT capsule    ERGOCALCIFEROL     Take 50,000 Units by mouth once a week

## 2018-07-05 ENCOUNTER — TELEPHONE (OUTPATIENT)
Dept: PSYCHIATRY | Facility: CLINIC | Age: 48
End: 2018-07-05

## 2018-07-05 DIAGNOSIS — F33.2 MAJOR DEPRESSIVE DISORDER, RECURRENT, SEVERE WITHOUT PSYCHOTIC FEATURES (H): ICD-10-CM

## 2018-07-05 NOTE — TELEPHONE ENCOUNTER
-Received incoming phone call from  [facility]. According to the pharmacist, a medication wasn't listed on the script that was faxed for sukhi. Writer reviewed chart and it appears it should be ketamine troches. Will route to provider for confirmation.

## 2018-07-05 NOTE — TELEPHONE ENCOUNTER
Message  Received: Today       Venancio Ochoa MD Pratt, Laura RN       Caller: Unspecified (Today,  3:39 PM)                     Yes.  Ketamine troches 50 mg       -Will confirm dose with provider as this is different from the med tab

## 2018-07-06 NOTE — TELEPHONE ENCOUNTER
Message  Received: Today       Venancio Ochoa MD Pratt, Laura, RN       Caller: Unspecified (Yesterday,  3:39 PM)                     Actually, I messed up.  It should be 10 mg troches       -Writer called  [facility] at [phone] and gave VORB for 10 mg Ketamine troches per provider's order  -PharmacistVerónica took the order  -Med tab changed to reflect this

## 2018-07-12 ENCOUNTER — INFUSION THERAPY VISIT (OUTPATIENT)
Dept: INFUSION THERAPY | Facility: CLINIC | Age: 48
End: 2018-07-12
Attending: PSYCHIATRY & NEUROLOGY
Payer: MEDICARE

## 2018-07-12 VITALS
TEMPERATURE: 97.8 F | SYSTOLIC BLOOD PRESSURE: 117 MMHG | DIASTOLIC BLOOD PRESSURE: 64 MMHG | BODY MASS INDEX: 29.91 KG/M2 | OXYGEN SATURATION: 92 % | RESPIRATION RATE: 16 BRPM | HEART RATE: 67 BPM | WEIGHT: 191 LBS

## 2018-07-12 DIAGNOSIS — F33.2 SEVERE EPISODE OF RECURRENT MAJOR DEPRESSIVE DISORDER, WITHOUT PSYCHOTIC FEATURES (H): Primary | ICD-10-CM

## 2018-07-12 DIAGNOSIS — F33.2 SEVERE RECURRENT MAJOR DEPRESSION WITHOUT PSYCHOTIC FEATURES (H): ICD-10-CM

## 2018-07-12 PROCEDURE — 25000125 ZZHC RX 250: Performed by: PSYCHIATRY & NEUROLOGY

## 2018-07-12 PROCEDURE — 96365 THER/PROPH/DIAG IV INF INIT: CPT

## 2018-07-12 PROCEDURE — 25000128 H RX IP 250 OP 636: Performed by: PSYCHIATRY & NEUROLOGY

## 2018-07-12 RX ADMIN — KETAMINE HYDROCHLORIDE 35 MG: 50 INJECTION, SOLUTION INTRAMUSCULAR; INTRAVENOUS at 09:22

## 2018-07-12 RX ADMIN — SODIUM CHLORIDE 250 ML: 9 INJECTION, SOLUTION INTRAVENOUS at 09:20

## 2018-07-12 ASSESSMENT — PAIN SCALES - GENERAL: PAINLEVEL: NO PAIN (0)

## 2018-07-12 NOTE — MR AVS SNAPSHOT
After Visit Summary   7/12/2018    Sebastien Hoover    MRN: [id number]           Patient Information     Date Of Birth          1970        Visit Information        Provider Department      7/12/2018 9:00 AM UR BD 01 [facility], [place], Infusion Services        Today's Diagnoses     Severe episode of recurrent major depressive disorder, without psychotic features (H)    -  1    Severe recurrent major depression without psychotic features (H)           Follow-ups after your visit        Your next 10 appointments already scheduled     Sep 20, 2018  1:00 PM CDT   Adult Med Follow UP with Venancio cOhoa MD   Psychiatry Clinic ([facility])    [place] [id number]  [address] [place] [address] [phone]            Nov 15, 2018  2:00 PM CST   Adult Med Follow UP with Venancio Ochoa MD   Psychiatry Clinic ([facility])    [place] [id number]  [facility] [address] [address] [phone]            Jan 17, 2019  1:00 PM CST   Adult Med Follow UP with Venancio Ochoa MD   Psychiatry Clinic ([facility])    [place] [id number]  [facility] [address] [address] [phone]            Mar 14, 2019  1:00 PM CDT   Adult Med Follow UP with Venancio Ochoa MD   Psychiatry Clinic ([facility])    [place] [id number]  [facility] [address] [address] [phone]              Who to contact     If you have questions or need follow up information about today's clinic visit or your schedule please [facility] directly at [phone].  Normal or non-critical lab and imaging results will be communicated to you by MyChart, letter or phone within 4 business days after the clinic has received the results. If you do not hear from us within 7 days, please contact the clinic through MyChart or phone. If you have  a critical or abnormal lab result, we will notify you by phone as soon as possible.  Submit refill requests through "[employer]" or call your pharmacy and they will forward the refill request to us. Please allow 3 business days for your refill to be completed.          Additional Information About Your Visit        Sigmascreeninghart Information     "[employer]" gives you secure access to your electronic health record. If you see a primary care provider, you can also send messages to your care team and make appointments. If you have questions, please call your primary care clinic.  If you do not have a primary care provider, please call [phone] and they will assist you.        Care EveryWhere ID     This is your Care EveryWhere ID. This could be used by other organizations to access your [place] medical records  [id number]        Your Vitals Were     Pulse Temperature Respirations Pulse Oximetry BMI (Body Mass Index)       67 97.8  F (36.6  C) 16 92% 29.91 kg/m2        Blood Pressure from Last 3 Encounters:   07/12/18 117/64   04/26/18 106/60   04/20/18 109/73    Weight from Last 3 Encounters:   07/12/18 86.6 kg (191 lb)   04/26/18 87.6 kg (193 lb 2 oz)   04/12/18 88.8 kg (195 lb 12.3 oz)              Today, you had the following     No orders found for display       Primary Care Provider Office Phone # Fax #    Junior Buenrostro [phone]       [facility] [id number] [facility] [address]        Equal Access to Services     STEVEN ARTHUR : Hadii aad ku hadasho Soomaali, waaxda luqadaha, qaybta kaalmada adeegyaaline, guillermo castillo . So [facility] [phone].    ATENCIÓN: Si habla sandeep, tiene a madrigal disposición servicios gratuitos de asistencia lingüística. Llame al [phone].    We comply with applicable federal civil rights laws and [place] laws. We do not discriminate on the basis of race, color, national origin, age, disability, sex, sexual orientation, or gender  identity.            Thank you!     Thank you for choosing [facility], [place], INFUSION SERVICES  for your care. Our goal is always to provide you with excellent care. Hearing back from our patients is one way we can continue to improve our services. Please take a few minutes to complete the written survey that you may receive in the mail after your visit with us. Thank you!             Your Updated Medication List - Protect others around you: Learn how to safely use, store and throw away your medicines at www.disposemymeds.org.          This list is accurate as of 7/12/18 12:02 PM.  Always use your most recent med list.                   Brand Name Dispense Instructions for use Diagnosis    cloNIDine 0.1 MG tablet    CATAPRES    45 tablet    1.5 tablets 1 hour prior to scheduled ketamine dose.    Severe episode of recurrent major depressive disorder, without psychotic features (H)       COMPOUND CONTAINING CONTROLLED SUBSTANCE - PHARMACY TO MIX COMPOUNDED MEDICATION    CMPD RX    1 Bottle    Ketamine 150 mg/mL. Instill 3 sprays in each nostril every 7 days.  30 d/s.    Major depressive disorder, recurrent, severe without psychotic features (H)       COMPOUND CONTAINING CONTROLLED SUBSTANCE - PHARMACY TO MIX COMPOUNDED MEDICATION    CMPD RX    30 each    10 mg troches.  One sublingual dario daily.    Major depressive disorder, recurrent, severe without psychotic features (H)       KETAMINE HCL           lithium 150 MG capsule     1 capsule    Take 1 capsule (150 mg) by mouth daily    Major depressive disorder, recurrent, severe without psychotic features (H)       propranolol 10 MG tablet    INDERAL    60 tablet    Take 1-2 tablets (10-20 mg) by mouth daily as needed    Panic attack       sertraline 25 MG tablet    ZOLOFT    1 tablet    Take 0.5 tablets (12.5 mg) by mouth daily    Major depressive disorder, recurrent, severe without psychotic features (H)       vitamin D 47693 UNIT capsule    ERGOCALCIFEROL     Take  50,000 Units by mouth once a week

## 2018-07-12 NOTE — PROGRESS NOTES
"Infusion Nursing Note:  Sebastien Hoover presents today for ketamine.    Patient seen by provider today: No   present during visit today: Not Applicable.    Note: Patient states he has been away from ketamine for about 2.5 months.  States he was doing well up until about 3 weeks ago.  He is going to start on the sublingual ketamine also.    Intravenous Access:  Peripheral IV placed.    Post Infusion Assessment:  Patient tolerated infusion, however felt like he got a \"boost\" at the end.  Will give NS at same rate as ketamine in future.  Patient observed for 60 minutes post ketamine per protocol.  Blood return noted pre and post infusion.  Site patent and intact, free from redness, edema or discomfort.  No evidence of extravasations.  Access discontinued per protocol.    Discharge Plan:   Patient discharged in stable condition accompanied by: self.  Departure Mode: Ambulatory.  Will call to schedule next appointment.  Angela Davis RN                        "

## 2018-07-18 DIAGNOSIS — F33.2 MAJOR DEPRESSIVE DISORDER, RECURRENT, SEVERE WITHOUT PSYCHOTIC FEATURES (H): ICD-10-CM

## 2018-07-18 DIAGNOSIS — F33.2 SEVERE EPISODE OF RECURRENT MAJOR DEPRESSIVE DISORDER, WITHOUT PSYCHOTIC FEATURES (H): ICD-10-CM

## 2018-07-18 RX ORDER — METHYLPHENIDATE HYDROCHLORIDE 10 MG/1
10 TABLET ORAL 3 TIMES DAILY
Qty: 90 TABLET | Refills: 0 | Status: SHIPPED | OUTPATIENT
Start: 2018-07-18 | End: 2018-08-15

## 2018-07-18 RX ORDER — CLONIDINE HYDROCHLORIDE 0.1 MG/1
TABLET ORAL
Qty: 45 TABLET | Refills: 2 | Status: SHIPPED | OUTPATIENT
Start: 2018-07-18 | End: 2018-09-25

## 2018-07-19 ENCOUNTER — TELEPHONE (OUTPATIENT)
Dept: PSYCHIATRY | Facility: CLINIC | Age: 48
End: 2018-07-19

## 2018-07-19 NOTE — TELEPHONE ENCOUNTER
-Writer received two scripts on printer in triage room for pt. One script for ketamine troches- 25 mg daily. Second script for Ritalin- 10 mg TID    -Scripts placed in provider's folder for signature. Once signed, writer will fax ketamine troches to compounding pharmacy and coordinate where Ritalin script should be sent with pt.     -Routed to provider

## 2018-07-19 NOTE — PROGRESS NOTES
PSYCHIATRY CLINIC PROGRESS NOTE      The patient returns for medication management and supportive therapy.      Interim History   The last month was chaotic and marked by down mood. He wants to stop Ritalin. He was looking into a trial of a research antidepressant, but he didn't meet the enrollment criteria.       RECENT SYMPTOMS   Depression:   The patient endorsing wishes of death, depressed mood, anhedonia, low energy, insomnia with interrupted sleep and early AM awakening, and excessive guilt. He is trying to follow a ketogenic diet.       Elevated mood:   The patient denies symptoms of jeanne.      Psychosis:   The patient denies symptoms of psychosis.      Panic Attacks:   The patient denies panic attacks.      Anxiety:   The patient endorses excessive worries and restlessness.       Trauma-related:   The patient denies symptoms related to trauma.       ADVERSE EFFECTS:   Mood instability from Ritalin.      MEDICAL CONCERNS:   None.      SUBSTANCE USE:   The patient denies substance use.      SOCIAL/FAMILY HISTORY:   The patient is disabled and lives with his parents. He is too depressed and non-functional to do any work. He is socially isolated.       MEDICAL/SURGICAL HISTORY:   See EMR medical problems list.      MEDICAL REVIEW OF SYSTEMS:   A comprehensive review of systems was performed and is negative other than as noted in the HPI.      ALLERGY:   None new.      CURRENT MEDICATIONS:   See EMR med sections.      VITALS:   See rooming note.      MENTAL STATUS EXAM:   On examination, the patient was alert, well groomed, and cooperative with the interview. Speech was normal rate and flow. Movements were normal. Mood was sad and affect was serious. Thought processes were logical and positive for passive suicidal ideation, but negative for psychosis. Insight and judgment were adequate. Patient was oriented x4 with adequate attention and concentration, intact memory, and good fund of knowledge. Gait and station  were normal.          DIAGNOSIS AND ASSESSMENT:   MDD, recurrent and severe, refractory to treatment. Ketamine has been very helpful in reducing suicide thoughts.       PLAN:   1. Medications: Ketamine troches 25 mg sublingual daily   2. Therapy: Not currently   3. RTC in two months   4. Other:   5. Crisis numbers: provided routinely in AVS.      Treatment Risk Statement: The patient understands the risks, benefits, adverse effects, and alternatives. Agrees to treatment with the capacity to do so. No medical contraindications to treatment. Agrees to call clinic for any problems. The patient understands to call 911 or come to the nearest ED is life-threatening or urgent symptoms present.

## 2018-07-19 NOTE — TELEPHONE ENCOUNTER
2018, Provider returned signed prescription for methylphenidate (Ritalin) 10 mg tablet - Si tablets daily in divided doses, qty 120 (one hundred twenty) tablets. This writer [facility]'s home address in chart on 2018.Mireya Uribe LPN    -Writer received signed scripts from the provider    -Called pt to ask where he'd like script sent. Pt would like it mailed to home address- confirmed in Epic.    -Ketamine troches faxed to  compounding pharmacy at [phone]

## 2018-07-24 ENCOUNTER — INFUSION THERAPY VISIT (OUTPATIENT)
Dept: INFUSION THERAPY | Facility: CLINIC | Age: 48
End: 2018-07-24
Attending: PSYCHIATRY & NEUROLOGY
Payer: MEDICARE

## 2018-07-24 VITALS
SYSTOLIC BLOOD PRESSURE: 114 MMHG | OXYGEN SATURATION: 97 % | TEMPERATURE: 98.1 F | DIASTOLIC BLOOD PRESSURE: 77 MMHG | RESPIRATION RATE: 16 BRPM | HEART RATE: 74 BPM

## 2018-07-24 DIAGNOSIS — F33.2 SEVERE EPISODE OF RECURRENT MAJOR DEPRESSIVE DISORDER, WITHOUT PSYCHOTIC FEATURES (H): Primary | ICD-10-CM

## 2018-07-24 DIAGNOSIS — F33.2 SEVERE RECURRENT MAJOR DEPRESSION WITHOUT PSYCHOTIC FEATURES (H): ICD-10-CM

## 2018-07-24 PROCEDURE — 25000128 H RX IP 250 OP 636: Performed by: PSYCHIATRY & NEUROLOGY

## 2018-07-24 PROCEDURE — 25000125 ZZHC RX 250: Performed by: PSYCHIATRY & NEUROLOGY

## 2018-07-24 PROCEDURE — 96361 HYDRATE IV INFUSION ADD-ON: CPT

## 2018-07-24 PROCEDURE — 96365 THER/PROPH/DIAG IV INF INIT: CPT

## 2018-07-24 RX ADMIN — SODIUM CHLORIDE 250 ML: 9 INJECTION, SOLUTION INTRAVENOUS at 12:19

## 2018-07-24 RX ADMIN — KETAMINE HYDROCHLORIDE 35 MG: 50 INJECTION, SOLUTION INTRAMUSCULAR; INTRAVENOUS at 12:19

## 2018-07-24 ASSESSMENT — PAIN SCALES - GENERAL: PAINLEVEL: NO PAIN (0)

## 2018-07-24 NOTE — PROGRESS NOTES
"Infusion Nursing Note:  Sebastien BERNABE Nathans presents today for ketamine.    Patient seen by provider today: No   present during visit today: Not Applicable.    Note: Patient states he is doing ok.  No new concerns.    Intravenous Access:  Peripheral IV placed.    Post Infusion Assessment:  Patient tolerated infusion without incident.  NS flush given at same rate as ketamine infusion which patient stated was \"much better.\"  VS monitored per protocol.  Patient observed for 60 minutes post ketamine per protocol.  Blood return noted pre and post infusion.  Site patent and intact, free from redness, edema or discomfort.  No evidence of extravasations.  Access discontinued per protocol.    Discharge Plan:   Patient discharged in stable condition accompanied by: self.  Departure Mode: Ambulatory.  Will return to clinic on Thursday.  Angela Davis RN                        "

## 2018-07-24 NOTE — MR AVS SNAPSHOT
After Visit Summary   7/24/2018    Sebastien Hoover    MRN: [id number]           Patient Information     Date Of Birth          1970        Visit Information        Provider Department      7/24/2018 12:00 PM UR CH 02 [facility]        Today's Diagnoses     Severe episode of recurrent major depressive disorder, without psychotic features (H)    -  1    Severe recurrent major depression without psychotic features (H)           Follow-ups after your visit        Your next 10 appointments already scheduled     Jul 26, 2018  9:00 AM CDT   Level 3 with UR CH 03   [facility] ([facility])    [address]  [address]  [facility] [address]   [phone]            Aug 02, 2018 10:00 AM CDT   Level 3 with UR CH 04   [facility] [place], Infusion Services ([facility])    [address] [place] [address] [address]  [address]  [facility] [address]   [phone]            Aug 09, 2018 12:00 PM CDT   Level 3 with UR CH 03   [facility] [place], Infusion Services ([facility])    [address] [place] [address] [address]  [address]  [facility] [address]   [phone]            Aug 16, 2018 10:00 AM CDT   Level 3 with UR CH 03   [facility] ([facility])    [address]  [address]  [facility] [place] [address]   [phone]            Aug 23, 2018 12:00 PM CDT   Level 3 with UR CH 03   [facility] ([facility])    [address] [place] [address] [address]  [address]  [facility] [address]   [phone]            Sep 20, 2018  1:00 PM CDT   Adult Med Follow UP with Venancio Ochoa MD   Psychiatry Clinic ([facility])    [address]  [address] [place] [address]   [phone]            Nov 15, 2018  2:00 PM CST   Adult  Med Follow UP with Venancio Ochoa MD   Psychiatry Clinic ([facility])    [address]  [address] [address] [phone]            Jan 17, 2019  1:00 PM CST   Adult Med Follow UP with Venancio Ochao MD   Psychiatry Clinic ([facility])    [address]  [address] [address] [phone]            Mar 14, 2019  1:00 PM CDT   Adult Med Follow UP with Venancio Ochoa MD   Psychiatry Clinic ([facility])    [address]  [address] [address] [phone]              Who to contact     If you have questions or need follow up information about today's clinic visit or your schedule please contact [facility] [place], [facility] SERVICES directly at [phone].  Normal or non-critical lab and imaging results will be communicated to you by [employer] C[employer], letter or phone within 4 business days after the clinic has received the results. If you do not hear from us within 7 days, please contact the clinic through [employer] or phone. If you have a critical or abnormal lab result, we will notify you by phone as soon as possible.  Submit refill requests through [employer] or call your pharmacy and they will forward the refill request to us. Please allow 3 business days for your refill to be completed.          Additional Information About Your Visit        [employer] Information     [employer] gives you secure access to your electronic health record. If you see a primary care provider, you can also send messages to your care team and make appointments. If you have questions, please call your primary care clinic.  If you do not have a primary care provider, please call [phone] and they will assist you.        Care EveryWhere ID     This is your Care EveryWhere ID. This could be used by other organizations to access your [facility]  records  [id number]        Your Vitals Were     Pulse Temperature Respirations Pulse Oximetry          74 98.1  F (36.7  C) 16 97%         Blood Pressure from Last 3 Encounters:   07/24/18 114/77   07/12/18 117/64   04/26/18 106/60    Weight from Last 3 Encounters:   07/12/18 86.6 kg (191 lb)   04/26/18 87.6 kg (193 lb 2 oz)   04/12/18 88.8 kg (195 lb 12.3 oz)              Today, you had the following     No orders found for display       Primary Care Provider Office Phone # Fax #    Junior Buenrostro [phone]       [facility] [id number] [facility] [address]        Equal Access to Services     STEVEN ARTHUR : Hadii aad ku hadasho Soomaali, waaxda luqadaha, qaybta kaalmada adeegyada, waxstevan castillo . So [facility] [phone].    ATENCIÓN: Si habla español, tiene a madrigal disposición servicios gratuitos de asistencia lingüística. Ll[facility] [phone].    We comply with applicable federal civil rights laws and [place] laws. We do not discriminate on the basis of race, color, national origin, age, disability, sex, sexual orientation, or gender identity.            Thank you!     Thank you for choosing [facility]  for your care. Our goal is always to provide you with excellent care. Hearing back from our patients is one way we can continue to improve our services. Please take a few minutes to complete the written survey that you may receive in the mail after your visit with us. Thank you!             Your Updated Medication List - Protect others around you: Learn how to safely use, store and throw away your medicines at www.disposemymeds.org.          This list is accurate as of 7/24/18  4:04 PM.  Always use your most recent med list.                   Brand Name Dispense Instructions for use Diagnosis    cloNIDine 0.1 MG tablet    CATAPRES    45 tablet    1.5 tablets 1 hour prior to scheduled ketamine dose.    Severe episode of recurrent major  depressive disorder, without psychotic features (H)       COMPOUND CONTAINING CONTROLLED SUBSTANCE - PHARMACY TO MIX COMPOUNDED MEDICATION    CMPD RX    1 Bottle    Ketamine 150 mg/mL. Instill 3 sprays in each nostril every 7 days.  30 d/s.    Major depressive disorder, recurrent, severe without psychotic features (H)       COMPOUND CONTAINING CONTROLLED SUBSTANCE - PHARMACY TO MIX COMPOUNDED MEDICATION    CMPD RX    30 each    Ketamine 25 mg troches.  One sublingual dario daily.    Major depressive disorder, recurrent, severe without psychotic features (H)       KETAMINE HCL           lithium 150 MG capsule     1 capsule    Take 1 capsule (150 mg) by mouth daily    Major depressive disorder, recurrent, severe without psychotic features (H)       methylphenidate 10 MG tablet    RITALIN    90 tablet    Take 1 tablet (10 mg) by mouth 3 times daily    Severe episode of recurrent major depressive disorder, without psychotic features (H)       propranolol 10 MG tablet    INDERAL    60 tablet    Take 1-2 tablets (10-20 mg) by mouth daily as needed    Panic attack       sertraline 25 MG tablet    ZOLOFT    1 tablet    Take 0.5 tablets (12.5 mg) by mouth daily    Major depressive disorder, recurrent, severe without psychotic features (H)       vitamin D 15811 UNIT capsule    ERGOCALCIFEROL     Take 50,000 Units by mouth once a week

## 2018-07-26 ENCOUNTER — INFUSION THERAPY VISIT (OUTPATIENT)
Dept: INFUSION THERAPY | Facility: CLINIC | Age: 48
End: 2018-07-26
Attending: PSYCHIATRY & NEUROLOGY
Payer: MEDICARE

## 2018-07-26 VITALS
RESPIRATION RATE: 16 BRPM | TEMPERATURE: 98.2 F | DIASTOLIC BLOOD PRESSURE: 64 MMHG | SYSTOLIC BLOOD PRESSURE: 106 MMHG | HEART RATE: 69 BPM | OXYGEN SATURATION: 97 %

## 2018-07-26 DIAGNOSIS — F33.2 SEVERE RECURRENT MAJOR DEPRESSION WITHOUT PSYCHOTIC FEATURES (H): ICD-10-CM

## 2018-07-26 DIAGNOSIS — F33.2 SEVERE EPISODE OF RECURRENT MAJOR DEPRESSIVE DISORDER, WITHOUT PSYCHOTIC FEATURES (H): Primary | ICD-10-CM

## 2018-07-26 PROCEDURE — 25000128 H RX IP 250 OP 636: Performed by: PSYCHIATRY & NEUROLOGY

## 2018-07-26 PROCEDURE — 96361 HYDRATE IV INFUSION ADD-ON: CPT

## 2018-07-26 PROCEDURE — 25000125 ZZHC RX 250: Performed by: PSYCHIATRY & NEUROLOGY

## 2018-07-26 PROCEDURE — 96365 THER/PROPH/DIAG IV INF INIT: CPT

## 2018-07-26 RX ADMIN — SODIUM CHLORIDE 250 ML: 9 INJECTION, SOLUTION INTRAVENOUS at 09:32

## 2018-07-26 RX ADMIN — KETAMINE HYDROCHLORIDE 35 MG: 50 INJECTION, SOLUTION INTRAMUSCULAR; INTRAVENOUS at 09:32

## 2018-07-26 NOTE — MR AVS SNAPSHOT
After Visit Summary   7/26/2018    Sebastien Hoover    MRN: [id number]           Patient Information     Date Of Birth          1970        Visit Information        Provider Department      7/26/2018 9:00 AM UR CH 03 [facility], Infusion Services        Today's Diagnoses     Severe episode of recurrent major depressive disorder, without psychotic features (H)    -  1    Severe recurrent major depression without psychotic features (H)           Follow-ups after your visit        Your next 10 appointments already scheduled     Aug 02, 2018 10:00 AM CDT   Level 3 with UR CH 04   [facility], Infusion Services ([facility])    [address]  [address]  [facility] [address]   [phone]            Aug 09, 2018 12:00 PM CDT   Level 3 with UR CH 03   [facility], Infusion Services ([facility])    [address] [place] [address] [address]  [address]  [facility] [address]   [phone]            Aug 16, 2018 10:00 AM CDT   Level 3 with UR CH 03   [facility], Infusion Services ([facility])    [address] [place] [address] [address]  [address]  [facility] [address]   [phone]            Aug 23, 2018 12:00 PM CDT   Level 3 with UR CH 03   [facility], Infusion Services ([facility])    [address] [place] [address] [address]  [address]  [facility] [address]   [phone]            Sep 20, 2018  1:00 PM CDT   Adult Med Follow UP with Venancio Ochoa MD   Psychiatry Clinic ([facility])    [address] [place] [address]  [address] [address]   [phone]            Nov 15, 2018  2:00 PM CST   Adult Med Follow UP with Venancio Ochoa MD   Psychiatry Clinic ([facility])    [address] [place] [address]  [address] [address]   [phone]            Jan 17, 2019  1:00 PM CST    Adult Med Follow UP with Venancio Ochoa MD   Psychiatry Clinic ([facility])    [place] [id number] [address] [address] [phone]            Mar 14, 2019  1:00 PM CDT   Adult Med Follow UP with Venancio Ochoa MD   Psychiatry Clinic ([facility])    [address]  [address] [address] [phone]              Who to contact     If you have questions or need follow up information about today's clinic visit or your schedule please contact [facility], [place], INFUSION SERVICES directly at [phone].  Normal or non-critical lab and imaging results will be communicated to you by Motomotiveshart, letter or phone within 4 business days after the clinic has received the results. If you do not hear from us within 7 days, please contact the clinic through Motomotiveshart or phone. If you have a critical or abnormal lab result, we will notify you by phone as soon as possible.  Submit refill requests through Say2me or call your pharmacy and they will forward the refill request to us. Please allow 3 business days for your refill to be completed.          Additional Information About Your Visit        Say2me Information     Say2me gives you secure access to your electronic health record. If you see a primary care provider, you can also send messages to your care team and make appointments. If you have questions, please call your primary care clinic.  If you do not have a primary care provider, please call [phone] and they will assist you.        Care EveryWhere ID     This is your Care EveryWhere ID. This could be used by other organizations to access your [place] medical records  [id number]        Your Vitals Were     Pulse Temperature Respirations Pulse Oximetry          69 98.2  F (36.8  C) 16 97%         Blood Pressure from Last 3 Encounters:   07/26/18 106/64   07/24/18 114/77   07/12/18 117/64    Weight  from Last 3 Encounters:   07/12/18 86.6 kg (191 lb)   04/26/18 87.6 kg (193 lb 2 oz)   04/12/18 88.8 kg (195 lb 12.3 oz)              Today, you had the following     No orders found for display       Primary Care Provider Office Phone # Fax #    Junior Buenrostro [phone]       [facility] [id number] [facility] [address]        Equal Access to Services     STEVEN ARTHUR : Hadii aad ku hadasho Soomaali, waaxda luqadaha, qaybta kaalmada adeegyada, waxay idiin hayaan adeeg kharash la'juden . So [facility] [phone].    ATENCIÓN: Si habla español, tiene a madrigal disposición servicios gratuitos de asistencia lingüística. [facility] [phone].    We comply with applicable federal civil rights laws and [place] laws. We do not discriminate on the basis of race, color, national origin, age, disability, sex, sexual orientation, or gender identity.            Thank you!     Thank you for choosing [facility]  for your care. Our goal is always to provide you with excellent care. Hearing back from our patients is one way we can continue to improve our services. Please take a few minutes to complete the written survey that you may receive in the mail after your visit with us. Thank you!             Your Updated Medication List - Protect others around you: Learn how to safely use, store and throw away your medicines at www.disposemymeds.org.          This list is accurate as of 7/26/18 11:43 AM.  Always use your most recent med list.                   Brand Name Dispense Instructions for use Diagnosis    cloNIDine 0.1 MG tablet    CATAPRES    45 tablet    1.5 tablets 1 hour prior to scheduled ketamine dose.    Severe episode of recurrent major depressive disorder, without psychotic features (H)       COMPOUND CONTAINING CONTROLLED SUBSTANCE - PHARMACY TO MIX COMPOUNDED MEDICATION    CMPD RX    30 each    Ketamine 25 mg troches.  One sublingual dario daily.    Major depressive  disorder, recurrent, severe without psychotic features (H)       KETAMINE HCL           lithium 150 MG capsule     1 capsule    Take 1 capsule (150 mg) by mouth daily    Major depressive disorder, recurrent, severe without psychotic features (H)       methylphenidate 10 MG tablet    RITALIN    90 tablet    Take 1 tablet (10 mg) by mouth 3 times daily    Severe episode of recurrent major depressive disorder, without psychotic features (H)       propranolol 10 MG tablet    INDERAL    60 tablet    Take 1-2 tablets (10-20 mg) by mouth daily as needed    Panic attack       sertraline 25 MG tablet    ZOLOFT    1 tablet    Take 0.5 tablets (12.5 mg) by mouth daily    Major depressive disorder, recurrent, severe without psychotic features (H)

## 2018-07-26 NOTE — PROGRESS NOTES
Infusion Nursing Note:  Sebastien Hoover presents today for ketamine infusion.    Patient seen by provider today: No   present during visit today: Not Applicable.    Note: Ketamine infused over 1 hour, followed by 1 hour observation with NS set at same rate (56ml/hr).    Intravenous Access:  Peripheral IV placed.    Treatment Conditions:  Not Applicable.      Post Infusion Assessment:  Patient tolerated infusion without incident.  Patient observed for 60 minutes post ketamine infusion, per protocol.  Blood return noted pre and post infusion.  Site patent and intact, free from redness, edema or discomfort.  No evidence of extravasations.  Access discontinued per protocol.    Discharge Plan:   Patient discharged in stable condition accompanied by: self.  Departure Mode: Ambulatory.    Angela Gann RN

## 2018-08-02 ENCOUNTER — INFUSION THERAPY VISIT (OUTPATIENT)
Dept: INFUSION THERAPY | Facility: CLINIC | Age: 48
End: 2018-08-02
Attending: PSYCHIATRY & NEUROLOGY
Payer: MEDICARE

## 2018-08-02 VITALS
HEART RATE: 71 BPM | TEMPERATURE: 98 F | DIASTOLIC BLOOD PRESSURE: 72 MMHG | RESPIRATION RATE: 18 BRPM | SYSTOLIC BLOOD PRESSURE: 112 MMHG | OXYGEN SATURATION: 95 %

## 2018-08-02 DIAGNOSIS — F33.2 SEVERE EPISODE OF RECURRENT MAJOR DEPRESSIVE DISORDER, WITHOUT PSYCHOTIC FEATURES (H): Primary | ICD-10-CM

## 2018-08-02 DIAGNOSIS — F33.2 SEVERE RECURRENT MAJOR DEPRESSION WITHOUT PSYCHOTIC FEATURES (H): ICD-10-CM

## 2018-08-02 PROCEDURE — 96361 HYDRATE IV INFUSION ADD-ON: CPT

## 2018-08-02 PROCEDURE — 25000128 H RX IP 250 OP 636: Performed by: PSYCHIATRY & NEUROLOGY

## 2018-08-02 PROCEDURE — 25000125 ZZHC RX 250: Performed by: PSYCHIATRY & NEUROLOGY

## 2018-08-02 PROCEDURE — 96360 HYDRATION IV INFUSION INIT: CPT

## 2018-08-02 PROCEDURE — 96365 THER/PROPH/DIAG IV INF INIT: CPT

## 2018-08-02 PROCEDURE — 96372 THER/PROPH/DIAG INJ SC/IM: CPT | Mod: XS

## 2018-08-02 RX ADMIN — KETAMINE HYDROCHLORIDE 35 MG: 50 INJECTION, SOLUTION INTRAMUSCULAR; INTRAVENOUS at 10:30

## 2018-08-02 RX ADMIN — SODIUM CHLORIDE 250 ML: 9 INJECTION, SOLUTION INTRAVENOUS at 10:30

## 2018-08-02 NOTE — PROGRESS NOTES
Infusion Nursing Note:  Sebastien Hoover presents today for ketamine infusion.    Patient seen by provider today: No   present during visit today: Not Applicable.    Note: Ketamine infused over 1 hour, with NS flush at same rate for additional hour.    Intravenous Access:  Peripheral IV placed.    Treatment Conditions:  Not Applicable.      Post Infusion Assessment:  Patient tolerated infusion without incident.  Patient observed for 60 minutes post ketamine infusion, per protocol.  Blood return noted pre and post infusion.  Site patent and intact, free from redness, edema or discomfort.  No evidence of extravasations.  Access discontinued per protocol.    Discharge Plan:   Patient discharged in stable condition accompanied by: self.  Departure Mode: Ambulatory.    Angela Gann RN

## 2018-08-02 NOTE — MR AVS SNAPSHOT
After Visit Summary   8/2/2018    Sebastien Hoover    MRN: [id number]           Patient Information     Date Of Birth          1970        Visit Information        Provider Department      8/2/2018 10:00 AM UR CH 04 [facility], Infusion Services        Today's Diagnoses     Severe episode of recurrent major depressive disorder, without psychotic features (H)    -  1    Severe recurrent major depression without psychotic features (H)           Follow-ups after your visit        Your next 10 appointments already scheduled     Aug 09, 2018 12:00 PM CDT   Level 3 with UR CH 03   [facility], Infusion Services ([facility])    [address] [place] [address] [address]  [address]  [facility] [address]   [phone]            Aug 16, 2018 10:00 AM CDT   Level 3 with UR CH 03   [facility], Infusion Services ([facility])    [address] [place] [address] [address]  [address]  [facility] [address]   [phone]            Aug 23, 2018 12:00 PM CDT   Level 3 with UR CH 03   [facility], Infusion Services ([facility])    [address] [place] [address] [address]  [address]  [facility] [address]   [phone]            Sep 20, 2018  1:00 PM CDT   Adult Med Follow UP with Venancio Ochoa MD   Psychiatry Clinic ([facility])    [address]  [address] [place] [address]   [phone]            Nov 15, 2018  2:00 PM CST   Adult Med Follow UP with Venancio Ochoa MD   Psychiatry Clinic ([facility])    [address] [place] [address]  [facility] [address] [address]   [phone]            Jan 17, 2019  1:00 PM CST   Adult Med Follow UP with Venancio Ochoa MD   Psychiatry Clinic ([facility])    [address] [address]  [facility] [address] [address]   [phone]            Mar 14, 2019  1:00 PM  CDT   Adult Med Follow UP with Venancio Ochoa MD   Psychiatry Clinic ([facility])    [address] [address] [phone]              Who to contact     If you have questions or need follow up information about today's clinic visit or your schedule please contact [facility], [place], INFUSION SERVICES directly at [phone].  Normal or non-critical lab and imaging results will be communicated to you by Ornicepthart, letter or phone within 4 business days after the clinic has received the results. If you do not hear from us within 7 days, please contact the clinic through [employer] or phone. If you have a critical or abnormal lab result, we will notify you by phone as soon as possible.  Submit refill requests through Hum or call your pharmacy and they will forward the refill request to us. Please allow 3 business days for your refill to be completed.          Additional Information About Your Visit        Hum Information     Hum gives you secure access to your electronic health record. If you see a primary care provider, you can also send messages to your care team and make appointments. If you have questions, please call your primary care clinic.  If you do not have a primary care provider, please call [phone] and they will assist you.        Care EveryWhere ID     This is your Care EveryWhere ID. This could be used by other organizations to access your [place] medical records  [id number]        Your Vitals Were     Pulse Temperature Respirations Pulse Oximetry          71 98  F (36.7  C) 18 95%         Blood Pressure from Last 3 Encounters:   08/02/18 112/72   07/26/18 106/64   07/24/18 114/77    Weight from Last 3 Encounters:   07/12/18 86.6 kg (191 lb)   04/26/18 87.6 kg (193 lb 2 oz)   04/12/18 88.8 kg (195 lb 12.3 oz)              Today, you had the following     No orders found for display       Primary Care Provider Office  Phone # Fax #    Junior Buenrostro [phone]       [facility] [id number] [facility] [address]        Equal Access to Services     BELENTONY SANDHYA : Hadii aad ku hadmaryaj Sodaniella, waaxda luqadaha, qaybta kaalmada htao, guillermo warnerkevin alexanderferny valenzuelajose mccain. So [facility] [phone].    ATENCIÓN: Si habla español, tiene a madrigal disposición servicios gratuitos de asistencia lingüística. Llame al [phone].    We comply with applicable federal civil rights laws and [place] laws. We do not discriminate on the basis of race, color, national origin, age, disability, sex, sexual orientation, or gender identity.            Thank you!     Thank you for choosing [facility] SERVICES  for your care. Our goal is always to provide you with excellent care. Hearing back from our patients is one way we can continue to improve our services. Please take a few minutes to complete the written survey that you may receive in the mail after your visit with us. Thank you!             Your Updated Medication List - Protect others around you: Learn how to safely use, store and throw away your medicines at www.disposemymeds.org.          This list is accurate as of 8/2/18  2:19 PM.  Always use your most recent med list.                   Brand Name Dispense Instructions for use Diagnosis    cloNIDine 0.1 MG tablet    CATAPRES    45 tablet    1.5 tablets 1 hour prior to scheduled ketamine dose.    Severe episode of recurrent major depressive disorder, without psychotic features (H)       COMPOUND CONTAINING CONTROLLED SUBSTANCE - PHARMACY TO MIX COMPOUNDED MEDICATION    CMPD RX    30 each    Ketamine 25 mg troches.  One sublingual dario daily.    Major depressive disorder, recurrent, severe without psychotic features (H)       KETAMINE HCL           lithium 150 MG capsule     1 capsule    Take 1 capsule (150 mg) by mouth daily    Major depressive disorder, recurrent, severe without psychotic  features (H)       methylphenidate 10 MG tablet    RITALIN    90 tablet    Take 1 tablet (10 mg) by mouth 3 times daily    Severe episode of recurrent major depressive disorder, without psychotic features (H)       propranolol 10 MG tablet    INDERAL    60 tablet    Take 1-2 tablets (10-20 mg) by mouth daily as needed    Panic attack       sertraline 25 MG tablet    ZOLOFT    1 tablet    Take 0.5 tablets (12.5 mg) by mouth daily    Major depressive disorder, recurrent, severe without psychotic features (H)

## 2018-08-09 ENCOUNTER — INFUSION THERAPY VISIT (OUTPATIENT)
Dept: INFUSION THERAPY | Facility: CLINIC | Age: 48
End: 2018-08-09
Attending: PSYCHIATRY & NEUROLOGY
Payer: MEDICARE

## 2018-08-09 VITALS
SYSTOLIC BLOOD PRESSURE: 115 MMHG | WEIGHT: 187.83 LBS | TEMPERATURE: 98 F | OXYGEN SATURATION: 96 % | DIASTOLIC BLOOD PRESSURE: 71 MMHG | BODY MASS INDEX: 29.41 KG/M2 | HEART RATE: 68 BPM

## 2018-08-09 DIAGNOSIS — F33.2 SEVERE RECURRENT MAJOR DEPRESSION WITHOUT PSYCHOTIC FEATURES (H): ICD-10-CM

## 2018-08-09 DIAGNOSIS — F33.2 SEVERE EPISODE OF RECURRENT MAJOR DEPRESSIVE DISORDER, WITHOUT PSYCHOTIC FEATURES (H): Primary | ICD-10-CM

## 2018-08-09 PROCEDURE — 25000128 H RX IP 250 OP 636: Performed by: PSYCHIATRY & NEUROLOGY

## 2018-08-09 PROCEDURE — 25000125 ZZHC RX 250: Performed by: PSYCHIATRY & NEUROLOGY

## 2018-08-09 PROCEDURE — 96365 THER/PROPH/DIAG IV INF INIT: CPT

## 2018-08-09 PROCEDURE — 96361 HYDRATE IV INFUSION ADD-ON: CPT

## 2018-08-09 RX ADMIN — KETAMINE HYDROCHLORIDE 35 MG: 50 INJECTION, SOLUTION INTRAMUSCULAR; INTRAVENOUS at 12:22

## 2018-08-09 RX ADMIN — SODIUM CHLORIDE 250 ML: 9 INJECTION, SOLUTION INTRAVENOUS at 12:22

## 2018-08-09 NOTE — MR AVS SNAPSHOT
After Visit Summary   8/9/2018    Sebastien Hoover    MRN: [id number]           Patient Information     Date Of Birth          1970        Visit Information        Provider Department      8/9/2018 12:00 PM UR CH 03 [facility], Infusion Services        Today's Diagnoses     Severe episode of recurrent major depressive disorder, without psychotic features (H)    -  1    Severe recurrent major depression without psychotic features (H)           Follow-ups after your visit        Your next 10 appointments already scheduled     Aug 16, 2018 10:00 AM CDT   Level 3 with UR CH 03   [facility], Infusion Services ([facility])    [address] [place] [address] [address]  [address] [place] [address]   [phone]            Aug 23, 2018 12:00 PM CDT   Level 3 with UR CH 03   [facility], Infusion Services ([facility])    [address] [place] [address] [address]  [address] [place] [address]   [phone]            Sep 20, 2018  1:00 PM CDT   Adult Med Follow UP with Venancio Ochoa MD   Psychiatry Clinic ([facility])    [address] [address]  [facility] [address] [address] [phone]            Nov 15, 2018  2:00 PM CST   Adult Med Follow UP with Venancio Ochoa MD   Psychiatry Clinic ([facility])    [address] [place] [address]  [facility] [address] [address] [phone]            Jan 17, 2019  1:00 PM CST   Adult Med Follow UP with Venancio Ochoa MD   Psychiatry Clinic ([facility])    [address] [place] [address]  [facility] [address] [address] [phone]            Mar 14, 2019  1:00 PM CDT   Adult Med Follow UP with Venancio Ochoa MD   Psychiatry Clinic ([facility])    [address]  [facility] [address] [address] [phone]              Who to contact      If you have questions or need follow up information about today's clinic visit or your schedule please contact [facility], [place], INFUSION SERVICES directly at [phone].  Normal or non-critical lab and imaging results will be communicated to you by MyChart, letter or phone within 4 business days after the clinic has received the results. If you do not hear from us within 7 days, please contact the clinic through Nirvahahart or phone. If you have a critical or abnormal lab result, we will notify you by phone as soon as possible.  Submit refill requests through Hand Talk or call your pharmacy and they will forward the refill request to us. Please allow 3 business days for your refill to be completed.          Additional Information About Your Visit        Hand Talk Information     Hand Talk gives you secure access to your electronic health record. If you see a primary care provider, you can also send messages to your care team and make appointments. If you have questions, please call your primary care clinic.  If you do not have a primary care provider, please call [phone] and they will assist you.        Care EveryWhere ID     This is your Care EveryWhere ID. This could be used by other organizations to access your [place] medical records  [id number]        Your Vitals Were     Pulse Temperature Pulse Oximetry BMI (Body Mass Index)          68 98  F (36.7  C) 96% 29.41 kg/m2         Blood Pressure from Last 3 Encounters:   08/09/18 115/71   08/02/18 112/72   07/26/18 106/64    Weight from Last 3 Encounters:   08/09/18 85.2 kg (187 lb 13.3 oz)   07/12/18 86.6 kg (191 lb)   04/26/18 87.6 kg (193 lb 2 oz)              Today, you had the following     No orders found for display       Primary Care Provider Office Phone # Fax #    Junior Buenrostro [phone]       [facility] [id number] [facility] [address]        Equal Access to Services     STEVEN ARTHUR AH: Reynaldo Jo,  wacoletteda josiah, qaybta kaalmada thao, guillermo coon juddkendra savageaakevin ah. So [facility] [phone].    ATENCIÓN: Si luiz hopkins, tiene a mardigal disposición servicios gratuitos de asistencia lingüística. [place] al [phone].    We comply with applicable federal civil rights laws and [place] laws. We do not discriminate on the basis of race, color, national origin, age, disability, sex, sexual orientation, or gender identity.            Thank you!     Thank you for choosing [facility] [place], [facility] SERVICES  for your care. Our goal is always to provide you with excellent care. Hearing back from our patients is one way we can continue to improve our services. Please take a few minutes to complete the written survey that you may receive in the mail after your visit with us. Thank you!             Your Updated Medication List - Protect others around you: Learn how to safely use, store and throw away your medicines at www.disposemymeds.org.          This list is accurate as of 8/9/18  3:02 PM.  Always use your most recent med list.                   Brand Name Dispense Instructions for use Diagnosis    cloNIDine 0.1 MG tablet    CATAPRES    45 tablet    1.5 tablets 1 hour prior to scheduled ketamine dose.    Severe episode of recurrent major depressive disorder, without psychotic features (H)       COMPOUND CONTAINING CONTROLLED SUBSTANCE - PHARMACY TO MIX COMPOUNDED MEDICATION    CMPD RX    30 each    Ketamine 25 mg troches.  One sublingual dario daily.    Major depressive disorder, recurrent, severe without psychotic features (H)       KETAMINE HCL           lithium 150 MG capsule     1 capsule    Take 1 capsule (150 mg) by mouth daily    Major depressive disorder, recurrent, severe without psychotic features (H)       methylphenidate 10 MG tablet    RITALIN    90 tablet    Take 1 tablet (10 mg) by mouth 3 times daily    Severe episode of recurrent major depressive disorder, without psychotic features (H)        propranolol 10 MG tablet    INDERAL    60 tablet    Take 1-2 tablets (10-20 mg) by mouth daily as needed    Panic attack       sertraline 25 MG tablet    ZOLOFT    1 tablet    Take 0.5 tablets (12.5 mg) by mouth daily    Major depressive disorder, recurrent, severe without psychotic features (H)

## 2018-08-09 NOTE — PROGRESS NOTES
Infusion Nursing Note:  Sebastien SRINIVASA Hoover presents today for ketamine infusion.    Patient seen by provider today: No   present during visit today: Not Applicable.    Note: Denies health changes.  Ketamine infused over 60 minutes with 60 minutes observation following, keeping the IV rate consistent.    Intravenous Access:  Peripheral IV placed.    Treatment Conditions:  Not Applicable.      Post Infusion Assessment:  Patient tolerated infusion without incident.  Patient observed for 60 minutes post ketamine infusion, per protocol.  Blood return noted pre and post infusion.  Site patent and intact, free from redness, edema or discomfort.  No evidence of extravasations.  Access discontinued per protocol.    Discharge Plan:   Patient discharged in stable condition accompanied by: self.  Departure Mode: Ambulatory.    Angela Gann RN

## 2018-08-15 DIAGNOSIS — F33.2 SEVERE EPISODE OF RECURRENT MAJOR DEPRESSIVE DISORDER, WITHOUT PSYCHOTIC FEATURES (H): ICD-10-CM

## 2018-08-15 RX ORDER — METHYLPHENIDATE HYDROCHLORIDE 10 MG/1
TABLET ORAL
Qty: 120 TABLET | Refills: 0 | Status: SHIPPED | OUTPATIENT
Start: 2018-08-15 | End: 2018-08-16

## 2018-08-16 ENCOUNTER — INFUSION THERAPY VISIT (OUTPATIENT)
Dept: INFUSION THERAPY | Facility: CLINIC | Age: 48
End: 2018-08-16
Attending: PSYCHIATRY & NEUROLOGY
Payer: MEDICARE

## 2018-08-16 VITALS
BODY MASS INDEX: 29.48 KG/M2 | OXYGEN SATURATION: 97 % | SYSTOLIC BLOOD PRESSURE: 113 MMHG | TEMPERATURE: 97.4 F | RESPIRATION RATE: 18 BRPM | WEIGHT: 188.27 LBS | HEART RATE: 66 BPM | DIASTOLIC BLOOD PRESSURE: 76 MMHG

## 2018-08-16 DIAGNOSIS — F33.2 SEVERE EPISODE OF RECURRENT MAJOR DEPRESSIVE DISORDER, WITHOUT PSYCHOTIC FEATURES (H): ICD-10-CM

## 2018-08-16 DIAGNOSIS — F33.2 SEVERE EPISODE OF RECURRENT MAJOR DEPRESSIVE DISORDER, WITHOUT PSYCHOTIC FEATURES (H): Primary | ICD-10-CM

## 2018-08-16 DIAGNOSIS — F33.2 SEVERE RECURRENT MAJOR DEPRESSION WITHOUT PSYCHOTIC FEATURES (H): ICD-10-CM

## 2018-08-16 PROCEDURE — 25000125 ZZHC RX 250: Performed by: PSYCHIATRY & NEUROLOGY

## 2018-08-16 PROCEDURE — 25000128 H RX IP 250 OP 636: Performed by: PSYCHIATRY & NEUROLOGY

## 2018-08-16 PROCEDURE — 96361 HYDRATE IV INFUSION ADD-ON: CPT

## 2018-08-16 PROCEDURE — 96365 THER/PROPH/DIAG IV INF INIT: CPT

## 2018-08-16 RX ORDER — METHYLPHENIDATE HYDROCHLORIDE 10 MG/1
TABLET ORAL
Qty: 120 TABLET | Refills: 0 | Status: SHIPPED | OUTPATIENT
Start: 2018-08-16 | End: 2018-08-21

## 2018-08-16 RX ADMIN — KETAMINE HYDROCHLORIDE 35 MG: 50 INJECTION, SOLUTION INTRAMUSCULAR; INTRAVENOUS at 10:28

## 2018-08-16 RX ADMIN — SODIUM CHLORIDE 250 ML: 9 INJECTION, SOLUTION INTRAVENOUS at 10:28

## 2018-08-16 NOTE — PROGRESS NOTES
Infusion Nursing Note:  Sebastien Hoover presents today for ketamine infusion.    Patient seen by provider today: No   present during visit today: Not Applicable.    Note: Ketamine infused over 60 minutes, with 60 minute observation following at same IV rate.    Intravenous Access:  Peripheral IV placed.    Treatment Conditions:  Not Applicable.      Post Infusion Assessment:  Patient tolerated infusion without incident.  Patient observed for 60 minutes post ketamine infusion per protocol.  Blood return noted pre and post infusion.  Site patent and intact, free from redness, edema or discomfort.  No evidence of extravasations.  Access discontinued per protocol.    Discharge Plan:   Patient discharged in stable condition accompanied by: self.  Departure Mode: Ambulatory.    Angela Gann RN

## 2018-08-16 NOTE — MR AVS SNAPSHOT
After Visit Summary   8/16/2018    Sebastien Hoover    MRN: [id number]           Patient Information     Date Of Birth          1970        Visit Information        Provider Department      8/16/2018 10:00 AM UR CH 03 [facility], Infusion Services        Today's Diagnoses     Severe episode of recurrent major depressive disorder, without psychotic features (H)    -  1    Severe recurrent major depression without psychotic features (H)           Follow-ups after your visit        Your next 10 appointments already scheduled     Aug 23, 2018 12:00 PM CDT   Level 3 with UR CH 03   [facility], Infusion Services ([facility])    [address] [place] [address] S.  [address]  [facility] [address]   [phone]            Sep 06, 2018 10:00 AM CDT   Level 3 with UR CH 03   [facility], Infusion Services ([facility])    [address] [place] [address] S.  [address] [place] [address]   [phone]            Sep 20, 2018  1:00 PM CDT   Adult Med Follow UP with Venancio Ochoa MD   Psychiatry Clinic ([facility])    [address]  [facility] [address] [address] [phone]            Nov 15, 2018  2:00 PM CST   Adult Med Follow UP with Venancio Ochoa MD   Psychiatry Clinic ([facility])    [address]  [facility] [address] [address] [phone]            Jan 17, 2019  1:00 PM CST   Adult Med Follow UP with Venancio Ochoa MD   Psychiatry Clinic ([facility])    [address]  [facility] [address] [address] [phone]            Mar 14, 2019  1:00 PM CDT   Adult Med Follow UP with Venancio Ochoa MD   Psychiatry Clinic ([facility])    [address]  [facility] [address] [address] [phone]              Who to  contact     If you have questions or need follow up information about today's clinic visit or your schedule please contact [facility] [place], INFUSION SERVICES directly at [phone].  Normal or non-critical lab and imaging results will be communicated to you by Linguastathart, letter or phone within 4 business days after the clinic has received the results. If you do not hear from us within 7 days, please contact the clinic through Linguastathart or phone. If you have a critical or abnormal lab result, we will notify you by phone as soon as possible.  Submit refill requests through [employer] or call your pharmacy and they will forward the refill request to us. Please allow 3 business days for your refill to be completed.          Additional Information About Your Visit        Movidius Information     [employer] gives you secure access to your electronic health record. If you see a primary care provider, you can also send messages to your care team and make appointments. If you have questions, please call your primary care clinic.  If you do not have a primary care provider, please call [phone] and they will assist you.        Care EveryWhere ID     This is your Care EveryWhere ID. This could be used by other organizations to access your [facility] medical records  [id number]        Your Vitals Were     Pulse Temperature Respirations Pulse Oximetry BMI (Body Mass Index)       66 97.4  F (36.3  C) 18 97% 29.48 kg/m2        Blood Pressure from Last 3 Encounters:   08/16/18 113/76   08/09/18 115/71   08/02/18 112/72    Weight from Last 3 Encounters:   08/16/18 85.4 kg (188 lb 4.4 oz)   08/09/18 85.2 kg (187 lb 13.3 oz)   07/12/18 86.6 kg (191 lb)              Today, you had the following     No orders found for display         Today's Medication Changes          These changes are accurate as of 8/16/18  4:33 PM.  If you have any questions, ask your nurse or doctor.               These medicines have changed or have updated  prescriptions.        Dose/Directions    methylphenidate 10 MG tablet   Commonly known as:  RITALIN   This may have changed:  additional instructions   Used for:  Severe episode of recurrent major depressive disorder, without psychotic features (H)   Changed by:  Venancio Ochoa MD        4 tablets daily in divided doses   Quantity:  120 tablet   Refills:  0            Where to get your medicines      Some of these will need a paper prescription and others can be bought over the counter.  Ask your nurse if you have questions.     Bring a paper prescription for each of these medications     methylphenidate 10 MG tablet                Primary Care Provider Office Phone # Fax #    Junior Buenrostro [phone]       [facility] [id number] [facility] [address]        Equal Access to Services     TONY ARTHUR : Hadii aad ku hadasho Sodaniella, waaxda carrolqadaha, qaybta kaalmada adefernyyaaline, guillermo castillo . So [facility] [phone].    ATENCIÓN: Si habla español, tiene a madrigal disposición servicios gratuitos de asistencia lingüística. Llame al [phone].    We comply with applicable federal civil rights laws and [place] laws. We do not discriminate on the basis of race, color, national origin, age, disability, sex, sexual orientation, or gender identity.            Thank you!     Thank you for choosing [facility]  for your care. Our goal is always to provide you with excellent care. Hearing back from our patients is one way we can continue to improve our services. Please take a few minutes to complete the written survey that you may receive in the mail after your visit with us. Thank you!             Your Updated Medication List - Protect others around you: Learn how to safely use, store and throw away your medicines at www.disposemymeds.org.          This list is accurate as of 8/16/18  4:33 PM.  Always use your most recent med list.                    Brand Name Dispense Instructions for use Diagnosis    cloNIDine 0.1 MG tablet    CATAPRES    45 tablet    1.5 tablets 1 hour prior to scheduled ketamine dose.    Severe episode of recurrent major depressive disorder, without psychotic features (H)       COMPOUND CONTAINING CONTROLLED SUBSTANCE - PHARMACY TO MIX COMPOUNDED MEDICATION    CMPD RX    30 each    Ketamine 25 mg troches.  One sublingual dario daily.    Major depressive disorder, recurrent, severe without psychotic features (H)       KETAMINE HCL           lithium 150 MG capsule     1 capsule    Take 1 capsule (150 mg) by mouth daily    Major depressive disorder, recurrent, severe without psychotic features (H)       methylphenidate 10 MG tablet    RITALIN    120 tablet    4 tablets daily in divided doses    Severe episode of recurrent major depressive disorder, without psychotic features (H)       propranolol 10 MG tablet    INDERAL    60 tablet    Take 1-2 tablets (10-20 mg) by mouth daily as needed    Panic attack       sertraline 25 MG tablet    ZOLOFT    1 tablet    Take 0.5 tablets (12.5 mg) by mouth daily    Major depressive disorder, recurrent, severe without psychotic features (H)

## 2018-08-21 DIAGNOSIS — F33.2 SEVERE EPISODE OF RECURRENT MAJOR DEPRESSIVE DISORDER, WITHOUT PSYCHOTIC FEATURES (H): ICD-10-CM

## 2018-08-21 RX ORDER — METHYLPHENIDATE HYDROCHLORIDE 10 MG/1
TABLET ORAL
Qty: 120 TABLET | Refills: 0 | Status: SHIPPED | OUTPATIENT
Start: 2018-08-21 | End: 2018-09-20

## 2018-08-23 ENCOUNTER — INFUSION THERAPY VISIT (OUTPATIENT)
Dept: INFUSION THERAPY | Facility: CLINIC | Age: 48
End: 2018-08-23
Attending: PSYCHIATRY & NEUROLOGY
Payer: MEDICARE

## 2018-08-23 VITALS
BODY MASS INDEX: 29.55 KG/M2 | DIASTOLIC BLOOD PRESSURE: 81 MMHG | HEART RATE: 69 BPM | RESPIRATION RATE: 16 BRPM | SYSTOLIC BLOOD PRESSURE: 121 MMHG | TEMPERATURE: 97.7 F | OXYGEN SATURATION: 98 % | WEIGHT: 188.71 LBS

## 2018-08-23 DIAGNOSIS — F33.2 SEVERE RECURRENT MAJOR DEPRESSION WITHOUT PSYCHOTIC FEATURES (H): ICD-10-CM

## 2018-08-23 DIAGNOSIS — F33.2 SEVERE EPISODE OF RECURRENT MAJOR DEPRESSIVE DISORDER, WITHOUT PSYCHOTIC FEATURES (H): Primary | ICD-10-CM

## 2018-08-23 PROCEDURE — 96365 THER/PROPH/DIAG IV INF INIT: CPT

## 2018-08-23 PROCEDURE — 96361 HYDRATE IV INFUSION ADD-ON: CPT

## 2018-08-23 PROCEDURE — 25000125 ZZHC RX 250: Performed by: PSYCHIATRY & NEUROLOGY

## 2018-08-23 PROCEDURE — 25000128 H RX IP 250 OP 636: Performed by: PSYCHIATRY & NEUROLOGY

## 2018-08-23 RX ADMIN — SODIUM CHLORIDE 250 ML: 9 INJECTION, SOLUTION INTRAVENOUS at 12:15

## 2018-08-23 RX ADMIN — KETAMINE HYDROCHLORIDE 35 MG: 50 INJECTION, SOLUTION INTRAMUSCULAR; INTRAVENOUS at 12:15

## 2018-08-23 ASSESSMENT — PAIN SCALES - GENERAL: PAINLEVEL: NO PAIN (0)

## 2018-08-23 NOTE — MR AVS SNAPSHOT
After Visit Summary   8/23/2018    Sebastien Hoover    MRN: [id number]           Patient Information     Date Of Birth          1970        Visit Information        Provider Department      8/23/2018 12:00 PM UR CH 03 [facility], Infusion Services        Today's Diagnoses     Severe episode of recurrent major depressive disorder, without psychotic features (H)    -  1    Severe recurrent major depression without psychotic features (H)           Follow-ups after your visit        Your next 10 appointments already scheduled     Sep 06, 2018 10:00 AM CDT   Level 3 with UR CH 03   [facility], Infusion Services ([facility])    [address]  [address]  [facility] [address]   [phone]            Sep 20, 2018  1:00 PM CDT   Adult Med Follow UP with Venancio Ochoa MD   Psychiatry Clinic ([facility])    [place] [id number]  [address] [place] [id number] [phone]            Sep 25, 2018 12:00 PM CDT   Level 3 with UR CH 06   [facility], Infusion Services ([facility])    [address] [place] [address] [address]  [address]  [facility] [address]   [phone]            Nov 15, 2018  2:00 PM CST   Adult Med Follow UP with Venancio Ochoa MD   Psychiatry Clinic ([facility])    [address]  [address] [place] [address] [phone]            Jan 17, 2019  1:00 PM CST   Adult Med Follow UP with Venancio Ochoa MD   Psychiatry Clinic ([facility])    [address]  [address] [place] [address] [phone]            Mar 14, 2019  1:00 PM CDT   Adult Med Follow UP with Venancio Ochoa MD   Psychiatry Clinic ([facility])    [address]  [address] [place] [id number] [phone]              Who to  contact     If you have questions or need follow up information about today's clinic visit or your schedule please contact [facility] [place], INFUSION SERVICES directly at [phone].  Normal or non-critical lab and imaging results will be communicated to you by MyChart, letter or phone within 4 business days after the clinic has received the results. If you do not hear from us within 7 days, please contact the clinic through elastic.iohart or phone. If you have a critical or abnormal lab result, we will notify you by phone as soon as possible.  Submit refill requests through [employer] or call your pharmacy and they will forward the refill request to us. Please allow 3 business days for your refill to be completed.          Additional Information About Your Visit        [employer] Information     [employer] gives you secure access to your electronic health record. If you see a primary care provider, you can also send messages to your care team and make appointments. If you have questions, please call your primary care clinic.  If you do not have a primary care provider, please call [phone] and they will assist you.        Care EveryWhere ID     This is your Care EveryWhere ID. This could be used by other organizations to access your [facility] medical records  [id number]        Your Vitals Were     Pulse Temperature Respirations Pulse Oximetry BMI (Body Mass Index)       69 97.7  F (36.5  C) 16 98% 29.55 kg/m2        Blood Pressure from Last 3 Encounters:   08/23/18 121/81   08/16/18 113/76   08/09/18 115/71    Weight from Last 3 Encounters:   08/23/18 85.6 kg (188 lb 11.4 oz)   08/16/18 85.4 kg (188 lb 4.4 oz)   08/09/18 85.2 kg (187 lb 13.3 oz)              Today, you had the following     No orders found for display       Primary Care Provider Office Phone # Fax #    Junior Buenrostro [phone]       [facility] [id number] [facility] [address]        Equal Access to Services     STEVEN ARTHUR :  Hadii aad ku hadmaryo Sodianeali, waaxda luqadaha, qaybta kaalmada thao, guillermo domingain hayaakevin alexanderferny mooney latristankevin adán. So [facility] [phone].    ATENCIÓN: Si luiz hopkins, tiene a madrigal disposición servicios gratuitos de asistencia lingüística. Llame al [phone].    We comply with applicable federal civil rights laws and [place] laws. We do not discriminate on the basis of race, color, national origin, age, disability, sex, sexual orientation, or gender identity.            Thank you!     Thank you for choosing [facility]  for your care. Our goal is always to provide you with excellent care. Hearing back from our patients is one way we can continue to improve our services. Please take a few minutes to complete the written survey that you may receive in the mail after your visit with us. Thank you!             Your Updated Medication List - Protect others around you: Learn how to safely use, store and throw away your medicines at www.disposemymeds.org.          This list is accurate as of 8/23/18  3:58 PM.  Always use your most recent med list.                   Brand Name Dispense Instructions for use Diagnosis    cloNIDine 0.1 MG tablet    CATAPRES    45 tablet    1.5 tablets 1 hour prior to scheduled ketamine dose.    Severe episode of recurrent major depressive disorder, without psychotic features (H)       COMPOUND CONTAINING CONTROLLED SUBSTANCE - PHARMACY TO MIX COMPOUNDED MEDICATION    CMPD RX    30 each    Ketamine 25 mg troches.  One sublingual dario daily.    Major depressive disorder, recurrent, severe without psychotic features (H)       KETAMINE HCL           lithium 150 MG capsule     1 capsule    Take 1 capsule (150 mg) by mouth daily    Major depressive disorder, recurrent, severe without psychotic features (H)       methylphenidate 10 MG tablet    RITALIN    120 tablet    4 tablets daily in divided doses    Severe episode of recurrent major depressive disorder, without  psychotic features (H)       propranolol 10 MG tablet    INDERAL    60 tablet    Take 1-2 tablets (10-20 mg) by mouth daily as needed    Panic attack       sertraline 25 MG tablet    ZOLOFT    1 tablet    Take 0.5 tablets (12.5 mg) by mouth daily    Major depressive disorder, recurrent, severe without psychotic features (H)

## 2018-08-23 NOTE — PROGRESS NOTES
Infusion Nursing Note:  Sebastien Hoover presents today for ketamine infusion.    Patient seen by provider today: No   present during visit today: Not Applicable.    Note: N/A.    Intravenous Access:  Peripheral IV placed.    Treatment Conditions:  Ketamine infused over 60 minutes, with 60 minutes NS infused at same rate during observation period.      Post Infusion Assessment:  Patient tolerated infusion without incident.  Patient observed for 60 minutes post ketamine infusion, per protocol.  Blood return noted pre and post infusion.  Site patent and intact, free from redness, edema or discomfort.  No evidence of extravasations.  Access discontinued per protocol.    Discharge Plan:   Patient discharged in stable condition accompanied by: self.  Departure Mode: Ambulatory.    Angela Gann RN

## 2018-09-06 ENCOUNTER — INFUSION THERAPY VISIT (OUTPATIENT)
Dept: INFUSION THERAPY | Facility: CLINIC | Age: 48
End: 2018-09-06
Attending: PSYCHIATRY & NEUROLOGY
Payer: MEDICARE

## 2018-09-06 VITALS
TEMPERATURE: 98 F | SYSTOLIC BLOOD PRESSURE: 130 MMHG | RESPIRATION RATE: 16 BRPM | OXYGEN SATURATION: 100 % | DIASTOLIC BLOOD PRESSURE: 74 MMHG | HEART RATE: 78 BPM

## 2018-09-06 DIAGNOSIS — F33.2 SEVERE RECURRENT MAJOR DEPRESSION WITHOUT PSYCHOTIC FEATURES (H): ICD-10-CM

## 2018-09-06 DIAGNOSIS — F33.2 SEVERE EPISODE OF RECURRENT MAJOR DEPRESSIVE DISORDER, WITHOUT PSYCHOTIC FEATURES (H): Primary | ICD-10-CM

## 2018-09-06 PROCEDURE — 96361 HYDRATE IV INFUSION ADD-ON: CPT

## 2018-09-06 PROCEDURE — 25000128 H RX IP 250 OP 636: Performed by: PSYCHIATRY & NEUROLOGY

## 2018-09-06 PROCEDURE — 96365 THER/PROPH/DIAG IV INF INIT: CPT

## 2018-09-06 PROCEDURE — 25000125 ZZHC RX 250: Performed by: PSYCHIATRY & NEUROLOGY

## 2018-09-06 RX ADMIN — SODIUM CHLORIDE 250 ML: 9 INJECTION, SOLUTION INTRAVENOUS at 10:27

## 2018-09-06 RX ADMIN — KETAMINE HYDROCHLORIDE 35 MG: 50 INJECTION, SOLUTION INTRAMUSCULAR; INTRAVENOUS at 10:27

## 2018-09-06 ASSESSMENT — PAIN SCALES - GENERAL: PAINLEVEL: NO PAIN (0)

## 2018-09-06 NOTE — MR AVS SNAPSHOT
After Visit Summary   9/6/2018    Sebastien Hoover    MRN: [id number]           Patient Information     Date Of Birth          1970        Visit Information        Provider Department      9/6/2018 10:00 AM UR CH 03 [facility], Infusion Services        Today's Diagnoses     Severe episode of recurrent major depressive disorder, without psychotic features (H)    -  1    Severe recurrent major depression without psychotic features (H)           Follow-ups after your visit        Your next 10 appointments already scheduled     Sep 20, 2018  1:00 PM CDT   Adult Med Follow UP with Venancio Ochoa MD   Psychiatry Clinic ([facility])    [facility]  [address]nd [address]  [address] [address]   [phone]            Sep 25, 2018 12:00 PM CDT   Level 3 with UR CH 06   [facility], Infusion Services ([facility])    [address] [place] [address] [address]  [address] [place] [address]   [phone]            Oct 11, 2018 10:00 AM CDT   Level 3 with UR CH 06   [facility], Infusion Services ([facility])    [address] [place] [address] [address]  [address] [place] [address]   [phone]            Oct 25, 2018 10:00 AM CDT   Level 3 with UR CH 06   [facility], Infusion Services ([facility])    [address] [place] [address] [address]  [address] [place] [address]   [phone]            Nov 15, 2018  2:00 PM CST   Adult Med Follow UP with Venancio Ochoa MD   Psychiatry Clinic ([facility])    [facility]  [address]nd [address]  [address] [address]   [phone]            Jan 17, 2019  1:00 PM CST   Adult Med Follow UP with Venancio Ochoa MD   Psychiatry Clinic ([facility])    [facility]  [address]nd [address]  [address] [address]   [phone]            Mar 14, 2019  1:00 PM  CDT   Adult Med Follow UP with Venancio Ochoa MD   Psychiatry Clinic ([facility])    [address] [address] [phone]              Who to contact     If you have questions or need follow up information about today's clinic visit or your schedule please contact [facility] [place], INFUSION SERVICES directly at [phone].  Normal or non-critical lab and imaging results will be communicated to you by PadSquadhart, letter or phone within 4 business days after the clinic has received the results. If you do not hear from us within 7 days, please contact the clinic through [employer] or phone. If you have a critical or abnormal lab result, we will notify you by phone as soon as possible.  Submit refill requests through [employer] or call your pharmacy and they will forward the refill request to us. Please allow 3 business days for your refill to be completed.          Additional Information About Your Visit        [employer] Information     [employer] gives you secure access to your electronic health record. If you see a primary care provider, you can also send messages to your care team and make appointments. If you have questions, please call your primary care clinic.  If you do not have a primary care provider, please call [phone] and they will assist you.        Care EveryWhere ID     This is your Care EveryWhere ID. This could be used by other organizations to access your [facility] medical records  [id number]        Your Vitals Were     Pulse Temperature Respirations Pulse Oximetry          78 98  F (36.7  C) 16 100%         Blood Pressure from Last 3 Encounters:   09/06/18 130/74   08/23/18 121/81   08/16/18 113/76    Weight from Last 3 Encounters:   08/23/18 85.6 kg (188 lb 11.4 oz)   08/16/18 85.4 kg (188 lb 4.4 oz)   08/09/18 85.2 kg (187 lb 13.3 oz)              Today, you had the following     No orders found for display       Primary Care  Provider Office Phone # Fax #    Junior Buenrostro [phone]       [facility] [id number] [facility] [address]        Equal Access to Services     BELENTONY SANDHYA : Hadii aad ku hadmaryaj Soadniella, waaxda luqadaha, qaybta kaalmada thao, guillermo penaloza catherineferny valenzuelajose mccain. So [facility] [phone].    ATENCIÓN: Si habla español, tiene a madrigal disposición servicios gratuitos de asistencia lingüística. Llame al [phone].    We comply with applicable federal civil rights laws and [place] laws. We do not discriminate on the basis of race, color, national origin, age, disability, sex, sexual orientation, or gender identity.            Thank you!     Thank you for choosing [facility]  for your care. Our goal is always to provide you with excellent care. Hearing back from our patients is one way we can continue to improve our services. Please take a few minutes to complete the written survey that you may receive in the mail after your visit with us. Thank you!             Your Updated Medication List - Protect others around you: Learn how to safely use, store and throw away your medicines at www.disposemymeds.org.          This list is accurate as of 9/6/18  2:12 PM.  Always use your most recent med list.                   Brand Name Dispense Instructions for use Diagnosis    cloNIDine 0.1 MG tablet    CATAPRES    45 tablet    1.5 tablets 1 hour prior to scheduled ketamine dose.    Severe episode of recurrent major depressive disorder, without psychotic features (H)       COMPOUND CONTAINING CONTROLLED SUBSTANCE - PHARMACY TO MIX COMPOUNDED MEDICATION    CMPD RX    30 each    Ketamine 25 mg troches.  One sublingual dario daily.    Major depressive disorder, recurrent, severe without psychotic features (H)       KETAMINE HCL           lithium 150 MG capsule     1 capsule    Take 1 capsule (150 mg) by mouth daily    Major depressive disorder, recurrent, severe  without psychotic features (H)       methylphenidate 10 MG tablet    RITALIN    120 tablet    4 tablets daily in divided doses    Severe episode of recurrent major depressive disorder, without psychotic features (H)       sertraline 25 MG tablet    ZOLOFT    1 tablet    Take 0.5 tablets (12.5 mg) by mouth daily    Major depressive disorder, recurrent, severe without psychotic features (H)

## 2018-09-06 NOTE — PROGRESS NOTES
Infusion Nursing Note:  Sebastien BERNABE Nathans presents today for ketamine.    Patient seen by provider today: No   present during visit today: Not Applicable.    Note: Patient is experimenting with every other week ketamine infusions.  States his depression and anxiety are about the same.  He has stopped taking the clonidine prior to infusion.    Intravenous Access:  Peripheral IV placed.    Post Infusion Assessment:  Patient tolerated infusion without incident.  VS monitored per protocol.  Patient observed for 60 minutes post ketamine per protocol.  Blood return noted pre and post infusion.  Site patent and intact, free from redness, edema or discomfort.  No evidence of extravasations.  Access discontinued per protocol.    Discharge Plan:   Patient discharged in stable condition accompanied by: self, has transportation  Departure Mode: Ambulatory.  Will return to clinic 9/25  Angela Davis RN

## 2018-09-20 ENCOUNTER — OFFICE VISIT (OUTPATIENT)
Dept: PSYCHIATRY | Facility: CLINIC | Age: 48
End: 2018-09-20
Attending: PSYCHIATRY & NEUROLOGY
Payer: MEDICARE

## 2018-09-20 VITALS
BODY MASS INDEX: 29.19 KG/M2 | DIASTOLIC BLOOD PRESSURE: 86 MMHG | HEART RATE: 80 BPM | WEIGHT: 186.4 LBS | SYSTOLIC BLOOD PRESSURE: 125 MMHG

## 2018-09-20 DIAGNOSIS — F33.2 SEVERE EPISODE OF RECURRENT MAJOR DEPRESSIVE DISORDER, WITHOUT PSYCHOTIC FEATURES (H): ICD-10-CM

## 2018-09-20 DIAGNOSIS — F33.2 MAJOR DEPRESSIVE DISORDER, RECURRENT, SEVERE WITHOUT PSYCHOTIC FEATURES (H): ICD-10-CM

## 2018-09-20 PROCEDURE — G0463 HOSPITAL OUTPT CLINIC VISIT: HCPCS | Mod: ZF

## 2018-09-20 RX ORDER — METHYLPHENIDATE HYDROCHLORIDE 10 MG/1
TABLET ORAL
Qty: 120 TABLET | Refills: 0 | Status: SHIPPED | OUTPATIENT
Start: 2018-09-20 | End: 2018-09-20

## 2018-09-20 RX ORDER — LITHIUM CARBONATE 150 MG/1
150 CAPSULE ORAL DAILY
Qty: 90 CAPSULE | Refills: 1 | Status: SHIPPED | OUTPATIENT
Start: 2018-09-20 | End: 2019-01-17

## 2018-09-20 RX ORDER — METHYLPHENIDATE HYDROCHLORIDE 10 MG/1
TABLET ORAL
Qty: 120 TABLET | Refills: 0 | Status: SHIPPED | OUTPATIENT
Start: 2018-11-20 | End: 2018-11-15

## 2018-09-20 RX ORDER — METHYLPHENIDATE HYDROCHLORIDE 10 MG/1
TABLET ORAL
Qty: 120 TABLET | Refills: 0 | Status: SHIPPED | OUTPATIENT
Start: 2018-10-20 | End: 2018-09-20

## 2018-09-20 ASSESSMENT — PAIN SCALES - GENERAL: PAINLEVEL: NO PAIN (0)

## 2018-09-20 NOTE — PATIENT INSTRUCTIONS
Thank you for coming to the PSYCHIATRY CLINIC.    Lab Testing:  If you had lab testing today and your results are reassuring or normal they will be mailed to you or sent through [employer] within 7 days.   If the lab tests need quick action we will call you with the results.  The phone number we will call with results is # [phone] (home) . If this is not the best number please call our clinic and change the number.    Medication Refills:  If you need any refills please call your pharmacy and they will contact us. Our fax number for refills is [phone]. Please allow three business for refill processing.   If you need to  your refill at a new pharmacy, please contact the new pharmacy directly. The new pharmacy will help you get your medications transferred.     Scheduling:  If you have any concerns about today's visit or wish to schedule another appointment please call our office during normal business hours [phone] (8-5:00 M-F)    Contact Us:  Please call [phone] during business hours (8-5:00 M-F).  If after clinic hours, or on the weekend, please call  [phone].    Financial Assistance [phone]  [employer] Billing [phone]  [email or web address] Billing [phone]  Medical Records [phone]      MENTAL HEALTH CRISIS NUMBERS:  [facility]:   [facility] - [phone]   Crisis Residence [facility] - [phone]   Walk-In Counseling [facility] [phone]   COPE 24/7 [place] Mobile Team for Adults - [[phone]]; Child - [[phone]]     Crisis Connection - [phone]     [facility]:   [facility] - [phone]   Walk-in counseling [facility] - [phone]   Walk-in counseling [facility] - [phone]   Crisis Residence [facility] - [phone]   Urgent Care Adult Mental Health:   --Drop-in, 24/7 crisis line, and [place] Mobile Team [[phone]]    CRISIS TEXT  LINE: Text 741-202 from anywhere, anytime, any crisis 24/7;    OR SEE www.crisistextline.org     Poison Control Center - [phone]    CHILD: [facility] needs assessment team - [phone]     [facility] - [phone]; or [employer] [facility] - [phone]    If you have a medical emergency please call 911or go to the nearest ER.                    _____________________________________________    Again thank you for choosing PSYCHIATRY CLINIC and please let us know how we can best partner with you to improve you and your family's health.  You may be receiving a survey in the mail regarding this appointment. We would love to have your feedback, both positive and negative, so please fill out the survey and return it using the provided envelope. The survey is done by an external company, so your answers are anonymous.

## 2018-09-20 NOTE — MR AVS SNAPSHOT
After Visit Summary   9/20/2018    Sebastien Hoover    MRN: [id number]           Patient Information     Date Of Birth          1970        Visit Information        Provider Department      9/20/2018 1:00 PM Venancio Ochoa MD Psychiatry Clinic        Today's Diagnoses     Major depressive disorder, recurrent, severe without psychotic features (H)        Severe episode of recurrent major depressive disorder, without psychotic features (H)          Care Instructions    Thank you for coming to the PSYCHIATRY CLINIC.    Lab Testing:  If you had lab testing today and your results are reassuring or normal they will be mailed to you or sent through [employer] within 7 days.   If the lab tests need quick action we will call you with the results.  The phone number we will call with results is # [phone] (home) . If this is not the best number please call our clinic and change the number.    Medication Refills:  If you need any refills please call your pharmacy and they will contact us. Our fax number for refills is [phone]. Please allow three business for refill processing.   If you need to  your refill at a new pharmacy, please contact the new pharmacy directly. The new pharmacy will help you get your medications transferred.     Scheduling:  If you have any concerns about today's visit or wish to schedule another appointment please call our office during normal business hours [phone] (8-5:00 M-F)    Contact Us:  Please call [phone] during business hours (8-5:00 M-F).  If after clinic hours, or on the weekend, please call  [phone].    Financial Assistance [phone]  [employer] Billing [phone]  [facility] Billing [phone]  Medical Records [phone]      MENTAL HEALTH CRISIS NUMBERS:  [facility]:   [facility] - [phone]   Crisis Residence [facility] - [phone]   Walk-In Counseling [facility] - [phone]   COPE  24/7 [place] Mobile Team for Adults - [[phone]]; Child - [[phone]]     Crisis Connection - [phone]     [facility] - [phone]   Walk-in counseling [facility] - [phone]   Walk-in counseling  - [phone]   Crisis Residence [facility] Residence - [phone]   Urgent Care Adult Mental Health:   --Drop-in, 24/7 crisis line, and [place] Mobile Team [[phone]]    CRISIS TEXT LINE: Text 599-354 from anywhere, anytime, any crisis 24/7;    OR SEE www.crisistextline.org     Poison Control Center - [phone]    CHILD: [facility] needs assessment team - [phone]     [facility] Lifeline - [phone]; or [employer] [facility] - [phone]    If you have a medical emergency please call 911or go to the nearest ER.                    _____________________________________________    Again thank you for choosing PSYCHIATRY CLINIC and please let us know how we can best partner with you to improve you and your family's health.  You may be receiving a survey in the mail regarding this appointment. We would love to have your feedback, both positive and negative, so please fill out the survey and return it using the provided envelope. The survey is done by an external company, so your answers are anonymous.             Follow-ups after your visit        Your next 10 appointments already scheduled     Sep 25, 2018 12:00 PM CDT   Level 3 with UR CH 06   [facility]    [address] [place] [address] [place] [address]   [phone]            Oct 11, 2018 10:00 AM CDT   Level 3 with UR CH 06   [facility])    [address] [place] [address] [place] [address]   [phone]            Oct 25, 2018 10:00 AM CDT   Level 3 with [facility] 06   [facility],  [place], [facility] Services ([facility])    [address] [place] [address] S.  [address] [place] [address]   [phone]            Nov 15, 2018  2:00 PM CST   Adult Med Follow UP with Venancio Ochoa MD   Psychiatry Clinic ([facility])    [address]  [facility] [address] [address] [phone]            Jan 17, 2019  1:00 PM CST   Adult Med Follow UP with Venancio Ochoa MD   Psychiatry Clinic ([facility])    [address]  [facility] [address] [address] [phone]            Mar 14, 2019  1:00 PM CDT   Adult Med Follow UP with Venancio Ochoa MD   Psychiatry Clinic ([facility])    [address]  [facility] [address] [address] [phone]            May 16, 2019  1:00 PM CDT   Adult Med Follow UP with Venancio Ochoa MD   Psychiatry Clinic ([facility])    [address]  [facility] [address] [address] [phone]            Jul 18, 2019  1:00 PM CDT   Adult Med Follow UP with Venancio Ochoa MD   Psychiatry Clinic ([facility])    [address]  [facility] [address] [address] [phone]            Sep 19, 2019  1:00 PM CDT   Adult Med Follow UP with Venancio Ochoa MD   Psychiatry Clinic ([facility])    [address]  [facility] [address] [address] [phone]              Who to contact     Please call your clinic at [phone] to:    Ask questions about your health    Make or cancel appointments    Discuss your medicines    Learn about your test results    Speak to your doctor            Additional Information About Your Visit        MyChart Information     MyChart gives you secure access to your electronic health record. If you see a primary care  provider, you can also send messages to your care team and make appointments. If you have questions, please call your primary care clinic.  If you do not have a primary care provider, please call [phone] and they will assist you.      [employer] is an electronic gateway that provides easy, online access to your medical records. With [employer], you can request a clinic appointment, read your test results, renew a prescription or communicate with your care team.     To access your existing account, please contact your [facility] or call [phone] for assistance.        Care EveryWhere ID     This is your Care EveryWhere ID. This could be used by other organizations to access your [place] medical records  [id number]        Your Vitals Were     Pulse BMI (Body Mass Index)                80 29.19 kg/m2           Blood Pressure from Last 3 Encounters:   09/20/18 125/86   09/06/18 130/74   08/23/18 121/81    Weight from Last 3 Encounters:   09/20/18 84.6 kg (186 lb 6.4 oz)   08/23/18 85.6 kg (188 lb 11.4 oz)   08/16/18 85.4 kg (188 lb 4.4 oz)              Today, you had the following     No orders found for display         Today's Medication Changes          These changes are accurate as of 9/20/18  1:31 PM.  If you have any questions, ask your nurse or doctor.               Start taking these medicines.        Dose/Directions    methylphenidate 10 MG tablet   Commonly known as:  RITALIN   Used for:  Severe episode of recurrent major depressive disorder, without psychotic features (H)   Started by:  Venancio Ochoa MD        Start taking on:  11/20/2018   4 tablets daily in divided doses   Quantity:  120 tablet   Refills:  0            Where to get your medicines      These medications were sent to [facility]/pharmacy #[id number] - [place], [place] - [address]  [address], [facility] [address]     Phone:  [phone]     lithium 150 MG capsule         Some of these will  need a paper prescription and others can be bought over the counter.  Ask your nurse if you have questions.     Bring a paper prescription for each of these medications     methylphenidate 10 MG tablet                Primary Care Provider Office Phone # Fax #    Junior Buenrostro [phone]       [facility] [id number] [facility] [address]        Equal Access to Services     [facility]: Hadii aad ku hadasho Soomaali, waaxda luqadaha, qaybta kaalmada adeegyada, waxay idiin hayaan adeeg kharash lafarzana . So [facility] [phone].    ATENCIÓN: Si habla español, tiene a madrigal disposición servicios gratuitos de asistencia lingüística. Parish al [phone].    We comply with applicable federal civil rights laws and [place] laws. We do not discriminate on the basis of race, color, national origin, age, disability, sex, sexual orientation, or gender identity.            Thank you!     Thank you for choosing PSYCHIATRY CLINIC  for your care. Our goal is always to provide you with excellent care. Hearing back from our patients is one way we can continue to improve our services. Please take a few minutes to complete the written survey that you may receive in the mail after your visit with us. Thank you!             Your Updated Medication List - Protect others around you: Learn how to safely use, store and throw away your medicines at www.disposemymeds.org.          This list is accurate as of 9/20/18  1:31 PM.  Always use your most recent med list.                   Brand Name Dispense Instructions for use Diagnosis    cloNIDine 0.1 MG tablet    CATAPRES    45 tablet    1.5 tablets 1 hour prior to scheduled ketamine dose.    Severe episode of recurrent major depressive disorder, without psychotic features (H)       COMPOUND CONTAINING CONTROLLED SUBSTANCE - PHARMACY TO MIX COMPOUNDED MEDICATION    CMPD RX    30 each    Ketamine 25 mg troches.  One sublingual dario daily.    Major depressive  disorder, recurrent, severe without psychotic features (H)       KETAMINE HCL           lithium 150 MG capsule     90 capsule    Take 1 capsule (150 mg) by mouth daily    Major depressive disorder, recurrent, severe without psychotic features (H)       methylphenidate 10 MG tablet   Start taking on:  11/20/2018    RITALIN    120 tablet    4 tablets daily in divided doses    Severe episode of recurrent major depressive disorder, without psychotic features (H)       sertraline 25 MG tablet    ZOLOFT    1 tablet    Take 0.5 tablets (12.5 mg) by mouth daily    Major depressive disorder, recurrent, severe without psychotic features (H)

## 2018-09-20 NOTE — NURSING NOTE
Chief Complaint   Patient presents with     RECHECK     Major depressive disorder, recurrent, severe without psychotic features

## 2018-09-25 ENCOUNTER — INFUSION THERAPY VISIT (OUTPATIENT)
Dept: INFUSION THERAPY | Facility: CLINIC | Age: 48
End: 2018-09-25
Attending: PSYCHIATRY & NEUROLOGY
Payer: MEDICARE

## 2018-09-25 VITALS
DIASTOLIC BLOOD PRESSURE: 78 MMHG | OXYGEN SATURATION: 94 % | HEART RATE: 72 BPM | RESPIRATION RATE: 16 BRPM | SYSTOLIC BLOOD PRESSURE: 127 MMHG | TEMPERATURE: 98.1 F

## 2018-09-25 DIAGNOSIS — F33.2 SEVERE EPISODE OF RECURRENT MAJOR DEPRESSIVE DISORDER, WITHOUT PSYCHOTIC FEATURES (H): Primary | ICD-10-CM

## 2018-09-25 DIAGNOSIS — F33.2 SEVERE RECURRENT MAJOR DEPRESSION WITHOUT PSYCHOTIC FEATURES (H): ICD-10-CM

## 2018-09-25 PROCEDURE — 96365 THER/PROPH/DIAG IV INF INIT: CPT

## 2018-09-25 PROCEDURE — 25000128 H RX IP 250 OP 636: Performed by: PSYCHIATRY & NEUROLOGY

## 2018-09-25 PROCEDURE — 25000125 ZZHC RX 250: Performed by: PSYCHIATRY & NEUROLOGY

## 2018-09-25 PROCEDURE — 96361 HYDRATE IV INFUSION ADD-ON: CPT

## 2018-09-25 RX ADMIN — SODIUM CHLORIDE 250 ML: 9 INJECTION, SOLUTION INTRAVENOUS at 12:06

## 2018-09-25 RX ADMIN — KETAMINE HYDROCHLORIDE 35 MG: 50 INJECTION, SOLUTION INTRAMUSCULAR; INTRAVENOUS at 12:06

## 2018-09-25 ASSESSMENT — PAIN SCALES - GENERAL: PAINLEVEL: NO PAIN (0)

## 2018-09-25 NOTE — PROGRESS NOTES
Infusion Nursing Note:  Sebastien BERNABE Kiko presents today for ketamine.    Patient seen by provider today: No   present during visit today: Not Applicable.    Note: Patient states he stopped taking the ketamine lozenges, did not feel that they helped him much.  Also feels like the IV ketamine has stopped working for him.    Intravenous Access:  Peripheral IV placed.    Post Infusion Assessment:  Patient tolerated infusion without incident.  VS monitored per protocol.  Patient observed for 60 minutes post ketamine per protocol.  Blood return noted pre and post infusion.  Site patent and intact, free from redness, edema or discomfort.  No evidence of extravasations.  Access discontinued per protocol.    Discharge Plan:   Patient discharged in stable condition accompanied by: self.  Departure Mode: Ambulatory.  Will return to clinic in 2 weeks.  Angela Davis RN

## 2018-09-25 NOTE — MR AVS SNAPSHOT
After Visit Summary   9/25/2018    Sebastien Hoover    MRN: [id number]           Patient Information     Date Of Birth          1970        Visit Information        Provider Department      9/25/2018 12:00 PM UR CH 06 [facility], Infusion Services        Today's Diagnoses     Severe episode of recurrent major depressive disorder, without psychotic features (H)    -  1    Severe recurrent major depression without psychotic features (H)           Follow-ups after your visit        Your next 10 appointments already scheduled     Oct 11, 2018 10:00 AM CDT   Level 3 with UR CH 06   [facility], Infusion Services ([facility])    [address] [place] [address] S.  [address] [place] [address]   [phone]            Oct 25, 2018 10:00 AM CDT   Level 3 with UR CH 06   [facility], Infusion Services ([facility])    [address] [place] [address] S.  [address]  [facility] [address]   [phone]            Nov 08, 2018 10:00 AM CST   Level 3 with UR BD 01   [facility], Infusion Services ([facility])    [address] [place] [address] S.  [address] [place] [address]   [phone]            Nov 15, 2018  2:00 PM CST   Adult Med Follow UP with Venancio Ochoa MD   Psychiatry Clinic ([facility])    [address] [place] [address]  [facility] [address] [address]   [phone]            Jan 17, 2019  1:00 PM CST   Adult Med Follow UP with Venancio Ochoa MD   Psychiatry Clinic ([facility])    [address] [place] [address]  [facility] [address] [address]   [phone]            Mar 14, 2019  1:00 PM CDT   Adult Med Follow UP with Venancio Ochoa MD   Psychiatry Clinic ([facility])    [address] [place] [address]  [facility] [address] [address]   [phone]            May 16, 2019  1:00 PM  CDT   Adult Med Follow UP with Venancio Ochoa MD   Psychiatry Clinic ([facility])    [place] [id number]  [address] [address] [phone]            Jul 18, 2019  1:00 PM CDT   Adult Med Follow UP with Venancio Ochoa MD   Psychiatry Clinic ([facility])    [place] [id number]  [address] [address] [phone]            Sep 19, 2019  1:00 PM CDT   Adult Med Follow UP with Venancio Ochoa MD   Psychiatry Clinic ([facility])    [place] [id number]  [address] [address] [phone]              Who to contact     If you have questions or need follow up information about today's clinic visit or your schedule please contact [facility], [place], [facility] directly at [phone].  Normal or non-critical lab and imaging results will be communicated to you by [employer], letter or phone within 4 business days after the clinic has received the results. If you do not hear from us within 7 days, please contact the clinic through [employer]fl[employer] or phone. If you have a critical or abnormal lab result, we will notify you by phone as soon as possible.  Submit refill requests through [employer] or call your pharmacy and they will forward the refill request to us. Please allow 3 business days for your refill to be completed.          Additional Information About Your Visit        [employer] Information     [employer] gives you secure access to your electronic health record. If you see a primary care provider, you can also send messages to your care team and make appointments. If you have questions, please call your primary care clinic.  If you do not have a primary care provider, please call [phone] and they will assist you.        Care EveryWhere ID     This is your Care EveryWhere ID. This could be used by other organizations to access your [facility]  records  [id number]        Your Vitals Were     Pulse Temperature Respirations Pulse Oximetry          72 98.1  F (36.7  C) 16 94%         Blood Pressure from Last 3 Encounters:   09/25/18 127/78   09/06/18 130/74   08/23/18 121/81    Weight from Last 3 Encounters:   08/23/18 85.6 kg (188 lb 11.4 oz)   08/16/18 85.4 kg (188 lb 4.4 oz)   08/09/18 85.2 kg (187 lb 13.3 oz)              Today, you had the following     No orders found for display       Primary Care Provider Office Phone # Fax #    Junior Buenrostro [phone]       [facility] [id number] [facility] [address]        Equal Access to Services     STEVEN ARTHUR : Hadii aad ku hadasho Soomaali, waaxda luqadaha, qaybta kaalmada adeegyada, guillermo castillo . So [facility] [phone].    ATENCIÓN: Si habla español, tiene a madrigal disposición servicios gratuitos de asistencia lingüística. Llame al [phone].    We comply with applicable federal civil rights laws and [place] laws. We do not discriminate on the basis of race, color, national origin, age, disability, sex, sexual orientation, or gender identity.            Thank you!     Thank you for choosing [facility]  for your care. Our goal is always to provide you with excellent care. Hearing back from our patients is one way we can continue to improve our services. Please take a few minutes to complete the written survey that you may receive in the mail after your visit with us. Thank you!             Your Updated Medication List - Protect others around you: Learn how to safely use, store and throw away your medicines at www.disposemymeds.org.          This list is accurate as of 9/25/18  2:17 PM.  Always use your most recent med list.                   Brand Name Dispense Instructions for use Diagnosis    COMPOUND CONTAINING CONTROLLED SUBSTANCE - PHARMACY TO MIX COMPOUNDED MEDICATION    CMPD RX    30 each    Ketamine 25 mg troches.   One sublingual dario daily.    Major depressive disorder, recurrent, severe without psychotic features (H)       KETAMINE HCL           lithium 150 MG capsule     90 capsule    Take 1 capsule (150 mg) by mouth daily    Major depressive disorder, recurrent, severe without psychotic features (H)       methylphenidate 10 MG tablet   Start taking on:  11/20/2018    RITALIN    120 tablet    4 tablets daily in divided doses    Severe episode of recurrent major depressive disorder, without psychotic features (H)       sertraline 25 MG tablet    ZOLOFT    1 tablet    Take 0.5 tablets (12.5 mg) by mouth daily    Major depressive disorder, recurrent, severe without psychotic features (H)

## 2018-10-11 ENCOUNTER — INFUSION THERAPY VISIT (OUTPATIENT)
Dept: INFUSION THERAPY | Facility: CLINIC | Age: 48
End: 2018-10-11
Attending: PSYCHIATRY & NEUROLOGY
Payer: MEDICARE

## 2018-10-11 VITALS
BODY MASS INDEX: 29.52 KG/M2 | TEMPERATURE: 97.5 F | OXYGEN SATURATION: 97 % | WEIGHT: 188.49 LBS | RESPIRATION RATE: 16 BRPM | DIASTOLIC BLOOD PRESSURE: 87 MMHG | SYSTOLIC BLOOD PRESSURE: 155 MMHG | HEART RATE: 78 BPM

## 2018-10-11 DIAGNOSIS — F33.2 SEVERE EPISODE OF RECURRENT MAJOR DEPRESSIVE DISORDER, WITHOUT PSYCHOTIC FEATURES (H): Primary | ICD-10-CM

## 2018-10-11 DIAGNOSIS — F33.2 SEVERE RECURRENT MAJOR DEPRESSION WITHOUT PSYCHOTIC FEATURES (H): ICD-10-CM

## 2018-10-11 PROCEDURE — 25000128 H RX IP 250 OP 636: Performed by: PSYCHIATRY & NEUROLOGY

## 2018-10-11 PROCEDURE — 96365 THER/PROPH/DIAG IV INF INIT: CPT

## 2018-10-11 PROCEDURE — 25000125 ZZHC RX 250: Performed by: PSYCHIATRY & NEUROLOGY

## 2018-10-11 RX ADMIN — SODIUM CHLORIDE 250 ML: 9 INJECTION, SOLUTION INTRAVENOUS at 10:26

## 2018-10-11 RX ADMIN — KETAMINE HYDROCHLORIDE 35 MG: 50 INJECTION, SOLUTION INTRAMUSCULAR; INTRAVENOUS at 10:26

## 2018-10-11 NOTE — MR AVS SNAPSHOT
After Visit Summary   10/11/2018    Sebastien Hoover    MRN: [id number]           Patient Information     Date Of Birth          1970        Visit Information        Provider Department      10/11/2018 10:00 AM UR CH 06 [facility]        Today's Diagnoses     Severe episode of recurrent major depressive disorder, without psychotic features (H)    -  1    Severe recurrent major depression without psychotic features (H)           Follow-ups after your visit        Your next 10 appointments already scheduled     Oct 25, 2018 10:00 AM CDT   Level 3 with UR CH 06   [facility] ([facility])    [address] [place] [address] [address]  [address]  [facility] [address]   [phone]            Oct 26, 2018 11:00 AM CDT   TREATMENT RESISTANT DEPRESSION EVAL with CARLOS A Nation [facility]iatry ([facility])    [address] [facility] [address] [place] [address]   [phone]            Nov 08, 2018 10:00 AM CST   Level 3 with UR BD 01   [facility] ([facility])    [address] [place] [address] [address]  [address]  [facility] [address]   [phone]            Nov 15, 2018  2:00 PM CST   Adult Med Follow UP with Venancio Ochoa MD   Psychiatry Clinic ([facility])    [place] [id number]  [address] [place] [address]   [phone]            Nov 20, 2018 10:30 AM CST   Level 3 with UR CH 06   [facility] ([facility])    [address] [place] [address] [address]  [address]  [facility] [address]   [phone]            Nov 29, 2018 10:30 AM CST   Level 3 with UR CH 06   [facility] ([facility])    [address] [place] [address] [address]  [address]  [facility] [address]   [phone]            Dec 13, 2018 10:00 AM CST   Level  3 with UR CH 03   [facility], [facility]fusion [facility] ([facility])    [address]  [address]  [facility] [address]   [phone]            Dec 21, 2018 10:30 AM CST   Level 3 with UR BD 01   [facility], Infusion Services ([facility])    [address]  [facility] [address]   [phone]            Dec 28, 2018 10:30 AM CST   Level 3 with UR CH 03   [facility], Infusion Services ([facility])    [address]  [address]  [facility] [address]   [phone]            Jan 03, 2019 10:30 AM CST   Level 3 with UR CH 02   [facility], Infusion Services ([facility])    [address]  [address]  [facility] [address]   [phone]              Who to contact     If you have questions or need follow up information about today's clinic visit or your schedule please contact [facility], INFUSION SERVICES directly at [phone].  Normal or non-critical lab and imaging results will be communicated to you by [employer] Engine[employer], letter or phone within 4 business days after the clinic has received the results. If you do not hear from us within 7 days, please contact the clinic through "[employer]t or phone. If you have a critical or abnormal lab result, we will notify you by phone as soon as possible.  Submit refill requests through Hundo or call your pharmacy and they will forward the refill request to us. Please allow 3 business days for your refill to be completed.          Additional Information About Your Visit        Comp[employer] Information     Hundo gives you secure access to your electronic health record. If you see a primary care provider, you can also send messages to your care team and make appointments. If you have questions, please call your primary care clinic.  If you do not have a primary care provider,  please call [phone] and they will assist you.        Care EveryWhere ID     This is your Care EveryWhere ID. This could be used by other organizations to access your [facility] medical records  [id number]        Your Vitals Were     Pulse Temperature Respirations Pulse Oximetry BMI (Body Mass Index)       78 97.5  F (36.4  C) 16 97% 29.52 kg/m2        Blood Pressure from Last 3 Encounters:   10/11/18 155/87   09/25/18 127/78   09/06/18 130/74    Weight from Last 3 Encounters:   10/11/18 85.5 kg (188 lb 7.9 oz)   08/23/18 85.6 kg (188 lb 11.4 oz)   08/16/18 85.4 kg (188 lb 4.4 oz)              Today, you had the following     No orders found for display       Primary Care Provider Office Phone # Fax #    Junior Buenrostro [phone]       [facility] [id number] [facility] [address]        Equal Access to Services     STEVEN ARTHUR : Hadii aad ku hadasho Soomaali, waaxda luqadaha, qaybta kaalmada adeegyada, waxay domingain haysofia castillo . So [facility] [phone].    ATENCIÓN: Si habla español, tiene a madrigal disposición servicios gratuitos de asistencia lingüística. [facility] [phone].    We comply with applicable federal civil rights laws and [place] laws. We do not discriminate on the basis of race, color, national origin, age, disability, sex, sexual orientation, or gender identity.            Thank you!     Thank you for choosing [facility] SERVICES  for your care. Our goal is always to provide you with excellent care. Hearing back from our patients is one way we can continue to improve our services. Please take a few minutes to complete the written survey that you may receive in the mail after your visit with us. Thank you!             Your Updated Medication List - Protect others around you: Learn how to safely use, store and throw away your medicines at www.disposemymeds.org.          This list is accurate as of 10/11/18  2:07 PM.  Always use your most  recent med list.                   Brand Name Dispense Instructions for use Diagnosis    COMPOUND CONTAINING CONTROLLED SUBSTANCE - PHARMACY TO MIX COMPOUNDED MEDICATION    CMPD RX    30 each    Ketamine 25 mg troches.  One sublingual dario daily.    Major depressive disorder, recurrent, severe without psychotic features (H)       KETAMINE HCL           lithium 150 MG capsule     90 capsule    Take 1 capsule (150 mg) by mouth daily    Major depressive disorder, recurrent, severe without psychotic features (H)       methylphenidate 10 MG tablet   Start taking on:  11/20/2018    RITALIN    120 tablet    4 tablets daily in divided doses    Severe episode of recurrent major depressive disorder, without psychotic features (H)       sertraline 25 MG tablet    ZOLOFT    1 tablet    Take 0.5 tablets (12.5 mg) by mouth daily    Major depressive disorder, recurrent, severe without psychotic features (H)

## 2018-10-11 NOTE — PROGRESS NOTES
Infusion Nursing Note:  Sebastien Hoover presents today for ketamine infusion.    Patient seen by provider today: No   present during visit today: Not Applicable.    Note: Denies health changes.    Intravenous Access:  Peripheral IV placed.    Treatment Conditions:  Not Applicable.      Post Infusion Assessment:  Patient tolerated infusion without incident.  Patient observed for 60 minutes post ketamine infusion, per protocol.  NS infused during this time at same rate as ketamine.  Blood return noted pre and post infusion.  Site patent and intact, free from redness, edema or discomfort.  No evidence of extravasations.  Access discontinued per protocol.    Discharge Plan:   Patient discharged in stable condition accompanied by: self.  Departure Mode: Ambulatory.    Angela Gann RN

## 2018-10-17 NOTE — PROGRESS NOTES
Service Date: 09/20/2018      PSYCHIATRY CLINIC PROGRESS NOTE      The patient returns for medication management and supportive therapy.      Interim History   Since the last visit there is not much new. We went up to 25 mg ketimine troches. This does not seem to help extend the benefits of the infusion. He no longer gets a bump in mood. He started Ritalin, which helps his energy but worsens his depression.      RECENT SYMPTOMS   Depression:   The patient endorses constant passive suicidal ideation, depressed mood, anhedonia, low-energy, early AM awakening, carbohydrate cravings, and poor concentration.       Elevated mood:   The patient denies symptoms of jeanne.      Psychosis:   The patient denies symptoms of psychosis.      Panic Attacks:   The patient denies panic attacks.      Anxiety:   The patient endorses tolerable anxiety with excessive worries and nervousness.       Trauma-related:   The patient denies symptoms related to trauma.       ADVERSE EFFECTS:   * Mood worsens after he takes Ritalin   * Insomnia      MEDICAL CONCERNS:   None recent.      SUBSTANCE USE:   The patient denies substance use.      SOCIAL/FAMILY HISTORY:   The patient exercises lightly each day.      MEDICAL/SURGICAL HISTORY:   See EMR medical problems list.      MEDICAL REVIEW OF SYSTEMS:   A comprehensive review of systems was performed and is negative other than as noted in the HPI.      ALLERGY:   None new.      CURRENT MEDICATIONS:   See EMR med sections.      VITALS:   See rooming note.      MENTAL STATUS EXAM:   On mental status examination, the patient was alert, well groomed, and cooperative with the interview. Speech was normal rate and flow. Movements, gait, and station were normal. Mood was sad with a congruent affect. Thought processes were logical with passive suicidal ideation but no psychosis. Insight and judgment were good. The patient was oriented x4 with adequate attention and concentration, intact memory, and good  [facility].       Labs and Data:         DIAGNOSIS AND ASSESSMENT:   MDD, recurrent and severe, without psychosis.      PLAN:   1. Medications:   a. Discontinue ketamine troches   b. VNS turned off about a year ago   2. Therapy: None currently   3. RTC in Refer to SLP clinic to reassess TMS   4. Other:   5. Crisis numbers: provided routinely in AVS.      Treatment Risk Statement: The patient understands the risks, benefits, adverse effects, and alternatives. Agrees to treatment with the capacity to do so. No medical contraindications to treatment. Agrees to call clinic for any problems. The patient understands to call 911 or come to the nearest ED is life-threatening or urgent symptoms present.         FANNY ZELAYA MD             D: 10/17/2018   T: 10/17/2018   MT: LENA      Name:     DANIEL HUNT   MRN:      [id number]        Account:      [id number]   :      1970           Service Date: 2018      Document: [id number]

## 2018-10-25 ENCOUNTER — INFUSION THERAPY VISIT (OUTPATIENT)
Dept: INFUSION THERAPY | Facility: CLINIC | Age: 48
End: 2018-10-25
Attending: PSYCHIATRY & NEUROLOGY
Payer: MEDICARE

## 2018-10-25 VITALS
DIASTOLIC BLOOD PRESSURE: 84 MMHG | OXYGEN SATURATION: 97 % | TEMPERATURE: 98.1 F | RESPIRATION RATE: 16 BRPM | HEART RATE: 70 BPM | SYSTOLIC BLOOD PRESSURE: 139 MMHG

## 2018-10-25 DIAGNOSIS — F33.2 SEVERE EPISODE OF RECURRENT MAJOR DEPRESSIVE DISORDER, WITHOUT PSYCHOTIC FEATURES (H): Primary | ICD-10-CM

## 2018-10-25 DIAGNOSIS — F33.2 SEVERE RECURRENT MAJOR DEPRESSION WITHOUT PSYCHOTIC FEATURES (H): ICD-10-CM

## 2018-10-25 PROCEDURE — 96361 HYDRATE IV INFUSION ADD-ON: CPT

## 2018-10-25 PROCEDURE — 96365 THER/PROPH/DIAG IV INF INIT: CPT

## 2018-10-25 PROCEDURE — 25000128 H RX IP 250 OP 636: Performed by: PSYCHIATRY & NEUROLOGY

## 2018-10-25 PROCEDURE — 25000125 ZZHC RX 250: Performed by: PSYCHIATRY & NEUROLOGY

## 2018-10-25 RX ADMIN — KETAMINE HYDROCHLORIDE 35 MG: 50 INJECTION, SOLUTION INTRAMUSCULAR; INTRAVENOUS at 10:31

## 2018-10-25 RX ADMIN — SODIUM CHLORIDE 250 ML: 9 INJECTION, SOLUTION INTRAVENOUS at 10:31

## 2018-10-25 NOTE — PROGRESS NOTES
Sebastien presents today for Ketamine infusion.   Patient seen by provider today: No   present during visit today: Not Applicable.  Note: N/A  ?  Intravenous Access:  Peripheral IV placed.  Treatment Conditions:  Not Applicable.  ?  Post Infusion Assessment:  Patient tolerated infusion without incident.  ?  Discharge Plan:   Patient and/or family verbalized understanding of discharge instructions and all questions answered.  ?  Nesha Calvert

## 2018-10-25 NOTE — MR AVS SNAPSHOT
After Visit Summary   10/25/2018    Sebastien Hoover    MRN: [id number]           Patient Information     Date Of Birth          1970        Visit Information        Provider Department      10/25/2018 10:00 AM [facility]        Today's Diagnoses     Severe episode of recurrent major depressive disorder, without psychotic features (H)    -  1    Severe recurrent major depression without psychotic features (H)           Follow-ups after your visit        Your next 10 appointments already scheduled     Oct 26, 2018 11:00 AM CDT   TREATMENT RESISTANT DEPRESSION EVAL with CARLOS A Nation CNP   [facility] Psychiatry ([facility])    [address] [facility] [address]  [facility] [address]   [phone]            Nov 08, 2018 10:00 AM CST   Level 3 with UR BD 01   [facility] ([facility])    [address] [place] [address] [address]  [address]  [facility] [address]   [phone]            Nov 15, 2018  2:00 PM CST   Adult Med Follow UP with Venancio Ochoa MD   Psychiatry Clinic ([facility])    [place] [id number]  [address] [place] [address]   [phone]            Nov 20, 2018 10:30 AM CST   Level 3 with UR CH 06   [facility] ([facility])    [address] [place] [address] [address]  [address]  [facility] [address]   [phone]            Nov 29, 2018 10:30 AM CST   Level 3 with UR CH 06   [facility] ([facility])    [address] [place] [address] [address]  [address] [place] [address]   [phone]            Dec 13, 2018 10:00 AM CST   Level 3 with UR CH 03   [facility] ([facility])    [address] [place] [address] [address]  [address]  [facility] [address]   [phone]            Dec 21, 2018 10:30 AM CST   Level  3 with UR BD 01   [facility], Infusion Services ([facility])    [address]  [facility] [address]   [phone]            Dec 28, 2018 10:30 AM CST   Level 3 with UR CH 03   [facility], Infusion Services ([facility])    [address]  [address]  [facility] [address]   [phone]            Jan 03, 2019 10:30 AM CST   Level 3 with UR CH 02   [facility], Infusion Services ([facility])    [address]  [address]  [facility] [address]   [phone]            Jan 17, 2019  1:00 PM CST   Adult Med Follow UP with Venancio Ochoa MD   Psychiatry Clinic ([facility])    [place] [id number]  [address] [place] [id number] [phone]              Who to contact     If you have questions or need follow up information about today's clinic visit or your schedule please contact [facility], INFUSION SERVICES directly at [phone].  Normal or non-critical lab and imaging results will be communicated to you by ST[employer]hart, letter or phone within 4 business days after the clinic has received the results. If you do not hear from us within 7 days, please contact the clinic through STWAhart or phone. If you have a critical or abnormal lab result, we will notify you by phone as soon as possible.  Submit refill requests through [employer] or call your pharmacy and they will forward the refill request to us. Please allow 3 business days for your refill to be completed.          Additional Information About Your Visit        STWAhart Information     [employer] gives you secure access to your electronic health record. If you see a primary care provider, you can also send messages to your care team and make appointments. If you have questions, please call your primary care clinic.  If you do not have a primary care  provider, please call [phone] and they will assist you.        Care EveryWhere ID     This is your Care EveryWhere ID. This could be used by other organizations to access your [facility] medical records  [id number]        Your Vitals Were     Pulse Temperature Respirations Pulse Oximetry          70 98.1  F (36.7  C) (Oral) 16 97%         Blood Pressure from Last 3 Encounters:   10/25/18 139/84   10/11/18 155/87   09/25/18 127/78    Weight from Last 3 Encounters:   10/11/18 85.5 kg (188 lb 7.9 oz)   08/23/18 85.6 kg (188 lb 11.4 oz)   08/16/18 85.4 kg (188 lb 4.4 oz)              Today, you had the following     No orders found for display       Primary Care Provider Office Phone # Fax #    Junior Buenrostro [phone]       [facility] [id number] [facility] [address]        Equal Access to Services     STEVEN ARTHUR : Hadii aad ku hadasho Soomaali, waaxda luqadaha, qaybta kaalmada adeegyada, waxay idiin hayaan shaggy castillo . So [facility] [phone].    ATENCIÓN: Si habla español, tiene a madrigal disposición servicios gratuitos de asistencia lingüística. Llame al [phone].    We comply with applicable federal civil rights laws and [place] laws. We do not discriminate on the basis of race, color, national origin, age, disability, sex, sexual orientation, or gender identity.            Thank you!     Thank you for choosing [facility], [facility] SERVICES  for your care. Our goal is always to provide you with excellent care. Hearing back from our patients is one way we can continue to improve our services. Please take a few minutes to complete the written survey that you may receive in the mail after your visit with us. Thank you!             Your Updated Medication List - Protect others around you: Learn how to safely use, store and throw away your medicines at www.disposemymeds.org.          This list is accurate as of 10/25/18 12:59 PM.  Always use your most recent med  list.                   Brand Name Dispense Instructions for use Diagnosis    KETAMINE HCL           lithium 150 MG capsule     90 capsule    Take 1 capsule (150 mg) by mouth daily    Major depressive disorder, recurrent, severe without psychotic features (H)       methylphenidate 10 MG tablet   Start taking on:  11/20/2018    RITALIN    120 tablet    4 tablets daily in divided doses    Severe episode of recurrent major depressive disorder, without psychotic features (H)       sertraline 25 MG tablet    ZOLOFT    1 tablet    Take 0.5 tablets (12.5 mg) by mouth daily    Major depressive disorder, recurrent, severe without psychotic features (H)

## 2018-10-26 ENCOUNTER — OFFICE VISIT (OUTPATIENT)
Dept: PSYCHIATRY | Facility: CLINIC | Age: 48
End: 2018-10-26
Payer: MEDICARE

## 2018-10-26 VITALS
RESPIRATION RATE: 16 BRPM | BODY MASS INDEX: 30.13 KG/M2 | WEIGHT: 192.4 LBS | SYSTOLIC BLOOD PRESSURE: 155 MMHG | HEART RATE: 92 BPM | DIASTOLIC BLOOD PRESSURE: 102 MMHG

## 2018-10-26 DIAGNOSIS — F33.2 MAJOR DEPRESSIVE DISORDER, RECURRENT, SEVERE WITHOUT PSYCHOTIC FEATURES (H): Primary | ICD-10-CM

## 2018-10-26 ASSESSMENT — PATIENT HEALTH QUESTIONNAIRE - PHQ9: SUM OF ALL RESPONSES TO PHQ QUESTIONS 1-9: 24

## 2018-10-26 ASSESSMENT — PAIN SCALES - GENERAL: PAINLEVEL: NO PAIN (0)

## 2018-10-26 NOTE — PROGRESS NOTES
"  Psychiatry Clinic Medical Diagnostic Assessment               Sebastien Hoover is a 48 year old male who prefers the name Sebastien and male pronouns.  PCP: Junior Buenrostro  Other Providers: Venancio Ochoa MD- psychiatric provider   Referred by Don for evaluation of depression.      History was provided by patient who was a good historian.     Chief Complaint                                                                                                             \" My psychiatrist sent me here for my depression\"     History of Present Illness                                                                                 4, 4      Pertinent Background: This is a long-time patient of Venancio Ochoa MD and has been treated for refractory depressive sx for >10 years. His depression has long manifested as depressed mood, low energy, anhedonia, sleep disturbances (insomnia) and reduced motivation and poor concentration/focus. Cognition and low motivation has caused variable levels of functioning through the year and has caused some occupational impairment. VNS implant, not much benefit    Interim History: The patient has been receiving ketamine infusions (since August 2013) with improved mood and functioning initially during treatment and decreasing efficacy. He is also currently receiving supplementation of ketamine troches but reports that this has never really had much effect to improve mood.         Psychiatric Review of Systems (Completed M.I.N.I. Version 7.0.2)     A. DEPRESSION:  Past 2 Weeks: depressed mood, insomnia and feeling hopeless; Past Episode: Yes  DENIES Past 2 Weeks: No DENIES Past Episode: No    Patient reports over the past two weeks almost every day feeling depressed, anhedonia, increased appetite, sleep trouble (early morning wakening), noticeably slower movement, feeling tired and worthless, having trouble concentrating, thoughts of death, and being unable to work.    B. SUICIDALITY: " "Current: NO No attempt or SI. Do not consider high risk.    {DENIES SI, {DENIES intent and plan   {DENIES HI    C. ELSI/HYPOMANIA:  Current Episode: No ; Past Episode: No  DENIES Current Episode, DENIES Past Episode    D. PANIC:  No    Patient reports having unexpected attacks of feeling anxious, frightened, or uncomfortable with symptoms including racing heart, sweaty hands, shortness of breath, chest pain, lightheaded/faintness, flushes/chills, feeling strange/detached, fearful of losing control but does not endorse an attack followed by a month or more of persistent concern of having another attack or worries about consequences of the attack.     E. AGORAPHOBIA: No    F. SOCIAL ANXIETY:  No     G. OBSESSIVE-COMPULSIVE:  No    H. TRAUMA:  No    I. ALCOHOL & J. NON-ALCOHOL:  No    K. PSYCHOSIS:  Current Symptoms: No; Past Symptoms: No  DENIES Present and  Past    L-M. EATING DISORDER:  No    N. GENERALIZED ANXIETY:  No    (O. RULE OUT MEDICAL, ORGANIC OR DRUG CAUSES FOR ALL DISORDERS) yes    P. ANTISOCIAL PERSONALITY:  No    Other Cluster B Traits: Reports efforts to avoid abandonment, unstable/intense interpersonal relationships, identity disturbance, impulsivity: spending, sex, substance abuse, driving, binge eating, recurrent suicidal behaviors, recurrent self injury, affective instability, feelings of emptiness, difficulty controlling anger, dissociation. No         Substance Use History   Caffeine: Not asked      Tobacco: None    ETOH: None     Cannabis: None             Opioids: None         Other Drugs: None     CD treatment hx: \"No\"    Withdrawal hx: \"No\"    Current sober supports include NA.        Psychiatric History     Suicidal ideation- None   Suicide Attempt:   #- 0     SIB- None   Psych Hosp- None   ECT- 2010: 13 treatments with some benefit  Outpatient Programs [ DBT, Day Treatment, Eating Disorder Tx etc]- [facility]  With [facility] 2013      Psychiatric Medication Trials     Will be reviewed in " MTM    Social/ Family History               [per patient report]                                                  1ea, 1ea     FINANCIAL SUPPORT- social security disability since 2012       CHILDREN- None       LIVING SITUATION- lives with his parents       LEGAL- none  SOCIAL/ SPIRITUAL SUPPORT- none identified             TRAUMA HISTORY (self-report)- None  FEELS SAFE AT HOME- Yes  FAMILY HISTORY-  None diagnosed     Medical / Surgical History                                                                                                                     Patient Active Problem List   Diagnosis     Severe recurrent major depression without psychotic features (H)     Severe episode of recurrent major depressive disorder, without psychotic features (H)       Past Surgical History:   Procedure Laterality Date     ENT SURGERY  2000/Nov    vegal nerve stimulator implanted     HC TOOTH EXTRACTION W/FORCEP       SURGICAL HISTORY OF -       vagus nerve stimulator        Medical Review of Systems                                                                                                     2, 10     GENERAL: Negative for malaise, significant weight loss and fever  HEENT: No changes in hearing or vision, no nose bleeds or other nasal problems  NECK: Negative for lumps, goiter, pain and significant neck swelling  RESPIRATORY: Negative for cough, wheezing and shortness of breath  CARDIOVASCULAR: Negative for chest pain, leg swelling and palpitations  GI: Negative for abdominal discomfort, blood in stools or black stools  : Negative for dysuria, frequency and incontinence  MUSCULOSKELETAL: Negative for joint pain or swelling, back pain, and muscle pain. Occa lower back pain  SKIN: Negative for lesions, rash, and itching.  HEMATOLOGY/LYMPHOLOGY Negative for prolonged bleeding, bruising easily, and swollen nodes.  ENDOCRINE: Negative for cold or heat intolerance, polyuria, polydipsia and goiter.  NEURO:  negative  The  remainder of the review of systems is noncontributory    Allergy                                Bee venom and Zithromax [azithromycin dihydrate]  Current Medications                                                                                                         Current Outpatient Prescriptions   Medication Sig Dispense Refill     KETAMINE HCL        lithium 150 MG capsule Take 1 capsule (150 mg) by mouth daily 90 capsule 1     [START ON 11/20/2018] methylphenidate (RITALIN) 10 MG tablet 4 tablets daily in divided doses 120 tablet 0     sertraline (ZOLOFT) 25 MG tablet Take 0.5 tablets (12.5 mg) by mouth daily 1 tablet 1     Vitals                                                                                                                         3, 3     BP (!) 155/102 (BP Location: Right arm, Patient Position: Chair, Cuff Size: Adult Regular)  Pulse 92  Resp 16  Wt 87.3 kg (192 lb 6.4 oz)  BMI 30.13 kg/m2     Mental Status Exam                                                                                      9, 14 cog gs     Alertness: alert   Appearance: casually groomed  Behavior/Demeanor: cooperative, with good  eye contact   Speech: normal  Language: intact  Psychomotor: normal or unremarkable  Mood: depressed  Affect: restricted; was congruent to mood; was congruent to content  Thought Process/Associations: unremarkable  Thought Content:  Reports suicidal ideation;  Denies violent ideation and delusions  Perception:  Reports none;  Denies auditory hallucinations and visual hallucinations  Insight: good  Judgment: good  Cognition: (6) does  appear grossly intact; formal cognitive testing was not done  oriented: time, person, and place  attention span: intact  concentration: intact  recent memory: intact  remote memory: intact  fund of knowledge: appropriate  Gait and Station: unremarkable    Labs and Data                                                                                                                      Rating Scales:   PHQ9    PHQ-9 SCORE 9/19/2016 9/20/2016 11/2/2017   Total Score - - -   Total Score 19 19 21       No lab results found.  No lab results found.    Diagnosis and Assessment                                                                             m2, h3     Today the following issues were addressed:    1) Major depressive disorder, recurrent, severe without psychotic features (F33.2)    [place] Prescription Monitoring Program [] review was not needed today.    PSYCHOTROPIC DRUG INTERACTIONS: review was not needed today.       Plan                                                                                                                     m2, h3     1) Follow up in TRD     CRISIS NUMBERS:   Provided routinely in AVS.    Treatment Risk Statement:  The patient understands the risks, benefits, adverse effects and alternatives. Agrees to treatment with the capacity to do so. No medical contraindications to treatment. Agrees to call clinic for any problems. The patient understands to call 911 or go to the nearest ED if life threatening or urgent symptoms occur.      PROVIDER:  CARLOS A Nation CNP

## 2018-10-26 NOTE — MR AVS SNAPSHOT
After Visit Summary   10/26/2018    Sebastien Hoover    MRN: [id number]           Patient Information     Date Of Birth          1970        Visit Information        Provider Department      10/26/2018 11:00 AM Orquidea Rios APRN CNP [facility]         Follow-ups after your visit        Your next 10 appointments already scheduled     Nov 02, 2018 11:00 AM CDT   New Patient Visit with Nathan Villa MD   [facility] ([facility])    [address]  [facility] [address]   [phone]            Nov 08, 2018 10:00 AM CST   Level 3 with UR BD 01   [facility] [place], Infusion Services ([facility])    [address]  [facility] [address]   [phone]            Nov 15, 2018  2:00 PM CST   Adult Med Follow UP with Venancio Ochoa MD   Psychiatry Clinic ([facility])    [place] [id number]  [address] [place] [address]   [phone]            Nov 20, 2018 10:30 AM CST   Level 3 with UR CH 06   [facility], Infusion Services ([facility])    [address]  [address]  [facility] [address]   [phone]            Nov 29, 2018 10:30 AM CST   Level 3 with UR CH 06   [facility], Infusion Services ([facility])    [address]  [address]  [facility] [address]   [phone]            Dec 13, 2018 10:00 AM CST   Level 3 with UR CH 03   [facility]    [address]  [address]  [facility] [address]   [phone]            Dec 21, 2018 10:30 AM CST   Level 3 with UR BD 01   [facility] [place], [facility] ([facility])    [address]  [address]  [facility] [address]   [phone]            Dec 28, 2018 10:30 AM CST    Level 3 with UR CH 03   [facility] [place], Infusion Services ([facility])    [address]  [address]  [facility] [address]   [phone]            Jan 03, 2019 10:30 AM CST   Level 3 with UR CH 02   [facility] [place], Infusion Services ([facility])    [address]  [address]  [facility] [address]   [phone]            Jan 17, 2019  1:00 PM CST   Adult Med Follow UP with Venancio Ochoa MD   Psychiatry Clinic ([facility])    [address]  [address] [place] [id number] [phone]              Who to contact     Please call your clinic at [phone] to:    Ask questions about your health    Make or cancel appointments    Discuss your medicines    Learn about your test results    Speak to your doctor            Additional Information About Your Visit        [employer] Information     [employer] gives you secure access to your electronic health record. If you see a primary care provider, you can also send messages to your care team and make appointments. If you have questions, please call your primary care clinic.  If you do not have a primary care provider, please call [phone] and they will assist you.      [employer] is an electronic gateway that provides easy, online access to your medical records. With [employer], you can request a clinic appointment, read your test results, renew a prescription or communicate with your care team.     To access your existing account, please contact your [facility] or call [phone] for assistance.        Care EveryWhere ID     This is your Care EveryWhere ID. This could be used by other organizations to access your [place] medical records  [id number]        Your Vitals Were     Pulse Respirations BMI (Body Mass Index)             92 16 30.13 kg/m2          Blood Pressure from Last 3  Encounters:   10/26/18 (!) 155/102   10/25/18 139/84   10/11/18 155/87    Weight from Last 3 Encounters:   10/26/18 192 lb 6.4 oz (87.3 kg)   10/11/18 188 lb 7.9 oz (85.5 kg)   08/23/18 188 lb 11.4 oz (85.6 kg)              Today, you had the following     No orders found for display       Primary Care Provider Office Phone # Fax #    Junior Buenrostro [phone]       [id number] [address]        Equal Access to Services     [facility]: Hadii nancy haq hadasho Sodaniella, waaxda luqadaha, qaybta kaalmada thao, guillermo castillo . So [facility] [phone].    ATENCIÓN: Si habla español, tiene a madrigal disposición servicios gratuitos de asistencia lingüística. [facility] [phone].    We comply with applicable federal civil rights laws and [place] laws. We do not discriminate on the basis of race, color, national origin, age, disability, sex, sexual orientation, or gender identity.            Thank you!     Thank you for choosing [facility] PSYCHIATRY  for your care. Our goal is always to provide you with excellent care. Hearing back from our patients is one way we can continue to improve our services. Please take a few minutes to complete the written survey that you may receive in the mail after your visit with us. Thank you!             Your Updated Medication List - Protect others around you: Learn how to safely use, store and throw away your medicines at www.disposemymeds.org.          This list is accurate as of 10/26/18 12:10 PM.  Always use your most recent med list.                   Brand Name Dispense Instructions for use Diagnosis    KETAMINE HCL      Infusion        lithium 150 MG capsule     90 capsule    Take 1 capsule (150 mg) by mouth daily    Major depressive disorder, recurrent, severe without psychotic features (H)       methylphenidate 10 MG tablet   Start taking on:  11/20/2018    RITALIN    120 tablet    4 tablets daily in divided  doses    Severe episode of recurrent major depressive disorder, without psychotic features (H)       sertraline 25 MG tablet    ZOLOFT    1 tablet    Take 0.5 tablets (12.5 mg) by mouth daily    Major depressive disorder, recurrent, severe without psychotic features (H)

## 2018-11-02 ENCOUNTER — OFFICE VISIT (OUTPATIENT)
Dept: PSYCHIATRY | Facility: CLINIC | Age: 48
End: 2018-11-02
Payer: MEDICARE

## 2018-11-02 VITALS
SYSTOLIC BLOOD PRESSURE: 147 MMHG | BODY MASS INDEX: 29.85 KG/M2 | RESPIRATION RATE: 16 BRPM | DIASTOLIC BLOOD PRESSURE: 100 MMHG | WEIGHT: 190.6 LBS | HEART RATE: 80 BPM

## 2018-11-02 DIAGNOSIS — F33.2 SEVERE EPISODE OF RECURRENT MAJOR DEPRESSIVE DISORDER, WITHOUT PSYCHOTIC FEATURES (H): ICD-10-CM

## 2018-11-02 DIAGNOSIS — R53.82 CHRONIC FATIGUE: Primary | ICD-10-CM

## 2018-11-02 ASSESSMENT — PAIN SCALES - GENERAL: PAINLEVEL: NO PAIN (0)

## 2018-11-02 ASSESSMENT — PATIENT HEALTH QUESTIONNAIRE - PHQ9: SUM OF ALL RESPONSES TO PHQ QUESTIONS 1-9: 24

## 2018-11-02 NOTE — MR AVS SNAPSHOT
After Visit Summary   11/2/2018    Sebastien Hoover    MRN: [id number]           Patient Information     Date Of Birth          1970        Visit Information        Provider Department      11/2/2018 11:00 AM Nathan Villa MD [facility] Psychiat[facility]        Today's Diagnoses     Chronic fatigue    -  1    Severe episode of recurrent major depressive disorder, without psychotic features (H)          Care Instructions    Will discuss with Dr Ochoa            Follow-ups after your visit        Your next 10 appointments already scheduled     Nov 28, 2018 12:30 PM CST   Level 3 with UR CH 04   [facility], Infusion Services ([facility])    [address] [place] [address] [address]  [address]  [facility] [address]   [phone]            Dec 13, 2018 10:00 AM CST   Level 3 with UR CH 03   [facility] [place], [facility]fusion Services ([facility])    [address] [place] [address] [address]  [address]  [facility] [address]   [phone]            Dec 28, 2018 10:30 AM CST   Level 3 with UR CH 03   [facility], Infusion Services ([facility])    [address] [place] [address] [address]  [address]  [facility] [address]   [phone]            Yoseph 10, 2019 10:30 AM CST   Level 3 with UR BD 01   [facility] [place], [facility]fusion Services ([facility])    [address] [place] [address] [address]  [address]  [facility] [address]   [phone]            Jan 17, 2019  1:00 PM CST   Adult Med Follow UP with Venancio Ochoa MD   Psychiatry Clinic ([facility])    [address] [place] [address]  [facility] [address]   [phone]            Mar 14, 2019  1:00 PM CDT   Adult Med Follow UP with Venancio Ochoa MD   Psychiatry Clinic ([facility])    [address] [place] [address]  [address] [address]   [phone]            May 16, 2019  1:00 PM CDT    Adult Med Follow UP with Venancio Ochoa MD   Psychiatry Clinic ([facility])    [address]  [address] [address] [phone]            Jul 18, 2019  1:00 PM CDT   Adult Med Follow UP with Venancio Ochoa MD   Psychiatry Clinic ([facility])    [address]  [address] [address] [phone]            Sep 19, 2019  1:00 PM CDT   Adult Med Follow UP with Venancio Ochoa MD   Psychiatry Clinic ([facility])    [place] [id number]  [address] [id number] [phone]              Who to contact     Please call your clinic at [phone] to:    Ask questions about your health    Make or cancel appointments    Discuss your medicines    Learn about your test results    Speak to your doctor            Additional Information About Your Visit        [facility] Information     [facility] gives you secure access to your electronic health record. If you see a primary care provider, you can also send messages to your care team and make appointments. If you have questions, please call your primary care clinic.  If you do not have a primary care provider, please call [phone] and they will assist you.      [facility] [facility] is an electronic gateway that provides easy, online access to your medical records. With [facility], you can request a clinic appointment, read your test results, renew a prescription or communicate with your care team.     To access your existing account, please contact your [facility] or call [phone] for assistance.        Care EveryWhere ID     This is your Care EveryWhere ID. This could be used by other organizations to access your [place] medical records  [id number]        Your Vitals Were     Pulse Respirations BMI (Body Mass Index)             80 16 29.85 kg/m2          Blood Pressure from  Last 3 Encounters:   11/15/18 (!) 146/99   11/08/18 127/89   11/02/18 (!) 147/100    Weight from Last 3 Encounters:   11/15/18 85.7 kg (189 lb)   11/02/18 86.5 kg (190 lb 9.6 oz)   10/26/18 87.3 kg (192 lb 6.4 oz)               Primary Care Provider Office Phone # Fax #    Junior Buenrostro [phone]       [facility] [id number] [facility] [address]        Equal Access to Services     [facility]: Hadii aad ku hadasho Soomaali, waaxda luqadaha, qaybta kaalmada adeegyaaline, guillermo castillo . So [facility] [phone].    ATENCIÓN: Si habla español, tiene a madrigal disposición servicios gratuitos de asistencia lingüística. [facility] [phone].    We comply with applicable federal civil rights laws and [place] laws. We do not discriminate on the basis of race, color, national origin, age, disability, sex, sexual orientation, or gender identity.            Thank you!     Thank you for choosing [facility] PSYCHIATRY  for your care. Our goal is always to provide you with excellent care. Hearing back from our patients is one way we can continue to improve our services. Please take a few minutes to complete the written survey that you may receive in the mail after your visit with us. Thank you!             Your Updated Medication List - Protect others around you: Learn how to safely use, store and throw away your medicines at www.disposemymeds.org.          This list is accurate as of 11/2/18 11:59 PM.  Always use your most recent med list.                   Brand Name Dispense Instructions for use Diagnosis    KETAMINE HCL      Infusion        lithium 150 MG capsule     90 capsule    Take 1 capsule (150 mg) by mouth daily    Major depressive disorder, recurrent, severe without psychotic features (H)       sertraline 25 MG tablet    ZOLOFT    1 tablet    Take 0.5 tablets (12.5 mg) by mouth daily    Major depressive disorder, recurrent, severe without psychotic features (H)

## 2018-11-02 NOTE — PROGRESS NOTES
"  Psychiatry Clinic New Patient Medication Evaluation                                           Sebastien Hoover is a 48 year old male   Therapist: None  PCP: Junior Buenrostro  Other Providers: ALIYAH Ochoa  Referred by Don for evaluation of depression.     History was provided by patient and review of records who was a fair historian.     Chief Complaint                                                                                                        \" Dr Ochoa suggested I come and see you and inquire what might be available\"     History of Present Illness                                                                                4, 4      Pertinent Background: This is a long-time patient of Venancio Ochoa MD and has been treated for refractory depressive sx for >10 years. His depression has long manifested as depressed mood, low energy, anhedonia, sleep disturbances (insomnia) and reduced motivation and poor concentration/focus. Cognition and low motivation has caused variable levels of functioning through the year and has caused some occupational impairment. VNS implant, not much benefit       Psych critical item history includes mutiple psychotropic trials, ECT and TMS and deep TMS, VNS therapy and  ketamine infusions and low dose naltrexone.     Interim History: The patient has been receiving ketamine infusions (since August 2013) with improved mood and functioning initially during treatment and decreasing efficacy. He is also currently receiving supplementation of ketamine troches but reports that this has never really had much effect to improve mood.      Substance Use History     Caffeine: Not asked       Tobacco: None     ETOH: None      Cannabis: None              Opioids: None          Other Drugs: None      CD treatment hx: \"No\"     Withdrawal hx: \"No\"     Current sober supports include NA.     Psychiatric History        Suicidal ideation- None   Suicide Attempt:   #- 0     SIB- None "   Psych Hosp- None   ECT- 2010: 13 treatments with some benefit  Outpatient Programs [ DBT, Day Treatment, Eating Disorder Tx etc]- [facility]  With [facility] 2013       Psychiatric Medication Trials       Only reports to a positive response to Luvox early in the course of his 20 plus years of depression treatment.   Has failed per his report ECT, TMS and VNS.   Has been maintained on Ketamine for over 5 years.  Has been exposed to large number of clinical medication trials including MAOIs     Medical / Surgical History     Patient Active Problem List   Diagnosis     Severe recurrent major depression without psychotic features (H)     Severe episode of recurrent major depressive disorder, without psychotic features (H)       Past Surgical History:   Procedure Laterality Date     ENT SURGERY  2000/Nov    vegal nerve stimulator implanted     HC TOOTH EXTRACTION W/FORCEP       SURGICAL HISTORY OF -       vagus nerve stimulator         Medical Review of Systems                                                                                                    2, 10     A comprehensive review of systems was performed and is negative other than noted in the HPI. Patient endorses lower back pain and 'being out of shape'. His 'mild sleep apnea' is not a major concern. His mild hypertension is 'under control'.     Allergy   Bee venom and Zithromax [azithromycin dihydrate]   Current Medications     Current Outpatient Prescriptions   Medication Sig Dispense Refill     KETAMINE HCL Infusion       lithium 150 MG capsule Take 1 capsule (150 mg) by mouth daily 90 capsule 1     [START ON 11/20/2018] methylphenidate (RITALIN) 10 MG tablet 4 tablets daily in divided doses 120 tablet 0     sertraline (ZOLOFT) 25 MG tablet Take 0.5 tablets (12.5 mg) by mouth daily 1 tablet 1      Vitals                                                                                                                        3, 3     BP (!) 147/100 (BP Location:  Right arm, Patient Position: Chair, Cuff Size: Adult Regular)  Pulse 80  Resp 16  Wt 86.5 kg (190 lb 9.6 oz)  BMI 29.85 kg/m2      Mental Status Exam                                                                                   9, 14 cog gs     Alertness: alert   Appearance: casually groomed  Behavior/Demeanor: cooperative and calm, with fair  eye contact   Speech: normal  Language: no problems  Psychomotor: normal or unremarkable  Mood: depressed  Affect: blunted; was congruent to mood; was congruent to content  Thought Process/Associations: unremarkable  Thought Content:  Reports suicidal ideation, preoccupations and  mainly ruminations over past events especially romatic losses / failures. Feels was not treated fairly by life / illness / parents early on / past romantic relationships. Also admits for checking behavior -- doors, locks etc 2-3 times at a time;  Denies suicidal ideation with plan; with intent [details in Interim History]  Perception:  Reports none;  Denies auditory hallucinations and visual hallucinations  Insight: good  Judgment: fair  Cognition: (6) does  appear grossly intact; formal cognitive testing was done  oriented: time, person, and place  attention span: intact  concentration: fair  recent memory: fair  fund of knowledge: appropriate  Gait and Station: unremarkable     Labs and Data     Rating Scales:    PHQ9    PHQ9 Today:  24  PHQ-9 SCORE 10/26/2018 10/26/2018 11/2/2018   Total Score - - -   Total Score 24 24 24         No lab results found.  No lab results found.    Diagnosis and Assessment                                                                             m2, h3     Today the following issues were addressed:    1) recurrent depression superimposed on dysthymia  2) mild to moderate obsessive compulsive disorder with a history of anxiety disorder  3) mild sleep apnea  4) rule out depression due to medical illness (unlikely)  5) historically intolerant to a substantial  number of drug interventions    [place] Prescription Monitoring Program [] review was not needed today.    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 According to patient the only clear response to an intervention was to luvox back in his early 20's. Since them if any intervention helped, it was only with partial response.   Will need to discuss in great details the case with Dr Ochoa, including his VNS therapy and ECT responses. Also, great concern about repeated ketamine at the frequency and chronicity it has been administered (patient reports not getting it for couple of months last summer and 'doing ok without it'. Currently, the positive effects are partial and mainly on day 3 and 4 post infusion.   Consider:  - repeat of deep TMS although failed back in Spring 2018 while also undergoing ketamine infusions  - deep TMS with OCD coil (not yet clinically available)  - repeat ECT and VNS therapy with some augmentation strategies  - wait for FEAST in Spring 2019 (once GABRIELLE / IRB approved)  - get genetic testing since he report consistent intolerance to medications  - continue to encourage exercise  - behavioral activation therapy (since he has failed prior therapies). Concerns over transportation    2) patient has not had a trial of luvox plus abilify (althoug later recalled that he didn't tolerate abilify?)    3) reports no need to get it treated. Validate report    4) get labs including B12, Vit D, folate, testosterone, TSH and CRP    RTC: pending discussion with Dr Ochoa. Patient has a ketamine infusion next week and will see Dr Ochao the week after.     CRISIS NUMBERS:   Provided routinely in AVS.    Treatment Risk Statement:  The patient understands the risks, benefits, adverse effects and  alternatives. Agrees to treatment with the capacity to do so. No medical contraindications to treatment. Agrees to call clinic for any problems. The patient understands to call 911 or go to the nearest ED if life threatening or urgent symptoms occur.     WHODAS 2.0  TODAY total score = N/A; [a 12-item WHODAS 2.0 assessment was not completed by the pt today and/or recorded in EPIC].     PROVIDER:  Nathan Villa MD

## 2018-11-08 ENCOUNTER — INFUSION THERAPY VISIT (OUTPATIENT)
Dept: INFUSION THERAPY | Facility: CLINIC | Age: 48
End: 2018-11-08
Attending: PSYCHIATRY & NEUROLOGY
Payer: MEDICARE

## 2018-11-08 VITALS
DIASTOLIC BLOOD PRESSURE: 89 MMHG | RESPIRATION RATE: 18 BRPM | HEART RATE: 80 BPM | SYSTOLIC BLOOD PRESSURE: 127 MMHG | OXYGEN SATURATION: 97 % | TEMPERATURE: 98.4 F

## 2018-11-08 DIAGNOSIS — F33.2 SEVERE EPISODE OF RECURRENT MAJOR DEPRESSIVE DISORDER, WITHOUT PSYCHOTIC FEATURES (H): Primary | ICD-10-CM

## 2018-11-08 DIAGNOSIS — F33.2 SEVERE RECURRENT MAJOR DEPRESSION WITHOUT PSYCHOTIC FEATURES (H): ICD-10-CM

## 2018-11-08 PROCEDURE — 96365 THER/PROPH/DIAG IV INF INIT: CPT

## 2018-11-08 PROCEDURE — 25000125 ZZHC RX 250: Performed by: PSYCHIATRY & NEUROLOGY

## 2018-11-08 PROCEDURE — 25000128 H RX IP 250 OP 636: Performed by: PSYCHIATRY & NEUROLOGY

## 2018-11-08 RX ADMIN — KETAMINE HYDROCHLORIDE 35 MG: 50 INJECTION, SOLUTION INTRAMUSCULAR; INTRAVENOUS at 10:33

## 2018-11-08 RX ADMIN — SODIUM CHLORIDE 250 ML: 9 INJECTION, SOLUTION INTRAVENOUS at 10:31

## 2018-11-08 NOTE — PROGRESS NOTES
Here for ketamine infusion. Depression is unchanged from last appointment. No new health issues. Tolerated infusion. Vitals monitored every 15 minutes. Did pull out PIV after ketamine infused and NS had started. Did not want the PIV reinserted at this time. Stayed for 1 hour post infusion. Left when discharged. Gait steady. Has ride home.To return at next appointment.

## 2018-11-15 ENCOUNTER — OFFICE VISIT (OUTPATIENT)
Dept: PSYCHIATRY | Facility: CLINIC | Age: 48
End: 2018-11-15
Attending: PSYCHIATRY & NEUROLOGY
Payer: MEDICARE

## 2018-11-15 VITALS
BODY MASS INDEX: 29.59 KG/M2 | SYSTOLIC BLOOD PRESSURE: 146 MMHG | HEART RATE: 76 BPM | WEIGHT: 189 LBS | DIASTOLIC BLOOD PRESSURE: 99 MMHG

## 2018-11-15 DIAGNOSIS — Z23 NEED FOR PROPHYLACTIC VACCINATION AND INOCULATION AGAINST INFLUENZA: ICD-10-CM

## 2018-11-15 DIAGNOSIS — F33.2 SEVERE EPISODE OF RECURRENT MAJOR DEPRESSIVE DISORDER, WITHOUT PSYCHOTIC FEATURES (H): ICD-10-CM

## 2018-11-15 DIAGNOSIS — R53.82 CHRONIC FATIGUE: ICD-10-CM

## 2018-11-15 DIAGNOSIS — F32.2 CURRENT SEVERE EPISODE OF MAJOR DEPRESSIVE DISORDER WITHOUT PSYCHOTIC FEATURES, UNSPECIFIED WHETHER RECURRENT (H): Primary | ICD-10-CM

## 2018-11-15 LAB
ANION GAP SERPL CALCULATED.3IONS-SCNC: 7 MMOL/L (ref 3–14)
BUN SERPL-MCNC: 14 MG/DL (ref 7–30)
CALCIUM SERPL-MCNC: 9.3 MG/DL (ref 8.5–10.1)
CHLORIDE SERPL-SCNC: 106 MMOL/L (ref 94–109)
CO2 SERPL-SCNC: 28 MMOL/L (ref 20–32)
CREAT SERPL-MCNC: 0.82 MG/DL (ref 0.66–1.25)
CRP SERPL-MCNC: <2.9 MG/L (ref 0–8)
FERRITIN SERPL-MCNC: 155 NG/ML (ref 26–388)
GFR SERPL CREATININE-BSD FRML MDRD: >90 ML/MIN/1.7M2
GLUCOSE SERPL-MCNC: 97 MG/DL (ref 70–99)
POTASSIUM SERPL-SCNC: 4.1 MMOL/L (ref 3.4–5.3)
SODIUM SERPL-SCNC: 141 MMOL/L (ref 133–144)
T4 FREE SERPL-MCNC: 0.96 NG/DL (ref 0.76–1.46)
TSH SERPL DL<=0.005 MIU/L-ACNC: 0.65 MU/L (ref 0.4–4)
TSH SERPL DL<=0.005 MIU/L-ACNC: 0.65 MU/L (ref 0.4–4)
VIT B12 SERPL-MCNC: 261 PG/ML (ref 193–986)

## 2018-11-15 PROCEDURE — 25000128 H RX IP 250 OP 636: Mod: ZF

## 2018-11-15 PROCEDURE — G0463 HOSPITAL OUTPT CLINIC VISIT: HCPCS | Mod: 25,ZF

## 2018-11-15 PROCEDURE — 36415 COLL VENOUS BLD VENIPUNCTURE: CPT | Performed by: PSYCHIATRY & NEUROLOGY

## 2018-11-15 PROCEDURE — 90686 IIV4 VACC NO PRSV 0.5 ML IM: CPT | Mod: ZF

## 2018-11-15 PROCEDURE — 86140 C-REACTIVE PROTEIN: CPT | Performed by: PSYCHIATRY & NEUROLOGY

## 2018-11-15 PROCEDURE — 82306 VITAMIN D 25 HYDROXY: CPT | Performed by: PSYCHIATRY & NEUROLOGY

## 2018-11-15 PROCEDURE — 84439 ASSAY OF FREE THYROXINE: CPT | Performed by: PSYCHIATRY & NEUROLOGY

## 2018-11-15 PROCEDURE — G0008 ADMIN INFLUENZA VIRUS VAC: HCPCS | Mod: ZF

## 2018-11-15 PROCEDURE — 84403 ASSAY OF TOTAL TESTOSTERONE: CPT | Performed by: PSYCHIATRY & NEUROLOGY

## 2018-11-15 PROCEDURE — 84443 ASSAY THYROID STIM HORMONE: CPT | Performed by: PSYCHIATRY & NEUROLOGY

## 2018-11-15 PROCEDURE — 84270 ASSAY OF SEX HORMONE GLOBUL: CPT | Performed by: PSYCHIATRY & NEUROLOGY

## 2018-11-15 PROCEDURE — 86141 C-REACTIVE PROTEIN HS: CPT | Performed by: PSYCHIATRY & NEUROLOGY

## 2018-11-15 PROCEDURE — 82607 VITAMIN B-12: CPT | Performed by: PSYCHIATRY & NEUROLOGY

## 2018-11-15 PROCEDURE — 82728 ASSAY OF FERRITIN: CPT | Performed by: PSYCHIATRY & NEUROLOGY

## 2018-11-15 PROCEDURE — 80048 BASIC METABOLIC PNL TOTAL CA: CPT | Performed by: PSYCHIATRY & NEUROLOGY

## 2018-11-15 RX ORDER — METHYLPHENIDATE HYDROCHLORIDE 10 MG/1
TABLET ORAL
Qty: 120 TABLET | Refills: 0 | Status: SHIPPED | OUTPATIENT
Start: 2018-11-20 | End: 2019-01-17

## 2018-11-15 RX ORDER — METHYLPHENIDATE HYDROCHLORIDE 10 MG/1
TABLET ORAL
Qty: 120 TABLET | Refills: 0 | Status: SHIPPED | OUTPATIENT
Start: 2018-11-20 | End: 2018-11-15

## 2018-11-15 ASSESSMENT — PAIN SCALES - GENERAL: PAINLEVEL: NO PAIN (0)

## 2018-11-16 LAB
CRP SERPL HS-MCNC: 1.9 MG/L
DEPRECATED CALCIDIOL+CALCIFEROL SERPL-MC: 29 UG/L (ref 20–75)

## 2018-11-16 NOTE — PROGRESS NOTES

## 2018-11-19 LAB
SHBG SERPL-SCNC: 51 NMOL/L (ref 11–80)
TESTOST FREE SERPL-MCNC: 6.26 NG/DL (ref 4.7–24.4)
TESTOST SERPL-MCNC: 410 NG/DL (ref 240–950)

## 2018-11-28 ENCOUNTER — INFUSION THERAPY VISIT (OUTPATIENT)
Dept: INFUSION THERAPY | Facility: CLINIC | Age: 48
End: 2018-11-28
Attending: PSYCHIATRY & NEUROLOGY
Payer: MEDICARE

## 2018-11-28 VITALS
OXYGEN SATURATION: 99 % | SYSTOLIC BLOOD PRESSURE: 126 MMHG | BODY MASS INDEX: 29.41 KG/M2 | RESPIRATION RATE: 18 BRPM | WEIGHT: 187.83 LBS | HEART RATE: 82 BPM | DIASTOLIC BLOOD PRESSURE: 76 MMHG | TEMPERATURE: 97.7 F

## 2018-11-28 DIAGNOSIS — F33.2 SEVERE EPISODE OF RECURRENT MAJOR DEPRESSIVE DISORDER, WITHOUT PSYCHOTIC FEATURES (H): Primary | ICD-10-CM

## 2018-11-28 DIAGNOSIS — F33.2 SEVERE RECURRENT MAJOR DEPRESSION WITHOUT PSYCHOTIC FEATURES (H): ICD-10-CM

## 2018-11-28 PROCEDURE — 96361 HYDRATE IV INFUSION ADD-ON: CPT

## 2018-11-28 PROCEDURE — 96365 THER/PROPH/DIAG IV INF INIT: CPT

## 2018-11-28 PROCEDURE — 25000128 H RX IP 250 OP 636: Performed by: PSYCHIATRY & NEUROLOGY

## 2018-11-28 PROCEDURE — 25000125 ZZHC RX 250: Performed by: PSYCHIATRY & NEUROLOGY

## 2018-11-28 RX ADMIN — SODIUM CHLORIDE 250 ML: 9 INJECTION, SOLUTION INTRAVENOUS at 12:46

## 2018-11-28 RX ADMIN — KETAMINE HYDROCHLORIDE 35 MG: 50 INJECTION, SOLUTION INTRAMUSCULAR; INTRAVENOUS at 12:46

## 2018-11-28 NOTE — MR AVS SNAPSHOT
After Visit Summary   11/28/2018    Sebastien Hoover    MRN: [id number]           Patient Information     Date Of Birth          1970        Visit Information        Provider Department      11/28/2018 12:30 PM UR CH 04 [facility], Infusion Services        Today's Diagnoses     Severe episode of recurrent major depressive disorder, without psychotic features (H)    -  1    Severe recurrent major depression without psychotic features (H)           Follow-ups after your visit        Your next 10 appointments already scheduled     Dec 13, 2018 10:00 AM CST   Level 3 with UR CH 03   [facility], Infusion Services ([facility])    [address] [place] [address] S.  [address] [place] [address]   [phone]            Dec 28, 2018 10:30 AM CST   Level 3 with UR CH 03   [facility] [place], Infusion Services ([facility])    [address] [place] [address] S.  [address] [place] [address]   [phone]            Yoseph 10, 2019 10:30 AM CST   Level 3 with UR BD 01   [facility] [place], Infusion Services ([facility])    [address] [place] [address] S.  [address] [place] [address]   [phone]            Jan 17, 2019  1:00 PM CST   Adult Med Follow UP with Venancio Ochoa MD   Psychiatry Clinic ([facility])    [address]  [facility] [address] [address]   [phone]            Mar 14, 2019  1:00 PM CDT   Adult Med Follow UP with Venancio Ochoa MD   Psychiatry Clinic ([facility])    [address]  [facility] [address] [address]   [phone]            May 16, 2019  1:00 PM CDT   Adult Med Follow UP with Venancio Ochoa MD   Psychiatry Clinic ([facility])    [address]  [facility] [address] [address]   [phone]            Jul 18, 2019  1:00  PM CDT   Adult Med Follow UP with Venancio Ochoa MD   Psychiatry Clinic ([facility])    [place] [id number] [address] [id number] [phone]            Sep 19, 2019  1:00 PM CDT   Adult Med Follow UP with Venancio Ochoa MD   Psychiatry Clinic ([facility])    [place] [id number]  [address] [id number] [phone]              Who to contact     If you have questions or need follow up information about today's clinic visit or your schedule please contact [facility] [place], INFUSION SERVICES directly at [phone].  Normal or non-critical lab and imaging results will be communicated to you by Renrenmoneyhart, letter or phone within 4 business days after the clinic has received the results. If you do not hear from us within 7 days, please contact the clinic through EZChipt or phone. If you have a critical or abnormal lab result, we will notify you by phone as soon as possible.  Submit refill requests through [employer] or call your pharmacy and they will forward the refill request to us. Please allow 3 business days for your refill to be completed.          Additional Information About Your Visit        RenrenmoneyharMotobuykers Information     [employer] gives you secure access to your electronic health record. If you see a primary care provider, you can also send messages to your care team and make appointments. If you have questions, please call your primary care clinic.  If you do not have a primary care provider, please call [phone] and they will assist you.        Care EveryWhere ID     This is your Care EveryWhere ID. This could be used by other organizations to access your [place] medical records  [id number]        Your Vitals Were     Pulse Temperature Respirations Pulse Oximetry BMI (Body Mass Index)       82 97.7  F (36.5  C) 18 99% 29.41 kg/m2        Blood Pressure from Last 3 Encounters:   11/28/18 126/76    11/08/18 127/89   11/02/18 (!) 147/100    Weight from Last 3 Encounters:   11/28/18 85.2 kg (187 lb 13.3 oz)   11/02/18 86.5 kg (190 lb 9.6 oz)   10/26/18 87.3 kg (192 lb 6.4 oz)              Today, you had the following     No orders found for display       Primary Care Provider Office Phone # Fax #    Junior Buenrostro [phone]       [facility] [id number] [facility] [id number]        Equal Access to Services     [facility]: Hadii aad ku hadasho Soomaali, waaxda luqadaha, qaybta kaalmada adeegyaaline, guillermo castillo . So [facility] [phone].    ATENCIÓN: Si habla español, tiene a madrigal disposición servicios gratuitos de asistencia lingüística. [facility] [phone].    We comply with applicable federal civil rights laws and [place] laws. We do not discriminate on the basis of race, color, national origin, age, disability, sex, sexual orientation, or gender identity.            Thank you!     Thank you for choosing [facility] SERVICES  for your care. Our goal is always to provide you with excellent care. Hearing back from our patients is one way we can continue to improve our services. Please take a few minutes to complete the written survey that you may receive in the mail after your visit with us. Thank you!             Your Updated Medication List - Protect others around you: Learn how to safely use, store and throw away your medicines at www.disposemymeds.org.          This list is accurate as of 11/28/18  2:44 PM.  Always use your most recent med list.                   Brand Name Dispense Instructions for use Diagnosis    KETAMINE HCL      Infusion        lithium 150 MG capsule     90 capsule    Take 1 capsule (150 mg) by mouth daily    Major depressive disorder, recurrent, severe without psychotic features (H)       methylphenidate 10 MG tablet    RITALIN    120 tablet    4 tablets daily in divided doses    Severe episode of recurrent  major depressive disorder, without psychotic features (H)

## 2018-12-11 NOTE — PROGRESS NOTES
Service Date: 11/15/2018      PSYCHIATRY CLINIC PROGRESS NOTE      The patient returns for medication management and supportive therapy.      Interim History   Since the last visit, the patient saw Dr. Villa for evaluation. TMS therapy at [place] was not effective. ECT helped but caued troubling side effects.       RECENT SYMPTOMS   Depression:   The patient endorses passive suicidal ideation, depressed mood, anhedonia, low energy, and broken sleep.      Elevated mood:   The patient denies symptoms of jeanne.      Psychosis:   The patient denies symptoms of psychosis.      Panic Attacks:   The patient denies panic attacks.      Anxiety:   The patient endorses excessive worries, social anxiety, and tension.      Trauma-related:   The patient denies symptoms related to trauma      ADVERSE EFFECTS:   The patient denies adverse effects.      MEDICAL CONCERNS:   None recently.      SUBSTANCE USE:   The patient denies substance use.      SOCIAL/FAMILY HISTORY:   Unchanged.      MEDICAL/SURGICAL HISTORY:   See EMR medical problems list.      MEDICAL REVIEW OF SYSTEMS:   A comprehensive review of systems was performed and is negative other than as noted in the HPI.      ALLERGY:   None new.      CURRENT MEDICATIONS:   See EMR med sections.      VITALS:   See rooming note.      MENTAL STATUS EXAM:   On mental status examination, the patient was alert, well groomed, and cooperative with the interview. Speech was normal rate and flow. Movements, gait, and station were normal. Mood was sad and affect was congruent. Thought processes were logical without active suicidal ideation or psychosis. Insight and judgment were good. Patient was oriented x4 with adequate attention and concentration, intact memory, and good fund of knowledge.       Labs and Data:   The patient will get B12, testosterone, ferritin, Vit D, TSH, and folate levels today.       DIAGNOSIS AND ASSESSMENT:   MDD, recurrent and severe without psychosis, very  treatment resistant.       PLAN:   1. Medications:    a. Discontinue Zoloft, which is not of benefit   2. Therapy: None   3. RTC in two months   4. Other:   5. Crisis numbers: provided routinely in AVS.      Treatment Risk Statement: The patient understands the risks, benefits, adverse effects, and alternatives. Agrees to treatment with the capacity to do so. No medical contraindications to treatment. Agrees to call clinic for any problems. The patient understands to call 911 or come to the nearest ED is life-threatening or urgent symptoms present.         FANNY ZELAYA MD             D: 2018   T: 2018   MT: LENA      Name:     DANIEL HUNT   MRN:      [id number]        Account:      [id number]   :      1970           Service Date: 11/15/2018      Document: [id number]

## 2019-01-10 ENCOUNTER — INFUSION THERAPY VISIT (OUTPATIENT)
Dept: INFUSION THERAPY | Facility: CLINIC | Age: 49
End: 2019-01-10
Attending: PSYCHIATRY & NEUROLOGY
Payer: MEDICARE

## 2019-01-10 VITALS
SYSTOLIC BLOOD PRESSURE: 138 MMHG | TEMPERATURE: 97.4 F | RESPIRATION RATE: 18 BRPM | DIASTOLIC BLOOD PRESSURE: 93 MMHG | OXYGEN SATURATION: 95 % | HEART RATE: 67 BPM

## 2019-01-10 DIAGNOSIS — F33.2 SEVERE EPISODE OF RECURRENT MAJOR DEPRESSIVE DISORDER, WITHOUT PSYCHOTIC FEATURES (H): Primary | ICD-10-CM

## 2019-01-10 DIAGNOSIS — F33.2 SEVERE RECURRENT MAJOR DEPRESSION WITHOUT PSYCHOTIC FEATURES (H): ICD-10-CM

## 2019-01-10 PROCEDURE — 25000128 H RX IP 250 OP 636: Performed by: PSYCHIATRY & NEUROLOGY

## 2019-01-10 PROCEDURE — 25000125 ZZHC RX 250: Performed by: PSYCHIATRY & NEUROLOGY

## 2019-01-10 PROCEDURE — 96365 THER/PROPH/DIAG IV INF INIT: CPT

## 2019-01-10 RX ADMIN — KETAMINE HYDROCHLORIDE 35 MG: 50 INJECTION, SOLUTION INTRAMUSCULAR; INTRAVENOUS at 10:50

## 2019-01-10 RX ADMIN — SODIUM CHLORIDE 250 ML: 9 INJECTION, SOLUTION INTRAVENOUS at 10:50

## 2019-01-10 ASSESSMENT — PAIN SCALES - GENERAL: PAINLEVEL: NO PAIN (0)

## 2019-01-17 ENCOUNTER — TELEPHONE (OUTPATIENT)
Dept: PSYCHIATRY | Facility: CLINIC | Age: 49
End: 2019-01-17

## 2019-01-17 ENCOUNTER — OFFICE VISIT (OUTPATIENT)
Dept: PSYCHIATRY | Facility: CLINIC | Age: 49
End: 2019-01-17
Attending: PSYCHIATRY & NEUROLOGY
Payer: MEDICARE

## 2019-01-17 VITALS
WEIGHT: 165.6 LBS | DIASTOLIC BLOOD PRESSURE: 105 MMHG | HEART RATE: 89 BPM | SYSTOLIC BLOOD PRESSURE: 151 MMHG | BODY MASS INDEX: 25.93 KG/M2

## 2019-01-17 DIAGNOSIS — F33.2 SEVERE EPISODE OF RECURRENT MAJOR DEPRESSIVE DISORDER, WITHOUT PSYCHOTIC FEATURES (H): ICD-10-CM

## 2019-01-17 DIAGNOSIS — F33.2 SEVERE EPISODE OF RECURRENT MAJOR DEPRESSIVE DISORDER, WITHOUT PSYCHOTIC FEATURES (H): Primary | ICD-10-CM

## 2019-01-17 DIAGNOSIS — F33.2 MAJOR DEPRESSIVE DISORDER, RECURRENT, SEVERE WITHOUT PSYCHOTIC FEATURES (H): ICD-10-CM

## 2019-01-17 PROCEDURE — G0463 HOSPITAL OUTPT CLINIC VISIT: HCPCS | Mod: ZF

## 2019-01-17 RX ORDER — METHYLPHENIDATE HYDROCHLORIDE 10 MG/1
TABLET ORAL
Qty: 120 TABLET | Refills: 0 | Status: SHIPPED | OUTPATIENT
Start: 2019-01-17 | End: 2019-03-14

## 2019-01-17 RX ORDER — METHYLPHENIDATE HYDROCHLORIDE 10 MG/1
TABLET ORAL
Qty: 120 TABLET | Refills: 0 | Status: SHIPPED | OUTPATIENT
Start: 2019-01-17 | End: 2019-01-17

## 2019-01-17 RX ORDER — LITHIUM CARBONATE 150 MG/1
150 CAPSULE ORAL DAILY
Qty: 90 CAPSULE | Refills: 1 | Status: SHIPPED | OUTPATIENT
Start: 2019-01-17 | End: 2019-07-18

## 2019-01-17 ASSESSMENT — PAIN SCALES - GENERAL: PAINLEVEL: NO PAIN (0)

## 2019-01-17 NOTE — NURSING NOTE
Chief Complaint   Patient presents with     Recheck Medication     Current severe episode of major depressive disorder without psychotic features, unspecified whether recurrent

## 2019-01-17 NOTE — TELEPHONE ENCOUNTER
Venancio Ochoa MD Pratt, Laura, HENRY             Can you check with compounding pharmacy to see if they can make naltrexone 1 mg bid for 30 days with 1 refill?     Thanks      Writer electronically sent 30 d/s with one additional refill.

## 2019-02-13 NOTE — PROGRESS NOTES
Service Date: 01/17/2019      PSYCHIATRY CLINIC PROGRESS NOTE      The patient returns for medication management and supportive therapy.      Interim History   Since the last visit the patient reports feeling very down. Holidays were stressful due to socializing. He fell off of his diet and exercise program. He tried bright lights without benefit. He missed several ketamine infusions; they don't have much effect anymore.      RECENT SYMPTOMS   Depression:   The patient endorses passive suicidal ideation, depressed mood, anhedonia, low energy, middle and early AM awakening, carbohydrate cravings, some excessive guilt, feelings of worthlessness, and psychomotor slowing.      Elevated mood:   The patient denies symptoms of jeanne.      Psychosis:   The patient denies symptoms of psychosis.      Panic Attacks:   The patient endorses the beginning of one mild panic attack over the past month.      Anxiety:   The patient endorses excessive worries, social anxiety, and mild checking compulsions.      Trauma-related:   The patient denies symptoms related to trauma.      ADVERSE EFFECTS:   Mood improves with stimulants, but they also increase his anxiety.       MEDICAL CONCERNS:   1. Elevated blood pressure - he tries to control it with diet and exercise.      SUBSTANCE USE:   None.      SOCIAL/FAMILY HISTORY:   Unchanged since the last appointment.      MEDICAL/SURGICAL HISTORY:   See EMR medical problems list.      MEDICAL REVIEW OF SYSTEMS:   A comprehensive review of systems was performed and is negative other than as noted in the HPI.      ALLERGY:   None new.      CURRENT MEDICATIONS:   See EMR med sections.      VITALS:   See rooming note.      MENTAL STATUS EXAM:   On mental status examination, the patient was alert, well groomed, and cooperative with the interview. Speech was normal rate and flow. Movements, gait, and station were normal. Mood was sad with congruent affect. Thought processes were logical and without  suicidal ideation or psychosis. Insight and judgment were good. Patient was oriented x4 with good attention and concentration, intact memory, and good fund of knowledge.       Labs and Data:   PHQ9 = 24      DIAGNOSIS AND ASSESSMENT:   1. MDD, recurrent and severe, without psychosis, treatment resistant.      PLAN:   1. Medications:   a. Naltrexone 1 mg BID (we will need a spscial compound). There are early reports of low-dose naltrexone being effective in TRD    2. Therapy: None currently   3. RTC in one month   4. Other:   5. Crisis numbers: provided routinely in AVS.      Treatment Risk Statement: The patient understands the risks, benefits, adverse effects, and alternatives. Agrees to treatment with the capacity to do so. No medical contraindications to treatment. Agrees to call clinic for any problems. The patient understands to call 911 or come to the nearest ED is life-threatening or urgent symptoms present.         FANNY ZELAYA MD             D: 2019   T: 2019   MT: LENA      Name:     DANIEL HUNT   MRN:      [id number]        Account:      [id number]   :      1970           Service Date: 2019      Document: [id number]

## 2019-03-14 ENCOUNTER — OFFICE VISIT (OUTPATIENT)
Dept: PSYCHIATRY | Facility: CLINIC | Age: 49
End: 2019-03-14
Attending: PSYCHIATRY & NEUROLOGY
Payer: MEDICARE

## 2019-03-14 VITALS
WEIGHT: 190.6 LBS | HEART RATE: 69 BPM | DIASTOLIC BLOOD PRESSURE: 99 MMHG | SYSTOLIC BLOOD PRESSURE: 142 MMHG | BODY MASS INDEX: 29.85 KG/M2

## 2019-03-14 DIAGNOSIS — F33.2 SEVERE EPISODE OF RECURRENT MAJOR DEPRESSIVE DISORDER, WITHOUT PSYCHOTIC FEATURES (H): Primary | ICD-10-CM

## 2019-03-14 PROCEDURE — G0463 HOSPITAL OUTPT CLINIC VISIT: HCPCS | Mod: ZF

## 2019-03-14 RX ORDER — METHYLPHENIDATE HYDROCHLORIDE 10 MG/1
TABLET ORAL
Qty: 120 TABLET | Refills: 0 | Status: SHIPPED | OUTPATIENT
Start: 2019-03-14 | End: 2019-03-14

## 2019-03-14 RX ORDER — METHYLPHENIDATE HYDROCHLORIDE 10 MG/1
TABLET ORAL
Qty: 120 TABLET | Refills: 0 | Status: SHIPPED | OUTPATIENT
Start: 2019-03-14 | End: 2019-05-16

## 2019-03-14 RX ORDER — BUPRENORPHINE AND NALOXONE 4; 1 MG/1; MG/1
1 FILM, SOLUBLE BUCCAL; SUBLINGUAL DAILY
Qty: 30 FILM | Refills: 0 | Status: SHIPPED | OUTPATIENT
Start: 2019-03-14 | End: 2019-04-12

## 2019-03-14 ASSESSMENT — PATIENT HEALTH QUESTIONNAIRE - PHQ9: SUM OF ALL RESPONSES TO PHQ QUESTIONS 1-9: 24

## 2019-03-14 ASSESSMENT — PAIN SCALES - GENERAL: PAINLEVEL: NO PAIN (0)

## 2019-03-14 NOTE — NURSING NOTE
Chief Complaint   Patient presents with     RECHECK     Severe episode of recurrent major depressive disorder, without psychotic features

## 2019-04-09 NOTE — PROGRESS NOTES
Service Date: 03/14/2019      PSYCHIATRY CLINIC PROGRESS NOTE      The patient returns for medication management and supportive therapy.      Interim History   Since the last visit the patient has been more down. He has not been having ketamine infusions, as he feels they didn't work. He has had mild mood improvement with opiates in the past.       RECENT SYMPTOMS   Depression:   The patient endorses passive suicidal ideation, severe depressed mood, severe anhedonia, low energy, middle and terminal insomnia. Carbohydrate cravings, some excessive guilt, and feelings of worthlessness.       Elevated mood:   The patient denies symptoms of jeanne.      Psychosis:   The patient denies symptoms of psychosis.      Panic Attacks:   The patient denies panic attacks.       Anxiety:   The patient endorses excessive worries, social anxiety, nervousness and tension sometimes, and feeling overwhelmed.      Trauma-related:   The patient denies symptoms related to trauma.      ADVERSE EFFECTS:   1. Negative mood when Ritalin wears off, which does increase anxiety.      MEDICAL CONCERNS:   1. Back injury, improving.      SUBSTANCE USE:   The patient denies all recent use of substances of abuse and denies ever having had problems in this area.      SOCIAL/FAMILY HISTORY:   Unchanged. The patient lives with her family, and is unemployed and on disability.      MEDICAL/SURGICAL HISTORY:   See EMR medical problems list.      MEDICAL REVIEW OF SYSTEMS:   A comprehensive review of systems was performed and is negative other than as noted in the HPI.      ALLERGY:   None.      CURRENT MEDICATIONS:   See EMR med sections.      VITALS:   See rooming note.      MENTAL STATUS EXAM:   On mental status examination, the patient was alert, well groomed, and cooperative with the interviewer. Speech was normal rate and flow. Movements, gait, and station were normal. Mood was sad and affect was congruent. Thought processes were logical and positive  for passive suicidal ideation but negative for psychosis. Insight and judgment were good. Patient was oriented x4 with good attention and concentration, intact memory, and good fund of knowledge.       Labs and Data:   PHQ9 = 24      DIAGNOSIS AND ASSESSMENT:   1. There is evidence that opiates improve TRD. Daniel has no history of CD issues and no family history.    2. MDD< recurrent and severe, without psychosis      PLAN:   1. Medications:   a. Suboxone 4-1   2. Therapy: None currently   3. RTC in 4 weeks   4. Other:   5. Crisis numbers: provided routinely in AVS.      Treatment Risk Statement: The patient understands the risks, benefits, adverse effects, and alternatives. Agrees to treatment with the capacity to do so. No medical contraindications to treatment. Agrees to call clinic for any problems. The patient understands to call 911 or come to the nearest ED is life-threatening or urgent symptoms present.         FANNY ZELAYA MD             D: 2019   T: 2019   MT: LENA      Name:     DANIEL HUNT   MRN:      [id number]        Account:      [id number]   :      1970           Service Date: 2019      Document: [id number]

## 2019-04-12 DIAGNOSIS — F33.2 SEVERE EPISODE OF RECURRENT MAJOR DEPRESSIVE DISORDER, WITHOUT PSYCHOTIC FEATURES (H): ICD-10-CM

## 2019-04-12 RX ORDER — BUPRENORPHINE AND NALOXONE 4; 1 MG/1; MG/1
1 FILM, SOLUBLE BUCCAL; SUBLINGUAL DAILY
Qty: 30 FILM | Refills: 0 | Status: SHIPPED | OUTPATIENT
Start: 2019-04-12 | End: 2019-04-22

## 2019-04-22 ENCOUNTER — TELEPHONE (OUTPATIENT)
Dept: PSYCHIATRY | Facility: CLINIC | Age: 49
End: 2019-04-22

## 2019-04-22 DIAGNOSIS — F33.2 SEVERE EPISODE OF RECURRENT MAJOR DEPRESSIVE DISORDER, WITHOUT PSYCHOTIC FEATURES (H): ICD-10-CM

## 2019-04-22 RX ORDER — BUPRENORPHINE AND NALOXONE 4; 1 MG/1; MG/1
1 FILM, SOLUBLE BUCCAL; SUBLINGUAL DAILY
Qty: 30 FILM | Refills: 0 | Status: SHIPPED | OUTPATIENT
Start: 2019-04-22 | End: 2019-05-16

## 2019-04-22 NOTE — TELEPHONE ENCOUNTER
Writer received signed Suboxone 4-1 mg script from provider. It reads, place 1 film under the tongue daily, #30 with 0 refills. Faxed to [facility] in [place] at [phone]. Script held at writer's desk.

## 2019-05-16 ENCOUNTER — OFFICE VISIT (OUTPATIENT)
Dept: PSYCHIATRY | Facility: CLINIC | Age: 49
End: 2019-05-16
Attending: PSYCHIATRY & NEUROLOGY
Payer: MEDICARE

## 2019-05-16 VITALS
WEIGHT: 187.6 LBS | DIASTOLIC BLOOD PRESSURE: 98 MMHG | HEART RATE: 99 BPM | BODY MASS INDEX: 29.38 KG/M2 | SYSTOLIC BLOOD PRESSURE: 147 MMHG

## 2019-05-16 DIAGNOSIS — F33.2 SEVERE EPISODE OF RECURRENT MAJOR DEPRESSIVE DISORDER, WITHOUT PSYCHOTIC FEATURES (H): ICD-10-CM

## 2019-05-16 PROCEDURE — G0463 HOSPITAL OUTPT CLINIC VISIT: HCPCS | Mod: ZF

## 2019-05-16 RX ORDER — METHYLPHENIDATE HYDROCHLORIDE 10 MG/1
30 TABLET ORAL 2 TIMES DAILY
Qty: 180 TABLET | Refills: 0 | Status: SHIPPED | OUTPATIENT
Start: 2019-05-16 | End: 2019-05-16

## 2019-05-16 RX ORDER — METHYLPHENIDATE HYDROCHLORIDE 10 MG/1
30 TABLET ORAL 2 TIMES DAILY
Qty: 180 TABLET | Refills: 0 | Status: SHIPPED | OUTPATIENT
Start: 2019-05-16 | End: 2019-07-18

## 2019-05-16 ASSESSMENT — PAIN SCALES - GENERAL: PAINLEVEL: NO PAIN (0)

## 2019-05-17 ENCOUNTER — TELEPHONE (OUTPATIENT)
Dept: PSYCHIATRY | Facility: CLINIC | Age: 49
End: 2019-05-17

## 2019-05-17 NOTE — TELEPHONE ENCOUNTER
received a fax from [facility] # [id number] requesting that 2 orders be sent for vortioxetine (TRINTELLIX).    vortioxetine (TRINTELLIX) 10 MG tablet- Take 1 daily for 1 week.     vortioxetine (TRINTELLIX) 20 MG tablet- Take 1 tablet daily.    As patients insurance will only cover 1 tab per day.  routed a message to Dr. Ochoa for approval.

## 2019-05-17 NOTE — TELEPHONE ENCOUNTER
Venancio Ochoa MD  You 2 hours ago (12:00 PM)      Please cancel the prescriptions.  We will be doing something different.    Routing comment       You  Venancio Ochoa MD 4 hours ago (9:10 AM)      Hello,   Please see my note and advise.   Thank you   Sanya    Routing comment        No further action required by this writer

## 2019-05-30 ENCOUNTER — TELEPHONE (OUTPATIENT)
Dept: PSYCHIATRY | Facility: CLINIC | Age: 49
End: 2019-05-30

## 2019-05-30 NOTE — TELEPHONE ENCOUNTER
Writer received mailed forms from pt. Included was a note from the pt, which is requesting that writer and provider complete the enclosed form for the pt's insurance company ASAP. Also enclosed is a completed form from the past.    Blank copy made of the forms and held at writer's desk.     Writer partially completed one set of blank forms and placed in provider's folder for when he returns to complete and sign.

## 2019-06-11 NOTE — PROGRESS NOTES
"Service Date: 05/16/2019      PSYCHIATRY CLINIC PROGRESS NOTE      The patient returns for medication management and supportive therapy.      Interim History:      Since the last visit, suboxome worked for 1st week, and then stopped. Side effects of lethargy, constipation, sexual side effects. Modest seasonal improvement in energy. No longer receiving ketamine.      RECENT SYMPTOMS:      Depression:    Suicidal ideation: yes, passive   depressed mood: yes   anhedonia: yes   low energy: yes, poor energy   insomnia: middle insomnia   appetite change: increased   feeling worthless: yes   psychomotor change: no   poor concentration      Elevated Mood:  no manic symptoms      Psychosis:  no      Panic Attacks: none recent       Anxiety:     excessive worry: yes      Trauma-Related Symptoms:  no       ADVERSE EFFECTS:  \"dehydration\"      MEDICAL CONCERNS:  None current      SUBSTANCE USE:  no      SOCIAL/FAMILY HISTORY:  unchanged. Totally disabled.      MEDICAL/SURGICAL HISTORY:  See EMR medical problems list.      MEDICAL REVIEW OF SYSTEMS:  A comprehensive review of systems was performed and is negative other than as noted in the HPI.      ALLERGIES:  none new      CURRENT MEDICATIONS:  See EMR med sections. Increase Ritalin to 60mg       VITALS:  See rooming note.      MENTAL STATUS EXAM:     Alertness: yes   Appearance: well groomed   Behavior/Demeanor: cooperative   Speech:nl rate and flow   Psychomotor: nl   Mood: sad   Affect: serious   Thought Process/Associations: logical   Thought content: no active SI   Perception: no psychosis    Insight: good   Judgement: good   Cognition:   Oriented: x4   Attention span: adequate   Concentration: adequate   Recent memory: intact   Remote memory: intact   Fund of knowledge: good   Gait and station: normal      Labs and Data:  PHQ9=24      DIAGNOSIS AND ASSESSMENT:  MDD, recurrent and severe without psychosis, refractory to treatment.       PLAN:   1. Medications:  D/C " Suboxone. Trintellix 10mg daily x1 week then 20mg daily. Increase Ritalin to 60mg   2. Therapy:  no   3. RTC:  2 mo.   4. Crisis numbers:  Provided routinely in AVS.      Treatment Risk Statement: The patient understands the risks, benefits, adverse effects, and alternatives. Agrees to treatment with the capacity to do so. No medical contraindications to treatment. Agrees to call clinic for any problems. The patient understands to call 911 or come to the nearest ED is life-threatening or urgent symptoms present.         FANNY ZELAYA MD             D: 2019   T: 2019   MT: DIONTE      Name:     DANIEL HUTN   MRN:      [id number]        Account:      [id number]   :      1970           Service Date: 2019      Document: [id number]

## 2019-07-18 ENCOUNTER — OFFICE VISIT (OUTPATIENT)
Dept: PSYCHIATRY | Facility: CLINIC | Age: 49
End: 2019-07-18
Attending: PSYCHIATRY & NEUROLOGY
Payer: MEDICARE

## 2019-07-18 VITALS
BODY MASS INDEX: 27.87 KG/M2 | SYSTOLIC BLOOD PRESSURE: 135 MMHG | DIASTOLIC BLOOD PRESSURE: 98 MMHG | HEART RATE: 85 BPM | WEIGHT: 178 LBS

## 2019-07-18 DIAGNOSIS — F33.2 SEVERE EPISODE OF RECURRENT MAJOR DEPRESSIVE DISORDER, WITHOUT PSYCHOTIC FEATURES (H): ICD-10-CM

## 2019-07-18 DIAGNOSIS — F33.2 MAJOR DEPRESSIVE DISORDER, RECURRENT, SEVERE WITHOUT PSYCHOTIC FEATURES (H): ICD-10-CM

## 2019-07-18 PROCEDURE — G0463 HOSPITAL OUTPT CLINIC VISIT: HCPCS | Mod: ZF

## 2019-07-18 RX ORDER — METHYLPHENIDATE HYDROCHLORIDE 10 MG/1
30 TABLET ORAL 2 TIMES DAILY
Qty: 180 TABLET | Refills: 0 | Status: SHIPPED | OUTPATIENT
Start: 2019-08-18 | End: 2019-09-19

## 2019-07-18 RX ORDER — METHYLPHENIDATE HYDROCHLORIDE 10 MG/1
30 TABLET ORAL 2 TIMES DAILY
Qty: 180 TABLET | Refills: 0 | Status: SHIPPED | OUTPATIENT
Start: 2019-08-18 | End: 2019-07-18

## 2019-07-18 RX ORDER — METHYLPHENIDATE HYDROCHLORIDE 10 MG/1
TABLET ORAL
Qty: 180 TABLET | Refills: 0 | Status: SHIPPED | OUTPATIENT
Start: 2019-07-18 | End: 2019-09-19

## 2019-07-18 RX ORDER — METHYLPHENIDATE HYDROCHLORIDE 10 MG/1
30 TABLET ORAL 2 TIMES DAILY
Qty: 180 TABLET | Refills: 0 | Status: SHIPPED | OUTPATIENT
Start: 2019-07-18 | End: 2019-07-18

## 2019-07-18 RX ORDER — LITHIUM CARBONATE 150 MG/1
150 CAPSULE ORAL DAILY
Qty: 90 CAPSULE | Refills: 1 | Status: SHIPPED | OUTPATIENT
Start: 2019-07-18 | End: 2020-01-16

## 2019-07-18 RX ORDER — BUPRENORPHINE AND NALOXONE 2; .5 MG/1; MG/1
1 FILM, SOLUBLE BUCCAL; SUBLINGUAL DAILY
Qty: 30 FILM | Refills: 0 | Status: SHIPPED | OUTPATIENT
Start: 2019-07-18 | End: 2019-07-18

## 2019-07-18 RX ORDER — BUPRENORPHINE AND NALOXONE 2; .5 MG/1; MG/1
1 FILM, SOLUBLE BUCCAL; SUBLINGUAL DAILY
Qty: 30 FILM | Refills: 0 | Status: SHIPPED | OUTPATIENT
Start: 2019-07-18 | End: 2019-08-21

## 2019-07-18 ASSESSMENT — PAIN SCALES - GENERAL: PAINLEVEL: NO PAIN (0)

## 2019-07-18 NOTE — PATIENT INSTRUCTIONS
Thank you for coming to the PSYCHIATRY CLINIC.    Lab Testing:  If you had lab testing today and your results are reassuring or normal they will be mailed to you or sent through [employer] within 7 days.   If the lab tests need quick action we will call you with the results.  The phone number we will call with results is # [phone] (home) . If this is not the best number please call our clinic and change the number.    Medication Refills:  If you need any refills please call your pharmacy and they will contact us. Our fax number for refills is [phone]. Please allow three business for refill processing.   If you need to  your refill at a new pharmacy, please contact the new pharmacy directly. The new pharmacy will help you get your medications transferred.     Scheduling:  If you have any concerns about today's visit or wish to schedule another appointment please call our office during normal business hours [phone] (8-5:00 M-F)    Contact Us:  Please call [phone] during business hours (8-5:00 M-F).  If after clinic hours, or on the weekend, please call  [phone].    Financial Assistance [phone]  [employer] Billing [phone]  [place] Billing Office, [employer]: [phone]  [place] Billing [phone]  Medical Records [phone]      MENTAL HEALTH CRISIS NUMBERS:  [facility]:   [facility] - [phone]   Crisis Residence [facility] - [phone]   Walk-In Counseling [facility] [phone]   COPE 24/7 [place] Mobile Team for Adults - [[phone]]; Child - [[phone]]        [facility]:   [facility] - [phone]   Walk-in counseling [facility] - [phone]   Walk-in counseling [facility] - [phone]   Crisis Residence [facility] - [phone]   Urgent Care Adult Mental Health:   --Drop-in, 24/7 crisis line, and [facility] Mobile Team  [[phone]]    CRISIS TEXT LINE: Text [phone] from anywhere, anytime, any crisis 24/7;    OR SEE [email or web address]     Poison Control Center - [phone]    CHILD: [facility] needs assessment team - [phone]     [facility] - [phone]; or [facility] - [phone]    If you have a medical emergency please call 911or go to the nearest ER.                    _____________________________________________    Again thank you for choosing PSYCHIATRY CLINIC and please let us know how we can best partner with you to improve you and your family's health.  You may be receiving a survey in the mail regarding this appointment. We would love to have your feedback, both positive and negative, so please fill out the survey and return it using the provided envelope. The survey is done by an external company, so your answers are anonymous.

## 2019-08-14 NOTE — PROGRESS NOTES
"Service Date: 07/18/2019      PSYCHIATRY CLINIC PROGRESS NOTE      The patient returns for medication management and supportive therapy.      Interim History:      Since the last visit, \"not too good\". Brother and his family were visiting which was stressful. Stopped Suboxone and in retrospect was helping. Wants to try a lower dose. Now 6 months since last Ketamine infusion.       RECENT SYMPTOMS:      Depression:    Suicidal ideation: Some passive   depressed mood: Yes   anhedonia: Yes   low energy: Yes   insomnia: Middle and last insomnia   appetite change: Carb craving      Elevated Mood: No manic symptoms      Psychosis: No        Panic Attacks: No        Anxiety:     excessive worry: Yes   social anxiety: Yes   nervous: Sometimes      Trauma-Related Symptoms: No      ADVERSE EFFECTS: No acute side effects      MEDICAL CONCERNS: Foot pain      SUBSTANCE USE: No       SOCIAL/FAMILY HISTORY: No changes       MEDICAL/SURGICAL HISTORY:  See EMR medical problems list.      MEDICAL REVIEW OF SYSTEMS:  A comprehensive review of systems was performed and is negative other than as noted in the HPI.      ALLERGIES: None new        CURRENT MEDICATIONS:  See EMR med sections.      VITALS:  See rooming note.      MENTAL STATUS EXAM:     Alertness: yes   Appearance: well groomed   Behavior/Demeanor: cooperative   Speech: nl rate and flow   Psychomotor: normal   Mood: sad    Affect: congruent   Thought Process/Associations: logical   Thought content: passive SI   Perception: no psychosis    Insight: good   Judgement: good   Cognition:   Oriented: x4   Attention span: good   Concentration: good   Recent memory: intact   Remote memory: intact   Fund of knowledge: good      Gait and station: normal      DIAGNOSIS AND ASSESSMENT: MDD-recurrent and severe refractory to treatment. No psychosis.       PLAN:   1. Medications: Suboxone 2/0.5 for TRD-not opiate dependence    2. Therapy: None current     3. RTC: 1 month    4. Crisis " numbers:  Provided routinely in AVS.      Treatment Risk Statement: The patient understands the risks, benefits, adverse effects, and alternatives. Agrees to treatment with the capacity to do so. No medical contraindications to treatment. Agrees to call clinic for any problems. The patient understands to call 911 or come to the nearest ED is life-threatening or urgent symptoms present.         FANNY ZELAYA MD             D: 2019   T: 2019   MT: SIVAN      Name:     DANIEL HUNT   MRN:      [id number]        Account:      [id number]   :      1970           Service Date: 2019      Document: [id number]

## 2019-08-21 ENCOUNTER — TELEPHONE (OUTPATIENT)
Dept: PSYCHIATRY | Facility: CLINIC | Age: 49
End: 2019-08-21

## 2019-08-21 DIAGNOSIS — F33.2 SEVERE EPISODE OF RECURRENT MAJOR DEPRESSIVE DISORDER, WITHOUT PSYCHOTIC FEATURES (H): ICD-10-CM

## 2019-08-21 RX ORDER — BUPRENORPHINE AND NALOXONE 2; .5 MG/1; MG/1
1 FILM, SOLUBLE BUCCAL; SUBLINGUAL DAILY
Qty: 30 FILM | Refills: 0 | Status: SHIPPED | OUTPATIENT
Start: 2019-08-21 | End: 2019-08-22

## 2019-08-21 NOTE — TELEPHONE ENCOUNTER
Writer located unsigned Suboxone 2-0.5 mg, #30 film printed by the provider. Called pt to confirm which pharmacy he'd like to use. No answer. LVM requesting a c/b.

## 2019-08-22 RX ORDER — BUPRENORPHINE AND NALOXONE 2; .5 MG/1; MG/1
1 FILM, SOLUBLE BUCCAL; SUBLINGUAL DAILY
Qty: 30 FILM | Refills: 0
Start: 2019-08-22 | End: 2020-01-16

## 2019-08-22 NOTE — TELEPHONE ENCOUNTER
Received return phone call from the pt. Confirmed that he wanted Rx sent to [facility] in [place]. Informed him of this. He will pick it up today.

## 2019-08-22 NOTE — TELEPHONE ENCOUNTER
Venancio Ochoa MD Labossiere, Laura, RN             The suboxone Rx should go to [facility] on [address] in [place].     Thanks          Writer phoned Rx for #30 with 0 refills to pharmacist, Sabiha with [facility] at [phone]. Med tab changed to reflect this.

## 2019-09-19 ENCOUNTER — OFFICE VISIT (OUTPATIENT)
Dept: PSYCHIATRY | Facility: CLINIC | Age: 49
End: 2019-09-19
Attending: PSYCHIATRY & NEUROLOGY
Payer: MEDICARE

## 2019-09-19 VITALS
SYSTOLIC BLOOD PRESSURE: 138 MMHG | HEART RATE: 98 BPM | DIASTOLIC BLOOD PRESSURE: 96 MMHG | BODY MASS INDEX: 28.03 KG/M2 | WEIGHT: 179 LBS

## 2019-09-19 DIAGNOSIS — F33.2 SEVERE EPISODE OF RECURRENT MAJOR DEPRESSIVE DISORDER, WITHOUT PSYCHOTIC FEATURES (H): ICD-10-CM

## 2019-09-19 DIAGNOSIS — F33.2 MAJOR DEPRESSIVE DISORDER, RECURRENT, SEVERE WITHOUT PSYCHOTIC FEATURES (H): ICD-10-CM

## 2019-09-19 DIAGNOSIS — Z23 NEED FOR PROPHYLACTIC VACCINATION AND INOCULATION AGAINST INFLUENZA: Primary | ICD-10-CM

## 2019-09-19 PROCEDURE — G0008 ADMIN INFLUENZA VIRUS VAC: HCPCS

## 2019-09-19 PROCEDURE — G0008 ADMIN INFLUENZA VIRUS VAC: HCPCS | Mod: ZF

## 2019-09-19 PROCEDURE — G0463 HOSPITAL OUTPT CLINIC VISIT: HCPCS | Mod: 25

## 2019-09-19 PROCEDURE — 90686 IIV4 VACC NO PRSV 0.5 ML IM: CPT | Mod: ZF

## 2019-09-19 PROCEDURE — 25000128 H RX IP 250 OP 636: Mod: ZF

## 2019-09-19 RX ORDER — METHYLPHENIDATE HYDROCHLORIDE 10 MG/1
30 TABLET ORAL 2 TIMES DAILY
Qty: 180 TABLET | Refills: 0 | Status: SHIPPED | OUTPATIENT
Start: 2019-09-19 | End: 2019-11-21

## 2019-09-19 RX ORDER — METHYLPHENIDATE HYDROCHLORIDE 10 MG/1
TABLET ORAL
Qty: 180 TABLET | Refills: 0 | Status: SHIPPED | OUTPATIENT
Start: 2019-09-19 | End: 2019-11-21

## 2019-09-19 RX ORDER — BUPRENORPHINE AND NALOXONE 2; .5 MG/1; MG/1
1 FILM, SOLUBLE BUCCAL; SUBLINGUAL DAILY
Qty: 30 FILM | Refills: 0 | Status: CANCELLED | OUTPATIENT
Start: 2019-09-19

## 2019-09-19 RX ORDER — LITHIUM CARBONATE 150 MG/1
150 CAPSULE ORAL DAILY
Qty: 90 CAPSULE | Refills: 1 | Status: CANCELLED | OUTPATIENT
Start: 2019-09-19

## 2019-09-19 RX ORDER — BUPRENORPHINE AND NALOXONE 4; 1 MG/1; MG/1
1 FILM, SOLUBLE BUCCAL; SUBLINGUAL DAILY
Qty: 30 FILM | Refills: 0 | Status: SHIPPED | OUTPATIENT
Start: 2019-09-19 | End: 2019-11-12

## 2019-09-19 RX ORDER — BUPRENORPHINE AND NALOXONE 4; 1 MG/1; MG/1
1 FILM, SOLUBLE BUCCAL; SUBLINGUAL DAILY
Qty: 30 FILM | Refills: 0 | Status: SHIPPED | OUTPATIENT
Start: 2019-09-19 | End: 2019-09-19

## 2019-09-19 ASSESSMENT — PAIN SCALES - GENERAL: PAINLEVEL: NO PAIN (0)

## 2019-09-19 NOTE — NURSING NOTE
Clinic Administered Medication Documentation    MEDICATION LIST:   Injectable Medication Documentation    Patient was given Fluzone . Prior to medication administration, verified patients identity using patient s name and date of birth. Please see MAR and medication order for additional information. Patient instructed to remain in clinic for 15 minutes.      Was entire vial of medication used? Yes  Vial/Syringe: Syringe  Expiration Date:  4/14/2020   Was this medication supplied by the patient? No

## 2019-09-25 DIAGNOSIS — F33.2 SEVERE EPISODE OF RECURRENT MAJOR DEPRESSIVE DISORDER, WITHOUT PSYCHOTIC FEATURES (H): Primary | ICD-10-CM

## 2019-09-25 RX ORDER — CELECOXIB 400 MG/1
400 CAPSULE ORAL DAILY
Qty: 14 CAPSULE | Refills: 0 | Status: SHIPPED | OUTPATIENT
Start: 2019-09-25 | End: 2020-01-16

## 2019-10-05 ENCOUNTER — HEALTH MAINTENANCE LETTER (OUTPATIENT)
Age: 49
End: 2019-10-05

## 2019-10-15 NOTE — PROGRESS NOTES
"Service Date: 09/19/2019      CLINIC PROGRESS NOTE      PSYCHIATRY    The patient returns for medication management and supportive therapy.      Interim History:      Since the last visit, \"not great\". Came off Suboxone and in retrospect, was benefitting wants to go back on the 4mg film. Thinking about having more Ketamine infusions. Feels in a \"rut\". Exercise has been difficult.       RECENT SYMPTOMS:      Depression:    Suicidal ideation: Wishes of death   depressed mood: Yes   anhedonia: Yes   low energy: Yes   insomnia: Early morning awakening   appetite change: Carbohydrate cravings   feeling worthless: Yes      Elevated Mood: No manic symptoms        Psychosis: No        Panic Attacks: No       Anxiety:     excessive worry: Yes   feeling fearful: No   social anxiety: Yes   nervous/tense: Yes/Yes      Trauma-Related Symptoms: No       ADVERSE EFFECTS: Constipation, lethargy       MEDICAL CONCERNS: Blood pressure-diet dependent        SUBSTANCE USE: No      SOCIAL/FAMILY HISTORY: Father is ill. He is 91 y/o.        MEDICAL/SURGICAL HISTORY:  See EMR medical problems list.      MEDICAL REVIEW OF SYSTEMS:  A comprehensive review of systems was performed and is negative other than as noted in the HPI.      ALLERGIES: None new        CURRENT MEDICATIONS:  See EMR med sections.      VITALS:  See rooming note.      MENTAL STATUS EXAM:     Alertness: yes   Appearance: well groomed   Behavior/Demeanor: cooperative   Speech: nl rate and flow   Psychomotor: normal     Mood: down   Affect: serious   Thought Process/Associations: logical   Thought content: no SI   Perception: no psychosis    Insight: ok    Judgement: ok   Cognition:   Oriented: x4   Attention span: good   Concentration: good   Recent memory: intact   Remote memory: intact   Fund of knowledge: good      Gait and station: Normal      DIAGNOSIS AND ASSESSMENT: MDD-recurrent and severe without psychosis        PLAN:   1. Medications Re-start Suboxone 4mg films  "   2. Therapy: Not currently   3. RTC: 2-3 months    4. Crisis numbers:  Provided routinely in AVS.      Treatment Risk Statement: The patient understands the risks, benefits, adverse effects, and alternatives. Agrees to treatment with the capacity to do so. No medical contraindications to treatment. Agrees to call clinic for any problems. The patient understands to call 911 or come to the nearest ED is life-threatening or urgent symptoms present.         FANNY ZELAYA MD             D: 10/15/2019   T: 10/15/2019   MT: SIVAN      Name:     DANIEL HUNT   MRN:      [id number]        Account:      [id number]   :      1970           Service Date: 2019      Document: [id number]

## 2019-11-12 DIAGNOSIS — F33.2 SEVERE EPISODE OF RECURRENT MAJOR DEPRESSIVE DISORDER, WITHOUT PSYCHOTIC FEATURES (H): ICD-10-CM

## 2019-11-12 RX ORDER — BUPRENORPHINE AND NALOXONE 4; 1 MG/1; MG/1
1 FILM, SOLUBLE BUCCAL; SUBLINGUAL DAILY
Qty: 30 FILM | Refills: 0 | Status: SHIPPED | OUTPATIENT
Start: 2019-11-12 | End: 2019-11-12

## 2019-11-12 RX ORDER — BUPRENORPHINE AND NALOXONE 4; 1 MG/1; MG/1
1 FILM, SOLUBLE BUCCAL; SUBLINGUAL DAILY
Qty: 30 FILM | Refills: 0 | Status: SHIPPED | OUTPATIENT
Start: 2019-11-12 | End: 2019-11-21

## 2019-11-21 ENCOUNTER — OFFICE VISIT (OUTPATIENT)
Dept: PSYCHIATRY | Facility: CLINIC | Age: 49
End: 2019-11-21
Attending: PSYCHIATRY & NEUROLOGY
Payer: MEDICARE

## 2019-11-21 VITALS
SYSTOLIC BLOOD PRESSURE: 146 MMHG | DIASTOLIC BLOOD PRESSURE: 97 MMHG | BODY MASS INDEX: 29.19 KG/M2 | HEART RATE: 77 BPM | WEIGHT: 186.4 LBS

## 2019-11-21 DIAGNOSIS — F33.2 SEVERE EPISODE OF RECURRENT MAJOR DEPRESSIVE DISORDER, WITHOUT PSYCHOTIC FEATURES (H): ICD-10-CM

## 2019-11-21 PROCEDURE — G0463 HOSPITAL OUTPT CLINIC VISIT: HCPCS | Mod: ZF

## 2019-11-21 RX ORDER — METHYLPHENIDATE HYDROCHLORIDE 10 MG/1
30 TABLET ORAL 2 TIMES DAILY
Qty: 180 TABLET | Refills: 0 | Status: SHIPPED | OUTPATIENT
Start: 2019-11-21 | End: 2020-01-16

## 2019-11-21 RX ORDER — BUPRENORPHINE AND NALOXONE 4; 1 MG/1; MG/1
1 FILM, SOLUBLE BUCCAL; SUBLINGUAL DAILY
Qty: 30 FILM | Refills: 0 | Status: SHIPPED | OUTPATIENT
Start: 2019-11-21 | End: 2020-01-16

## 2019-11-21 RX ORDER — METHYLPHENIDATE HYDROCHLORIDE 10 MG/1
TABLET ORAL
Qty: 180 TABLET | Refills: 0 | Status: SHIPPED | OUTPATIENT
Start: 2019-11-21 | End: 2020-01-16

## 2019-11-21 ASSESSMENT — PAIN SCALES - GENERAL: PAINLEVEL: NO PAIN (0)

## 2019-11-21 NOTE — PATIENT INSTRUCTIONS
Thank you for coming to the PSYCHIATRY CLINIC.    Lab Testing:  If you had lab testing today and your results are reassuring or normal they will be mailed to you or sent through [employer] within 7 days.   If the lab tests need quick action we will call you with the results.  The phone number we will call with results is # [phone] (home) . If this is not the best number please call our clinic and change the number.    Medication Refills:  If you need any refills please call your pharmacy and they will contact us. Our fax number for refills is [phone]. Please allow three business for refill processing.   If you need to  your refill at a new pharmacy, please contact the new pharmacy directly. The new pharmacy will help you get your medications transferred.     Scheduling:  If you have any concerns about today's visit or wish to schedule another appointment please call our office during normal business hours [phone] (8-5:00 M-F)    Contact Us:  Please call [phone] during business hours (8-5:00 M-F).  If after clinic hours, or on the weekend, please call  [phone].    Financial Assistance [phone]  [employer] Billing [phone]  [place] Billing Office, [employer]: [phone]  [place] Billing [phone]  Medical Records [phone]      MENTAL HEALTH CRISIS NUMBERS:  [facility]:   [facility] - [phone]   Crisis Residence [facility] - [phone]   Walk-In Counseling [facility] [phone]   COPE 24/7 [place] Mobile Team for Adults - [[phone]]; Child - [[phone]]        [facility]:   [facility] - [phone]   Walk-in counseling [facility] - [phone]   Walk-in counseling [facility] - [phone]   Crisis Residence [facility] - [phone]   Urgent Care Adult Mental Health:   --Drop-in, 24/7 crisis line, and [facility] Mobile Team  [[phone]]    CRISIS TEXT LINE: Text [phone] from anywhere, anytime, any crisis 24/7;    OR SEE [email or web address].org     Poison Control Center - [phone]    CHILD: [facility] needs assessment team - [phone]     [facility] - [phone]; or [facility] - [phone]    If you have a medical emergency please call 911or go to the nearest ER.                    _____________________________________________    Again thank you for choosing PSYCHIATRY CLINIC and please let us know how we can best partner with you to improve you and your family's health.  You may be receiving a survey in the mail regarding this appointment. We would love to have your feedback, both positive and negative, so please fill out the survey and return it using the provided envelope. The survey is done by an external company, so your answers are anonymous.

## 2019-12-16 NOTE — PROGRESS NOTES
Service Date: 11/21/2019      CLINIC PROGRESS NOTE      PSYCHIATRY    The patient returns for medication management and supportive therapy.      Interim History:      Since the last visit, had VNS turned off several years ago due to lack of efficacy. Working on water getting into house. Very slow going. Feels better when eats low carbohydrate diet.      RECENT SYMPTOMS:      Depression:    Suicidal ideation: Not hopeless   depressed mood: All the time   anhedonia: Mostly   low energy: Yes   insomnia: Middle awakening   appetite change: C HO craving   excessive guilt: No   feeling worthless: Wants to feel more useful   low motivations: Yes      Elevated Mood: No manic symptoms       Psychosis: No        Panic Attacks: No        Anxiety:     excessive worry: Yes   social anxiety: Yes   nervous/tense: No/Yes      Trauma-Related Symptoms: No       ADVERSE EFFECTS: Sexual side effects, sedation        MEDICAL CONCERNS: None        SUBSTANCE USE: None       SOCIAL/FAMILY HISTORY: Unchanged        MEDICAL/SURGICAL HISTORY:  See EMR medical problems list.      MEDICAL REVIEW OF SYSTEMS:  A comprehensive review of systems was performed and is negative other than as noted in the HPI.      ALLERGIES: None new        CURRENT MEDICATIONS:  See EMR med sections.      VITALS:  See rooming note.      MENTAL STATUS EXAM:     Alertness: yes   Appearance: well groomed   Behavior/Demeanor: cooperative   Speech: nl rate and flow   Psychomotor: normal   Mood: sad   Affect: serious   Thought Process/Associations: logical   Thought content: no SI   Perception: no psychosis    Insight: good   Judgement: good   Cognition:   Oriented: x4   Attention span: good   Concentration: good   Recent memory: intact   Remote memory: intact   Fund of knowledge: very good       Gait and station: Normal       DIAGNOSIS AND ASSESSMENT: MDD, recurrent & severe without psychosis        PLAN:   1. Medications: Suboxone 4-1 for TRD. Seemed to help a bit     2.  Therapy: Not currently   3. RTC: 2 months   4. Crisis numbers:  Provided routinely in AVS.      Treatment Risk Statement: The patient understands the risks, benefits, adverse effects, and alternatives. Agrees to treatment with the capacity to do so. No medical contraindications to treatment. Agrees to call clinic for any problems. The patient understands to call 911 or come to the nearest ED is life-threatening or urgent symptoms present.         FANNY ZELAYA MD             D: 2019   T: 2019   MT: SIVAN      Name:     DANIEL HUNT   MRN:      [id number]        Account:      [id number]   :      1970           Service Date: 2019      Document: [id number]

## 2020-01-16 ENCOUNTER — OFFICE VISIT (OUTPATIENT)
Dept: PSYCHIATRY | Facility: CLINIC | Age: 50
End: 2020-01-16
Attending: PSYCHIATRY & NEUROLOGY
Payer: MEDICARE

## 2020-01-16 VITALS
SYSTOLIC BLOOD PRESSURE: 142 MMHG | WEIGHT: 193 LBS | DIASTOLIC BLOOD PRESSURE: 95 MMHG | BODY MASS INDEX: 30.22 KG/M2 | HEART RATE: 96 BPM

## 2020-01-16 DIAGNOSIS — F33.2 MAJOR DEPRESSIVE DISORDER, RECURRENT, SEVERE WITHOUT PSYCHOTIC FEATURES (H): ICD-10-CM

## 2020-01-16 DIAGNOSIS — F33.2 SEVERE EPISODE OF RECURRENT MAJOR DEPRESSIVE DISORDER, WITHOUT PSYCHOTIC FEATURES (H): ICD-10-CM

## 2020-01-16 PROCEDURE — G0463 HOSPITAL OUTPT CLINIC VISIT: HCPCS | Mod: ZF

## 2020-01-16 RX ORDER — LITHIUM CARBONATE 150 MG/1
150 CAPSULE ORAL DAILY
Qty: 90 CAPSULE | Refills: 1 | Status: SHIPPED | OUTPATIENT
Start: 2020-01-16 | End: 2020-07-16

## 2020-01-16 RX ORDER — METHYLPHENIDATE HYDROCHLORIDE 10 MG/1
30 TABLET ORAL 2 TIMES DAILY
Qty: 180 TABLET | Refills: 0 | Status: SHIPPED | OUTPATIENT
Start: 2020-02-16 | End: 2021-01-14

## 2020-01-16 RX ORDER — BUPRENORPHINE AND NALOXONE 4; 1 MG/1; MG/1
1 FILM, SOLUBLE BUCCAL; SUBLINGUAL DAILY
Qty: 30 FILM | Refills: 1 | Status: SHIPPED | OUTPATIENT
Start: 2020-01-16 | End: 2020-01-17

## 2020-01-16 RX ORDER — METHYLPHENIDATE HYDROCHLORIDE 10 MG/1
30 TABLET ORAL 2 TIMES DAILY
Qty: 180 TABLET | Refills: 0 | Status: SHIPPED | OUTPATIENT
Start: 2020-03-18 | End: 2021-01-14

## 2020-01-16 RX ORDER — BUPRENORPHINE AND NALOXONE 4; 1 MG/1; MG/1
1 FILM, SOLUBLE BUCCAL; SUBLINGUAL DAILY
Qty: 30 FILM | Refills: 1 | Status: SHIPPED | OUTPATIENT
Start: 2020-01-16 | End: 2020-01-16

## 2020-01-16 RX ORDER — METHYLPHENIDATE HYDROCHLORIDE 10 MG/1
30 TABLET ORAL 2 TIMES DAILY
Qty: 180 TABLET | Refills: 0 | Status: SHIPPED | OUTPATIENT
Start: 2020-02-16 | End: 2020-01-16

## 2020-01-16 RX ORDER — METHYLPHENIDATE HYDROCHLORIDE 10 MG/1
30 TABLET ORAL 2 TIMES DAILY
Qty: 180 TABLET | Refills: 0 | Status: SHIPPED | OUTPATIENT
Start: 2020-01-16 | End: 2020-09-24

## 2020-01-16 RX ORDER — LITHIUM CARBONATE 150 MG/1
150 CAPSULE ORAL DAILY
Qty: 90 CAPSULE | Refills: 1 | Status: SHIPPED | OUTPATIENT
Start: 2020-01-16 | End: 2020-01-16

## 2020-01-16 RX ORDER — METHYLPHENIDATE HYDROCHLORIDE 10 MG/1
30 TABLET ORAL 2 TIMES DAILY
Qty: 180 TABLET | Refills: 0 | Status: SHIPPED | OUTPATIENT
Start: 2020-01-16 | End: 2020-01-16

## 2020-01-16 RX ORDER — METHYLPHENIDATE HYDROCHLORIDE 10 MG/1
30 TABLET ORAL 2 TIMES DAILY
Qty: 180 TABLET | Refills: 0 | Status: SHIPPED | OUTPATIENT
Start: 2020-03-18 | End: 2020-01-16

## 2020-01-16 ASSESSMENT — PAIN SCALES - GENERAL: PAINLEVEL: NO PAIN (0)

## 2020-01-17 DIAGNOSIS — F33.2 SEVERE EPISODE OF RECURRENT MAJOR DEPRESSIVE DISORDER, WITHOUT PSYCHOTIC FEATURES (H): ICD-10-CM

## 2020-01-17 RX ORDER — BUPRENORPHINE AND NALOXONE 4; 1 MG/1; MG/1
1 FILM, SOLUBLE BUCCAL; SUBLINGUAL DAILY
Qty: 30 FILM | Refills: 1 | Status: SHIPPED | OUTPATIENT
Start: 2020-01-17 | End: 2020-03-12

## 2020-02-10 ENCOUNTER — HEALTH MAINTENANCE LETTER (OUTPATIENT)
Age: 50
End: 2020-02-10

## 2020-02-11 NOTE — PROGRESS NOTES
Infusion Nursing Note:  Sebastien Hoover presents today for ketamine infusion.    Patient seen by provider today: No   present during visit today: Not Applicable.    Note: N/A.    Intravenous Access:  Peripheral IV placed.    Treatment Conditions:  Not Applicable.      Post Infusion Assessment:  Patient tolerated infusion without incident.  Patient observed for 60 minutes post ketamine infusion, per protocol.  Blood return noted pre and post infusion.  Site patent and intact, free from redness, edema or discomfort.  No evidence of extravasations.  Access discontinued per protocol.    Discharge Plan:   Patient discharged in stable condition accompanied by: self.  Departure Mode: Ambulatory.    Angela Gann RN                         Detail Level: Simple

## 2020-02-12 NOTE — PROGRESS NOTES
Service Date: 01/16/2020      CLINIC PROGRESS NOTE      PSYCHIATRY    The patient returns for medication management and supportive therapy.      Interim History:      Since the last visit, holidays were stressful-easily overwhelmed. Wasn't able to keep up diet & exercise. Wasn't able to complete water project due to fatigue. Wearing splints for carpal tunnel. Very helpful for pain ablation.      RECENT SYMPTOMS:      Depression:    Suicidal ideation: Wishes he was not alive   depressed mood: Most of the time   anhedonia: Mostly   low energy: Yes   insomnia: Initial and terminal insomnia   appetite change: Excess over holidays   excessive guilt: Yes      Elevated Mood: No manic symptoms       Psychosis: No        Panic Attacks: No        Anxiety:     excessive worry: Yes   feeling fearful: No   nervous/tense: Yes/Yes      Trauma-Related Symptoms: No      ADVERSE EFFECTS: Increased anxiety from Ritalin. Groggy & constipated from Suboxone      MEDICAL CONCERNS: Carpal tunnel. Low back pain        SUBSTANCE USE: No        SOCIAL/FAMILY HISTORY: Unchanged        MEDICAL/SURGICAL HISTORY:  See EMR medical problems list.      MEDICAL REVIEW OF SYSTEMS:  A comprehensive review of systems was performed and is negative other than as noted in the HPI.      ALLERGIES: None new        CURRENT MEDICATIONS:  See EMR med sections.      VITALS:  See rooming note.      MENTAL STATUS EXAM:     Alertness: yes   Appearance: well groomed   Behavior/Demeanor: cooperative   Speech: nl rate and flow   Psychomotor: normal   Mood: sad   Affect: congruent   Thought Process/Associations: logical   Thought content: no active SI   Perception: no psychosis    Insight: good    Judgement: good   Cognition:   Oriented: x4   Attention span: good   Concentration: good   Recent memory: intact   Remote memory: intact   Fund of knowledge: good       Gait and station: Normal      DIAGNOSIS AND ASSESSMENT: MDD, recurrent & severe refractory to treatment  without psychosis.       PLAN:   1. Medications: Continue low dose Suboxone for TRD-not opiate dependence    2. Therapy: No   3. RTC: 2 months   4. Crisis numbers: Provided routinely in AVS.      Treatment Risk Statement: The patient understands the risks, benefits, adverse effects, and alternatives. Agrees to treatment with the capacity to do so. No medical contraindications to treatment. Agrees to call clinic for any problems. The patient understands to call 911 or come to the nearest ED is life-threatening or urgent symptoms present.         FANNY ZELAYA MD             D: 2020   T: 2020   MT: SIVAN      Name:     DANIEL HUNT   MRN:      [id number]        Account:      [id number]   :      1970           Service Date: 2020      Document: [id number]

## 2020-03-12 ENCOUNTER — OFFICE VISIT (OUTPATIENT)
Dept: PSYCHIATRY | Facility: CLINIC | Age: 50
End: 2020-03-12
Attending: PSYCHIATRY & NEUROLOGY
Payer: MEDICARE

## 2020-03-12 DIAGNOSIS — F33.2 MAJOR DEPRESSIVE DISORDER, RECURRENT, SEVERE WITHOUT PSYCHOTIC FEATURES (H): Primary | ICD-10-CM

## 2020-03-12 DIAGNOSIS — F33.2 SEVERE EPISODE OF RECURRENT MAJOR DEPRESSIVE DISORDER, WITHOUT PSYCHOTIC FEATURES (H): ICD-10-CM

## 2020-03-12 RX ORDER — BUPRENORPHINE AND NALOXONE 4; 1 MG/1; MG/1
1 FILM, SOLUBLE BUCCAL; SUBLINGUAL DAILY
Qty: 30 FILM | Refills: 1 | Status: SHIPPED | OUTPATIENT
Start: 2020-03-12 | End: 2020-05-14

## 2020-03-12 RX ORDER — BUPRENORPHINE AND NALOXONE 2; .5 MG/1; MG/1
1 FILM, SOLUBLE BUCCAL; SUBLINGUAL DAILY
Qty: 30 FILM | Refills: 1 | Status: SHIPPED | OUTPATIENT
Start: 2020-03-12 | End: 2020-05-14

## 2020-03-12 RX ORDER — METHYLPHENIDATE HYDROCHLORIDE 10 MG/1
30 TABLET ORAL 2 TIMES DAILY
Qty: 180 TABLET | Refills: 0 | Status: SHIPPED | OUTPATIENT
Start: 2020-05-13 | End: 2020-06-12

## 2020-03-12 RX ORDER — METHYLPHENIDATE HYDROCHLORIDE 10 MG/1
30 TABLET ORAL 2 TIMES DAILY
Qty: 180 TABLET | Refills: 0 | Status: SHIPPED | OUTPATIENT
Start: 2020-04-12 | End: 2020-05-12

## 2020-03-12 RX ORDER — METHYLPHENIDATE HYDROCHLORIDE 10 MG/1
30 TABLET ORAL 2 TIMES DAILY
Qty: 180 TABLET | Refills: 0 | Status: SHIPPED | OUTPATIENT
Start: 2020-03-12 | End: 2020-04-11

## 2020-03-12 NOTE — PATIENT INSTRUCTIONS
Thank you for coming to the PSYCHIATRY CLINIC.    Lab Testing:  If you had lab testing today and your results are reassuring or normal they will be mailed to you or sent through Verti[employer] within 7 days.   If the lab tests need quick action we will call you with the results.  The phone number we will call with results is # [phone] (home) . If this is not the best number please call our clinic and change the number.    Medication Refills:  If you need any refills please call your pharmacy and they will contact us. Our fax number for refills is [phone]. Please allow three business for refill processing.   If you need to  your refill at a new pharmacy, please contact the new pharmacy directly. The new pharmacy will help you get your medications transferred.     Scheduling:  If you have any concerns about today's visit or wish to schedule another appointment please call our office during normal business hours [phone] (8-5:00 M-F)    Contact Us:  Please call [phone] during business hours (8-5:00 M-F).  If after clinic hours, or on the weekend, please call  [phone].    Financial Assistance [phone]  [facility] Billing [phone]  [place] Billing Office, [facility]: [phone]  [place] Billing [phone]  Medical Records [phone]      MENTAL HEALTH CRISIS NUMBERS:  [facility]:   [facility] - [phone]   Crisis Residence [facility] - [phone]   Walk-In Counseling [facility] [phone]   COPE 24/7 [place] Mobile Team for Adults - [[phone]]; Child - [[phone]]        [facility]:   [facility] - [phone]   Walk-in counseling [facility] - [phone]   Walk-in counseling [facility] - [phone]   Crisis Residence [facility] - [phone]   Urgent Care Adult Mental Health:   --Drop-in, 24/7 crisis line, and [facility] Mobile Team  [[phone]]    CRISIS TEXT LINE: Text [phone] from anywhere, anytime, any crisis 24/7;    OR SEE www.crisistextline.org     National Suicide Prevention Lifeline: 412-289-PJLB (135-890-3289)    Poison Control Center - 1-968-848-7392    CHILD: [facility] needs assessment team - [phone]     [facility] Lifeline - 1-779.812.5648; or [facility] Lifeline - 1-720.872.7223    If you have a medical emergency please call 911or go to the nearest ER.                    _____________________________________________    Again thank you for choosing PSYCHIATRY CLINIC and please let us know how we can best partner with you to improve you and your family's health.  You may be receiving a survey regarding this appointment. We would love to have your feedback, both positive and negative. The survey is done by an external company, so your answers are anonymous.

## 2020-05-03 NOTE — PROGRESS NOTES
Patient reports she is not doing well is very worried about the coronavirus and affecting his elderly parents he spends time preparing for pandemic not able to exercise or self-care secondary to poor motivation he has passive suicidal thoughts depressed mood anhedonia low energy early morning awakening carbohydrate cravings and feeling worthless he denies any manic symptoms nor any psychosis.  He has no panic attacks.  He does endorse very excessive worry social anxiety nervousness and tenseness.  He has no trauma related symptoms.    Side effects none    Medical concerns: None current    Substance use: He denies any alcohol tobacco cannabis or any other drug use.    Social history is unchanged.    Medical history: Please see EMR medical problem list    Medical review of systems: A comprehensive review of systems was performed and is negative other than noted in the HPI.    Allergies: None new    Current medications: Please see EMR medication section.    Vital signs: Please see rooming note.    Mental status exam: Patient presents alert cooperative with good eye contact grooming is good speech is normal rate and flow mood movements are normal mood is sad affect is congruent thought process is logical and goal oriented there is no psychosis but there is ongoing passive suicidal thoughts.  He is oriented x4.  Attention and concentration are good.  Recent and remote memory are good general fund of knowledge is good gait and station are normal.  Insight and judgment are good    PHQ 9 equals 24 very severe    Diagnosis and assessment major depressive disorder recurrent and severe refractory to treatment.  He is currently on low-dose Suboxone as an augmentation to his antidepressant not because of opiate dependence plan will increase his Suboxone to 6 mg daily.he will return in 1 month's time for reevaluation he has my office number and will phone me should there be any difficulties in the interim.    Venancio Ochoa MD

## 2020-05-14 ENCOUNTER — TELEPHONE (OUTPATIENT)
Dept: PSYCHIATRY | Facility: CLINIC | Age: 50
End: 2020-05-14

## 2020-05-14 ENCOUNTER — VIRTUAL VISIT (OUTPATIENT)
Dept: PSYCHIATRY | Facility: CLINIC | Age: 50
End: 2020-05-14
Attending: PSYCHIATRY & NEUROLOGY
Payer: MEDICARE

## 2020-05-14 DIAGNOSIS — F33.2 SEVERE EPISODE OF RECURRENT MAJOR DEPRESSIVE DISORDER, WITHOUT PSYCHOTIC FEATURES (H): ICD-10-CM

## 2020-05-14 DIAGNOSIS — F33.2 MAJOR DEPRESSIVE DISORDER, RECURRENT, SEVERE WITHOUT PSYCHOTIC FEATURES (H): ICD-10-CM

## 2020-05-14 RX ORDER — BUPRENORPHINE AND NALOXONE 2; .5 MG/1; MG/1
1 FILM, SOLUBLE BUCCAL; SUBLINGUAL DAILY
Qty: 30 FILM | Refills: 1 | Status: SHIPPED | OUTPATIENT
Start: 2020-05-14 | End: 2020-07-16

## 2020-05-14 RX ORDER — BUPRENORPHINE AND NALOXONE 4; 1 MG/1; MG/1
1 FILM, SOLUBLE BUCCAL; SUBLINGUAL DAILY
Qty: 30 FILM | Refills: 1 | Status: SHIPPED | OUTPATIENT
Start: 2020-05-14 | End: 2020-07-16

## 2020-05-14 RX ORDER — METHYLPHENIDATE HYDROCHLORIDE 10 MG/1
30 TABLET ORAL 2 TIMES DAILY
Qty: 180 TABLET | Refills: 0 | Status: SHIPPED | OUTPATIENT
Start: 2020-07-15 | End: 2020-08-14

## 2020-05-14 RX ORDER — METHYLPHENIDATE HYDROCHLORIDE 10 MG/1
30 TABLET ORAL 2 TIMES DAILY
Qty: 180 TABLET | Refills: 0 | Status: SHIPPED | OUTPATIENT
Start: 2020-05-14 | End: 2020-06-13

## 2020-05-14 RX ORDER — METHYLPHENIDATE HYDROCHLORIDE 10 MG/1
30 TABLET ORAL 2 TIMES DAILY
Qty: 180 TABLET | Refills: 0 | Status: SHIPPED | OUTPATIENT
Start: 2020-06-14 | End: 2020-08-07

## 2020-05-14 NOTE — TELEPHONE ENCOUNTER
On 5/14/2020, at 12:25, writer called patient at [phone] to confirm Virtual Visit. Writer unable to make contact with patient. Writer left detailed voice message for call back. [phone] left as call back number. Cherelle Mcdaniels, [facility]

## 2020-06-14 NOTE — PROGRESS NOTES
TELEPHONE VISIT  Sebastien Hoover is a 49 year old pt. who is being evaluated via a billable telephone visit.      The patient has been notified of the following:    We have found that certain health care needs can be provided without the need for a physical exam. This service lets us provide the care you need with a short phone conversation. If a prescription is necessary we can send it directly to your pharmacy. If lab work is needed we can place an order for that and you can then stop by our lab to have the test done at a later time. Insurers are generally covering virtual visits as they would in-office visits so billing should not be different than normal.  If for some reason you do get billed incorrectly, you should contact the billing office to correct it and that number is in the AVS .    Patient has given verbal consent for a telephone visit?:  Y  How would the pt like to obtain the AVS?: [employer]    Start Time:  1:00 pm          End Time:  1:26 pm    Patient reports he is been very stressed by the COVID virus pandemic.  He stays secluded with his elderly parents who have medical issues.  He avoids all outside contact so as to not bring the virus home to infect them.  His mood is down he has poor interest and poor motivation.  Concentration is poor.  Low energy is low.  He tries to exercise but does not have the motivation or energy for it.  He thinks the Suboxone helps but it causes excessive sedation.  He notes some increased sleep but wakes up in a few days.  This is not restful sleep.  He has carbohydrate cravings.  He has passive thoughts of death.    Current medications: Methylphenidate Suboxone lithium.  Please see EMR for doses.  Side effects include sedation sexual side effects and constipation.    Allergies: Azithromycin.    Medical review of systems: Carpal tunnel foot pain.  Otherwise comprehensive review of systems is negative.    Diagnosis major depressive disorder recurrent and severe refractory  to treatment without psychosis.  Patient has failed numerous and medications including monoamine oxidase inhibitors multiple SSRIs and SNRIs vagus nerve stimulation and psychotherapy the only thing that has brought him modest but on lasting benefit has been electroconvulsive therapy but the side effects outweighed any benefit that he received.    Plan: We will continue with current medication trials; he is on the Suboxone for treatment refractory depression not opiate dependence.  I reviewed his status with him again in 4 to 6 weeks time.    Venancio Ochoa MD

## 2020-07-16 ENCOUNTER — TELEPHONE (OUTPATIENT)
Dept: PSYCHIATRY | Facility: CLINIC | Age: 50
End: 2020-07-16

## 2020-07-16 ENCOUNTER — VIRTUAL VISIT (OUTPATIENT)
Dept: PSYCHIATRY | Facility: CLINIC | Age: 50
End: 2020-07-16
Attending: PSYCHIATRY & NEUROLOGY
Payer: MEDICARE

## 2020-07-16 DIAGNOSIS — F33.2 MAJOR DEPRESSIVE DISORDER, RECURRENT, SEVERE WITHOUT PSYCHOTIC FEATURES (H): ICD-10-CM

## 2020-07-16 DIAGNOSIS — F33.2 SEVERE EPISODE OF RECURRENT MAJOR DEPRESSIVE DISORDER, WITHOUT PSYCHOTIC FEATURES (H): ICD-10-CM

## 2020-07-16 RX ORDER — METHYLPHENIDATE HYDROCHLORIDE 20 MG/1
20 TABLET ORAL DAILY
Qty: 30 TABLET | Refills: 0 | Status: SHIPPED | OUTPATIENT
Start: 2020-08-16 | End: 2020-09-15

## 2020-07-16 RX ORDER — BUPRENORPHINE AND NALOXONE 4; 1 MG/1; MG/1
1 FILM, SOLUBLE BUCCAL; SUBLINGUAL DAILY
Qty: 30 FILM | Refills: 1 | Status: SHIPPED | OUTPATIENT
Start: 2020-07-16 | End: 2020-08-07

## 2020-07-16 RX ORDER — METHYLPHENIDATE HYDROCHLORIDE 20 MG/1
20 TABLET ORAL DAILY
Qty: 30 TABLET | Refills: 0 | Status: SHIPPED | OUTPATIENT
Start: 2020-07-16 | End: 2020-08-15

## 2020-07-16 RX ORDER — METHYLPHENIDATE HYDROCHLORIDE 40 MG/1
40 CAPSULE, EXTENDED RELEASE ORAL DAILY
Qty: 30 CAPSULE | Refills: 0 | Status: SHIPPED | OUTPATIENT
Start: 2020-07-16 | End: 2020-07-24

## 2020-07-16 RX ORDER — METHYLPHENIDATE HYDROCHLORIDE 20 MG/1
20 TABLET ORAL DAILY
Qty: 30 TABLET | Refills: 0 | Status: SHIPPED | OUTPATIENT
Start: 2020-09-16 | End: 2020-10-16

## 2020-07-16 RX ORDER — METHYLPHENIDATE HYDROCHLORIDE 40 MG/1
40 CAPSULE, EXTENDED RELEASE ORAL DAILY
Qty: 30 CAPSULE | Refills: 0 | Status: SHIPPED | OUTPATIENT
Start: 2020-09-16 | End: 2020-10-16

## 2020-07-16 RX ORDER — BUPRENORPHINE AND NALOXONE 2; .5 MG/1; MG/1
1 FILM, SOLUBLE BUCCAL; SUBLINGUAL DAILY
Qty: 30 FILM | Refills: 1 | Status: SHIPPED | OUTPATIENT
Start: 2020-07-16 | End: 2020-09-24

## 2020-07-16 RX ORDER — METHYLPHENIDATE HYDROCHLORIDE 40 MG/1
40 CAPSULE, EXTENDED RELEASE ORAL DAILY
Qty: 30 CAPSULE | Refills: 0 | Status: SHIPPED | OUTPATIENT
Start: 2020-08-16 | End: 2020-09-15

## 2020-07-16 RX ORDER — LITHIUM CARBONATE 150 MG/1
150 CAPSULE ORAL DAILY
Qty: 90 CAPSULE | Refills: 1 | Status: SHIPPED | OUTPATIENT
Start: 2020-07-16 | End: 2021-02-17

## 2020-07-16 ASSESSMENT — PAIN SCALES - GENERAL: PAINLEVEL: NO PAIN (0)

## 2020-07-16 NOTE — PROGRESS NOTES
"VIDEO VISIT  Sebastien Hoover is a 50 year old patient who is being evaluated via a billable video visit.      The patient has been notified of following:   \"This video visit will be conducted via a call between you and your physician/provider. We have found that certain health care needs can be provided without the need for an in-person physical exam. This service lets us provide the care you need with a video conversation. If a prescription is necessary we can send it directly to your pharmacy. If lab work is needed we can place an order for that and you can then stop by our lab to have the test done at a later time. Insurers are generally covering virtual visits as they would in-office visits so billing should not be different than normal.  If for some reason you do get billed incorrectly, you should contact the billing office to correct it and that number is in the AVS .    Patient has given verbal consent for video visit?:  Yes    How would you like to obtain your AVS?:  [facility]    Patient would prefer that any video invitations be sent by: Send to e-mail at: [email or web address]      AVS SmartPhrase [PsychAVS] has been placed in 'Patient Instructions':  Yes     Video- Visit Details  Type of service:  video visit for medication management  Time of service:    Date:  7/16/20 Video Start Time:  1:00 pm       Video End Time:  1:19 pm    Reason for video visit:  Covid 19  Originating Site (patient location):  [facility]  Location- [place]  Distant Site (provider location):[facility]  Mode of Communication:  Video Conference via Doxy.meConsent:  Patient has given verbal consent for video visit?: Yes        Patient reports he has been physically healthy.  He is stressed by home repair needs.  His mood is better than it was a few weeks ago.  His baseline is moderate to severe depression he was not able to exercise or eat properly when depressed.  He wakes up groggy and exhausted he has early morning awakening.  Appetite is " better now which means less as he gets carbohydrate craving when he is more depressed.  He is unable to concentrate enough to read.  He has some passive suicidal ideation.  He has no panic attacks no jeanne no psychosis.    Medications: Suboxone 6 mg with Ritalin 30 mg twice daily and lithium 150 mg daily he is experiencing no side effects.    Allergies: Zithromax.    Medical status currently healthy comprehensive medical review of systems is negative.    Diagnosis major depressive disorder recurrent and severe refractory to treatment he has had electroconvulsive therapy ECT and numerous medication trials including ketamine.  He is experiencing some falloff when the Ritalin doses wear off.  We discussed switching to a long-acting form.    Plan we will change the Ritalin to the Ritalin LA 60 mg.    Start time 1 PM end time 1:19 PM

## 2020-07-16 NOTE — TELEPHONE ENCOUNTER
On 7/16/2020, at 12:29, writer called patient at [phone] to confirm Virtual Visit. Writer unable to make contact with patient. Writer left detailed voice message for call back. [phone] left as call back number. Cherelle Mcdaniels, [facility]

## 2020-07-16 NOTE — PATIENT INSTRUCTIONS
Thank you for coming to the PSYCHIATRY CLINIC.    Lab Testing:  If you had lab testing today and your results are reassuring or normal they will be mailed to you or sent through StreamSpec within 7 days. If the lab tests need quick action we will call you with the results. The phone number we will call with results is # [phone] (home) . If this is not the best number please call our clinic and change the number.    Medication Refills:  If you need any refills please call your pharmacy and they will contact us. Our fax number for refills is [phone]. Please allow three business for refill processing. If you need to  your refill at a new pharmacy, please contact the new pharmacy directly. The new pharmacy will help you get your medications transferred.     Scheduling:  If you have any concerns about today's visit or wish to schedule another appointment please call our office during normal business hours [phone] (8-5:00 M-F)    Contact Us:  Please call [phone] during business hours (8-5:00 M-F).  If after clinic hours, or on the weekend, please call  [phone].    Financial Assistance [phone]  [facility] Billing [phone]  [place] Billing Office, [facility]: [phone]  [place] Billing [phone]  Medical Records [phone]      MENTAL HEALTH CRISIS NUMBERS:  For a medical emergency please call  911 or go to the nearest ER.     [facility]:   [facility] -[phone]   Crisis Residence [facility] Residence -[phone]   Walk-In Counseling [facility] -[phone]   COPE 24/7 [place] Mobile Team -[phone] (adults)/[phone] (child)  CHILD: [facility] needs assessment team - [phone]      [facility]:   [facility] - [phone]   Walk-in counseling [facility] - [phone]   Walk-in counseling [facility] - [phone]   Crisis Residence [facility] - [phone]  Urgent  [facility] Adult Mental [place]-[phone] mobile unit/ 24/7 crisis line    National Crisis Numbers:   National Suicide Prevention Lifeline: 7-616-825-TALK (496-860-9504)  Poison Control Center - 5-087-424-2424  [employer]/resources for a list of additional resources (SOS)  Trans Lifeline a hotline for transgender people 5-130-280-9403  The Tim Project a hotline for LGBT youth 1-329.998.9823  Crisis Text Line: For any crisis 24/7   To: 029261  see www.crisistextline.org  - IF MAKING A CALL FEELS TOO HARD, send a text!         Again thank you for choosing PSYCHIATRY CLINIC and please let us know how we can best partner with you to improve you and your family's health.    You may be receiving a survey regarding this appointment. We would love to have your feedback, both positive and negative. The survey is done by an external company, so your answers are anonymous.

## 2020-07-17 DIAGNOSIS — F33.2 SEVERE EPISODE OF RECURRENT MAJOR DEPRESSIVE DISORDER, WITHOUT PSYCHOTIC FEATURES (H): Primary | ICD-10-CM

## 2020-07-17 RX ORDER — METHYLPHENIDATE HYDROCHLORIDE 20 MG/1
20 CAPSULE, EXTENDED RELEASE ORAL DAILY
Qty: 30 CAPSULE | Refills: 0 | Status: SHIPPED | OUTPATIENT
Start: 2020-08-17 | End: 2020-09-16

## 2020-07-17 RX ORDER — METHYLPHENIDATE HYDROCHLORIDE 20 MG/1
20 CAPSULE, EXTENDED RELEASE ORAL DAILY
Qty: 30 CAPSULE | Refills: 0 | Status: SHIPPED | OUTPATIENT
Start: 2020-09-17 | End: 2020-10-17

## 2020-07-17 RX ORDER — METHYLPHENIDATE HYDROCHLORIDE 20 MG/1
20 CAPSULE, EXTENDED RELEASE ORAL DAILY
Qty: 30 CAPSULE | Refills: 0 | Status: SHIPPED | OUTPATIENT
Start: 2020-07-17 | End: 2020-08-16

## 2020-07-24 DIAGNOSIS — F33.2 SEVERE EPISODE OF RECURRENT MAJOR DEPRESSIVE DISORDER, WITHOUT PSYCHOTIC FEATURES (H): ICD-10-CM

## 2020-07-24 RX ORDER — METHYLPHENIDATE HYDROCHLORIDE 40 MG/1
40 CAPSULE, EXTENDED RELEASE ORAL DAILY
Qty: 30 CAPSULE | Refills: 0 | Status: SHIPPED | OUTPATIENT
Start: 2020-07-24 | End: 2021-01-14

## 2020-08-07 DIAGNOSIS — F33.2 MAJOR DEPRESSIVE DISORDER, RECURRENT, SEVERE WITHOUT PSYCHOTIC FEATURES (H): ICD-10-CM

## 2020-08-07 DIAGNOSIS — F33.2 SEVERE EPISODE OF RECURRENT MAJOR DEPRESSIVE DISORDER, WITHOUT PSYCHOTIC FEATURES (H): ICD-10-CM

## 2020-08-07 RX ORDER — BUPRENORPHINE AND NALOXONE 4; 1 MG/1; MG/1
1 FILM, SOLUBLE BUCCAL; SUBLINGUAL DAILY
Qty: 30 FILM | Refills: 1 | Status: SHIPPED | OUTPATIENT
Start: 2020-08-07 | End: 2020-09-24

## 2020-08-07 RX ORDER — METHYLPHENIDATE HYDROCHLORIDE 10 MG/1
30 TABLET ORAL 2 TIMES DAILY
Qty: 180 TABLET | Refills: 0 | Status: SHIPPED | OUTPATIENT
Start: 2020-08-07 | End: 2020-09-04

## 2020-09-04 DIAGNOSIS — F33.2 MAJOR DEPRESSIVE DISORDER, RECURRENT, SEVERE WITHOUT PSYCHOTIC FEATURES (H): ICD-10-CM

## 2020-09-04 RX ORDER — METHYLPHENIDATE HYDROCHLORIDE 10 MG/1
30 TABLET ORAL 2 TIMES DAILY
Qty: 180 TABLET | Refills: 0 | Status: SHIPPED | OUTPATIENT
Start: 2020-09-04 | End: 2020-09-24

## 2020-09-24 ENCOUNTER — VIRTUAL VISIT (OUTPATIENT)
Dept: PSYCHIATRY | Facility: CLINIC | Age: 50
End: 2020-09-24
Attending: PSYCHIATRY & NEUROLOGY
Payer: MEDICARE

## 2020-09-24 DIAGNOSIS — F33.2 SEVERE EPISODE OF RECURRENT MAJOR DEPRESSIVE DISORDER, WITHOUT PSYCHOTIC FEATURES (H): ICD-10-CM

## 2020-09-24 DIAGNOSIS — F33.2 MAJOR DEPRESSIVE DISORDER, RECURRENT, SEVERE WITHOUT PSYCHOTIC FEATURES (H): ICD-10-CM

## 2020-09-24 RX ORDER — BUPRENORPHINE AND NALOXONE 4; 1 MG/1; MG/1
1 FILM, SOLUBLE BUCCAL; SUBLINGUAL DAILY
Qty: 30 FILM | Refills: 1 | Status: SHIPPED | OUTPATIENT
Start: 2020-09-24 | End: 2020-12-01

## 2020-09-24 RX ORDER — METHYLPHENIDATE HYDROCHLORIDE 10 MG/1
30 TABLET ORAL 2 TIMES DAILY
Qty: 180 TABLET | Refills: 0 | Status: SHIPPED | OUTPATIENT
Start: 2020-09-24 | End: 2020-11-02

## 2020-09-24 RX ORDER — METHYLPHENIDATE HYDROCHLORIDE 10 MG/1
30 TABLET ORAL 2 TIMES DAILY
Qty: 180 TABLET | Refills: 0 | Status: SHIPPED | OUTPATIENT
Start: 2020-09-24 | End: 2021-01-14

## 2020-09-24 ASSESSMENT — PAIN SCALES - GENERAL: PAINLEVEL: NO PAIN (0)

## 2020-09-24 NOTE — PATIENT INSTRUCTIONS
Thank you for coming to the PSYCHIATRY CLINIC.    Lab Testing:  If you had lab testing today and your results are reassuring or normal they will be mailed to you or sent through [employer] within 7 days. If the lab tests need quick action we will call you with the results. The phone number we will call with results is # [phone] (home) . If this is not the best number please call our clinic and change the number.    Medication Refills:  If you need any refills please call your pharmacy and they will contact us. Our fax number for refills is [phone]. Please allow three business for refill processing. If you need to  your refill at a new pharmacy, please contact the new pharmacy directly. The new pharmacy will help you get your medications transferred.     Scheduling:  If you have any concerns about today's visit or wish to schedule another appointment please call our office during normal business hours [phone] (8-5:00 M-F)    Contact Us:  Please call [phone] during business hours (8-5:00 M-F).  If after clinic hours, or on the weekend, please call  [phone].    Financial Assistance [phone]  [facility] Billing [phone]  [place] Billing Office, [facility]: [phone]  [place] Billing [phone]  Medical Records [phone]      MENTAL HEALTH CRISIS NUMBERS:  For a medical emergency please call  911 or go to the nearest ER.     [facility]:   [facility] -[phone]   Crisis Residence [facility] Residence -[phone]   Walk-In Counseling [facility] -[phone]   COPE 24/7 [place] Mobile Team -[phone] (adults)/[phone] (child)  CHILD: [facility] needs assessment team - [phone]      [facility]:   [facility] - [phone]   Walk-in counseling [facility] - [phone]   Walk-in counseling [facility] - [phone]   Crisis Residence [facility] - [phone]  Urgent  [facility] Adult Mental Dobqqt-[phone] mobile unit/ 24/7 crisis line    National Crisis Numbers:   National Suicide Prevention Lifeline: 3-723-855-TALK (282-613-8870)  Poison Control Center - 7-996-235-3304  [employer]/resources for a list of additional resources (SOS)  Trans Lifeline a hotline for transgender people 5-026-524-7738  The Tim Project a hotline for LGBT youth 1-178.381.5164  Crisis Text Line: For any crisis 24/7   To: 051005  see www.crisistextline.org  - IF MAKING A CALL FEELS TOO HARD, send a text!         Again thank you for choosing PSYCHIATRY CLINIC and please let us know how we can best partner with you to improve you and your family's health.    You may be receiving a survey regarding this appointment. We would love to have your feedback, both positive and negative. The survey is done by an external company, so your answers are anonymous.

## 2020-09-24 NOTE — PROGRESS NOTES
"VIDEO VISIT  Sebastien Hoover is a 50 year old patient who is being evaluated via a billable video visit.      The patient has been notified of following:   \"This video visit will be conducted via a call between you and your physician/provider. We have found that certain health care needs can be provided without the need for an in-person physical exam. This service lets us provide the care you need with a video conversation. If a prescription is necessary we can send it directly to your pharmacy. If lab work is needed we can place an order for that and you can then stop by our lab to have the test done at a later time. Insurers are generally covering virtual visits as they would in-office visits so billing should not be different than normal.  If for some reason you do get billed incorrectly, you should contact the billing office to correct it and that number is in the AVS .    Video Conference to be completed via:  [email or web address]    Patient has given verbal consent for video visit?:  Yes    Patient would prefer that any video invitations be sent by: Send to e-mail at: [email or web address]      How would patient like to obtain AVS?:  Mail a copy    AVS SmartPhrase [PsychAVS] has been placed in 'Patient Instructions':  Yes      Patient reports that he is feeling \"not great\" he is having seasonal worsening of his depression he started using his light box.  The extended release stimulant did not work as well as the immediate release.  He is taking only 4 mg of Suboxone's pharmacy did not have the 2 mg sizes he is experiencing carbohydrate craving.  He is able to read a little but not easily.  He has some anxiety but no panic attacks.  Poor interest.  Not listening to music parents are in poor health he has some passive suicidal thoughts but no intent to act on them he has no psychotic symptoms no manic symptoms he does not drink alcohol nor use any recreational drugs.    Medications: Suboxone 4 mg Eskalith  mg " methylphenidate long-acting 40 mg.    Allergies: None new.    Medical review of systems is negative.    Mental status exam.  Patient is adequately groomed appropriately attired he is cooperative with the examination.  Speech is minimally slowed somewhat monoton.  Mood is down affect is pensive.  Movements are normal.  Thought process is logical and goal oriented there are no psychotic symptoms no active suicidal thoughts.  Recall is excellent for recent and remote memory.  He is oriented x4 Insight and judgment are good.    Assessments: Major depressive disorder chronic severe without psychosis.  Is treatment resistant depression he had a modest benefit from ECT no significant benefit from TMS and numerous medication trials.  We have been tried ketamine but he was unable to tolerate it.  There has been some benefit from the Suboxone which she does not take for chemical dependency.    Plan there will be no changes today except I will change back the methylphenidate to immediate release 10mg tablets 3 tablets by mouth twice daily.    Venancio Ochoa MD.    Start time 3:30 PM end time 3:52 PM

## 2020-11-02 DIAGNOSIS — F33.2 MAJOR DEPRESSIVE DISORDER, RECURRENT, SEVERE WITHOUT PSYCHOTIC FEATURES (H): ICD-10-CM

## 2020-11-02 RX ORDER — METHYLPHENIDATE HYDROCHLORIDE 10 MG/1
30 TABLET ORAL 2 TIMES DAILY
Qty: 180 TABLET | Refills: 0 | Status: SHIPPED | OUTPATIENT
Start: 2020-11-02 | End: 2021-01-14

## 2020-11-14 ENCOUNTER — HEALTH MAINTENANCE LETTER (OUTPATIENT)
Age: 50
End: 2020-11-14

## 2020-12-01 DIAGNOSIS — F33.2 SEVERE EPISODE OF RECURRENT MAJOR DEPRESSIVE DISORDER, WITHOUT PSYCHOTIC FEATURES (H): ICD-10-CM

## 2020-12-01 RX ORDER — BUPRENORPHINE AND NALOXONE 4; 1 MG/1; MG/1
1 FILM, SOLUBLE BUCCAL; SUBLINGUAL DAILY
Qty: 30 FILM | Refills: 2 | Status: SHIPPED | OUTPATIENT
Start: 2020-12-01 | End: 2021-01-04

## 2020-12-01 RX ORDER — METHYLPHENIDATE HYDROCHLORIDE 10 MG/1
30 TABLET ORAL 2 TIMES DAILY
Qty: 180 TABLET | Refills: 0 | Status: SHIPPED | OUTPATIENT
Start: 2021-02-01 | End: 2021-01-04

## 2020-12-01 RX ORDER — METHYLPHENIDATE HYDROCHLORIDE 10 MG/1
30 TABLET ORAL 2 TIMES DAILY
Qty: 180 TABLET | Refills: 0 | Status: SHIPPED | OUTPATIENT
Start: 2020-12-01 | End: 2020-12-31

## 2020-12-01 RX ORDER — METHYLPHENIDATE HYDROCHLORIDE 10 MG/1
30 TABLET ORAL 2 TIMES DAILY
Qty: 180 TABLET | Refills: 0 | Status: SHIPPED | OUTPATIENT
Start: 2021-01-01 | End: 2021-01-31

## 2020-12-23 ENCOUNTER — TELEPHONE (OUTPATIENT)
Dept: PSYCHIATRY | Facility: CLINIC | Age: 50
End: 2020-12-23

## 2020-12-23 NOTE — TELEPHONE ENCOUNTER
6 faxed pages was received from [employer] requesting LTD forms be completed. A signed BON was included, requesting all records from 12/1/2019 be sent also. This was sent down to medical records. The original copies were put in Dr Ochoa's folder and a message was text to him. Copies are held in Nurse Triage until completed.signed forms are returned to this writer.Mireya Uribe LPN

## 2021-01-04 DIAGNOSIS — F33.2 MAJOR DEPRESSIVE DISORDER, RECURRENT, SEVERE WITHOUT PSYCHOTIC FEATURES (H): Primary | ICD-10-CM

## 2021-01-04 DIAGNOSIS — F33.2 SEVERE EPISODE OF RECURRENT MAJOR DEPRESSIVE DISORDER, WITHOUT PSYCHOTIC FEATURES (H): ICD-10-CM

## 2021-01-04 RX ORDER — BUPRENORPHINE AND NALOXONE 4; 1 MG/1; MG/1
1 FILM, SOLUBLE BUCCAL; SUBLINGUAL DAILY
Qty: 30 FILM | Refills: 2 | Status: SHIPPED | OUTPATIENT
Start: 2021-01-04 | End: 2021-04-02

## 2021-01-04 RX ORDER — METHYLPHENIDATE HYDROCHLORIDE 10 MG/1
30 TABLET ORAL 2 TIMES DAILY
Qty: 180 TABLET | Refills: 0 | Status: SHIPPED | OUTPATIENT
Start: 2021-02-01 | End: 2021-03-04

## 2021-01-04 RX ORDER — METHYLPHENIDATE HYDROCHLORIDE 10 MG/1
30 TABLET ORAL 2 TIMES DAILY
Qty: 180 TABLET | Refills: 0 | Status: SHIPPED | OUTPATIENT
Start: 2021-03-07 | End: 2021-03-03

## 2021-01-04 RX ORDER — METHYLPHENIDATE HYDROCHLORIDE 10 MG/1
30 TABLET ORAL 2 TIMES DAILY
Qty: 180 TABLET | Refills: 0 | Status: SHIPPED | OUTPATIENT
Start: 2021-01-04 | End: 2021-02-03

## 2021-01-04 RX ORDER — METHYLPHENIDATE HYDROCHLORIDE 10 MG/1
30 TABLET ORAL 2 TIMES DAILY
Qty: 180 TABLET | Refills: 0 | Status: SHIPPED | OUTPATIENT
Start: 2021-02-04 | End: 2021-03-04

## 2021-01-14 ENCOUNTER — VIRTUAL VISIT (OUTPATIENT)
Dept: PSYCHIATRY | Facility: CLINIC | Age: 51
End: 2021-01-14
Attending: PSYCHIATRY & NEUROLOGY
Payer: MEDICARE

## 2021-01-14 DIAGNOSIS — F33.2 SEVERE EPISODE OF RECURRENT MAJOR DEPRESSIVE DISORDER, WITHOUT PSYCHOTIC FEATURES (H): Primary | ICD-10-CM

## 2021-01-14 PROCEDURE — 99213 OFFICE O/P EST LOW 20 MIN: CPT | Mod: 95 | Performed by: PSYCHIATRY & NEUROLOGY

## 2021-01-14 ASSESSMENT — PAIN SCALES - GENERAL: PAINLEVEL: NO PAIN (0)

## 2021-01-14 NOTE — PROGRESS NOTES
"VIDEO VISIT  Sebastien Hoover is a 50 year old patient who is being evaluated via a billable video visit.      The patient has been notified of following:   \"This video visit will be conducted via a call between you and your physician/provider. We have found that certain health care needs can be provided without the need for an in-person physical exam. This service lets us provide the care you need with a video conversation. If a prescription is necessary we can send it directly to your pharmacy. If lab work is needed we can place an order for that and you can then stop by our lab to have the test done at a later time. Insurers are generally covering virtual visits as they would in-office visits so billing should not be different than normal.  If for some reason you do get billed incorrectly, you should contact the billing office to correct it and that number is in the AVS .    Video Conference to be completed via:  [email or web address]    Patient has given verbal consent for video visit?:  Yes    Patient would prefer that any video invitations be sent by: Send to e-mail at: [email or web address]      How would patient like to obtain AVS?:  [employer]    AVS SmartPhrase [PsychAVS] has been placed in 'Patient Instructions':  Yes     Chart reviewed interviewed patient.  Patient reports he is not leaving the house because of COVID-19 has parents are elderly and he does not want to transmit the virus to them.  He reports feeling bored.  He is exercising.  He has not been listening to music something he has enjoyed in the past.  Sleep is okay.  He has carbohydrate craving when she is trying to control.  He is able to briefly read but concentration is limited.  He is somewhat able to watch television.  He has mild anxiety.  He is using bright light box.  He thinks medications do help he denies having any suicide thoughts.  No panic attacks no jeanne no psychosis no alcohol or drug use.    Current medications: Please see EMR    Allergies: " None new.    Medical review of systems is entirely negative.    Mental status exam: Patient appears adequately groomed he is attentive to the interview.  Attire is appropriate.  Speech is fluent of normal rate movements appear grossly normal.  Thought process is logical and goal oriented.  There is no flight of ideas or loosening of associations.  He has no suicide thoughts no psychosis.  He is oriented x4.  Insight and judgment are good.  Recent and remote recall are good.    Assessments major depressive disorder recurrent and severe refractory to treatment.  He has had virtually all classes of medications has a vagus nerve stimulator.  He had TMS without significant benefits.  ECT provided some brief benefit but it did not last.  Current meds are helping somewhat but clearly are suboptimal.    Plan there will be no changes today he will return for follow-up in 4 weeks time

## 2021-01-14 NOTE — PATIENT INSTRUCTIONS
Patient Education      Thank you for coming to the [facility] MENTAL HEALTH & ADDICTION [place] [employer] CLINIC.    Lab Testing:  If you had lab testing today and your results are reassuring or normal they will be mailed to you or sent through [employer] within 7 days. If the lab tests need quick action we will call you with the results. The phone number we will call with results is # [phone] (home) . If this is not the best number please call our clinic and change the number.    Medication Refills:  If you need any refills please call your pharmacy and they will contact us. Our fax number for refills is [phone]. Please allow three business for refill processing. If you need to  your refill at a new pharmacy, please contact the new pharmacy directly. The new pharmacy will help you get your medications transferred.     Scheduling:  If you have any concerns about today's visit or wish to schedule another appointment please call our office during normal business hours [phone] (8-5:00 M-F)    Contact Us:  Please call [phone] during business hours (8-5:00 M-F).  If after clinic hours, or on the weekend, please call  [phone].    Financial Assistance [phone]  [facility] Billing [phone]  Central Billing Office, [facility]: [phone]  [place] Billing [phone]  Medical Records [phone]  [place] Patient Bill of Rights [email or web address]       MENTAL HEALTH CRISIS NUMBERS:  For a medical emergency please call  911 or go to the nearest ER.     [facility]:   [facility] -[phone]   Crisis Residence [facility] Residence -[phone]   Walk-In Counseling Center [facility] -[phone]   COPE 24/7 [place] Mobile Team -[phone] (adults)/[phone] (child)  CHILD: [facility] needs assessment team - [phone]      [facility]:   [facility] - [phone]   Walk-in  counseling [facility] - [phone]   Walk-in counseling [facility] - [phone]   Crisis Residence [facility] - [phone]  Urgent Care Adult Mental Vspnyd-[phone] mobile unit/ 24/7 crisis line    National Crisis Numbers:   National Suicide Prevention Lifeline: 8-390-428-TALK (360-335-6840)  Poison Control Center - 0-205-211-7101  CareOne/resources for a list of additional resources (SOS)  Trans Lifeline a hotline for transgender people 5-221-756-3806  The Tim Project a hotline for LGBT youth 1-904.659.2251  Crisis Text Line: For any crisis 24/7   To: 336497  see www.crisistextline.org  - IF MAKING A CALL FEELS TOO HARD, send a text!         Again thank you for choosing [facility] MENTAL HEALTH & ADDICTION [place] [facility] and please let us know how we can best partner with you to improve you and your family's health.    You may be receiving a survey regarding this appointment. We would love to have your feedback, both positive and negative. The survey is done by an external company, so your answers are anonymous.

## 2021-02-14 DIAGNOSIS — F33.2 MAJOR DEPRESSIVE DISORDER, RECURRENT, SEVERE WITHOUT PSYCHOTIC FEATURES (H): ICD-10-CM

## 2021-02-17 RX ORDER — LITHIUM CARBONATE 150 MG/1
150 CAPSULE ORAL DAILY
Qty: 90 CAPSULE | Refills: 1 | Status: SHIPPED | OUTPATIENT
Start: 2021-02-17 | End: 2021-08-12

## 2021-02-17 NOTE — TELEPHONE ENCOUNTER
Medication requested: LITHIUM CARBONATE 150 MG CAP  Last refilled: 11/21/20  Qty: 90      Last seen: 1/14/21  RTC: ?  Cancel: 0  No-show: 0  Next appt: 3/11/21    Refill decision:   Refill pended and routed to the provider for review/determination due to   Last note from 1/14/21 is not complete

## 2021-03-03 DIAGNOSIS — F33.2 MAJOR DEPRESSIVE DISORDER, RECURRENT, SEVERE WITHOUT PSYCHOTIC FEATURES (H): ICD-10-CM

## 2021-03-03 RX ORDER — METHYLPHENIDATE HYDROCHLORIDE 10 MG/1
30 TABLET ORAL 2 TIMES DAILY
Qty: 180 TABLET | Refills: 0 | Status: SHIPPED | OUTPATIENT
Start: 2021-03-07 | End: 2021-03-04

## 2021-03-04 ENCOUNTER — TELEPHONE (OUTPATIENT)
Dept: PSYCHIATRY | Facility: CLINIC | Age: 51
End: 2021-03-04

## 2021-03-04 DIAGNOSIS — F33.2 MAJOR DEPRESSIVE DISORDER, RECURRENT, SEVERE WITHOUT PSYCHOTIC FEATURES (H): Primary | ICD-10-CM

## 2021-03-04 RX ORDER — METHYLPHENIDATE HYDROCHLORIDE 10 MG/1
30 TABLET ORAL 2 TIMES DAILY
Qty: 180 TABLET | Refills: 0 | Status: SHIPPED | OUTPATIENT
Start: 2021-03-04 | End: 2021-04-01

## 2021-03-04 NOTE — TELEPHONE ENCOUNTER
Writer received incoming phone call from Juli, pharmacist with [facility]. She is calling to clean up the pt's profile at the pharmacy. Per Juli, the pt has 7 total scripts of methylphenidate- 1 for 3/7, 3 for Jan, 1 for Dec, 1 for Sept, and 1 for July. She would like to confirm how to proceed.    Per [place] , pt last filled medication on 2/2/21, 1/4/21, and 12/1/20.    Writer asked that Juli delete all prescriptions, except one from January, as pt has appt on 3/11/21. Juli agreed to do so. Writer will update med tab as well.

## 2021-03-25 ENCOUNTER — VIRTUAL VISIT (OUTPATIENT)
Dept: PSYCHIATRY | Facility: CLINIC | Age: 51
End: 2021-03-25
Attending: PSYCHIATRY & NEUROLOGY
Payer: MEDICARE

## 2021-03-25 DIAGNOSIS — F33.2 SEVERE EPISODE OF RECURRENT MAJOR DEPRESSIVE DISORDER, WITHOUT PSYCHOTIC FEATURES (H): Primary | ICD-10-CM

## 2021-03-25 PROCEDURE — 99212 OFFICE O/P EST SF 10 MIN: CPT | Mod: 95 | Performed by: PSYCHIATRY & NEUROLOGY

## 2021-03-25 ASSESSMENT — PAIN SCALES - GENERAL: PAINLEVEL: NO PAIN (0)

## 2021-03-25 NOTE — PATIENT INSTRUCTIONS
**For crisis resources, please see the information at the end of this document**     Patient Education      Thank you for coming to the [facility] MENTAL HEALTH & ADDICTION [place] [facility]IN[facility].    Lab Testing:  If you had lab testing today and your results are reassuring or normal they will be mailed to you or sent through [employer] within 7 days. If the lab tests need quick action we will call you with the results. The phone number we will call with results is # [phone] (home) . If this is not the best number please call our clinic and change the number.    Medication Refills:  If you need any refills please call your pharmacy and they will contact us. Our fax number for refills is [phone]. Please allow three business for refill processing. If you need to  your refill at a new pharmacy, please contact the new pharmacy directly. The new pharmacy will help you get your medications transferred.     Scheduling:  If you have any concerns about today's visit or wish to schedule another appointment please call our office during normal business hours [phone] (8-5:00 M-F)    Contact Us:  Please call [phone] during business hours (8-5:00 M-F).  If after clinic hours, or on the weekend, please call  [phone].    Financial Assistance [phone]  [facility] Billing [phone]  Central Billing Office, [facility]: [phone]  [place] Billing [phone]  Medical Records [phone]  [place] Patient Bill of Rights [email or web address]       MENTAL HEALTH CRISIS NUMBERS:  For a medical emergency please call  911 or go to the nearest ER.     [facility]:   [facility] -[phone]   Crisis Residence [facility] Residence -[phone]   Walk-In Counseling Center [facility] -[phone]   COPE 24/7 [place] Mobile Team -[phone] (adults)/[phone] (child)  CHILD: [facility] needs assessment  team - [phone]      [facility]:   [facility] - [phone]   Walk-in counseling [facility] - [phone]   Walk-in counseling [facility] - [phone]   Crisis Residence [facility] Residence - [phone]  Urgent Care Adult Mental Chbktc-[phone] mobile unit/ 24/7 crisis line    National Crisis Numbers:   National Suicide Prevention Lifeline: 8-974-632-TALK (017-142-9936)  Poison Control Center - 5-246-059-5106  [employer]/resources for a list of additional resources (SOS)  Trans Lifeline a hotline for transgender people 1-789.952.7869  The Tim Project a hotline for LGBT youth 3-060-622-8510  Crisis Text Line: For any crisis 24/7   To: 912517  see www.crisistextline.org  - IF MAKING A CALL FEELS TOO HARD, send a text!         Again thank you for choosing [facility] MENTAL HEALTH & ADDICTION [facility] and please let us know how we can best partner with you to improve you and your family's health.    You may be receiving a survey regarding this appointment. We would love to have your feedback, both positive and negative. The survey is done by an external company, so your answers are anonymous.

## 2021-03-25 NOTE — PROGRESS NOTES
"VIDEO VISIT  Sebastien Hoover is a 50 year old patient who is being evaluated via a billable video visit.      The patient has been notified of following:   \"This video visit will be conducted via a call between you and your physician/provider. We have found that certain health care needs can be provided without the need for an in-person physical exam. This service lets us provide the care you need with a video conversation. If a prescription is necessary we can send it directly to your pharmacy. If lab work is needed we can place an order for that and you can then stop by our lab to have the test done at a later time. Insurers are generally covering virtual visits as they would in-office visits so billing should not be different than normal.  If for some reason you do get billed incorrectly, you should contact the billing office to correct it and that number is in the AVS .    Video Conference to be completed via:  [email or web address]    Patient has given verbal consent for video visit?:  Yes    Patient would prefer that any video invitations be sent by: Send to e-mail at: [email or web address]      How would patient like to obtain AVS?:  [employer]    AVS SmartPhrase [PsychAVS] has been placed in 'Patient Instructions':  Yes    "

## 2021-03-28 ENCOUNTER — HEALTH MAINTENANCE LETTER (OUTPATIENT)
Age: 51
End: 2021-03-28

## 2021-04-01 DIAGNOSIS — F33.2 MAJOR DEPRESSIVE DISORDER, RECURRENT, SEVERE WITHOUT PSYCHOTIC FEATURES (H): ICD-10-CM

## 2021-04-01 RX ORDER — METHYLPHENIDATE HYDROCHLORIDE 10 MG/1
30 TABLET ORAL 2 TIMES DAILY
Qty: 180 TABLET | Refills: 0 | Status: SHIPPED | OUTPATIENT
Start: 2021-04-01 | End: 2021-05-03

## 2021-04-02 DIAGNOSIS — F33.2 SEVERE EPISODE OF RECURRENT MAJOR DEPRESSIVE DISORDER, WITHOUT PSYCHOTIC FEATURES (H): ICD-10-CM

## 2021-04-02 RX ORDER — BUPRENORPHINE AND NALOXONE 4; 1 MG/1; MG/1
1 FILM, SOLUBLE BUCCAL; SUBLINGUAL DAILY
Qty: 30 FILM | Refills: 2 | Status: SHIPPED | OUTPATIENT
Start: 2021-04-02 | End: 2021-05-27

## 2021-05-03 DIAGNOSIS — F33.2 MAJOR DEPRESSIVE DISORDER, RECURRENT, SEVERE WITHOUT PSYCHOTIC FEATURES (H): ICD-10-CM

## 2021-05-03 RX ORDER — METHYLPHENIDATE HYDROCHLORIDE 10 MG/1
30 TABLET ORAL 2 TIMES DAILY
Qty: 180 TABLET | Refills: 0 | Status: SHIPPED | OUTPATIENT
Start: 2021-05-03 | End: 2021-05-27

## 2021-05-27 ENCOUNTER — VIRTUAL VISIT (OUTPATIENT)
Dept: PSYCHIATRY | Facility: CLINIC | Age: 51
End: 2021-05-27
Attending: PSYCHIATRY & NEUROLOGY
Payer: MEDICARE

## 2021-05-27 ENCOUNTER — TELEPHONE (OUTPATIENT)
Dept: PSYCHIATRY | Facility: CLINIC | Age: 51
End: 2021-05-27

## 2021-05-27 DIAGNOSIS — F33.2 SEVERE EPISODE OF RECURRENT MAJOR DEPRESSIVE DISORDER, WITHOUT PSYCHOTIC FEATURES (H): ICD-10-CM

## 2021-05-27 DIAGNOSIS — F33.2 MAJOR DEPRESSIVE DISORDER, RECURRENT, SEVERE WITHOUT PSYCHOTIC FEATURES (H): ICD-10-CM

## 2021-05-27 PROCEDURE — 99212 OFFICE O/P EST SF 10 MIN: CPT | Mod: 95 | Performed by: PSYCHIATRY & NEUROLOGY

## 2021-05-27 RX ORDER — BUPRENORPHINE AND NALOXONE 4; 1 MG/1; MG/1
1 FILM, SOLUBLE BUCCAL; SUBLINGUAL DAILY
Qty: 30 FILM | Refills: 2 | Status: SHIPPED | OUTPATIENT
Start: 2021-05-27 | End: 2021-08-12

## 2021-05-27 RX ORDER — METHYLPHENIDATE HYDROCHLORIDE 10 MG/1
30 TABLET ORAL 2 TIMES DAILY
Qty: 180 TABLET | Refills: 0 | Status: SHIPPED | OUTPATIENT
Start: 2021-05-27

## 2021-06-30 DIAGNOSIS — F33.2 MAJOR DEPRESSIVE DISORDER, RECURRENT, SEVERE WITHOUT PSYCHOTIC FEATURES (H): Primary | ICD-10-CM

## 2021-06-30 RX ORDER — METHYLPHENIDATE HYDROCHLORIDE 10 MG/1
30 TABLET ORAL 2 TIMES DAILY
Qty: 180 TABLET | Refills: 0 | Status: SHIPPED | OUTPATIENT
Start: 2021-06-30

## 2021-06-30 RX ORDER — METHYLPHENIDATE HYDROCHLORIDE 10 MG/1
30 TABLET ORAL 2 TIMES DAILY
Qty: 180 TABLET | Refills: 0 | Status: SHIPPED | OUTPATIENT
Start: 2021-07-31

## 2021-06-30 RX ORDER — METHYLPHENIDATE HYDROCHLORIDE 10 MG/1
30 TABLET ORAL 2 TIMES DAILY
Qty: 180 TABLET | Refills: 0 | Status: SHIPPED | OUTPATIENT
Start: 2021-08-31 | End: 2021-08-27

## 2021-08-12 ENCOUNTER — VIRTUAL VISIT (OUTPATIENT)
Dept: PSYCHIATRY | Facility: CLINIC | Age: 51
End: 2021-08-12
Attending: PSYCHIATRY & NEUROLOGY
Payer: MEDICARE

## 2021-08-12 DIAGNOSIS — F33.2 SEVERE EPISODE OF RECURRENT MAJOR DEPRESSIVE DISORDER, WITHOUT PSYCHOTIC FEATURES (H): ICD-10-CM

## 2021-08-12 PROCEDURE — 99212 OFFICE O/P EST SF 10 MIN: CPT | Mod: 95 | Performed by: PSYCHIATRY & NEUROLOGY

## 2021-08-12 RX ORDER — BUPRENORPHINE AND NALOXONE 4; 1 MG/1; MG/1
1 FILM, SOLUBLE BUCCAL; SUBLINGUAL DAILY
Qty: 30 FILM | Refills: 2 | Status: SHIPPED | OUTPATIENT
Start: 2021-08-12

## 2021-08-12 ASSESSMENT — PAIN SCALES - GENERAL: PAINLEVEL: NO PAIN (0)

## 2021-08-12 NOTE — PATIENT INSTRUCTIONS
**For crisis resources, please see the information at the end of this document**     Patient Education      Thank you for coming to the [facility] MENTAL HEALTH & ADDICTION [place] CLINIC.    Lab Testing:  If you had lab testing today and your results are reassuring or normal they will be mailed to you or sent through [employer] within 7 days. If the lab tests need quick action we will call you with the results. The phone number we will call with results is # [phone] (home) . If this is not the best number please call our clinic and change the number.    Medication Refills:  If you need any refills please call your pharmacy and they will contact us. Our fax number for refills is [phone]. Please allow three business for refill processing. If you need to  your refill at a new pharmacy, please contact the new pharmacy directly. The new pharmacy will help you get your medications transferred.     Scheduling:  If you have any concerns about today's visit or wish to schedule another appointment please call our office during normal business hours [phone] (8-5:00 M-F)    Contact Us:  Please call [phone] during business hours (8-5:00 M-F).  If after clinic hours, or on the weekend, please call  [phone].    Financial Assistance [phone]  [facility] Billing [phone]  Central Billing Office, [facility]: [phone]  [place] Billing [phone]  Medical Records [phone]  [place] Patient Bill of Rights [email or web address]       MENTAL HEALTH CRISIS NUMBERS:  For a medical emergency please call  911 or go to the nearest ER.     [facility]:   [facility] -[phone]   Crisis Residence [facility] Residence -[phone]   Walk-In Counseling Center \[facility] [place] [facility]\"" -[phone]   COPE 24/7 [place] Mobile Team -[phone] (adults)/[phone] (child)  CHILD: [facility] needs assessment  team - [phone]      [facility]:   [facility] - [phone]   Walk-in counseling [facility] - [phone]   Walk-in counseling [facility] - [phone]   Crisis Residence [facility] Residence - [phone]  Urgent Care Adult Mental Ruhfhw-[phone] mobile unit/ 24/7 crisis line    National Crisis Numbers:   National Suicide Prevention Lifeline: 5-264-415-TALK (923-029-5230)  Poison Control Center - 1-871-643-6819  [employer]/resources for a list of additional resources (SOS)  Trans Lifeline a hotline for transgender people 1-175.606.3891  The Tim Project a hotline for LGBT youth 5-150-265-2568  Crisis Text Line: For any crisis 24/7   To: 506087  see www.crisistextline.org  - IF MAKING A CALL FEELS TOO HARD, send a text!         Again thank you for choosing [facility] MENTAL HEALTH & ADDICTION [facility] and please let us know how we can best partner with you to improve you and your family's health.    You may be receiving a survey regarding this appointment. We would love to have your feedback, both positive and negative. The survey is done by an external company, so your answers are anonymous.

## 2021-08-12 NOTE — PROGRESS NOTES
"VIDEO VISIT  Sebastien Hoover is a 51 year old patient who is being evaluated via a billable video visit.      The patient has been notified of following:   \"This video visit will be conducted via a call between you and your physician/provider. We have found that certain health care needs can be provided without the need for an in-person physical exam. This service lets us provide the care you need with a video conversation. If a prescription is necessary we can send it directly to your pharmacy. If lab work is needed we can place an order for that and you can then stop by our lab to have the test done at a later time. Insurers are generally covering virtual visits as they would in-office visits so billing should not be different than normal.  If for some reason you do get billed incorrectly, you should contact the billing office to correct it and that number is in the AVS .    Video Conference to be completed via:  Juwan    Patient has given verbal consent for video visit?:  Yes    Patient would prefer that any video invitations be sent by: Send to e-mail at: [email or web address]      How would patient like to obtain AVS?:  [employer]    AVS SmartPhrase [PsychAVS] has been placed in 'Patient Instructions':  Yes    "

## 2021-08-19 NOTE — PROGRESS NOTES
"Service Date: 2021    Subjective: Making progress on diet and exercise. Quite down. Stressed by MD change. Stressed by COVID delta variant. Gets out almost daily for walks. Avoids situations with other people. Concern stimulants are causing inflammation. Off lithium    Suicide thoughts: No    Psychiatry Review of Symptoms: Sleep \"not great\". Interest poor past several weeks. Concentration poor. No psychosis. No alcohol    Medications: Suboxone 4 mg; VNS - off for several years; Ritalin    Allergies: None new to meds    Medical ROS: Stung by yellow jacket - hand swollen. Carpal tunnel    Diagnosis: MDD, recurrent refractory to treatment    Plan: Referred to MD Venancio Mackenzie MD        D: 2021   T: 2021   MT: JOSE G    Name:     DANIEL HUNT  MRN:      [id number]        Account:      [id number]   :      1970           Service Date: 2021       Document: [id number]  "

## 2021-08-21 NOTE — PROGRESS NOTES
Service Date: 2021    Subjective: Busy with brother and family coming. Covid shot. Some benefit from med's. Not listening to music. Shops. Diet and exercise fell off. Anhedonic.  Low energy and motivation.  No active SI.  No alcohol or drugs.  No psychosis.      Medications: Lethargy, decrease energy.  Ritalin probably enhnces anxiety.    Current Outpatient Medications:      methylphenidate (RITALIN) 10 MG tablet, Take 3 tablets (30 mg) by mouth 2 times daily, Disp: 180 tablet, Rfl: 0     buprenorphine HCl-naloxone HCl (SUBOXONE) 4-1 MG per film, Place 1 Film under the tongue daily, Disp: 30 Film, Rfl: 2     methylphenidate (RITALIN) 10 MG tablet, Take 3 tablets (30 mg) by mouth 2 times daily, Disp: 180 tablet, Rfl: 0     methylphenidate (RITALIN) 10 MG tablet, Take 3 tablets (30 mg) by mouth 2 times daily, Disp: 180 tablet, Rfl: 0     [START ON 2021] methylphenidate (RITALIN) 10 MG tablet, Take 3 tablets (30 mg) by mouth 2 times daily, Disp: 180 tablet, Rfl: 0      Diagnosis:  MDD, recurrent, refractory to treatment        Plan:  No changes today        Venancio Ochoa MD    Video- Visit Details  Type of service:  video visit for med management  Time of service:    Date:  21    Video Start Time:  2:00 PM        Video End Time:  2:25 PM    Reason for video visit: Covid 19  Originating Site (patient location):  [facility]  Location- [place]  Distant Site (provider location):  [facility]  Mode of Communication:  Video Conference via [email or web address]  Consent:  Patient has given verbal consent for video visit?: Yes            D: 2021   T: 2021   MT: [place]    Name:     DANIEL HUNT  MRN:      [id number]        Account:      [id number]   :      1970           Service Date: 2021       Document: [id number]

## 2021-08-26 ENCOUNTER — TELEPHONE (OUTPATIENT)
Dept: PSYCHIATRY | Facility: CLINIC | Age: 51
End: 2021-08-26

## 2021-08-26 NOTE — CONFIDENTIAL NOTE
MANNIE left voicemail for Sebastien. Writer identified as working with Dr. Ochoa. Writer requested a call back to confirm plans for on-going care after Dr. Ochoa's custodial. Provided contact number.    CLAUDIA Adler, [facility]  [phone]

## 2021-08-27 DIAGNOSIS — F33.2 MAJOR DEPRESSIVE DISORDER, RECURRENT, SEVERE WITHOUT PSYCHOTIC FEATURES (H): ICD-10-CM

## 2021-08-27 RX ORDER — METHYLPHENIDATE HYDROCHLORIDE 10 MG/1
30 TABLET ORAL 2 TIMES DAILY
Qty: 180 TABLET | Refills: 0 | Status: SHIPPED | OUTPATIENT
Start: 2021-10-01

## 2021-08-31 ENCOUNTER — TELEPHONE (OUTPATIENT)
Dept: PSYCHIATRY | Facility: CLINIC | Age: 51
End: 2021-08-31

## 2021-08-31 NOTE — CONFIDENTIAL NOTE
MANNIE received call back from Sebastien.    He confirmed he has an appointment with Blaise Jose at [facility] at end of October. Unfortunately his medications will not last until then. He has enough medications to last until end of September and is requesting additional prescription. Writer will check with team and follow up.    Writer reviewed chart. MANNIE left message for Sebastien confirming Dr. Ochoa received his request. Prescription send to pharmacy to be filled October 1 for 30 day prescription.    Provided contact number in case patient had further questions.    CLAUDIA Adler, [facility]  [phone]

## 2021-08-31 NOTE — PROGRESS NOTES
Service Date: 2021    Subjective: Hanging in there. Relief getting thru another winter. Worried about Covid in parents. Taking Ivermectin (parents are). Not going for many walks. Sleep ok. CHO craving slowly improving. Not listening to music. Anhedonic.Poor energy.  Isolated by Covid and not wanting to bring the virus home.  No active SI or alcohol/drugs.        Suicide Thoughts: Passive.        Psychiatry Review of Symptoms: Anxiety from Ritalin.      Allergies: None new.      Medical ROS: None current.      Diagnosis:MDD, chronic.  Failed ECT, VNS (currently turned off) and numerous antidepressants.      Plan: Refer Kirt Tovar for 2nd opinion and hopefully follow up when I retire.      Venancio Ochoa MD    Video- Visit Details  Type of service:  video visit for med management      Date:  3/25/21    Reason for video visit: Covid 19  Originating Site (patient location):  [facility]  Location- [place]  Distant Site (provider location): [facility]  Mode of Communication:  Video Conference via [email or web address]  Consent:  Patient has given verbal consent for video visit?:yes            D: 2021   T: 2021   MT: SIVAN    Name:     DANIEL HUNTDiana  MRN:      [id number]        Account:      [id number]   :      1970           Service Date: 2021       Document: [id number]

## 2021-09-12 ENCOUNTER — HEALTH MAINTENANCE LETTER (OUTPATIENT)
Age: 51
End: 2021-09-12

## 2021-09-22 NOTE — PROGRESS NOTES
Service Date: 2019  In Clinic    Subjective: Sadness, anhedonia, hopelessness, fatigue, poor motivation, social withdrawal.        Suicide Thoughts:        Psychiatry Review of Symptoms: Sad affect and congruent mood, distractible, anxious, cognition intact. Patient remains severely depressed. No changes in depression or poor level of function.        Medications: Trintellix 20 mg.        Allergies: Little benefit to medications.        Medical ROS: Pharmacotherapy. Had had TMS, ECT and Ketamine.        Diagnosis: MDD, recurrent and severe.        Plan:  Retry MAOI + stimulant?        Venancio Ochoa MD            D: 2021   T: 2021   MT: SIVAN    Name:     DANIEL HUNT  MRN:      [id number]        Account:      [id number]   :      1970           Service Date: 2019       Document: [id number]

## 2022-04-24 ENCOUNTER — HEALTH MAINTENANCE LETTER (OUTPATIENT)
Age: 52
End: 2022-04-24

## 2022-11-19 ENCOUNTER — HEALTH MAINTENANCE LETTER (OUTPATIENT)
Age: 52
End: 2022-11-19

## 2023-06-01 ENCOUNTER — HEALTH MAINTENANCE LETTER (OUTPATIENT)
Age: 53
End: 2023-06-01

## 2024-06-15 ENCOUNTER — HEALTH MAINTENANCE LETTER (OUTPATIENT)
Age: 54
End: 2024-06-15

## 2025-01-03 NOTE — PROGRESS NOTES
Anesthesia Post-op Note    Patient: Karyna Martínez  Procedure(s) Performed: EGD with biopsies  Anesthesia type: MAC    Vitals Value Taken Time   Temp 98.0 F 01/03/25 1123   Pulse 65 01/03/25 1123   Resp 12 01/03/25 1123   SpO2 98 01/03/25 1123   BP 89/54 01/03/25 1123         Patient Location: bedside  Post-op Vital Signs:stable  Level of Consciousness: awake and participates in exam  Respiratory Status: spontaneous ventilation and room air  Cardiovascular stable  Hydration: euvolemic  Pain Management: adequately controlled  Handoff: Handoff to receiving clinician was performed and questions were answered  Vomiting: none  Nausea: None  Airway Patency:patent  Post-op Assessment: awake, alert, appropriately conversant, or baseline, no complications, patient tolerated procedure well, no evidence of recall, dentition, mouth, tongue, and oropharynx unchanged  and No Corneal Abrasion      No notable events documented.                       Pt here for ketamine infusion.  Med infused over 40 min via P IV.  Monitored on cont pulse ox and q 15 min VS during and for 1 hr post infusion.  Tolerated well.  RTC next week.